# Patient Record
Sex: MALE | Race: ASIAN | NOT HISPANIC OR LATINO | URBAN - METROPOLITAN AREA
[De-identification: names, ages, dates, MRNs, and addresses within clinical notes are randomized per-mention and may not be internally consistent; named-entity substitution may affect disease eponyms.]

---

## 2015-04-15 RX ORDER — ASPIRIN/CALCIUM CARB/MAGNESIUM 324 MG
1 TABLET ORAL
Qty: 0 | Refills: 0 | COMMUNITY
Start: 2015-04-15

## 2017-03-31 ENCOUNTER — INPATIENT (INPATIENT)
Facility: HOSPITAL | Age: 77
LOS: 4 days | Discharge: HOME | End: 2017-04-05
Attending: INTERNAL MEDICINE | Admitting: INTERNAL MEDICINE
Payer: MEDICARE

## 2017-03-31 VITALS
OXYGEN SATURATION: 100 % | RESPIRATION RATE: 14 BRPM | SYSTOLIC BLOOD PRESSURE: 131 MMHG | HEART RATE: 58 BPM | TEMPERATURE: 98 F | DIASTOLIC BLOOD PRESSURE: 65 MMHG

## 2017-03-31 DIAGNOSIS — R55 SYNCOPE AND COLLAPSE: ICD-10-CM

## 2017-03-31 DIAGNOSIS — E11.9 TYPE 2 DIABETES MELLITUS WITHOUT COMPLICATIONS: ICD-10-CM

## 2017-03-31 DIAGNOSIS — I25.10 ATHEROSCLEROTIC HEART DISEASE OF NATIVE CORONARY ARTERY WITHOUT ANGINA PECTORIS: ICD-10-CM

## 2017-03-31 DIAGNOSIS — I10 ESSENTIAL (PRIMARY) HYPERTENSION: ICD-10-CM

## 2017-03-31 DIAGNOSIS — K21.9 GASTRO-ESOPHAGEAL REFLUX DISEASE WITHOUT ESOPHAGITIS: ICD-10-CM
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
## 2017-03-31 DIAGNOSIS — E78.5 HYPERLIPIDEMIA, UNSPECIFIED: ICD-10-CM

## 2017-03-31 DIAGNOSIS — N40.0 BENIGN PROSTATIC HYPERPLASIA WITHOUT LOWER URINARY TRACT SYMPTOMS: ICD-10-CM

## 2017-03-31 DIAGNOSIS — R42 DIZZINESS AND GIDDINESS: ICD-10-CM

## 2017-03-31 LAB
ALBUMIN SERPL ELPH-MCNC: 3.4 G/DL — SIGNIFICANT CHANGE UP (ref 3.3–5)
ALP SERPL-CCNC: 58 U/L — SIGNIFICANT CHANGE UP (ref 40–120)
ALT FLD-CCNC: 20 U/L — SIGNIFICANT CHANGE UP (ref 4–41)
APTT BLD: 28.7 SEC — SIGNIFICANT CHANGE UP (ref 27.5–37.4)
AST SERPL-CCNC: 21 U/L — SIGNIFICANT CHANGE UP (ref 4–40)
BASOPHILS # BLD AUTO: 0.01 K/UL — SIGNIFICANT CHANGE UP (ref 0–0.2)
BASOPHILS NFR BLD AUTO: 0.1 % — SIGNIFICANT CHANGE UP (ref 0–2)
BILIRUB SERPL-MCNC: 0.6 MG/DL — SIGNIFICANT CHANGE UP (ref 0.2–1.2)
BUN SERPL-MCNC: 12 MG/DL — SIGNIFICANT CHANGE UP (ref 7–23)
CALCIUM SERPL-MCNC: 8.6 MG/DL — SIGNIFICANT CHANGE UP (ref 8.4–10.5)
CHLORIDE SERPL-SCNC: 99 MMOL/L — SIGNIFICANT CHANGE UP (ref 98–107)
CK MB BLD-MCNC: 2.89 NG/ML — SIGNIFICANT CHANGE UP (ref 1–6.6)
CK MB BLD-MCNC: SIGNIFICANT CHANGE UP (ref 0–2.5)
CK SERPL-CCNC: 52 U/L — SIGNIFICANT CHANGE UP (ref 30–200)
CK SERPL-CCNC: 56 U/L — SIGNIFICANT CHANGE UP (ref 30–200)
CO2 SERPL-SCNC: 25 MMOL/L — SIGNIFICANT CHANGE UP (ref 22–31)
CREAT SERPL-MCNC: 0.84 MG/DL — SIGNIFICANT CHANGE UP (ref 0.5–1.3)
EOSINOPHIL # BLD AUTO: 0.22 K/UL — SIGNIFICANT CHANGE UP (ref 0–0.5)
EOSINOPHIL NFR BLD AUTO: 2.6 % — SIGNIFICANT CHANGE UP (ref 0–6)
GLUCOSE SERPL-MCNC: 87 MG/DL — SIGNIFICANT CHANGE UP (ref 70–99)
HCT VFR BLD CALC: 41.7 % — SIGNIFICANT CHANGE UP (ref 39–50)
HGB BLD-MCNC: 14.2 G/DL — SIGNIFICANT CHANGE UP (ref 13–17)
IMM GRANULOCYTES NFR BLD AUTO: 0.2 % — SIGNIFICANT CHANGE UP (ref 0–1.5)
INR BLD: 1.06 — SIGNIFICANT CHANGE UP (ref 0.88–1.17)
LYMPHOCYTES # BLD AUTO: 1.1 K/UL — SIGNIFICANT CHANGE UP (ref 1–3.3)
LYMPHOCYTES # BLD AUTO: 13.2 % — SIGNIFICANT CHANGE UP (ref 13–44)
MCHC RBC-ENTMCNC: 30.7 PG — SIGNIFICANT CHANGE UP (ref 27–34)
MCHC RBC-ENTMCNC: 34.1 % — SIGNIFICANT CHANGE UP (ref 32–36)
MCV RBC AUTO: 90.3 FL — SIGNIFICANT CHANGE UP (ref 80–100)
MONOCYTES # BLD AUTO: 0.52 K/UL — SIGNIFICANT CHANGE UP (ref 0–0.9)
MONOCYTES NFR BLD AUTO: 6.2 % — SIGNIFICANT CHANGE UP (ref 2–14)
NEUTROPHILS # BLD AUTO: 6.49 K/UL — SIGNIFICANT CHANGE UP (ref 1.8–7.4)
NEUTROPHILS NFR BLD AUTO: 77.7 % — HIGH (ref 43–77)
PLATELET # BLD AUTO: 174 K/UL — SIGNIFICANT CHANGE UP (ref 150–400)
PMV BLD: 10.1 FL — SIGNIFICANT CHANGE UP (ref 7–13)
POTASSIUM SERPL-MCNC: 4.3 MMOL/L — SIGNIFICANT CHANGE UP (ref 3.5–5.3)
POTASSIUM SERPL-SCNC: 4.3 MMOL/L — SIGNIFICANT CHANGE UP (ref 3.5–5.3)
PROT SERPL-MCNC: 5.9 G/DL — LOW (ref 6–8.3)
PROTHROM AB SERPL-ACNC: 11.9 SEC — SIGNIFICANT CHANGE UP (ref 9.8–13.1)
RBC # BLD: 4.62 M/UL — SIGNIFICANT CHANGE UP (ref 4.2–5.8)
RBC # FLD: 14.1 % — SIGNIFICANT CHANGE UP (ref 10.3–14.5)
SODIUM SERPL-SCNC: 137 MMOL/L — SIGNIFICANT CHANGE UP (ref 135–145)
TROPONIN T SERPL-MCNC: < 0.06 NG/ML — SIGNIFICANT CHANGE UP (ref 0–0.06)
TROPONIN T SERPL-MCNC: < 0.06 NG/ML — SIGNIFICANT CHANGE UP (ref 0–0.06)
WBC # BLD: 8.36 K/UL — SIGNIFICANT CHANGE UP (ref 3.8–10.5)
WBC # FLD AUTO: 8.36 K/UL — SIGNIFICANT CHANGE UP (ref 3.8–10.5)

## 2017-03-31 PROCEDURE — 70450 CT HEAD/BRAIN W/O DYE: CPT | Mod: 26

## 2017-03-31 PROCEDURE — 71020: CPT | Mod: 26

## 2017-03-31 RX ORDER — PANTOPRAZOLE SODIUM 20 MG/1
40 TABLET, DELAYED RELEASE ORAL
Qty: 0 | Refills: 0 | Status: DISCONTINUED | OUTPATIENT
Start: 2017-03-31 | End: 2017-04-05

## 2017-03-31 RX ORDER — DOCUSATE SODIUM 100 MG
100 CAPSULE ORAL DAILY
Qty: 0 | Refills: 0 | Status: DISCONTINUED | OUTPATIENT
Start: 2017-03-31 | End: 2017-04-01

## 2017-03-31 RX ORDER — ISOSORBIDE DINITRATE 5 MG/1
20 TABLET ORAL
Qty: 0 | Refills: 0 | Status: DISCONTINUED | OUTPATIENT
Start: 2017-03-31 | End: 2017-04-05

## 2017-03-31 RX ORDER — HYDROXYZINE HCL 10 MG
25 TABLET ORAL AT BEDTIME
Qty: 0 | Refills: 0 | Status: DISCONTINUED | OUTPATIENT
Start: 2017-03-31 | End: 2017-04-05

## 2017-03-31 RX ORDER — FERROUS SULFATE 325(65) MG
325 TABLET ORAL DAILY
Qty: 0 | Refills: 0 | Status: DISCONTINUED | OUTPATIENT
Start: 2017-03-31 | End: 2017-04-05

## 2017-03-31 RX ORDER — ASPIRIN/CALCIUM CARB/MAGNESIUM 324 MG
81 TABLET ORAL DAILY
Qty: 0 | Refills: 0 | Status: DISCONTINUED | OUTPATIENT
Start: 2017-03-31 | End: 2017-04-05

## 2017-03-31 RX ORDER — HEPARIN SODIUM 5000 [USP'U]/ML
5000 INJECTION INTRAVENOUS; SUBCUTANEOUS EVERY 8 HOURS
Qty: 0 | Refills: 0 | Status: DISCONTINUED | OUTPATIENT
Start: 2017-03-31 | End: 2017-04-05

## 2017-03-31 RX ORDER — SODIUM CHLORIDE 9 MG/ML
500 INJECTION INTRAMUSCULAR; INTRAVENOUS; SUBCUTANEOUS ONCE
Qty: 0 | Refills: 0 | Status: COMPLETED | OUTPATIENT
Start: 2017-03-31 | End: 2017-03-31

## 2017-03-31 RX ORDER — FAMOTIDINE 10 MG/ML
20 INJECTION INTRAVENOUS
Qty: 0 | Refills: 0 | Status: DISCONTINUED | OUTPATIENT
Start: 2017-03-31 | End: 2017-04-05

## 2017-03-31 RX ORDER — RANOLAZINE 500 MG/1
500 TABLET, FILM COATED, EXTENDED RELEASE ORAL
Qty: 0 | Refills: 0 | Status: DISCONTINUED | OUTPATIENT
Start: 2017-03-31 | End: 2017-04-05

## 2017-03-31 RX ORDER — SIMVASTATIN 20 MG/1
40 TABLET, FILM COATED ORAL AT BEDTIME
Qty: 0 | Refills: 0 | Status: DISCONTINUED | OUTPATIENT
Start: 2017-03-31 | End: 2017-04-05

## 2017-03-31 RX ADMIN — SODIUM CHLORIDE 500 MILLILITER(S): 9 INJECTION INTRAMUSCULAR; INTRAVENOUS; SUBCUTANEOUS at 13:36

## 2017-03-31 RX ADMIN — Medication 100 MILLIGRAM(S): at 22:01

## 2017-03-31 RX ADMIN — RANOLAZINE 500 MILLIGRAM(S): 500 TABLET, FILM COATED, EXTENDED RELEASE ORAL at 22:01

## 2017-03-31 RX ADMIN — Medication 20 MILLIGRAM(S): at 19:25

## 2017-03-31 RX ADMIN — Medication 81 MILLIGRAM(S): at 19:25

## 2017-03-31 RX ADMIN — SIMVASTATIN 40 MILLIGRAM(S): 20 TABLET, FILM COATED ORAL at 22:01

## 2017-03-31 RX ADMIN — PANTOPRAZOLE SODIUM 40 MILLIGRAM(S): 20 TABLET, DELAYED RELEASE ORAL at 19:25

## 2017-03-31 RX ADMIN — ISOSORBIDE DINITRATE 20 MILLIGRAM(S): 5 TABLET ORAL at 19:25

## 2017-03-31 RX ADMIN — FAMOTIDINE 20 MILLIGRAM(S): 10 INJECTION INTRAVENOUS at 22:01

## 2017-03-31 NOTE — H&P ADULT. - HISTORY OF PRESENT ILLNESS
75 y/o male c/o dizziness lasting a half hour that was experienced at 9:30am causing the pt to sit down to prevent himself from falling. He has associated upper and lower extremity weakness, along with nausea and one episode of vomiting. Pt states going to the bathroom soonafter where he collapsed due to increased weakness and was unable to lift himself from the bathroom floor. He denies hitting his head and LOC. He was found by his daughter-in-law immediately after who then called EMS. Pt also had associated palpitations during today's episode of dizziness along with blurriness and diaphoresis, but admits to having occasional episodes of dizziness, which are often accompanied with palpitations and blurriness, but the weakness was a new symptoms for him. Pt also mentions having 9/10, intermittent, right and left-sided chest pain radiating to the epigastrium for one week prior to him visiting the cardiologist last Tuesday on 03/20/17. He was prescribed a "medication" by the cardiologist, which he can't recall. Pt also admits to 8/10 neck back experienced daily when he lays down for bed. Pt rubs Beng[place] in the neck region, which provides relief. Pt denies SOB, RYAN, HAs, fevers, chills, hematuria, dysuria, polyuria, hematochezia, and melena. Pt is s/p CABG x4 in 2002 and gastrectomy for gastric cancer in 2006. Pt denies recent travel and sick contacts. 75 y/o male c/o dizziness lasting a half hour that was experienced at 9:30am causing the pt to sit down to prevent himself from falling. He also has associated b/l upper and lower extremity weakness, along with nausea and one episode of vomiting. Pt states going to the bathroom soon after where he collapsed due to increased weakness and was unable to lift himself from the bathroom floor. He denies hitting his head or LOC. He was found by his daughter-in-law immediately after who then called EMS. Pt also had associated palpitations during today's episode of dizziness along with blurriness and diaphoresis, but admits to having occasional episodes of dizziness, which are often accompanied with palpitations and blurriness, but the weakness was a new symptoms for him. Pt also mentions having 9/10, intermittent, right and left-sided chest pain radiating to the epigastrium for one week prior to him visiting the cardiologist last Tuesday on 03/28/17. He was prescribed a "medication" by the cardiologist, which he can't recall. Pt also had a carotid doppler on 3/28 which revealed carotid stenosis at Dr Rebolledo's office. Pt also admits to 8/10 neck back experienced daily when he lays down for bed. Pt rubs Beng[place] in the neck region, which provides relief. Pt denies SOB, RYAN, HAs, fevers, chills, hematuria, dysuria, polyuria, hematochezia, and melena.  Pt denies recent travel and sick contacts.

## 2017-03-31 NOTE — H&P ADULT. - PROBLEM SELECTOR PLAN 3
Monitor BPs of both arms   Place pt 2g Na restriction diet  Continue isosorbide dinitrate  F/u with outpatient PMD Continue aspirin, isosorbide, statin

## 2017-03-31 NOTE — ED PROVIDER NOTE - MEDICAL DECISION MAKING DETAILS
75 yo male w/ ? syncopal episode w/o head trauma.  Known hx of carotid stenosis, awaiting CT scan. cocnern for acs  cbc, cmp, trop, ekg

## 2017-03-31 NOTE — H&P ADULT. - PROBLEM SELECTOR PLAN 2
Consider CT of head  Place patient on fall risk 2g Na restriction diet, Continue isosorbide dinitrate

## 2017-03-31 NOTE — H&P ADULT. - PROBLEM SELECTOR PLAN 6
Order PSA level   F/u with outpatient PMD Start pt on insulin sliding scale, Monitor fingersticks, check HgbA1C

## 2017-03-31 NOTE — H&P ADULT. - ATTENDING COMMENTS
pt seen and examined  agree with above h and p    patient is a 76 year old man with mult med issues admitted with dizziness, syncope, extremity weakness, and nausea/vomiting. Also c/o palpitations.      denies fever, chills, abd pain, chest pain    veronica neg  ekg  sr, lbbb, 1st degree AVB    cxr clear    vitals stable  nad aoo x 3  nc/at neck supple no jvd  cv s1s2 rrr  lungs clear   abd soft, nt  ext no edema    A/P  \  syncope  palps  chest pain  conduction disease  r/o carotid disease      syncope/dizziness  ? sec to conduction disease vs vasovagal   tele  echo for ef  stress basim to r/o new ischemic heart disease    eps eval     carotid dopplers  r/o critical disease  head ct   med eval for mult med issues  repeat tsh, tfts  dvt ppx

## 2017-03-31 NOTE — H&P ADULT. - RS GEN PE MLT RESP DETAILS PC
clear to auscultation bilaterally/respirations non-labored/airway patent/good air movement/breath sounds equal

## 2017-03-31 NOTE — H&P ADULT. - NEGATIVE CARDIOVASCULAR SYMPTOMS
no claudication/no paroxysmal nocturnal dyspnea/no orthopnea/no chest pain/no dyspnea on exertion/no peripheral edema

## 2017-03-31 NOTE — H&P ADULT. - PROBLEM SELECTOR PLAN 1
Order orthostatics, CBC, CMP, EKG  Consider Echo  Possible cariology consult  Place patient on fall risk Admit to Telemetry, serial CE's, EKG's, check orthostatics, echocardiogram, CT Head. Obtain outpt CD results in am. F/U Cardiology note Admit to Telemetry, serial CE's, EKG's, check orthostatics, echocardiogram, CT Head, Carotid Dopplers, pharm NST. F/U Cardiology note

## 2017-03-31 NOTE — ED ADULT TRIAGE NOTE - CHIEF COMPLAINT QUOTE
generalized weakness L/E  f/s @ home 125 generalized weakness L/E  f/s @ home 125   as per family states  pt b/p was low

## 2017-03-31 NOTE — H&P ADULT. - NEGATIVE MUSCULOSKELETAL SYMPTOMS
no arthritis/no stiffness/no muscle weakness/no joint swelling/no arthralgia/no muscle cramps/no myalgia

## 2017-03-31 NOTE — ED PROVIDER NOTE - OBJECTIVE STATEMENT
74 year old male with history of CAD s/p CABG, HTN, Hypercholesterolemia, Chronic urticaria(on po prednisone for 2 years), BPH, gastric CA s/p partial gastrectomy s/p syncope. Recently diagnosed rt carotid stenosis, due for imaging.    This morning patient went to pray, felt lightheadedness, saw spots, felt weak, diaphoretic. Went to bathroom, unwitnessed fall, patient denies loc, head trauma. Afterwards 74 year old male with history of CAD s/p CABG, HTN, Hypercholesterolemia, Chronic urticaria(on po prednisone for 2 years), BPH, gastric CA s/p partial gastrectomy s/p syncope. Recently diagnosed rt carotid stenosis, due for imaging.   This morning patient went to pray, felt lightheadedness, saw spots, felt weak, diaphoretic. Went to bathroom, unwitnessed fall, states that he some LOC, lasting approximately seconds, denies head trauma. Patient states that he had chest pain yesterday, non before, during, or after the incident.  Denies sob, fevers or chills. The patient had similar symptoms in the past, was admitted and told everything was "fine".  Denies bowel or bladder incontinence.  Patient had 1 episode of n/v.  States that he feels a little "dizzy"  Cardiologist: Dr. Rebolledo [phone] Jose G att: 74 year old male with history of htn, hld, CAD s/p CABG, BPH, gastric CA s/p partial gastrectomy s/p syncope. Recently diagnosed rt carotid stenosis, due for imaging. This morning patient went to pray, felt lightheadedness, saw spots, felt weak, diaphoretic. Went to bathroom, unwitnessed fall, states that he some LOC, lasting approximately seconds, denies head trauma. Patient states that he had chest pain yesterday, non before, during, or after the incident.  Denies sob, fevers or chills. The patient had similar symptoms in the past, was admitted and told everything was "fine".  Denies bowel or bladder incontinence.  Patient had 1 episode of n/v.  States that he feels a little "dizzy." Denies focal motor or sensory deficit.   Cardiologist: Dr. Rebolledo [phone] Jose G att: 74 year old male with history of htn, hld, CAD s/p CABG, BPH, gastric CA s/p partial gastrectomy s/p syncope. Recently diagnosed rt carotid stenosis, due for imaging. This morning patient went to pray, felt lightheadedness, saw spots, felt weak, diaphoretic. Went to bathroom, unwitnessed fall, unclear if LOC, lasting approximately seconds, denies head trauma. Patient states that he had chest pain yesterday, non before, during, or after the incident.  Denies sob, fevers or chills. The patient had similar symptoms in the past, was admitted and told everything was "fine".  Denies bowel or bladder incontinence.  Patient had 1 episode of n/v.  States that he feels a little "dizzy." Denies focal motor or sensory deficit.   Cardiologist: Dr. Rebolledo [phone]

## 2017-03-31 NOTE — H&P ADULT. - NEGATIVE GENERAL SYMPTOMS
no weight gain/no fever/no chills/no polyphagia/no polyuria/no anorexia/no weight loss/no polydipsia/no malaise

## 2017-03-31 NOTE — H&P ADULT. - NEGATIVE GASTROINTESTINAL SYMPTOMS
no melena/no flatulence/no change in bowel habits/no constipation/no abdominal pain/no nausea/no hematochezia/no diarrhea/no steatorrhea/no vomiting/no jaundice

## 2017-03-31 NOTE — ED PROVIDER NOTE - PMH
Anemia    BPH (Benign Prostatic Hypertrophy)    CAD (Coronary Artery Disease)    DM (diabetes mellitus)    Dyslipidemia    GERD (Gastroesophageal Reflux Disease)    History of Stomach Cancer    Hives  on prednisone  HTN - Hypertension    Hypercholesteremia    S/P CABG X 3    S/P Chemotherapy, Time Since Greater than 12 Weeks    S/P Radiation > 12 Weeks

## 2017-03-31 NOTE — H&P ADULT. - PROBLEM SELECTOR PLAN 4
Continue aspirin  Consider possible echo  F/u with outpatient cardiologist Continue simvastatin, Order lipid panel

## 2017-03-31 NOTE — H&P ADULT. - PSH
S/P CABG X 4    S/P Gastrectomy  2006 due to gastric cancer  Status Post Gastrectomy S/P CABG X 4  2002  S/P Gastrectomy  2006 due to gastric cancer

## 2017-03-31 NOTE — H&P ADULT. - ASSESSMENT
75 y/o male c/o of an episode of dizziness with associated upper and lower extremity weakness, palpitations, and diaphoresis 77 y/o male c/o of an episode of dizziness with associated upper and lower extremity weakness, palpitations, and diaphoresis and fall. Pt had recent carotid dopplers as outpt revealing carotid stenosis.  BP RUE: 187/78,  LUE: 190/76

## 2017-03-31 NOTE — ED PROVIDER NOTE - ATTENDING CONTRIBUTION TO CARE
Dr. Araiza: I have personally seen and examined this patient at the bedside. I have fully participated in the care of this patient. I have reviewed all pertinent clinical information, including history, physical exam, plan and the Resident's note and agree except as noted. HPI above as by me. PE above as by me. DDX syncope, suspect cardiac etiology PLAN cbc cmp trop ck ckmb cxr, admit to cards for r/o mi and cta head and neck eval carotid stenosis.

## 2017-04-01 LAB
ALBUMIN SERPL ELPH-MCNC: 3.3 G/DL — SIGNIFICANT CHANGE UP (ref 3.3–5)
ALP SERPL-CCNC: 59 U/L — SIGNIFICANT CHANGE UP (ref 40–120)
ALT FLD-CCNC: 17 U/L — SIGNIFICANT CHANGE UP (ref 4–41)
APPEARANCE UR: CLEAR — SIGNIFICANT CHANGE UP
AST SERPL-CCNC: 19 U/L — SIGNIFICANT CHANGE UP (ref 4–40)
BASOPHILS # BLD AUTO: 0.01 K/UL — SIGNIFICANT CHANGE UP (ref 0–0.2)
BASOPHILS NFR BLD AUTO: 0.2 % — SIGNIFICANT CHANGE UP (ref 0–2)
BILIRUB SERPL-MCNC: 0.9 MG/DL — SIGNIFICANT CHANGE UP (ref 0.2–1.2)
BILIRUB UR-MCNC: NEGATIVE — SIGNIFICANT CHANGE UP
BLOOD UR QL VISUAL: NEGATIVE — SIGNIFICANT CHANGE UP
BUN SERPL-MCNC: 15 MG/DL — SIGNIFICANT CHANGE UP (ref 7–23)
CALCIUM SERPL-MCNC: 8.8 MG/DL — SIGNIFICANT CHANGE UP (ref 8.4–10.5)
CHLORIDE SERPL-SCNC: 100 MMOL/L — SIGNIFICANT CHANGE UP (ref 98–107)
CHOLEST SERPL-MCNC: 161 MG/DL — SIGNIFICANT CHANGE UP (ref 120–199)
CK SERPL-CCNC: 45 U/L — SIGNIFICANT CHANGE UP (ref 30–200)
CO2 SERPL-SCNC: 23 MMOL/L — SIGNIFICANT CHANGE UP (ref 22–31)
COLOR SPEC: SIGNIFICANT CHANGE UP
CREAT SERPL-MCNC: 0.82 MG/DL — SIGNIFICANT CHANGE UP (ref 0.5–1.3)
EOSINOPHIL # BLD AUTO: 0.01 K/UL — SIGNIFICANT CHANGE UP (ref 0–0.5)
EOSINOPHIL NFR BLD AUTO: 0.2 % — SIGNIFICANT CHANGE UP (ref 0–6)
GLUCOSE SERPL-MCNC: 147 MG/DL — HIGH (ref 70–99)
GLUCOSE UR-MCNC: SIGNIFICANT CHANGE UP
HBA1C BLD-MCNC: 6.7 % — HIGH (ref 4–5.6)
HCT VFR BLD CALC: 45.4 % — SIGNIFICANT CHANGE UP (ref 39–50)
HDLC SERPL-MCNC: 78 MG/DL — HIGH (ref 35–55)
HGB BLD-MCNC: 15.5 G/DL — SIGNIFICANT CHANGE UP (ref 13–17)
IMM GRANULOCYTES NFR BLD AUTO: 0.5 % — SIGNIFICANT CHANGE UP (ref 0–1.5)
KETONES UR-MCNC: NEGATIVE — SIGNIFICANT CHANGE UP
LEUKOCYTE ESTERASE UR-ACNC: NEGATIVE — SIGNIFICANT CHANGE UP
LIPID PNL WITH DIRECT LDL SERPL: 86 MG/DL — SIGNIFICANT CHANGE UP
LYMPHOCYTES # BLD AUTO: 0.7 K/UL — LOW (ref 1–3.3)
LYMPHOCYTES # BLD AUTO: 12.1 % — LOW (ref 13–44)
MAGNESIUM SERPL-MCNC: 2.3 MG/DL — SIGNIFICANT CHANGE UP (ref 1.6–2.6)
MCHC RBC-ENTMCNC: 30.9 PG — SIGNIFICANT CHANGE UP (ref 27–34)
MCHC RBC-ENTMCNC: 34.1 % — SIGNIFICANT CHANGE UP (ref 32–36)
MCV RBC AUTO: 90.6 FL — SIGNIFICANT CHANGE UP (ref 80–100)
MONOCYTES # BLD AUTO: 0.07 K/UL — SIGNIFICANT CHANGE UP (ref 0–0.9)
MONOCYTES NFR BLD AUTO: 1.2 % — LOW (ref 2–14)
MUCOUS THREADS # UR AUTO: SIGNIFICANT CHANGE UP
NEUTROPHILS # BLD AUTO: 4.98 K/UL — SIGNIFICANT CHANGE UP (ref 1.8–7.4)
NEUTROPHILS NFR BLD AUTO: 85.8 % — HIGH (ref 43–77)
NITRITE UR-MCNC: NEGATIVE — SIGNIFICANT CHANGE UP
PH UR: 8 — SIGNIFICANT CHANGE UP (ref 4.6–8)
PHOSPHATE SERPL-MCNC: 3.8 MG/DL — SIGNIFICANT CHANGE UP (ref 2.5–4.5)
PLATELET # BLD AUTO: 199 K/UL — SIGNIFICANT CHANGE UP (ref 150–400)
PMV BLD: 10.4 FL — SIGNIFICANT CHANGE UP (ref 7–13)
POTASSIUM SERPL-MCNC: 5.1 MMOL/L — SIGNIFICANT CHANGE UP (ref 3.5–5.3)
POTASSIUM SERPL-SCNC: 5.1 MMOL/L — SIGNIFICANT CHANGE UP (ref 3.5–5.3)
PROT SERPL-MCNC: 5.9 G/DL — LOW (ref 6–8.3)
PROT UR-MCNC: NEGATIVE — SIGNIFICANT CHANGE UP
RBC # BLD: 5.01 M/UL — SIGNIFICANT CHANGE UP (ref 4.2–5.8)
RBC # FLD: 14.2 % — SIGNIFICANT CHANGE UP (ref 10.3–14.5)
RBC CASTS # UR COMP ASSIST: SIGNIFICANT CHANGE UP (ref 0–?)
SODIUM SERPL-SCNC: 135 MMOL/L — SIGNIFICANT CHANGE UP (ref 135–145)
SP GR SPEC: 1.01 — SIGNIFICANT CHANGE UP (ref 1–1.03)
TRIGL SERPL-MCNC: 46 MG/DL — SIGNIFICANT CHANGE UP (ref 10–149)
TROPONIN T SERPL-MCNC: < 0.06 NG/ML — SIGNIFICANT CHANGE UP (ref 0–0.06)
TSH SERPL-MCNC: 0.21 UIU/ML — LOW (ref 0.27–4.2)
UROBILINOGEN FLD QL: NORMAL E.U. — SIGNIFICANT CHANGE UP (ref 0.1–0.2)
WBC # BLD: 5.8 K/UL — SIGNIFICANT CHANGE UP (ref 3.8–10.5)
WBC # FLD AUTO: 5.8 K/UL — SIGNIFICANT CHANGE UP (ref 3.8–10.5)
WBC UR QL: SIGNIFICANT CHANGE UP (ref 0–?)

## 2017-04-01 RX ORDER — SODIUM CHLORIDE 9 MG/ML
1000 INJECTION INTRAMUSCULAR; INTRAVENOUS; SUBCUTANEOUS
Qty: 0 | Refills: 0 | Status: DISCONTINUED | OUTPATIENT
Start: 2017-04-01 | End: 2017-04-05

## 2017-04-01 RX ORDER — DOCUSATE SODIUM 100 MG
100 CAPSULE ORAL
Qty: 0 | Refills: 0 | Status: DISCONTINUED | OUTPATIENT
Start: 2017-04-01 | End: 2017-04-05

## 2017-04-01 RX ORDER — POLYETHYLENE GLYCOL 3350 17 G/17G
17 POWDER, FOR SOLUTION ORAL DAILY
Qty: 0 | Refills: 0 | Status: DISCONTINUED | OUTPATIENT
Start: 2017-04-01 | End: 2017-04-05

## 2017-04-01 RX ADMIN — RANOLAZINE 500 MILLIGRAM(S): 500 TABLET, FILM COATED, EXTENDED RELEASE ORAL at 17:25

## 2017-04-01 RX ADMIN — ISOSORBIDE DINITRATE 20 MILLIGRAM(S): 5 TABLET ORAL at 17:25

## 2017-04-01 RX ADMIN — SIMVASTATIN 40 MILLIGRAM(S): 20 TABLET, FILM COATED ORAL at 21:13

## 2017-04-01 RX ADMIN — POLYETHYLENE GLYCOL 3350 17 GRAM(S): 17 POWDER, FOR SOLUTION ORAL at 18:40

## 2017-04-01 RX ADMIN — FAMOTIDINE 20 MILLIGRAM(S): 10 INJECTION INTRAVENOUS at 06:09

## 2017-04-01 RX ADMIN — Medication 100 MILLIGRAM(S): at 18:40

## 2017-04-01 RX ADMIN — Medication 325 MILLIGRAM(S): at 11:25

## 2017-04-01 RX ADMIN — Medication 20 MILLIGRAM(S): at 06:09

## 2017-04-01 RX ADMIN — ISOSORBIDE DINITRATE 20 MILLIGRAM(S): 5 TABLET ORAL at 06:09

## 2017-04-01 RX ADMIN — SODIUM CHLORIDE 100 MILLILITER(S): 9 INJECTION INTRAMUSCULAR; INTRAVENOUS; SUBCUTANEOUS at 20:35

## 2017-04-01 RX ADMIN — FAMOTIDINE 20 MILLIGRAM(S): 10 INJECTION INTRAVENOUS at 17:25

## 2017-04-01 RX ADMIN — Medication 81 MILLIGRAM(S): at 11:25

## 2017-04-01 RX ADMIN — Medication 100 MILLIGRAM(S): at 11:25

## 2017-04-01 RX ADMIN — PANTOPRAZOLE SODIUM 40 MILLIGRAM(S): 20 TABLET, DELAYED RELEASE ORAL at 06:09

## 2017-04-01 RX ADMIN — RANOLAZINE 500 MILLIGRAM(S): 500 TABLET, FILM COATED, EXTENDED RELEASE ORAL at 06:09

## 2017-04-02 LAB
BUN SERPL-MCNC: 20 MG/DL — SIGNIFICANT CHANGE UP (ref 7–23)
CALCIUM SERPL-MCNC: 8.2 MG/DL — LOW (ref 8.4–10.5)
CHLORIDE SERPL-SCNC: 102 MMOL/L — SIGNIFICANT CHANGE UP (ref 98–107)
CO2 SERPL-SCNC: 24 MMOL/L — SIGNIFICANT CHANGE UP (ref 22–31)
CREAT SERPL-MCNC: 0.85 MG/DL — SIGNIFICANT CHANGE UP (ref 0.5–1.3)
GLUCOSE SERPL-MCNC: 100 MG/DL — HIGH (ref 70–99)
HCT VFR BLD CALC: 39.6 % — SIGNIFICANT CHANGE UP (ref 39–50)
HGB BLD-MCNC: 13.4 G/DL — SIGNIFICANT CHANGE UP (ref 13–17)
MCHC RBC-ENTMCNC: 30.6 PG — SIGNIFICANT CHANGE UP (ref 27–34)
MCHC RBC-ENTMCNC: 33.8 % — SIGNIFICANT CHANGE UP (ref 32–36)
MCV RBC AUTO: 90.4 FL — SIGNIFICANT CHANGE UP (ref 80–100)
PLATELET # BLD AUTO: 189 K/UL — SIGNIFICANT CHANGE UP (ref 150–400)
PMV BLD: 10.2 FL — SIGNIFICANT CHANGE UP (ref 7–13)
POTASSIUM SERPL-MCNC: 4.7 MMOL/L — SIGNIFICANT CHANGE UP (ref 3.5–5.3)
POTASSIUM SERPL-SCNC: 4.7 MMOL/L — SIGNIFICANT CHANGE UP (ref 3.5–5.3)
RBC # BLD: 4.38 M/UL — SIGNIFICANT CHANGE UP (ref 4.2–5.8)
RBC # FLD: 14.1 % — SIGNIFICANT CHANGE UP (ref 10.3–14.5)
SODIUM SERPL-SCNC: 138 MMOL/L — SIGNIFICANT CHANGE UP (ref 135–145)
WBC # BLD: 10.02 K/UL — SIGNIFICANT CHANGE UP (ref 3.8–10.5)
WBC # FLD AUTO: 10.02 K/UL — SIGNIFICANT CHANGE UP (ref 3.8–10.5)

## 2017-04-02 PROCEDURE — 93880 EXTRACRANIAL BILAT STUDY: CPT | Mod: 26

## 2017-04-02 RX ORDER — DEXTROSE 50 % IN WATER 50 %
1 SYRINGE (ML) INTRAVENOUS ONCE
Qty: 0 | Refills: 0 | Status: DISCONTINUED | OUTPATIENT
Start: 2017-04-02 | End: 2017-04-05

## 2017-04-02 RX ORDER — DEXTROSE 50 % IN WATER 50 %
25 SYRINGE (ML) INTRAVENOUS ONCE
Qty: 0 | Refills: 0 | Status: DISCONTINUED | OUTPATIENT
Start: 2017-04-02 | End: 2017-04-05

## 2017-04-02 RX ORDER — SODIUM CHLORIDE 9 MG/ML
1000 INJECTION INTRAMUSCULAR; INTRAVENOUS; SUBCUTANEOUS
Qty: 0 | Refills: 0 | Status: DISCONTINUED | OUTPATIENT
Start: 2017-04-02 | End: 2017-04-05

## 2017-04-02 RX ORDER — GLUCAGON INJECTION, SOLUTION 0.5 MG/.1ML
1 INJECTION, SOLUTION SUBCUTANEOUS ONCE
Qty: 0 | Refills: 0 | Status: DISCONTINUED | OUTPATIENT
Start: 2017-04-02 | End: 2017-04-05

## 2017-04-02 RX ORDER — DEXTROSE 50 % IN WATER 50 %
12.5 SYRINGE (ML) INTRAVENOUS ONCE
Qty: 0 | Refills: 0 | Status: DISCONTINUED | OUTPATIENT
Start: 2017-04-02 | End: 2017-04-05

## 2017-04-02 RX ORDER — INSULIN LISPRO 100/ML
2 VIAL (ML) SUBCUTANEOUS ONCE
Qty: 0 | Refills: 0 | Status: COMPLETED | OUTPATIENT
Start: 2017-04-02 | End: 2017-04-02

## 2017-04-02 RX ORDER — SODIUM CHLORIDE 9 MG/ML
1000 INJECTION, SOLUTION INTRAVENOUS
Qty: 0 | Refills: 0 | Status: DISCONTINUED | OUTPATIENT
Start: 2017-04-02 | End: 2017-04-05

## 2017-04-02 RX ORDER — INSULIN LISPRO 100/ML
VIAL (ML) SUBCUTANEOUS
Qty: 0 | Refills: 0 | Status: DISCONTINUED | OUTPATIENT
Start: 2017-04-02 | End: 2017-04-05

## 2017-04-02 RX ADMIN — Medication 100 MILLIGRAM(S): at 17:18

## 2017-04-02 RX ADMIN — Medication 1: at 12:36

## 2017-04-02 RX ADMIN — SIMVASTATIN 40 MILLIGRAM(S): 20 TABLET, FILM COATED ORAL at 22:05

## 2017-04-02 RX ADMIN — POLYETHYLENE GLYCOL 3350 17 GRAM(S): 17 POWDER, FOR SOLUTION ORAL at 17:18

## 2017-04-02 RX ADMIN — RANOLAZINE 500 MILLIGRAM(S): 500 TABLET, FILM COATED, EXTENDED RELEASE ORAL at 17:19

## 2017-04-02 RX ADMIN — SODIUM CHLORIDE 75 MILLILITER(S): 9 INJECTION INTRAMUSCULAR; INTRAVENOUS; SUBCUTANEOUS at 15:13

## 2017-04-02 RX ADMIN — Medication 20 MILLIGRAM(S): at 05:36

## 2017-04-02 RX ADMIN — FAMOTIDINE 20 MILLIGRAM(S): 10 INJECTION INTRAVENOUS at 05:37

## 2017-04-02 RX ADMIN — RANOLAZINE 500 MILLIGRAM(S): 500 TABLET, FILM COATED, EXTENDED RELEASE ORAL at 05:37

## 2017-04-02 RX ADMIN — ISOSORBIDE DINITRATE 20 MILLIGRAM(S): 5 TABLET ORAL at 17:19

## 2017-04-02 RX ADMIN — FAMOTIDINE 20 MILLIGRAM(S): 10 INJECTION INTRAVENOUS at 17:19

## 2017-04-02 RX ADMIN — Medication 81 MILLIGRAM(S): at 12:37

## 2017-04-02 RX ADMIN — Medication 325 MILLIGRAM(S): at 12:37

## 2017-04-02 RX ADMIN — PANTOPRAZOLE SODIUM 40 MILLIGRAM(S): 20 TABLET, DELAYED RELEASE ORAL at 06:04

## 2017-04-02 RX ADMIN — Medication 2 UNIT(S): at 17:21

## 2017-04-02 RX ADMIN — SODIUM CHLORIDE 75 MILLILITER(S): 9 INJECTION INTRAMUSCULAR; INTRAVENOUS; SUBCUTANEOUS at 22:05

## 2017-04-02 RX ADMIN — ISOSORBIDE DINITRATE 20 MILLIGRAM(S): 5 TABLET ORAL at 05:37

## 2017-04-02 RX ADMIN — Medication 100 MILLIGRAM(S): at 05:40

## 2017-04-03 LAB — T4 FREE SERPL-MCNC: 1.51 NG/DL — SIGNIFICANT CHANGE UP (ref 0.9–1.8)

## 2017-04-03 PROCEDURE — 70498 CT ANGIOGRAPHY NECK: CPT | Mod: 26,52

## 2017-04-03 PROCEDURE — 93016 CV STRESS TEST SUPVJ ONLY: CPT | Mod: GC

## 2017-04-03 PROCEDURE — 78452 HT MUSCLE IMAGE SPECT MULT: CPT | Mod: 26

## 2017-04-03 PROCEDURE — 93306 TTE W/DOPPLER COMPLETE: CPT | Mod: 26

## 2017-04-03 PROCEDURE — 93018 CV STRESS TEST I&R ONLY: CPT | Mod: GC

## 2017-04-03 RX ADMIN — FAMOTIDINE 20 MILLIGRAM(S): 10 INJECTION INTRAVENOUS at 06:19

## 2017-04-03 RX ADMIN — Medication 100 MILLIGRAM(S): at 06:19

## 2017-04-03 RX ADMIN — Medication 20 MILLIGRAM(S): at 06:19

## 2017-04-03 RX ADMIN — Medication 3: at 13:51

## 2017-04-03 RX ADMIN — ISOSORBIDE DINITRATE 20 MILLIGRAM(S): 5 TABLET ORAL at 06:19

## 2017-04-03 RX ADMIN — Medication 10 MILLIGRAM(S): at 13:52

## 2017-04-03 RX ADMIN — ISOSORBIDE DINITRATE 20 MILLIGRAM(S): 5 TABLET ORAL at 18:24

## 2017-04-03 RX ADMIN — Medication 325 MILLIGRAM(S): at 11:29

## 2017-04-03 RX ADMIN — Medication 81 MILLIGRAM(S): at 11:29

## 2017-04-03 RX ADMIN — HEPARIN SODIUM 5000 UNIT(S): 5000 INJECTION INTRAVENOUS; SUBCUTANEOUS at 06:19

## 2017-04-03 RX ADMIN — PANTOPRAZOLE SODIUM 40 MILLIGRAM(S): 20 TABLET, DELAYED RELEASE ORAL at 06:19

## 2017-04-03 RX ADMIN — POLYETHYLENE GLYCOL 3350 17 GRAM(S): 17 POWDER, FOR SOLUTION ORAL at 11:29

## 2017-04-03 RX ADMIN — Medication 100 MILLIGRAM(S): at 18:24

## 2017-04-03 RX ADMIN — SIMVASTATIN 40 MILLIGRAM(S): 20 TABLET, FILM COATED ORAL at 22:52

## 2017-04-03 RX ADMIN — FAMOTIDINE 20 MILLIGRAM(S): 10 INJECTION INTRAVENOUS at 18:24

## 2017-04-03 RX ADMIN — RANOLAZINE 500 MILLIGRAM(S): 500 TABLET, FILM COATED, EXTENDED RELEASE ORAL at 06:19

## 2017-04-03 RX ADMIN — RANOLAZINE 500 MILLIGRAM(S): 500 TABLET, FILM COATED, EXTENDED RELEASE ORAL at 18:24

## 2017-04-03 RX ADMIN — HEPARIN SODIUM 5000 UNIT(S): 5000 INJECTION INTRAVENOUS; SUBCUTANEOUS at 22:52

## 2017-04-04 LAB
BASOPHILS # BLD AUTO: 0.02 K/UL — SIGNIFICANT CHANGE UP (ref 0–0.2)
BASOPHILS NFR BLD AUTO: 0.2 % — SIGNIFICANT CHANGE UP (ref 0–2)
BUN SERPL-MCNC: 19 MG/DL — SIGNIFICANT CHANGE UP (ref 7–23)
CALCIUM SERPL-MCNC: 9.3 MG/DL — SIGNIFICANT CHANGE UP (ref 8.4–10.5)
CHLORIDE SERPL-SCNC: 95 MMOL/L — LOW (ref 98–107)
CO2 SERPL-SCNC: 20 MMOL/L — LOW (ref 22–31)
CREAT SERPL-MCNC: 1 MG/DL — SIGNIFICANT CHANGE UP (ref 0.5–1.3)
EOSINOPHIL # BLD AUTO: 0.08 K/UL — SIGNIFICANT CHANGE UP (ref 0–0.5)
EOSINOPHIL NFR BLD AUTO: 1 % — SIGNIFICANT CHANGE UP (ref 0–6)
GLUCOSE SERPL-MCNC: 96 MG/DL — SIGNIFICANT CHANGE UP (ref 70–99)
HCT VFR BLD CALC: 44.7 % — SIGNIFICANT CHANGE UP (ref 39–50)
HGB BLD-MCNC: 15.7 G/DL — SIGNIFICANT CHANGE UP (ref 13–17)
IMM GRANULOCYTES NFR BLD AUTO: 0.5 % — SIGNIFICANT CHANGE UP (ref 0–1.5)
LYMPHOCYTES # BLD AUTO: 2.77 K/UL — SIGNIFICANT CHANGE UP (ref 1–3.3)
LYMPHOCYTES # BLD AUTO: 33.5 % — SIGNIFICANT CHANGE UP (ref 13–44)
MAGNESIUM SERPL-MCNC: 2.1 MG/DL — SIGNIFICANT CHANGE UP (ref 1.6–2.6)
MCHC RBC-ENTMCNC: 31.5 PG — SIGNIFICANT CHANGE UP (ref 27–34)
MCHC RBC-ENTMCNC: 35.1 % — SIGNIFICANT CHANGE UP (ref 32–36)
MCV RBC AUTO: 89.8 FL — SIGNIFICANT CHANGE UP (ref 80–100)
MONOCYTES # BLD AUTO: 1 K/UL — HIGH (ref 0–0.9)
MONOCYTES NFR BLD AUTO: 12.1 % — SIGNIFICANT CHANGE UP (ref 2–14)
NEUTROPHILS # BLD AUTO: 4.35 K/UL — SIGNIFICANT CHANGE UP (ref 1.8–7.4)
NEUTROPHILS NFR BLD AUTO: 52.7 % — SIGNIFICANT CHANGE UP (ref 43–77)
PLATELET # BLD AUTO: 193 K/UL — SIGNIFICANT CHANGE UP (ref 150–400)
PMV BLD: 10.5 FL — SIGNIFICANT CHANGE UP (ref 7–13)
POTASSIUM SERPL-MCNC: 4.4 MMOL/L — SIGNIFICANT CHANGE UP (ref 3.5–5.3)
POTASSIUM SERPL-SCNC: 4.4 MMOL/L — SIGNIFICANT CHANGE UP (ref 3.5–5.3)
RBC # BLD: 4.98 M/UL — SIGNIFICANT CHANGE UP (ref 4.2–5.8)
RBC # FLD: 14.1 % — SIGNIFICANT CHANGE UP (ref 10.3–14.5)
SODIUM SERPL-SCNC: 133 MMOL/L — LOW (ref 135–145)
WBC # BLD: 8.26 K/UL — SIGNIFICANT CHANGE UP (ref 3.8–10.5)
WBC # FLD AUTO: 8.26 K/UL — SIGNIFICANT CHANGE UP (ref 3.8–10.5)

## 2017-04-04 RX ADMIN — Medication: at 18:45

## 2017-04-04 RX ADMIN — RANOLAZINE 500 MILLIGRAM(S): 500 TABLET, FILM COATED, EXTENDED RELEASE ORAL at 06:52

## 2017-04-04 RX ADMIN — Medication 81 MILLIGRAM(S): at 12:38

## 2017-04-04 RX ADMIN — Medication 325 MILLIGRAM(S): at 12:38

## 2017-04-04 RX ADMIN — HEPARIN SODIUM 5000 UNIT(S): 5000 INJECTION INTRAVENOUS; SUBCUTANEOUS at 06:52

## 2017-04-04 RX ADMIN — Medication 30 MILLILITER(S): at 20:53

## 2017-04-04 RX ADMIN — ISOSORBIDE DINITRATE 20 MILLIGRAM(S): 5 TABLET ORAL at 18:06

## 2017-04-04 RX ADMIN — Medication 100 MILLIGRAM(S): at 06:52

## 2017-04-04 RX ADMIN — POLYETHYLENE GLYCOL 3350 17 GRAM(S): 17 POWDER, FOR SOLUTION ORAL at 06:58

## 2017-04-04 RX ADMIN — RANOLAZINE 500 MILLIGRAM(S): 500 TABLET, FILM COATED, EXTENDED RELEASE ORAL at 18:06

## 2017-04-04 RX ADMIN — Medication 2: at 12:44

## 2017-04-04 RX ADMIN — FAMOTIDINE 20 MILLIGRAM(S): 10 INJECTION INTRAVENOUS at 18:06

## 2017-04-04 RX ADMIN — FAMOTIDINE 20 MILLIGRAM(S): 10 INJECTION INTRAVENOUS at 06:52

## 2017-04-04 RX ADMIN — Medication 20 MILLIGRAM(S): at 06:52

## 2017-04-04 RX ADMIN — ISOSORBIDE DINITRATE 20 MILLIGRAM(S): 5 TABLET ORAL at 06:59

## 2017-04-04 RX ADMIN — SIMVASTATIN 40 MILLIGRAM(S): 20 TABLET, FILM COATED ORAL at 21:21

## 2017-04-04 RX ADMIN — PANTOPRAZOLE SODIUM 40 MILLIGRAM(S): 20 TABLET, DELAYED RELEASE ORAL at 06:59

## 2017-04-04 RX ADMIN — Medication 100 MILLIGRAM(S): at 18:07

## 2017-04-04 RX ADMIN — HEPARIN SODIUM 5000 UNIT(S): 5000 INJECTION INTRAVENOUS; SUBCUTANEOUS at 21:21

## 2017-04-04 RX ADMIN — HEPARIN SODIUM 5000 UNIT(S): 5000 INJECTION INTRAVENOUS; SUBCUTANEOUS at 14:17

## 2017-04-05 ENCOUNTER — TRANSCRIPTION ENCOUNTER (OUTPATIENT)
Age: 77
End: 2017-04-05

## 2017-04-05 VITALS
OXYGEN SATURATION: 98 % | DIASTOLIC BLOOD PRESSURE: 88 MMHG | HEART RATE: 94 BPM | TEMPERATURE: 98 F | SYSTOLIC BLOOD PRESSURE: 135 MMHG | RESPIRATION RATE: 18 BRPM

## 2017-04-05 LAB
BUN SERPL-MCNC: 20 MG/DL — SIGNIFICANT CHANGE UP (ref 7–23)
CALCIUM SERPL-MCNC: 9.2 MG/DL — SIGNIFICANT CHANGE UP (ref 8.4–10.5)
CHLORIDE SERPL-SCNC: 94 MMOL/L — LOW (ref 98–107)
CO2 SERPL-SCNC: 27 MMOL/L — SIGNIFICANT CHANGE UP (ref 22–31)
CREAT SERPL-MCNC: 1.11 MG/DL — SIGNIFICANT CHANGE UP (ref 0.5–1.3)
GLUCOSE SERPL-MCNC: 264 MG/DL — HIGH (ref 70–99)
POTASSIUM SERPL-MCNC: 4.5 MMOL/L — SIGNIFICANT CHANGE UP (ref 3.5–5.3)
POTASSIUM SERPL-SCNC: 4.5 MMOL/L — SIGNIFICANT CHANGE UP (ref 3.5–5.3)
SODIUM SERPL-SCNC: 134 MMOL/L — LOW (ref 135–145)

## 2017-04-05 RX ADMIN — Medication 2: at 09:37

## 2017-04-05 RX ADMIN — Medication 325 MILLIGRAM(S): at 12:31

## 2017-04-05 RX ADMIN — Medication 20 MILLIGRAM(S): at 06:21

## 2017-04-05 RX ADMIN — ISOSORBIDE DINITRATE 20 MILLIGRAM(S): 5 TABLET ORAL at 06:21

## 2017-04-05 RX ADMIN — Medication 100 MILLIGRAM(S): at 06:21

## 2017-04-05 RX ADMIN — RANOLAZINE 500 MILLIGRAM(S): 500 TABLET, FILM COATED, EXTENDED RELEASE ORAL at 06:21

## 2017-04-05 RX ADMIN — Medication 2: at 12:31

## 2017-04-05 RX ADMIN — Medication 81 MILLIGRAM(S): at 12:31

## 2017-04-05 RX ADMIN — HEPARIN SODIUM 5000 UNIT(S): 5000 INJECTION INTRAVENOUS; SUBCUTANEOUS at 14:41

## 2017-04-05 RX ADMIN — PANTOPRAZOLE SODIUM 40 MILLIGRAM(S): 20 TABLET, DELAYED RELEASE ORAL at 06:21

## 2017-04-05 RX ADMIN — HEPARIN SODIUM 5000 UNIT(S): 5000 INJECTION INTRAVENOUS; SUBCUTANEOUS at 06:21

## 2017-04-05 RX ADMIN — FAMOTIDINE 20 MILLIGRAM(S): 10 INJECTION INTRAVENOUS at 06:21

## 2017-04-05 NOTE — DISCHARGE NOTE ADULT - MEDICATION SUMMARY - MEDICATIONS TO TAKE
I will START or STAY ON the medications listed below when I get home from the hospital:    predniSONE 20 mg oral tablet  -- 1 tab(s) by mouth once a day  -- Indication: For CAD (Coronary Artery Disease)    aspirin 81 mg oral delayed release tablet  -- 1 tab(s) by mouth once a day, As Needed  -- Indication: For CAD (Coronary Artery Disease)    Ranexa 500 mg oral tablet, extended release  -- 1 tab(s) by mouth 2 times a day  -- Indication: For CAD (Coronary Artery Disease)    isosorbide dinitrate 20 mg oral tablet  -- 1 tab(s) by mouth 2 times a day  -- Indication: For CAD (Coronary Artery Disease)    glipiZIDE 5 mg oral tablet  -- 1 tab(s) by mouth once a day, As Needed  -- Indication: For Diabetes mellitus    simvastatin 40 mg oral tablet  -- 1 tab(s) by mouth once a day (at bedtime)  -- Indication: For Cholesterol    hydrOXYzine hydrochloride 25 mg oral tablet  -- 1 tab(s) by mouth once a day (at bedtime), As Needed  -- Indication: For Anxiety    Zantac 150 oral tablet  -- 1 tab(s) by mouth 2 times a day  -- Indication: For GERD (Gastroesophageal Reflux Disease)    ferrous sulfate 325 mg (65 mg elemental iron) oral tablet  -- 1 tab(s) by mouth once a day  -- Indication: For Supplement    NexIUM 40 mg oral delayed release capsule  -- 1 cap(s) by mouth once a day  -- Indication: For GERD (Gastroesophageal Reflux Disease) I will START or STAY ON the medications listed below when I get home from the hospital:    predniSONE 20 mg oral tablet  -- 1 tab(s) by mouth once a day, as needed   -- Indication: For rash    aspirin 81 mg oral delayed release tablet  -- 1 tab(s) by mouth once a day, As Needed  -- Indication: For CAD (Coronary Artery Disease)    Ranexa 500 mg oral tablet, extended release  -- 1 tab(s) by mouth 2 times a day  -- Indication: For CAD (Coronary Artery Disease)    isosorbide dinitrate 20 mg oral tablet  -- 1 tab(s) by mouth 2 times a day  -- Indication: For CAD (Coronary Artery Disease)    glipiZIDE 5 mg oral tablet  -- 1 tab(s) by mouth once a day, As Needed  -- Indication: For Diabetes mellitus    simvastatin 40 mg oral tablet  -- 1 tab(s) by mouth once a day (at bedtime)  -- Indication: For Cholesterol    hydrOXYzine hydrochloride 25 mg oral tablet  -- 1 tab(s) by mouth once a day (at bedtime), As Needed  -- Indication: For Anxiety    Zantac 150 oral tablet  -- 1 tab(s) by mouth 2 times a day  -- Indication: For GERD (Gastroesophageal Reflux Disease)    ferrous sulfate 325 mg (65 mg elemental iron) oral tablet  -- 1 tab(s) by mouth once a day  -- Indication: For Supplement    NexIUM 40 mg oral delayed release capsule  -- 1 cap(s) by mouth once a day  -- Indication: For GERD (Gastroesophageal Reflux Disease)

## 2017-04-05 NOTE — DISCHARGE NOTE ADULT - PATIENT PORTAL LINK FT
“You can access the [employer] Patient Portal, offered by [facility], by registering with the following website: [email or web address]”

## 2017-04-05 NOTE — DISCHARGE NOTE ADULT - HOSPITAL COURSE
75 y/o Male, with a PmHx of DM, CABG x 3, Anemia, HLD, HTN, BPH, Stomach Ca s/p chemo and radiation w/gastrectomy, Gerd, p/w gen weaknwess, lightheadedness, fall, denies losing consciousness, and had CP 10 days ago. Pt went to Cardiologist office and was found to have Rt carotid stenosis.  EKG showed NSR @ 60 1st deg AVB, LBBB, 1-2mm PALOMA v1-3, Q waves III, avF, TWI I, L .  CXR results show Vague subcentimeter nodular shadow projecting in left lung base above the left hemidiaphragm thought to represent a nipple shadow, repeat study with markers applied could be obtained to confirm. Clear remaining visualized lungs. No pleural effusions or pneumothorax. CT Head showed Unremarkable noncontrast head CT. Carotid dopplers results were Right Internal Carotid Artery:  There is mild heterogenous plaque within the proximal vessel, consistent with a 16-49% stenosis. No hemodynamically significant stenosis. Left Internal Carotid Artery:  There is moderate  heterogenous plaque within the proximal vessel, consistent with a 50-79% stenosis. TTE results were  Mitral annular calcification, otherwise normal mitral valve. Minimal mitral regurgitation. Normal left ventricular internal dimensions and wall thicknesses. Normal left ventricular systolic function. No segmental wall motion abnormalities. Mild diastolic dysfunction (Stage I). Normal right ventricular size and function.CTA neck results were bilateral carotid bifurcation atherosclerotic disease without flow-limiting stenosis by NASCET criteria. Intracranially, no evidence for aneurysm or significant stenosis. Stress test results show Myocardial Perfusion SPECT results are abnormal. There is a medium sized moderate to severe defect in proximal to mid septal wall  and mild defect in  proximal to mid inferior wall which are predominantly reversible consistent with moderate to severe basal septal  ischemia and mild proximal to mid inferior ischemia.Post-stress gated wall motion analysis was performed (LVEF = 55 %;LVEDV = 67 ml.), revealing normal LV function. There was paradoxical motion of the septum (LBBB and post CABG). RV function appeared normal.    4/3 Cards recommend f/u Stress, CTA neck to eval L ICA dz CTH negative, ASA/Statin.    Patient is stable for discharge and in no acute distress. 75 y/o Male, with a PmHx of DM, CABG x 3, Anemia, HLD, HTN, BPH, Stomach Ca s/p chemo and radiation w/gastrectomy, Gerd, p/w gen weaknwess, lightheadedness, fall, denies losing consciousness, and had CP 10 days ago. Pt went to Cardiologist office and was found to have Rt carotid stenosis.  EKG showed NSR @ 60 1st deg AVB, LBBB, 1-2mm PALOMA v1-3, Q waves III, avF, TWI I, L .  CXR results show Vague subcentimeter nodular shadow projecting in left lung base above the left hemidiaphragm thought to represent a nipple shadow, repeat study with markers applied could be obtained to confirm. Clear remaining visualized lungs. No pleural effusions or pneumothorax. CT Head showed Unremarkable noncontrast head CT. Carotid dopplers showed  moderate L carotid stenosis. A subsequent CTA neck revealed Bilateral carotid bifurcation atherosclerotic disease without   flow-limiting stenosis.      TTE results were  Mitral annular calcification, otherwise normal mitral valve. Minimal mitral regurgitation. Normal left ventricular internal dimensions and wall thicknesses. Normal left ventricular systolic function. No segmental wall motion abnormalities. Mild diastolic dysfunction (Stage I). Normal right ventricular size and function.  Stress test results showed  a medium sized moderate to severe defect in proximal to mid septal wall  and mild defect in  proximal to mid inferior wall which are predominantly reversible consistent with moderate to severe basal septal  ischemia and mild proximal to mid inferior ischemia. It was decided by cardiology to medically manage the patient,. Pt will be discharged home with outpatient cardiology follow up.

## 2017-04-05 NOTE — DISCHARGE NOTE ADULT - PLAN OF CARE
Prevent CAD Return to the hospital if you have any of the following signs of a heart attack: Squeezing, pressure, or pain in your chest that lasts longer than 5 minutes or returns, Discomfort or pain in your back, neck, jaw, stomach, or arm , Trouble breathing, Nausea or vomiting, Lightheadedness or a sudden cold sweat, especially with chest pain or trouble breathing.   Please follow up with your primary care doctor and cardiologist upon discharge. Also take the medications as directed when discharged. prevent hyperlidemia Maintain a low salt, cardio healthy diet. Exercise regularly. Continue to take medications as prescribed. Follow up with primary care doctor. Check cholesterol levels regularly. Prevent GERD Maintain a regular blood pressure Prevent blood loss. Continue to check blood pressure daily. Maintain a low salt diet. Continue to take medications as prescribed. Follow up with primary care doctor. Maintain a healthy weight.    Avoid lying down after meals.    Avoid eating late at night. Elevate the head of your bed by 6 inches. You can do this by placing wooden blocks or bed risers under the head of your bed. Avoid wearing tight-fitting clothes. Avoid foods that might irritate your stomach, such as the following: Alcohol, Chocolate, Caffeine, Spearmint or peppermint, etc. Routine blood level check. Follow up with primary care doctor. medication compliance to prevent progression of disease moderate carotid stenosis in left carotid artery. Follow up with PCP for routine imaging as needed for monitoring.

## 2017-04-05 NOTE — DISCHARGE NOTE ADULT - CARE PLAN
Principal Discharge DX:	CAD (Coronary Artery Disease)  Goal:	Prevent CAD  Instructions for follow-up, activity and diet:	Return to the hospital if you have any of the following signs of a heart attack: Squeezing, pressure, or pain in your chest that lasts longer than 5 minutes or returns, Discomfort or pain in your back, neck, jaw, stomach, or arm , Trouble breathing, Nausea or vomiting, Lightheadedness or a sudden cold sweat, especially with chest pain or trouble breathing.   Please follow up with your primary care doctor and cardiologist upon discharge. Also take the medications as directed when discharged.  Secondary Diagnosis:	Dyslipidemia  Goal:	prevent hyperlidemia  Instructions for follow-up, activity and diet:	Maintain a low salt, cardio healthy diet. Exercise regularly. Continue to take medications as prescribed. Follow up with primary care doctor. Check cholesterol levels regularly.  Secondary Diagnosis:	GERD (Gastroesophageal Reflux Disease)  Goal:	Prevent GERD  Instructions for follow-up, activity and diet:	Maintain a healthy weight.    Avoid lying down after meals.    Avoid eating late at night. Elevate the head of your bed by 6 inches. You can do this by placing wooden blocks or bed risers under the head of your bed. Avoid wearing tight-fitting clothes. Avoid foods that might irritate your stomach, such as the following: Alcohol, Chocolate, Caffeine, Spearmint or peppermint, etc.  Secondary Diagnosis:	HTN (hypertension)  Goal:	Maintain a regular blood pressure  Instructions for follow-up, activity and diet:	Continue to check blood pressure daily. Maintain a low salt diet. Continue to take medications as prescribed. Follow up with primary care doctor.  Secondary Diagnosis:	Anemia  Goal:	Prevent blood loss.  Instructions for follow-up, activity and diet:	Routine blood level check. Follow up with primary care doctor. Principal Discharge DX:	CAD (Coronary Artery Disease)  Goal:	medication compliance to prevent progression of disease  Instructions for follow-up, activity and diet:	Return to the hospital if you have any of the following signs of a heart attack: Squeezing, pressure, or pain in your chest that lasts longer than 5 minutes or returns, Discomfort or pain in your back, neck, jaw, stomach, or arm , Trouble breathing, Nausea or vomiting, Lightheadedness or a sudden cold sweat, especially with chest pain or trouble breathing.   Please follow up with your primary care doctor and cardiologist upon discharge. Also take the medications as directed when discharged.  Secondary Diagnosis:	Dyslipidemia  Goal:	prevent hyperlidemia  Instructions for follow-up, activity and diet:	Maintain a low salt, cardio healthy diet. Exercise regularly. Continue to take medications as prescribed. Follow up with primary care doctor. Check cholesterol levels regularly.  Secondary Diagnosis:	GERD (Gastroesophageal Reflux Disease)  Goal:	Prevent GERD  Instructions for follow-up, activity and diet:	Maintain a healthy weight.    Avoid lying down after meals.    Avoid eating late at night. Elevate the head of your bed by 6 inches. You can do this by placing wooden blocks or bed risers under the head of your bed. Avoid wearing tight-fitting clothes. Avoid foods that might irritate your stomach, such as the following: Alcohol, Chocolate, Caffeine, Spearmint or peppermint, etc.  Secondary Diagnosis:	HTN (hypertension)  Goal:	Maintain a regular blood pressure  Instructions for follow-up, activity and diet:	Continue to check blood pressure daily. Maintain a low salt diet. Continue to take medications as prescribed. Follow up with primary care doctor.  Secondary Diagnosis:	Anemia  Goal:	Prevent blood loss.  Instructions for follow-up, activity and diet:	Routine blood level check. Follow up with primary care doctor. Principal Discharge DX:	CAD (Coronary Artery Disease)  Goal:	medication compliance to prevent progression of disease  Instructions for follow-up, activity and diet:	Return to the hospital if you have any of the following signs of a heart attack: Squeezing, pressure, or pain in your chest that lasts longer than 5 minutes or returns, Discomfort or pain in your back, neck, jaw, stomach, or arm , Trouble breathing, Nausea or vomiting, Lightheadedness or a sudden cold sweat, especially with chest pain or trouble breathing.   Please follow up with your primary care doctor and cardiologist upon discharge. Also take the medications as directed when discharged.  Secondary Diagnosis:	Dyslipidemia  Goal:	prevent hyperlidemia  Instructions for follow-up, activity and diet:	Maintain a low salt, cardio healthy diet. Exercise regularly. Continue to take medications as prescribed. Follow up with primary care doctor. Check cholesterol levels regularly.  Secondary Diagnosis:	GERD (Gastroesophageal Reflux Disease)  Goal:	Prevent GERD  Instructions for follow-up, activity and diet:	Maintain a healthy weight.    Avoid lying down after meals.    Avoid eating late at night. Elevate the head of your bed by 6 inches. You can do this by placing wooden blocks or bed risers under the head of your bed. Avoid wearing tight-fitting clothes. Avoid foods that might irritate your stomach, such as the following: Alcohol, Chocolate, Caffeine, Spearmint or peppermint, etc.  Secondary Diagnosis:	HTN (hypertension)  Goal:	Maintain a regular blood pressure  Instructions for follow-up, activity and diet:	Continue to check blood pressure daily. Maintain a low salt diet. Continue to take medications as prescribed. Follow up with primary care doctor.  Secondary Diagnosis:	Anemia  Goal:	Prevent blood loss.  Instructions for follow-up, activity and diet:	Routine blood level check. Follow up with primary care doctor.  Secondary Diagnosis:	Left carotid stenosis  Instructions for follow-up, activity and diet:	moderate carotid stenosis in left carotid artery. Follow up with PCP for routine imaging as needed for monitoring.

## 2017-12-21 ENCOUNTER — NON-APPOINTMENT (OUTPATIENT)
Age: 77
End: 2017-12-21

## 2017-12-21 ENCOUNTER — APPOINTMENT (OUTPATIENT)
Dept: CARDIOLOGY | Facility: CLINIC | Age: 77
End: 2017-12-21
Payer: MEDICARE

## 2017-12-21 VITALS
WEIGHT: 107 LBS | DIASTOLIC BLOOD PRESSURE: 64 MMHG | HEART RATE: 77 BPM | BODY MASS INDEX: 17.83 KG/M2 | HEIGHT: 65 IN | OXYGEN SATURATION: 98 % | SYSTOLIC BLOOD PRESSURE: 120 MMHG

## 2017-12-21 DIAGNOSIS — Z82.49 FAMILY HISTORY OF ISCHEMIC HEART DISEASE AND OTHER DISEASES OF THE CIRCULATORY SYSTEM: ICD-10-CM

## 2017-12-21 DIAGNOSIS — E78.00 PURE HYPERCHOLESTEROLEMIA, UNSPECIFIED: ICD-10-CM

## 2017-12-21 DIAGNOSIS — Z87.898 PERSONAL HISTORY OF OTHER SPECIFIED CONDITIONS: ICD-10-CM

## 2017-12-21 DIAGNOSIS — Z86.39 PERSONAL HISTORY OF OTHER ENDOCRINE, NUTRITIONAL AND METABOLIC DISEASE: ICD-10-CM

## 2017-12-21 PROCEDURE — 99214 OFFICE O/P EST MOD 30 MIN: CPT

## 2017-12-21 PROCEDURE — 93000 ELECTROCARDIOGRAM COMPLETE: CPT

## 2017-12-21 RX ORDER — NITROGLYCERIN 0.4 MG/1
0.4 TABLET SUBLINGUAL
Qty: 25 | Refills: 1 | Status: ACTIVE | COMMUNITY

## 2017-12-21 RX ORDER — SIMVASTATIN 40 MG/1
40 TABLET, FILM COATED ORAL
Refills: 0 | Status: ACTIVE | COMMUNITY

## 2018-03-02 ENCOUNTER — APPOINTMENT (OUTPATIENT)
Dept: CARDIOLOGY | Facility: CLINIC | Age: 78
End: 2018-03-02

## 2018-04-13 ENCOUNTER — APPOINTMENT (OUTPATIENT)
Dept: CARDIOLOGY | Facility: CLINIC | Age: 78
End: 2018-04-13
Payer: MEDICARE

## 2018-04-13 VITALS
OXYGEN SATURATION: 98 % | SYSTOLIC BLOOD PRESSURE: 120 MMHG | WEIGHT: 111 LBS | HEART RATE: 88 BPM | BODY MASS INDEX: 18.49 KG/M2 | DIASTOLIC BLOOD PRESSURE: 66 MMHG | HEIGHT: 65 IN

## 2018-04-13 PROCEDURE — 93000 ELECTROCARDIOGRAM COMPLETE: CPT

## 2018-04-13 PROCEDURE — 99214 OFFICE O/P EST MOD 30 MIN: CPT

## 2018-05-16 NOTE — PATIENT PROFILE ADULT. - AS [facility] NUTRITION
Problem: Respiratory Impairment - Respiratory Therapy 253  Intervention: Inhaled medication delivery  Intervention Status  Done         (3) adequate

## 2018-07-03 ENCOUNTER — INPATIENT (INPATIENT)
Facility: HOSPITAL | Age: 78
LOS: 2 days | Discharge: HOME | End: 2018-07-06
Attending: INTERNAL MEDICINE | Admitting: INTERNAL MEDICINE
Payer: MEDICARE

## 2018-07-03 VITALS
SYSTOLIC BLOOD PRESSURE: 131 MMHG | RESPIRATION RATE: 16 BRPM | DIASTOLIC BLOOD PRESSURE: 54 MMHG | TEMPERATURE: 97 F | OXYGEN SATURATION: 100 % | HEART RATE: 73 BPM

## 2018-07-03 DIAGNOSIS — E11.9 TYPE 2 DIABETES MELLITUS WITHOUT COMPLICATIONS: ICD-10-CM

## 2018-07-03 DIAGNOSIS — R55 SYNCOPE AND COLLAPSE: ICD-10-CM

## 2018-07-03 DIAGNOSIS — Z29.9 ENCOUNTER FOR PROPHYLACTIC MEASURES, UNSPECIFIED: ICD-10-CM

## 2018-07-03 DIAGNOSIS — I65.29 OCCLUSION AND STENOSIS OF UNSPECIFIED CAROTID ARTERY: ICD-10-CM

## 2018-07-03 DIAGNOSIS — I10 ESSENTIAL (PRIMARY) HYPERTENSION: ICD-10-CM

## 2018-07-03 DIAGNOSIS — E78.5 HYPERLIPIDEMIA, UNSPECIFIED: ICD-10-CM

## 2018-07-03 DIAGNOSIS — I25.10 ATHEROSCLEROTIC HEART DISEASE OF NATIVE CORONARY ARTERY WITHOUT ANGINA PECTORIS: ICD-10-CM

## 2018-07-03 DIAGNOSIS — D50.9 IRON DEFICIENCY ANEMIA, UNSPECIFIED: ICD-10-CM

## 2018-07-03 LAB
ALBUMIN SERPL ELPH-MCNC: 3.5 G/DL — SIGNIFICANT CHANGE UP (ref 3.3–5)
ALP SERPL-CCNC: 45 U/L — SIGNIFICANT CHANGE UP (ref 40–120)
ALT FLD-CCNC: 25 U/L — SIGNIFICANT CHANGE UP (ref 4–41)
APPEARANCE UR: CLEAR — SIGNIFICANT CHANGE UP
APTT BLD: 23.5 SEC — LOW (ref 27.5–37.4)
AST SERPL-CCNC: 36 U/L — SIGNIFICANT CHANGE UP (ref 4–40)
BASOPHILS # BLD AUTO: 0.02 K/UL — SIGNIFICANT CHANGE UP (ref 0–0.2)
BASOPHILS NFR BLD AUTO: 0.3 % — SIGNIFICANT CHANGE UP (ref 0–2)
BILIRUB SERPL-MCNC: 0.6 MG/DL — SIGNIFICANT CHANGE UP (ref 0.2–1.2)
BILIRUB UR-MCNC: NEGATIVE — SIGNIFICANT CHANGE UP
BLOOD UR QL VISUAL: NEGATIVE — SIGNIFICANT CHANGE UP
BUN SERPL-MCNC: 22 MG/DL — SIGNIFICANT CHANGE UP (ref 7–23)
CALCIUM SERPL-MCNC: 9 MG/DL — SIGNIFICANT CHANGE UP (ref 8.4–10.5)
CHLORIDE SERPL-SCNC: 100 MMOL/L — SIGNIFICANT CHANGE UP (ref 98–107)
CO2 SERPL-SCNC: 26 MMOL/L — SIGNIFICANT CHANGE UP (ref 22–31)
COLOR SPEC: SIGNIFICANT CHANGE UP
CREAT SERPL-MCNC: 1 MG/DL — SIGNIFICANT CHANGE UP (ref 0.5–1.3)
EOSINOPHIL # BLD AUTO: 0.05 K/UL — SIGNIFICANT CHANGE UP (ref 0–0.5)
EOSINOPHIL NFR BLD AUTO: 0.7 % — SIGNIFICANT CHANGE UP (ref 0–6)
GLUCOSE BLDC GLUCOMTR-MCNC: 159 MG/DL — HIGH (ref 70–99)
GLUCOSE SERPL-MCNC: 150 MG/DL — HIGH (ref 70–99)
GLUCOSE UR-MCNC: NEGATIVE — SIGNIFICANT CHANGE UP
HCT VFR BLD CALC: 37.3 % — LOW (ref 39–50)
HGB BLD-MCNC: 11.5 G/DL — LOW (ref 13–17)
IMM GRANULOCYTES # BLD AUTO: 0.05 # — SIGNIFICANT CHANGE UP
IMM GRANULOCYTES NFR BLD AUTO: 0.7 % — SIGNIFICANT CHANGE UP (ref 0–1.5)
INR BLD: 0.98 — SIGNIFICANT CHANGE UP (ref 0.88–1.17)
KETONES UR-MCNC: NEGATIVE — SIGNIFICANT CHANGE UP
LEUKOCYTE ESTERASE UR-ACNC: NEGATIVE — SIGNIFICANT CHANGE UP
LYMPHOCYTES # BLD AUTO: 1.12 K/UL — SIGNIFICANT CHANGE UP (ref 1–3.3)
LYMPHOCYTES # BLD AUTO: 15.2 % — SIGNIFICANT CHANGE UP (ref 13–44)
MCHC RBC-ENTMCNC: 24.4 PG — LOW (ref 27–34)
MCHC RBC-ENTMCNC: 30.8 % — LOW (ref 32–36)
MCV RBC AUTO: 79 FL — LOW (ref 80–100)
MONOCYTES # BLD AUTO: 0.28 K/UL — SIGNIFICANT CHANGE UP (ref 0–0.9)
MONOCYTES NFR BLD AUTO: 3.8 % — SIGNIFICANT CHANGE UP (ref 2–14)
NEUTROPHILS # BLD AUTO: 5.87 K/UL — SIGNIFICANT CHANGE UP (ref 1.8–7.4)
NEUTROPHILS NFR BLD AUTO: 79.3 % — HIGH (ref 43–77)
NITRITE UR-MCNC: NEGATIVE — SIGNIFICANT CHANGE UP
NRBC # FLD: 0 — SIGNIFICANT CHANGE UP
PH UR: 8 — SIGNIFICANT CHANGE UP (ref 4.6–8)
PLATELET # BLD AUTO: 181 K/UL — SIGNIFICANT CHANGE UP (ref 150–400)
PMV BLD: 10.7 FL — SIGNIFICANT CHANGE UP (ref 7–13)
POTASSIUM SERPL-MCNC: 5.1 MMOL/L — SIGNIFICANT CHANGE UP (ref 3.5–5.3)
POTASSIUM SERPL-SCNC: 5.1 MMOL/L — SIGNIFICANT CHANGE UP (ref 3.5–5.3)
PROT SERPL-MCNC: 6.6 G/DL — SIGNIFICANT CHANGE UP (ref 6–8.3)
PROT UR-MCNC: NEGATIVE MG/DL — SIGNIFICANT CHANGE UP
PROTHROM AB SERPL-ACNC: 10.9 SEC — SIGNIFICANT CHANGE UP (ref 9.8–13.1)
RBC # BLD: 4.72 M/UL — SIGNIFICANT CHANGE UP (ref 4.2–5.8)
RBC # FLD: 17.5 % — HIGH (ref 10.3–14.5)
RBC CASTS # UR COMP ASSIST: SIGNIFICANT CHANGE UP (ref 0–?)
SODIUM SERPL-SCNC: 137 MMOL/L — SIGNIFICANT CHANGE UP (ref 135–145)
SP GR SPEC: 1.01 — SIGNIFICANT CHANGE UP (ref 1–1.04)
TROPONIN T, HIGH SENSITIVITY: 19 NG/L — SIGNIFICANT CHANGE UP (ref ?–14)
TROPONIN T, HIGH SENSITIVITY: 22 NG/L — SIGNIFICANT CHANGE UP (ref ?–14)
UROBILINOGEN FLD QL: NORMAL MG/DL — SIGNIFICANT CHANGE UP
WBC # BLD: 7.39 K/UL — SIGNIFICANT CHANGE UP (ref 3.8–10.5)
WBC # FLD AUTO: 7.39 K/UL — SIGNIFICANT CHANGE UP (ref 3.8–10.5)
WBC UR QL: SIGNIFICANT CHANGE UP (ref 0–?)

## 2018-07-03 PROCEDURE — 71046 X-RAY EXAM CHEST 2 VIEWS: CPT | Mod: 26

## 2018-07-03 RX ORDER — DEXTROSE 50 % IN WATER 50 %
25 SYRINGE (ML) INTRAVENOUS ONCE
Qty: 0 | Refills: 0 | Status: DISCONTINUED | OUTPATIENT
Start: 2018-07-03 | End: 2018-07-06

## 2018-07-03 RX ORDER — PANTOPRAZOLE SODIUM 20 MG/1
40 TABLET, DELAYED RELEASE ORAL
Qty: 0 | Refills: 0 | Status: DISCONTINUED | OUTPATIENT
Start: 2018-07-03 | End: 2018-07-06

## 2018-07-03 RX ORDER — SODIUM CHLORIDE 9 MG/ML
1000 INJECTION, SOLUTION INTRAVENOUS
Qty: 0 | Refills: 0 | Status: DISCONTINUED | OUTPATIENT
Start: 2018-07-03 | End: 2018-07-06

## 2018-07-03 RX ORDER — FERROUS SULFATE 325(65) MG
325 TABLET ORAL DAILY
Qty: 0 | Refills: 0 | Status: DISCONTINUED | OUTPATIENT
Start: 2018-07-03 | End: 2018-07-06

## 2018-07-03 RX ORDER — ASPIRIN/CALCIUM CARB/MAGNESIUM 324 MG
81 TABLET ORAL DAILY
Qty: 0 | Refills: 0 | Status: DISCONTINUED | OUTPATIENT
Start: 2018-07-03 | End: 2018-07-06

## 2018-07-03 RX ORDER — INSULIN LISPRO 100/ML
VIAL (ML) SUBCUTANEOUS
Qty: 0 | Refills: 0 | Status: DISCONTINUED | OUTPATIENT
Start: 2018-07-03 | End: 2018-07-06

## 2018-07-03 RX ORDER — SIMVASTATIN 20 MG/1
40 TABLET, FILM COATED ORAL AT BEDTIME
Qty: 0 | Refills: 0 | Status: DISCONTINUED | OUTPATIENT
Start: 2018-07-03 | End: 2018-07-06

## 2018-07-03 RX ORDER — RANOLAZINE 500 MG/1
1 TABLET, FILM COATED, EXTENDED RELEASE ORAL
Qty: 0 | Refills: 0 | COMMUNITY

## 2018-07-03 RX ORDER — ENOXAPARIN SODIUM 100 MG/ML
40 INJECTION SUBCUTANEOUS EVERY 24 HOURS
Qty: 0 | Refills: 0 | Status: DISCONTINUED | OUTPATIENT
Start: 2018-07-03 | End: 2018-07-06

## 2018-07-03 RX ORDER — HYDROXYZINE HCL 10 MG
1 TABLET ORAL
Qty: 0 | Refills: 0 | COMMUNITY

## 2018-07-03 RX ORDER — ESOMEPRAZOLE MAGNESIUM 40 MG/1
1 CAPSULE, DELAYED RELEASE ORAL
Qty: 0 | Refills: 0 | COMMUNITY

## 2018-07-03 RX ORDER — RANITIDINE HYDROCHLORIDE 150 MG/1
1 TABLET, FILM COATED ORAL
Qty: 0 | Refills: 0 | COMMUNITY

## 2018-07-03 RX ORDER — INSULIN LISPRO 100/ML
VIAL (ML) SUBCUTANEOUS AT BEDTIME
Qty: 0 | Refills: 0 | Status: DISCONTINUED | OUTPATIENT
Start: 2018-07-03 | End: 2018-07-06

## 2018-07-03 RX ORDER — GLUCAGON INJECTION, SOLUTION 0.5 MG/.1ML
1 INJECTION, SOLUTION SUBCUTANEOUS ONCE
Qty: 0 | Refills: 0 | Status: DISCONTINUED | OUTPATIENT
Start: 2018-07-03 | End: 2018-07-06

## 2018-07-03 RX ORDER — FERROUS SULFATE 325(65) MG
1 TABLET ORAL
Qty: 0 | Refills: 0 | COMMUNITY

## 2018-07-03 RX ORDER — DEXTROSE 50 % IN WATER 50 %
12.5 SYRINGE (ML) INTRAVENOUS ONCE
Qty: 0 | Refills: 0 | Status: DISCONTINUED | OUTPATIENT
Start: 2018-07-03 | End: 2018-07-06

## 2018-07-03 RX ORDER — DEXTROSE 50 % IN WATER 50 %
15 SYRINGE (ML) INTRAVENOUS ONCE
Qty: 0 | Refills: 0 | Status: DISCONTINUED | OUTPATIENT
Start: 2018-07-03 | End: 2018-07-06

## 2018-07-03 RX ADMIN — SIMVASTATIN 40 MILLIGRAM(S): 20 TABLET, FILM COATED ORAL at 21:21

## 2018-07-03 RX ADMIN — ENOXAPARIN SODIUM 40 MILLIGRAM(S): 100 INJECTION SUBCUTANEOUS at 21:20

## 2018-07-03 NOTE — H&P ADULT - NEGATIVE GASTROINTESTINAL SYMPTOMS
no flatulence/no hematochezia/no diarrhea/no change in bowel habits/no nausea/no vomiting/no constipation/no abdominal pain/no melena

## 2018-07-03 NOTE — H&P ADULT - NSHPLABSRESULTS_GEN_ALL_CORE
EKG: NSR with 1st degree AV block, LAE, LBBB  CE x2: Trop 22-->19  H/H: 11.5/37.3  Glucose: 150  UA: Negative    7/3 CXR: Clear lungs.

## 2018-07-03 NOTE — H&P ADULT - ASSESSMENT
76 y/o male with a PMHx of CAD S/P CABG, HTN, HLD, DM, GERD, anemia and gastric cancer S/P RT, chemo and gastrectomy presents to ED S/P syncopal episodes.

## 2018-07-03 NOTE — H&P ADULT - NSHPSOCIALHISTORY_GEN_ALL_CORE
Pt is  and lives with wife. He is retired. He is independent at baseline. He denies smoking and drinking.

## 2018-07-03 NOTE — ED ADULT NURSE NOTE - OBJECTIVE STATEMENT
pt brought to rm 20 A&Ox3, amb, skin w/d/i, as per family hx low BP associated w/ dizziness x 1 week, episodes occur when he stands up or changes position associated w/ palpitations, chest pressure, dizziness and near syncopal episode, relieved by sitting, eating some salty crackers, and water, similar symptoms in past w/ multiple hospitalizations for similar symptoms, VS as noted, SL palced, labs sent, pt appears in NAD @ this time, awaiting results and further orders, will continue to monitor.

## 2018-07-03 NOTE — ED PROVIDER NOTE - ATTENDING CONTRIBUTION TO CARE
I performed a history and physical exam of the patient and discussed their management with the resident and /or advanced care provider. I reviewed the resident and /or ACP's note and agree with the documented findings and plan of care. My medical decison making and observations are found above.  Abd soft, chest clear.

## 2018-07-03 NOTE — H&P ADULT - PMH
CAD (coronary artery disease)  S/P CABG  Carotid stenosis    DM (diabetes mellitus)    Gastric cancer  S/P RT and chemo  GERD (gastroesophageal reflux disease)    Hives  on Prednisone  HLD (hyperlipidemia)    HTN (hypertension)    JACK (iron deficiency anemia)

## 2018-07-03 NOTE — H&P ADULT - ATTENDING COMMENTS
Pt seen and examined yesterday and today  agree with most of above    CAD history of cabg  cont asa and statin     Syncope  ? BP related   hold imdur and ranexa  check echo and carotid doppler  monitor on tele   head CT  orhtostatics neg  check am cortisol    DM  Monitor finger stick. Insulin coverage. Diabetic education and Diabetic diet. Consider nutrition consultation.

## 2018-07-03 NOTE — ED PROVIDER NOTE - MEDICAL DECISION MAKING DETAILS
77M presenting with presyncope and low BP with hx of multiple similar episodes. Will obtain cardiac enzymes with likely admission for further evaluation 77M presenting with presyncope and low BP with hx of multiple similar episodes. Will obtain cardiac enzymes with likely admission for further evaluation  Lakesha: Highest risk for syncope being cardiac, as well as high risk from trauma perspective.  Chest pain on and off also needs further eval. Would admit.

## 2018-07-03 NOTE — ED PROVIDER NOTE - OBJECTIVE STATEMENT
77M with hx of CAD s/p CABG, HLD, DM, BPH presenting with complaints of intermittent low BP, dizziness x 1 week. Patient endorses episodes occur when he stands up, experiences palpitations, chest pressure, dizziness and feeling like he might pass out. Symptoms are relieved after sitting down in about 15 minutes, eating some salty crackers, and water. States long hx of similar symptoms with multiple hospitalizations for dizziness and syncope. Denies fever, chills, sob, n/v/d, dysuria, cough, rhinorrhea 77M with hx of CAD s/p CABG, HLD, DM, BPH presenting with complaints of intermittent low BP, dizziness x 1 week and episode of syncope about 1 week ago near start of symptoms. Patient endorses episodes occur when he stands up, experiences palpitations, chest pressure, dizziness and feeling like he might pass out. Symptoms are relieved after sitting down in about 15 minutes, eating some salty crackers, and water. States long hx of similar symptoms with multiple hospitalizations for dizziness and syncope. Denies fever, chills, sob, n/v/d, dysuria, cough, rhinorrhea

## 2018-07-03 NOTE — H&P ADULT - NEGATIVE CARDIOVASCULAR SYMPTOMS
no paroxysmal nocturnal dyspnea/no dyspnea on exertion/no chest pain/no claudication/no orthopnea/no peripheral edema/no palpitations

## 2018-07-03 NOTE — H&P ADULT - PROBLEM SELECTOR PLAN 1
Admit to telemetry, serial EKGs, serial cardiac enzymes  Check CBC, CMP, TSH, FLP, HgA1C, UA  Orthostatics ordered  Carotid dopplers ordered   Echocardiogram ordered  PT eval ordered  F/U MD note

## 2018-07-03 NOTE — H&P ADULT - NEUROLOGICAL DETAILS
sensation intact/cranial nerves intact/normal strength/alert and oriented x 3/responds to pain/responds to verbal commands

## 2018-07-03 NOTE — H&P ADULT - NEGATIVE OPHTHALMOLOGIC SYMPTOMS
no blurred vision R/no diplopia/no photophobia/no pain L/no blurred vision L/no pain R/no loss of vision L/no loss of vision R

## 2018-07-03 NOTE — H&P ADULT - RS GEN PE MLT RESP DETAILS PC
good air movement/no chest wall tenderness/no intercostal retractions/no rales/no rhonchi/no wheezes/respirations non-labored/clear to auscultation bilaterally/breath sounds equal/airway patent

## 2018-07-03 NOTE — H&P ADULT - NEGATIVE NEUROLOGICAL SYMPTOMS
no transient paralysis/no paresthesias/no headache/no confusion/no generalized seizures/no tremors/no loss of sensation/no facial palsy/no weakness/no difficulty walking/no focal seizures/no hemiparesis

## 2018-07-03 NOTE — ED ADULT TRIAGE NOTE - CHIEF COMPLAINT QUOTE
Pt c/o intermittent low bp at home x 1 week with intermittent dizziness. Pt normotensive at present time. Denies dizziness at present time. Denies cp.

## 2018-07-03 NOTE — H&P ADULT - PROBLEM SELECTOR PLAN 4
Pt previously on Ranexa and Isosorbide but no longer on any antihypertensives  Monitor BP serially and consider initiation of antihypertensive (ideally BB or ACE given history of CAD/DM)  Sodium restriction

## 2018-07-03 NOTE — ED ADULT NURSE REASSESSMENT NOTE - NS ED NURSE REASSESS COMMENT FT1
FS as noted, Tele PA Charlie aware, pt not given tray @ this time, awaiting orders, pt transported to 4S.

## 2018-07-04 LAB
BUN SERPL-MCNC: 17 MG/DL — SIGNIFICANT CHANGE UP (ref 7–23)
CALCIUM SERPL-MCNC: 8.8 MG/DL — SIGNIFICANT CHANGE UP (ref 8.4–10.5)
CHLORIDE SERPL-SCNC: 101 MMOL/L — SIGNIFICANT CHANGE UP (ref 98–107)
CHOLEST SERPL-MCNC: 195 MG/DL — SIGNIFICANT CHANGE UP (ref 120–199)
CO2 SERPL-SCNC: 23 MMOL/L — SIGNIFICANT CHANGE UP (ref 22–31)
CREAT SERPL-MCNC: 0.79 MG/DL — SIGNIFICANT CHANGE UP (ref 0.5–1.3)
GLUCOSE BLDC GLUCOMTR-MCNC: 155 MG/DL — HIGH (ref 70–99)
GLUCOSE BLDC GLUCOMTR-MCNC: 176 MG/DL — HIGH (ref 70–99)
GLUCOSE BLDC GLUCOMTR-MCNC: 82 MG/DL — SIGNIFICANT CHANGE UP (ref 70–99)
GLUCOSE BLDC GLUCOMTR-MCNC: 86 MG/DL — SIGNIFICANT CHANGE UP (ref 70–99)
GLUCOSE SERPL-MCNC: 70 MG/DL — SIGNIFICANT CHANGE UP (ref 70–99)
HAPTOGLOB SERPL-MCNC: 130 MG/DL — SIGNIFICANT CHANGE UP (ref 34–200)
HBA1C BLD-MCNC: 7.8 % — HIGH (ref 4–5.6)
HCT VFR BLD CALC: 35.8 % — LOW (ref 39–50)
HDLC SERPL-MCNC: 92 MG/DL — HIGH (ref 35–55)
HGB BLD-MCNC: 10.9 G/DL — LOW (ref 13–17)
IRON SATN MFR SERPL: 295 UG/DL — SIGNIFICANT CHANGE UP (ref 155–535)
IRON SATN MFR SERPL: 35 UG/DL — LOW (ref 45–165)
LDH SERPL L TO P-CCNC: 232 U/L — HIGH (ref 135–225)
LIPID PNL WITH DIRECT LDL SERPL: 99 MG/DL — SIGNIFICANT CHANGE UP
MAGNESIUM SERPL-MCNC: 2.2 MG/DL — SIGNIFICANT CHANGE UP (ref 1.6–2.6)
MCHC RBC-ENTMCNC: 24.4 PG — LOW (ref 27–34)
MCHC RBC-ENTMCNC: 30.4 % — LOW (ref 32–36)
MCV RBC AUTO: 80.1 FL — SIGNIFICANT CHANGE UP (ref 80–100)
NRBC # FLD: 0 — SIGNIFICANT CHANGE UP
PLATELET # BLD AUTO: 215 K/UL — SIGNIFICANT CHANGE UP (ref 150–400)
PMV BLD: 10.4 FL — SIGNIFICANT CHANGE UP (ref 7–13)
POTASSIUM SERPL-MCNC: 4.4 MMOL/L — SIGNIFICANT CHANGE UP (ref 3.5–5.3)
POTASSIUM SERPL-SCNC: 4.4 MMOL/L — SIGNIFICANT CHANGE UP (ref 3.5–5.3)
RBC # BLD: 4.47 M/UL — SIGNIFICANT CHANGE UP (ref 4.2–5.8)
RBC # FLD: 17.1 % — HIGH (ref 10.3–14.5)
RETICS #: 81 K/UL — SIGNIFICANT CHANGE UP (ref 25–125)
RETICS/RBC NFR: 1.8 % — SIGNIFICANT CHANGE UP (ref 0.5–2.5)
SODIUM SERPL-SCNC: 138 MMOL/L — SIGNIFICANT CHANGE UP (ref 135–145)
TRIGL SERPL-MCNC: 86 MG/DL — SIGNIFICANT CHANGE UP (ref 10–149)
TSH SERPL-MCNC: 1.34 UIU/ML — SIGNIFICANT CHANGE UP (ref 0.27–4.2)
UIBC SERPL-MCNC: 259.9 UG/DL — SIGNIFICANT CHANGE UP (ref 110–370)
VIT B12 SERPL-MCNC: 620 PG/ML — SIGNIFICANT CHANGE UP (ref 200–900)
WBC # BLD: 6.3 K/UL — SIGNIFICANT CHANGE UP (ref 3.8–10.5)
WBC # FLD AUTO: 6.3 K/UL — SIGNIFICANT CHANGE UP (ref 3.8–10.5)

## 2018-07-04 PROCEDURE — 70450 CT HEAD/BRAIN W/O DYE: CPT | Mod: 26

## 2018-07-04 RX ORDER — SODIUM CHLORIDE 9 MG/ML
1000 INJECTION INTRAMUSCULAR; INTRAVENOUS; SUBCUTANEOUS
Qty: 0 | Refills: 0 | Status: DISCONTINUED | OUTPATIENT
Start: 2018-07-04 | End: 2018-07-06

## 2018-07-04 RX ADMIN — ENOXAPARIN SODIUM 40 MILLIGRAM(S): 100 INJECTION SUBCUTANEOUS at 21:21

## 2018-07-04 RX ADMIN — Medication 81 MILLIGRAM(S): at 12:36

## 2018-07-04 RX ADMIN — SIMVASTATIN 40 MILLIGRAM(S): 20 TABLET, FILM COATED ORAL at 21:21

## 2018-07-04 RX ADMIN — Medication 325 MILLIGRAM(S): at 12:36

## 2018-07-04 RX ADMIN — PANTOPRAZOLE SODIUM 40 MILLIGRAM(S): 20 TABLET, DELAYED RELEASE ORAL at 05:15

## 2018-07-04 RX ADMIN — SODIUM CHLORIDE 100 MILLILITER(S): 9 INJECTION INTRAMUSCULAR; INTRAVENOUS; SUBCUTANEOUS at 19:31

## 2018-07-04 RX ADMIN — Medication 1: at 13:29

## 2018-07-04 NOTE — CONSULT NOTE ADULT - ASSESSMENT
78 y/o male with a PMHx of CAD S/P CABG, HTN, HLD, DM, GERD, anemia and gastric cancer S/P RT, chemo and gastrectomy presents to ED S/P syncopal episodes:  1. Syncope - multiple episodes at home, also reports dizziness and low BP associated w/episodes, plan per Cardio, Imdur and Renexa held, TTE pending, CTH pending, tele  2. CAD - ACS ruled out, ASA, statin  3. Carotid stenosis - ASA, statin, US carotid  4. HTN - monitor BP off meds  5. HLD - c/w statin  6. DM2 - c/w ISS, PO meds held, check A1C  7. JACK - c/w FeSO4  8. DVT prophylaxis

## 2018-07-04 NOTE — CONSULT NOTE ADULT - SUBJECTIVE AND OBJECTIVE BOX
HPI: 76 y/o male with a PMHx of CAD S/P CABG, carotid stenosis, HTN, HLD, DM, GERD, anemia and gastric cancer S/P RT, chemo and gastrectomy presents to ED with multiple episodes of syncope associated with hypotension. Pt reports that he monitors his blood pressure at home. For the past week or so, pt has noticed that his blood pressure has been fluctuating from very low to very high. Pt attributes his episodes of hypotension to taking baby Aspirin so he no longer takes it. During the times when his blood pressure has been low, he reports dizziness and palpitations with several episodes of syncope. Pt admits to transient and short periods of loss of consciousness but he does not admit to any head or bodily trauma. Pt does not know how long he passes out for but he doesn't think it is for a long period of time. Pt admits that this has happened to him multiple times in the past and he has been known to have carotid stenosis. Pt denies fever, chills, recent travel, headache, visual deficits, chest pain, shortness of breath, orthopnea, abdominal pain, N/V/D/C, hematochezia, melena, dysuria, hematuria, peripheral edema.      Allergies:  No Known Allergies      PAST MEDICAL & SURGICAL HISTORY:  GERD (gastroesophageal reflux disease)  Gastric cancer: S/P RT and chemo  Carotid stenosis  JACK (iron deficiency anemia)  HLD (hyperlipidemia)  HTN (hypertension)  CAD (coronary artery disease): S/P CABG  Hives: on Prednisone  DM (diabetes mellitus)  S/P Gastrectomy: 2006 due to gastric cancer  S/P CABG X 4: 2002      FAMILY HISTORY:  No pertinent family history in first degree relatives      REVIEW OF SYSTEMS:  CONSTITUTIONAL: No fever, weight loss, or fatigue  EYES: No eye pain, visual disturbances, or discharge  NECK: No pain or stiffness  RESPIRATORY: No cough or wheezing, no shortness of breath  CARDIOVASCULAR: No chest pain, palpitations, + occasional dizziness, no leg swelling  GASTROINTESTINAL: No abdominal or epigastric pain. No nausea, vomiting, diarrhea or constipation  GENITOURINARY: No dysuria, urinary frequency or urgency, no hematuria  NEUROLOGICAL: No headaches, memory loss, loss of strength, numbness, or tremors, + LOC  SKIN: No itching, burning, rashes, or lesions   MUSCULOSKELETAL: No joint pain or swelling; No muscle, back, or extremity pain    Medications:  MEDICATIONS  (STANDING):  aspirin enteric coated 81 milliGRAM(s) Oral daily  dextrose 5%. 1000 milliLiter(s) (50 mL/Hr) IV Continuous <Continuous>  dextrose 50% Injectable 12.5 Gram(s) IV Push once  dextrose 50% Injectable 25 Gram(s) IV Push once  dextrose 50% Injectable 25 Gram(s) IV Push once  enoxaparin Injectable 40 milliGRAM(s) SubCutaneous every 24 hours  ferrous    sulfate 325 milliGRAM(s) Oral daily  insulin lispro (HumaLOG) corrective regimen sliding scale   SubCutaneous three times a day before meals  insulin lispro (HumaLOG) corrective regimen sliding scale   SubCutaneous at bedtime  pantoprazole    Tablet 40 milliGRAM(s) Oral before breakfast  simvastatin 40 milliGRAM(s) Oral at bedtime    MEDICATIONS  (PRN):  dextrose 40% Gel 15 Gram(s) Oral once PRN Blood Glucose LESS THAN 70 milliGRAM(s)/deciliter  glucagon  Injectable 1 milliGRAM(s) IntraMuscular once PRN Glucose LESS THAN 70 milligrams/deciliter    	    PHYSICAL EXAM:  T(C): 36.6 (07-04-18 @ 05:14), Max: 36.7 (07-03-18 @ 19:00)  HR: 57 (07-03-18 @ 21:17) (57 - 73)  BP: 114/58 (07-03-18 @ 21:17) (114/58 - 143/43)  RR: 18 (07-04-18 @ 05:14) (16 - 18)  SpO2: 100% (07-04-18 @ 05:14) (100% - 100%)  Wt(kg): --  I&O's Summary      Appearance: Normal	  HEENT:   NCAT, PERRL, EOMI	  Lymphatic: No lymphadenopathy  Cardiovascular: Normal S1 S2, RRR  Respiratory: Lungs clear to auscultation BL  Psychiatry: A & O x 3, Mood & affect appropriate  Gastrointestinal:  Soft, Non-tender, + BS  Skin: No rashes, No ecchymoses, No cyanosis	  Neurologic: Non-focal  Extremities: Normal range of motion, No clubbing, cyanosis or edema    	  LABS:	 	    CARDIAC MARKERS:                                11.5   7.39  )-----------( 181      ( 03 Jul 2018 11:45 )             37.3     07-03    137  |  100  |  22  ----------------------------<  150<H>  5.1   |  26  |  1.00    Ca    9.0      03 Jul 2018 11:45    TPro  6.6  /  Alb  3.5  /  TBili  0.6  /  DBili  x   /  AST  36  /  ALT  25  /  AlkPhos  45  07-03    proBNP:   Lipid Profile:   HgA1c:   TSH:

## 2018-07-04 NOTE — PHYSICAL THERAPY INITIAL EVALUATION ADULT - PLANNED THERAPY INTERVENTIONS, PT EVAL
balance training/bed mobility training/transfer training/stair negotiation/strengthening/gait training

## 2018-07-04 NOTE — PHYSICAL THERAPY INITIAL EVALUATION ADULT - PATIENT PROFILE REVIEW, REHAB EVAL
Pt. profile reviewed, consulted with RN Vaishnavi MENDOSA prior to initial PT evaluation and tx, as per RN, Pt. is OK to participate in skilled therapy session, current activity orders; ambulate with assistance/yes

## 2018-07-04 NOTE — PHYSICAL THERAPY INITIAL EVALUATION ADULT - PERTINENT HX OF CURRENT PROBLEM, REHAB EVAL
Pt. is a 77 year old male admitted to [facility] secondary to syncope and collapse. PMH: GERD, HTN, CAD

## 2018-07-05 DIAGNOSIS — E27.40 UNSPECIFIED ADRENOCORTICAL INSUFFICIENCY: ICD-10-CM

## 2018-07-05 LAB
BUN SERPL-MCNC: 18 MG/DL — SIGNIFICANT CHANGE UP (ref 7–23)
CALCIUM SERPL-MCNC: 8.9 MG/DL — SIGNIFICANT CHANGE UP (ref 8.4–10.5)
CHLORIDE SERPL-SCNC: 99 MMOL/L — SIGNIFICANT CHANGE UP (ref 98–107)
CO2 SERPL-SCNC: 18 MMOL/L — LOW (ref 22–31)
CORTIS SERPL-MCNC: 1.4 UG/DL — LOW (ref 2.7–18.4)
CREAT SERPL-MCNC: 0.81 MG/DL — SIGNIFICANT CHANGE UP (ref 0.5–1.3)
GLUCOSE BLDC GLUCOMTR-MCNC: 141 MG/DL — HIGH (ref 70–99)
GLUCOSE BLDC GLUCOMTR-MCNC: 145 MG/DL — HIGH (ref 70–99)
GLUCOSE BLDC GLUCOMTR-MCNC: 173 MG/DL — HIGH (ref 70–99)
GLUCOSE BLDC GLUCOMTR-MCNC: 209 MG/DL — HIGH (ref 70–99)
GLUCOSE SERPL-MCNC: 133 MG/DL — HIGH (ref 70–99)
HCT VFR BLD CALC: 43.8 % — SIGNIFICANT CHANGE UP (ref 39–50)
HGB BLD-MCNC: 13.5 G/DL — SIGNIFICANT CHANGE UP (ref 13–17)
MAGNESIUM SERPL-MCNC: 2.1 MG/DL — SIGNIFICANT CHANGE UP (ref 1.6–2.6)
MCHC RBC-ENTMCNC: 24.2 PG — LOW (ref 27–34)
MCHC RBC-ENTMCNC: 30.8 % — LOW (ref 32–36)
MCV RBC AUTO: 78.4 FL — LOW (ref 80–100)
NRBC # FLD: 0 — SIGNIFICANT CHANGE UP
PLATELET # BLD AUTO: 181 K/UL — SIGNIFICANT CHANGE UP (ref 150–400)
PMV BLD: 12.1 FL — SIGNIFICANT CHANGE UP (ref 7–13)
POTASSIUM SERPL-MCNC: 4.8 MMOL/L — SIGNIFICANT CHANGE UP (ref 3.5–5.3)
POTASSIUM SERPL-SCNC: 4.8 MMOL/L — SIGNIFICANT CHANGE UP (ref 3.5–5.3)
RBC # BLD: 5.59 M/UL — SIGNIFICANT CHANGE UP (ref 4.2–5.8)
RBC # FLD: 18.3 % — HIGH (ref 10.3–14.5)
SODIUM SERPL-SCNC: 136 MMOL/L — SIGNIFICANT CHANGE UP (ref 135–145)
WBC # BLD: 6.99 K/UL — SIGNIFICANT CHANGE UP (ref 3.8–10.5)
WBC # FLD AUTO: 6.99 K/UL — SIGNIFICANT CHANGE UP (ref 3.8–10.5)

## 2018-07-05 PROCEDURE — 93880 EXTRACRANIAL BILAT STUDY: CPT | Mod: 26

## 2018-07-05 PROCEDURE — 99223 1ST HOSP IP/OBS HIGH 75: CPT | Mod: GC

## 2018-07-05 RX ORDER — SENNA PLUS 8.6 MG/1
2 TABLET ORAL AT BEDTIME
Qty: 0 | Refills: 0 | Status: DISCONTINUED | OUTPATIENT
Start: 2018-07-05 | End: 2018-07-06

## 2018-07-05 RX ORDER — DOCUSATE SODIUM 100 MG
100 CAPSULE ORAL
Qty: 0 | Refills: 0 | Status: DISCONTINUED | OUTPATIENT
Start: 2018-07-05 | End: 2018-07-06

## 2018-07-05 RX ADMIN — SENNA PLUS 2 TABLET(S): 8.6 TABLET ORAL at 21:38

## 2018-07-05 RX ADMIN — ENOXAPARIN SODIUM 40 MILLIGRAM(S): 100 INJECTION SUBCUTANEOUS at 21:38

## 2018-07-05 RX ADMIN — Medication 325 MILLIGRAM(S): at 13:00

## 2018-07-05 RX ADMIN — PANTOPRAZOLE SODIUM 40 MILLIGRAM(S): 20 TABLET, DELAYED RELEASE ORAL at 05:04

## 2018-07-05 RX ADMIN — Medication 1: at 09:20

## 2018-07-05 RX ADMIN — SIMVASTATIN 40 MILLIGRAM(S): 20 TABLET, FILM COATED ORAL at 21:38

## 2018-07-05 RX ADMIN — Medication 2: at 13:01

## 2018-07-05 RX ADMIN — Medication 15 MILLIGRAM(S): at 21:38

## 2018-07-05 RX ADMIN — Medication 81 MILLIGRAM(S): at 13:00

## 2018-07-05 NOTE — CONSULT NOTE ADULT - PROBLEM SELECTOR RECOMMENDATION 9
Primary vs Secondary   2/2 topical and PO steroid use   Cortisol level of 1.4 on PO steroids is very low and can be virtually diagnostic of adrenal insufficiency   will repeat cortisol levels in am with ACTH as ordered   pt is a candidate of cosyntropin stim test   After confirmation of disease pt will need further work up like MRI and Pitutary hormones is its secondary AI Secondary AI 2/2 long term topical and PO steroid use   Hx intermittent steroid use so will do short taper of steroids - 15 mg prednisone PO today , 10 mg PO prednisone tomorrow and 5 mg Prednisone day after tomorrow, will watch for any AI sx while tapering steroids  Cortisol level of 1.4 , will not pursue further work up for AI  derm/rhuem consult for his pruritus dx and need of high dose long term steroids

## 2018-07-05 NOTE — CONSULT NOTE ADULT - ATTENDING COMMENTS
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 Recommend prednisone taper as outlined above. Would try to obtain diagnosis for his skin condition to determine if other treatments are available instead of prednisone - would consult dermatology. He will need outpatient endocrine follow up [phone].

## 2018-07-05 NOTE — CONSULT NOTE ADULT - ASSESSMENT
76 y/o male with a PMHx of CAD S/P CABG, carotid stenosis, HTN, HLD, DM, GERD, anemia and gastric cancer S/P RT, chemo and gastrectomy presents to ED with multiple episodes of syncope associated with hypotension. with AM cortisol level of 1.4 in setting if PO and Topical steroid use x 2-3 years leads to high suspicion of Adrenal insufficiency 2/2 steroid use.

## 2018-07-05 NOTE — CONSULT NOTE ADULT - PROBLEM SELECTOR RECOMMENDATION 3
BP low in 100s with syncope   agree with not giving BP meds now until BP goes persistently above goal  goal  <130/85 in diabetic pts

## 2018-07-05 NOTE — CONSULT NOTE ADULT - PROBLEM SELECTOR RECOMMENDATION 2
HbA1c - 7.8   takes glipizide as needed at home  elderly pt with comorbidities , HbA1c goal ~ 7.5-8.0  pre-meal FS goal - 140-180   currently fasting and pre-meal FS are labile from 82 to 173  upon dc - kidney functions normal, will start low dose metformin will discontinue glipizide 2.5 mg daily/as needed pt will continue f/u with our o/p office [address] [facility] ( [phone]) 2/2 steroid use  HbA1c - 7.8   only used glipizide one time for high sugars  elderly pt with comorbidities , HbA1c goal ~ 7.5-8.0  pre-meal FS goal - 140-180   currently fasting and pre-meal FS are labile from 82 to 173  after tapering steroids pt might not need any diabetic meds  will f/u  upon dc - will discontinue glipizide 2.5 mg daily/as needed pt will continue f/u with our o/p office [address] ([phone])

## 2018-07-05 NOTE — CONSULT NOTE ADULT - SUBJECTIVE AND OBJECTIVE BOX
HPI:  78 y/o male with a PMHx of CAD S/P CABG, carotid stenosis, HTN, HLD, DM, GERD, anemia and gastric cancer S/P RT, chemo and gastrectomy presents to ED with multiple episodes of syncope associated with hypotension. Pt reports that he monitors his blood pressure at home. For the past week or so, pt has noticed that his blood pressure has been fluctuating from very low to very high. Pt attributes his episodes of hypotension to taking baby Aspirin so he no longer takes it. During the times when his blood pressure has been low, he reports dizziness and palpitations with several episodes of syncope. Pt admits to transient and short periods of loss of consciousness but he does not admit to any head or bodily trauma. Pt does not know how long he passes out for but he doesn't think it is for a long period of time. Pt admits that this has happened to him multiple times in the past and he has been known to have carotid stenosis. Pt denies fever, chills, recent travel, headache, visual deficits, chest pain, shortness of breath, orthopnea, abdominal pain, N/V/D/C, hematochezia, melena, dysuria, hematuria, peripheral edema. Upon arrival to ED, EKG: NSR with 1st degree AV block, LAE, LBBB. CE x2: Trop 22-->19. H/H: 11.5/37.3. Glucose: 150. UA: Negative. CXR: Clear lungs. (03 Jul 2018 20:36)      Endocrine History  78 y/o male with a PMHx of CAD S/P CABG, carotid stenosis, HTN, HLD, DM, GERD, anemia and gastric cancer S/P RT, chemo and gastrectomy presents to ED with multiple episodes of syncope associated with hypotension. Pt was seen by Endocrine service for low cortisol level - 1.4 and Adrenal insufficiency work up. Pt mentioned dizziness prior to his unwitnessed syncope with unknown duration of LOC. He mentioned syncope episodes with negative cardiac work up in the past and lately feeling weak and tired. He lost 1-2 lbs in last few weeks. He has been using triamcinolone topical and prednisone oral 20 mg for last 2-3 years for his pruritus on extremities. He has no rash.  He denies and known pituitary mass, hormonal therapy in the past.  He denies ant fever, cough, night sweats, recent surgery, recent infection , recent stress, C/P, dyspnea, abd pain.     Diabetes - dx 3 years ago , currently HbA1c 7.8, on as needed glipizide. follows PMD for diabetes.       PAST MEDICAL & SURGICAL HISTORY:  GERD (gastroesophageal reflux disease)  Gastric cancer: S/P RT and chemo  Carotid stenosis  JACK (iron deficiency anemia)  HLD (hyperlipidemia)  HTN (hypertension)  CAD (coronary artery disease): S/P CABG  Hives: on Prednisone  DM (diabetes mellitus)  S/P Gastrectomy: 2006 due to gastric cancer  S/P CABG X 4: 2002      FAMILY HISTORY:  No pertinent family history in first degree relatives      Social History:  Tobacco  ETOH  Illicits  Occupation - retired      MEDICATIONS  (STANDING):  aspirin enteric coated 81 milliGRAM(s) Oral daily  dextrose 5%. 1000 milliLiter(s) (50 mL/Hr) IV Continuous <Continuous>  dextrose 50% Injectable 12.5 Gram(s) IV Push once  dextrose 50% Injectable 25 Gram(s) IV Push once  dextrose 50% Injectable 25 Gram(s) IV Push once  docusate sodium 100 milliGRAM(s) Oral two times a day  enoxaparin Injectable 40 milliGRAM(s) SubCutaneous every 24 hours  ferrous    sulfate 325 milliGRAM(s) Oral daily  insulin lispro (HumaLOG) corrective regimen sliding scale   SubCutaneous three times a day before meals  insulin lispro (HumaLOG) corrective regimen sliding scale   SubCutaneous at bedtime  pantoprazole    Tablet 40 milliGRAM(s) Oral before breakfast  senna 2 Tablet(s) Oral at bedtime  simvastatin 40 milliGRAM(s) Oral at bedtime  sodium chloride 0.9%. 1000 milliLiter(s) (100 mL/Hr) IV Continuous <Continuous>    MEDICATIONS  (PRN):  dextrose 40% Gel 15 Gram(s) Oral once PRN Blood Glucose LESS THAN 70 milliGRAM(s)/deciliter  glucagon  Injectable 1 milliGRAM(s) IntraMuscular once PRN Glucose LESS THAN 70 milligrams/deciliter  predniSONE   Tablet 20 milliGRAM(s) Oral daily PRN Pruritus      Allergies    No Known Allergies    Intolerances      Review of Systems:  Constitutional: No fever, good appetite/po intake, dizziness  Eyes: No blurry vision, diplopia  Neuro: No tremors  HEENT: No pain  Cardiovascular: No chest pain, palpitations  Respiratory: No SOB, no cough  GI: trouble swallowing and heartburn associated with nausea/vomiting,   : No dysuria, hematuria  Skin: no rash,   Psych: no depression  Endocrine: no polyuria, polydipsia  Hem/lymph: no swelling  Osteoporosis: no fractures    ALL OTHER SYSTEMS REVIEWED AND NEGATIVE    UNABLE TO OBTAIN    PHYSICAL EXAM:  VITALS: T(C): 36.6 (07-05-18 @ 14:21)  T(F): 97.9 (07-05-18 @ 14:21), Max: 98.4 (07-04-18 @ 20:19)  HR: 65 (07-05-18 @ 14:21) (61 - 65)  BP: 116/61 (07-05-18 @ 14:21) (101/59 - 116/61)  RR:  (18 - 19)  SpO2:  (99% - 100%)  Wt(kg): --  GENERAL: NAD, well-groomed, well-developed  EYES: No proptosis, no lid lag, anicteric  HEENT:  Atraumatic, Normocephalic, moist mucous membranes  THYROID: Normal size, no palpable nodules  RESPIRATORY: Clear to auscultation bilaterally; No rales, rhonchi, wheezing, or rubs  CARDIOVASCULAR: Regular rate and rhythm; No murmurs; no peripheral edema  GI: Soft, nontender, non distended, normal bowel sounds  SKIN: Dry, intact, No rashes or lesions;  no purple or white striae  NEURO: sensation intact, extraocular movements intact, no tremor, normal reflexes  PSYCH: reactive affect, euthymic mood      POCT Blood Glucose.: 209 mg/dL (07-05-18 @ 12:21)  POCT Blood Glucose.: 173 mg/dL (07-05-18 @ 08:58)  POCT Blood Glucose.: 176 mg/dL (07-04-18 @ 21:23)  POCT Blood Glucose.: 86 mg/dL (07-04-18 @ 17:29)  POCT Blood Glucose.: 155 mg/dL (07-04-18 @ 12:46)  POCT Blood Glucose.: 82 mg/dL (07-04-18 @ 08:28)  POCT Blood Glucose.: 159 mg/dL (07-03-18 @ 21:44)  POCT Blood Glucose.: 194 mg/dL (07-03-18 @ 18:38)  POCT Blood Glucose.: 228 mg/dL (07-03-18 @ 10:39)                            13.5   6.99  )-----------( 181      ( 05 Jul 2018 06:12 )             43.8       07-05    136  |  99  |  18  ----------------------------<  133<H>  4.8   |  18<L>  |  0.81    EGFR if : 99  EGFR if non : 85    Ca    8.9      07-05  Mg     2.1     07-05    TPro  6.6  /  Alb  3.5  /  TBili  0.6  /  DBili  x   /  AST  36  /  ALT  25  /  AlkPhos  45  07-03      Thyroid Function Tests:  07-04 @ 07:18 TSH 1.34 FreeT4 -- T3 -- Anti TPO -- Anti Thyroglobulin Ab -- TSI --      Hemoglobin A1C, Whole Blood: 7.8 % <H> [4.0 - 5.6] (07-04-18 @ 07:18)      07-04 Chol 195 LDL 99 HDL 92<H> Trig 86 HPI:  78 y/o male with a PMHx of CAD S/P CABG, carotid stenosis, HTN, HLD, DM, GERD, anemia and gastric cancer S/P RT, chemo and gastrectomy presents to ED with multiple episodes of syncope associated with hypotension. Pt reports that he monitors his blood pressure at home. For the past week or so, pt has noticed that his blood pressure has been fluctuating from very low to very high. Pt attributes his episodes of hypotension to taking baby Aspirin so he no longer takes it. During the times when his blood pressure has been low, he reports dizziness and palpitations with several episodes of syncope. Pt admits to transient and short periods of loss of consciousness but he does not admit to any head or bodily trauma. Pt does not know how long he passes out for but he doesn't think it is for a long period of time. Pt admits that this has happened to him multiple times in the past and he has been known to have carotid stenosis. Pt denies fever, chills, recent travel, headache, visual deficits, chest pain, shortness of breath, orthopnea, abdominal pain, N/V/D/C, hematochezia, melena, dysuria, hematuria, peripheral edema. Upon arrival to ED, EKG: NSR with 1st degree AV block, LAE, LBBB. CE x2: Trop 22-->19. H/H: 11.5/37.3. Glucose: 150. UA: Negative. CXR: Clear lungs. (03 Jul 2018 20:36)      Endocrine History  78 y/o male with a PMHx of CAD S/P CABG, carotid stenosis, HTN, HLD, DM, GERD, anemia and gastric cancer S/P RT, chemo and gastrectomy presents to ED with multiple episodes of syncope associated with hypotension. Pt was seen by Endocrine service for low cortisol level - 1.4 and Adrenal insufficiency work up. Pt mentioned dizziness prior to his unwitnessed syncope with unknown duration of LOC. He mentioned syncope episodes with negative cardiac work up in the past and lately feeling weak and tired. He lost 1-2 lbs in last few weeks. He has been using triamcinolone topical and prednisone oral 20 mg for last 2-3 years for his pruritus on extremities. He has no rash.  He denies and known pituitary mass, hormonal therapy in the past.  He denies ant fever, cough, night sweats, recent surgery, recent infection , recent stress, C/P, dyspnea, abd pain.     Diabetes - dx 3 years ago , currently HbA1c 7.8, on as needed glipizide. follows PMD for diabetes.       PAST MEDICAL & SURGICAL HISTORY:  GERD (gastroesophageal reflux disease)  Gastric cancer: S/P RT and chemo  Carotid stenosis  JACK (iron deficiency anemia)  HLD (hyperlipidemia)  HTN (hypertension)  CAD (coronary artery disease): S/P CABG  Hives: on Prednisone  DM (diabetes mellitus)  S/P Gastrectomy: 2006 due to gastric cancer  S/P CABG X 4: 2002      FAMILY HISTORY:  No pertinent family history in first degree relatives      Social History:  Tobacco  ETOH  Illicits  Occupation - retired      MEDICATIONS  (STANDING):  aspirin enteric coated 81 milliGRAM(s) Oral daily  dextrose 5%. 1000 milliLiter(s) (50 mL/Hr) IV Continuous <Continuous>  dextrose 50% Injectable 12.5 Gram(s) IV Push once  dextrose 50% Injectable 25 Gram(s) IV Push once  dextrose 50% Injectable 25 Gram(s) IV Push once  docusate sodium 100 milliGRAM(s) Oral two times a day  enoxaparin Injectable 40 milliGRAM(s) SubCutaneous every 24 hours  ferrous    sulfate 325 milliGRAM(s) Oral daily  insulin lispro (HumaLOG) corrective regimen sliding scale   SubCutaneous three times a day before meals  insulin lispro (HumaLOG) corrective regimen sliding scale   SubCutaneous at bedtime  pantoprazole    Tablet 40 milliGRAM(s) Oral before breakfast  senna 2 Tablet(s) Oral at bedtime  simvastatin 40 milliGRAM(s) Oral at bedtime  sodium chloride 0.9%. 1000 milliLiter(s) (100 mL/Hr) IV Continuous <Continuous>    MEDICATIONS  (PRN):  dextrose 40% Gel 15 Gram(s) Oral once PRN Blood Glucose LESS THAN 70 milliGRAM(s)/deciliter  glucagon  Injectable 1 milliGRAM(s) IntraMuscular once PRN Glucose LESS THAN 70 milligrams/deciliter  predniSONE   Tablet 20 milliGRAM(s) Oral daily PRN Pruritus      Allergies    No Known Allergies    Intolerances      Review of Systems:  Constitutional: No fever, good appetite/po intake, dizziness  Eyes: No blurry vision, diplopia  Neuro: No tremors  HEENT: No pain  Cardiovascular: No chest pain, palpitations  Respiratory: No SOB, no cough  GI: trouble swallowing and heartburn associated with nausea/vomiting,   : No dysuria, hematuria  Skin: no rash,   Psych: no depression  Endocrine: no polyuria, polydipsia  Hem/lymph: no swelling  Osteoporosis: no fractures    ALL OTHER SYSTEMS REVIEWED AND NEGATIVE      PHYSICAL EXAM:  VITALS: T(C): 36.6 (07-05-18 @ 14:21)  T(F): 97.9 (07-05-18 @ 14:21), Max: 98.4 (07-04-18 @ 20:19)  HR: 65 (07-05-18 @ 14:21) (61 - 65)  BP: 116/61 (07-05-18 @ 14:21) (101/59 - 116/61)  RR:  (18 - 19)  SpO2:  (99% - 100%)  Wt(kg): --  GENERAL: NAD, well-groomed, well-developed  EYES: No proptosis, no lid lag, anicteric  HEENT:  Atraumatic, Normocephalic, moist mucous membranes  THYROID: Normal size, no palpable nodules  RESPIRATORY: Clear to auscultation bilaterally; No rales, rhonchi, wheezing, or rubs  CARDIOVASCULAR: Regular rate and rhythm; No murmurs; no peripheral edema  GI: Soft, nontender, non distended, normal bowel sounds  SKIN: Dry, intact, No rashes or lesions;  no purple or white striae  NEURO: sensation intact, extraocular movements intact, no tremor, normal reflexes  PSYCH: reactive affect, euthymic mood      POCT Blood Glucose.: 209 mg/dL (07-05-18 @ 12:21)  POCT Blood Glucose.: 173 mg/dL (07-05-18 @ 08:58)  POCT Blood Glucose.: 176 mg/dL (07-04-18 @ 21:23)  POCT Blood Glucose.: 86 mg/dL (07-04-18 @ 17:29)  POCT Blood Glucose.: 155 mg/dL (07-04-18 @ 12:46)  POCT Blood Glucose.: 82 mg/dL (07-04-18 @ 08:28)  POCT Blood Glucose.: 159 mg/dL (07-03-18 @ 21:44)  POCT Blood Glucose.: 194 mg/dL (07-03-18 @ 18:38)  POCT Blood Glucose.: 228 mg/dL (07-03-18 @ 10:39)                            13.5   6.99  )-----------( 181      ( 05 Jul 2018 06:12 )             43.8       07-05    136  |  99  |  18  ----------------------------<  133<H>  4.8   |  18<L>  |  0.81    EGFR if : 99  EGFR if non : 85    Ca    8.9      07-05  Mg     2.1     07-05    TPro  6.6  /  Alb  3.5  /  TBili  0.6  /  DBili  x   /  AST  36  /  ALT  25  /  AlkPhos  45  07-03      Thyroid Function Tests:  07-04 @ 07:18 TSH 1.34 FreeT4 -- T3 -- Anti TPO -- Anti Thyroglobulin Ab -- TSI --      Hemoglobin A1C, Whole Blood: 7.8 % <H> [4.0 - 5.6] (07-04-18 @ 07:18)      07-04 Chol 195 LDL 99 HDL 92<H> Trig 86

## 2018-07-06 ENCOUNTER — TRANSCRIPTION ENCOUNTER (OUTPATIENT)
Age: 78
End: 2018-07-06

## 2018-07-06 ENCOUNTER — APPOINTMENT (OUTPATIENT)
Dept: CARDIOLOGY | Facility: CLINIC | Age: 78
End: 2018-07-06

## 2018-07-06 VITALS
RESPIRATION RATE: 17 BRPM | SYSTOLIC BLOOD PRESSURE: 113 MMHG | TEMPERATURE: 98 F | OXYGEN SATURATION: 100 % | HEART RATE: 65 BPM | DIASTOLIC BLOOD PRESSURE: 60 MMHG

## 2018-07-06 LAB
BUN SERPL-MCNC: 22 MG/DL — SIGNIFICANT CHANGE UP (ref 7–23)
CALCIUM SERPL-MCNC: 8.9 MG/DL — SIGNIFICANT CHANGE UP (ref 8.4–10.5)
CHLORIDE SERPL-SCNC: 101 MMOL/L — SIGNIFICANT CHANGE UP (ref 98–107)
CO2 SERPL-SCNC: 23 MMOL/L — SIGNIFICANT CHANGE UP (ref 22–31)
CORTIS SERPL-MCNC: 1.3 UG/DL — LOW (ref 2.7–18.4)
CREAT SERPL-MCNC: 0.84 MG/DL — SIGNIFICANT CHANGE UP (ref 0.5–1.3)
GLUCOSE BLDC GLUCOMTR-MCNC: 132 MG/DL — HIGH (ref 70–99)
GLUCOSE SERPL-MCNC: 147 MG/DL — HIGH (ref 70–99)
POTASSIUM SERPL-MCNC: 4.7 MMOL/L — SIGNIFICANT CHANGE UP (ref 3.5–5.3)
POTASSIUM SERPL-SCNC: 4.7 MMOL/L — SIGNIFICANT CHANGE UP (ref 3.5–5.3)
SODIUM SERPL-SCNC: 135 MMOL/L — SIGNIFICANT CHANGE UP (ref 135–145)

## 2018-07-06 RX ORDER — DOCUSATE SODIUM 100 MG
1 CAPSULE ORAL
Qty: 60 | Refills: 0 | OUTPATIENT
Start: 2018-07-06 | End: 2018-08-04

## 2018-07-06 RX ORDER — HYDROXYZINE HCL 10 MG
1 TABLET ORAL
Qty: 0 | Refills: 0 | COMMUNITY

## 2018-07-06 RX ORDER — SIMVASTATIN 20 MG/1
1 TABLET, FILM COATED ORAL
Qty: 0 | Refills: 0 | COMMUNITY
Start: 2018-07-06

## 2018-07-06 RX ORDER — SENNA PLUS 8.6 MG/1
2 TABLET ORAL
Qty: 60 | Refills: 0 | OUTPATIENT
Start: 2018-07-06 | End: 2018-08-04

## 2018-07-06 RX ORDER — FERROUS SULFATE 325(65) MG
1 TABLET ORAL
Qty: 0 | Refills: 0 | COMMUNITY
Start: 2018-07-06

## 2018-07-06 RX ORDER — SENNA PLUS 8.6 MG/1
2 TABLET ORAL
Qty: 0 | Refills: 0 | COMMUNITY
Start: 2018-07-06

## 2018-07-06 RX ORDER — DOCUSATE SODIUM 100 MG
1 CAPSULE ORAL
Qty: 0 | Refills: 0 | COMMUNITY
Start: 2018-07-06

## 2018-07-06 RX ORDER — DOCUSATE SODIUM 100 MG
1 CAPSULE ORAL
Qty: 0 | Refills: 0 | COMMUNITY

## 2018-07-06 RX ORDER — ASPIRIN/CALCIUM CARB/MAGNESIUM 324 MG
1 TABLET ORAL
Qty: 0 | Refills: 0 | COMMUNITY
Start: 2018-07-06

## 2018-07-06 RX ADMIN — PANTOPRAZOLE SODIUM 40 MILLIGRAM(S): 20 TABLET, DELAYED RELEASE ORAL at 05:48

## 2018-07-06 RX ADMIN — Medication 81 MILLIGRAM(S): at 11:48

## 2018-07-06 RX ADMIN — Medication 325 MILLIGRAM(S): at 11:48

## 2018-07-06 RX ADMIN — Medication 3: at 13:09

## 2018-07-06 RX ADMIN — Medication 100 MILLIGRAM(S): at 05:48

## 2018-07-06 NOTE — DISCHARGE NOTE ADULT - HOSPITAL COURSE
76 y/o male, with a PmHx of CAD s/p CABG, carotid stenosis, HTN, HLD, DM, GERD, anemia and gastric cancer s/p RT, chemo and gastrectomy, presents to ED with multiple episodes of syncope associated with hypotension. Pt reports that he monitors his blood pressure at home. For the past week or so, pt has noticed that his blood pressure has been fluctuating from very low to very high. Pt attributes his episodes of hypotension to taking baby Aspirin so he no longer takes it. During the times when his blood pressure has been low, he reports dizziness and palpitations with several episodes of syncope. Pt admits to transient and short periods of loss of consciousness but he does not admit to any head or bodily trauma. Pt does not know how long he passes out for but he doesn't think it is for a long period of time. Pt admits that this has happened to him multiple times in the past and he has been known to have carotid stenosis. Pt denies fever, chills, recent travel, headache, visual deficits, chest pain, shortness of breath, orthopnea, abdominal pain, N/V/D/C, hematochezia, melena, dysuria, hematuria, peripheral edema. Upon arrival to ED, EKG: NSR with 1st degree AV block, LAE, LBBB. CE x2: Trop 22-->19. H/H: 11.5/37.3. Glucose: 150. UA: Negative. CXR: Clear lungs.    On admission, CT Head done showed stable exam, microvascular disease. CD done showed right internal carotid artery: there is mild heterogenous plaque within the proximal vessel, consistent with a 16-49% stenosis. No hemodynamically significant stenosis. Left internal carotid artery, there is moderate heterogenous plaque within the proximal vessel, consistent with a 50-79% stenosis. Pt was found to have Adrenal Insufficiency due to the prolonged use of oral steroids. Pt was seen by Endocrine and will have a short taper off of steroids and will need outpatient follow up with them. 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 Outpatient Echocardiogram. Pt comfortable at this time. Pt is medically cleared for discharge home as per Dr. Tobar.

## 2018-07-06 NOTE — PROGRESS NOTE ADULT - SUBJECTIVE AND OBJECTIVE BOX
Chief complaint: syncope  Interval hx: no acute events    Allergies:  No Known Allergies      PAST MEDICAL & SURGICAL HISTORY:  GERD (gastroesophageal reflux disease)  Gastric cancer: S/P RT and chemo  Carotid stenosis  JACK (iron deficiency anemia)  HLD (hyperlipidemia)  HTN (hypertension)  CAD (coronary artery disease): S/P CABG  Hives: on Prednisone  DM (diabetes mellitus)  S/P Gastrectomy: 2006 due to gastric cancer  S/P CABG X 4: 2002      FAMILY HISTORY:  No pertinent family history in first degree relatives      REVIEW OF SYSTEMS:  CONSTITUTIONAL: No fever, weight loss, or fatigue  EYES: No eye pain, visual disturbances, or discharge  NECK: No pain or stiffness  RESPIRATORY: No cough or wheezing, no shortness of breath  CARDIOVASCULAR: No chest pain, palpitations, no dizziness, no leg swelling  GASTROINTESTINAL: No abdominal or epigastric pain. No nausea, vomiting, diarrhea or constipation  GENITOURINARY: No dysuria, urinary frequency or urgency, no hematuria  NEUROLOGICAL: No headaches, memory loss, loss of strength, numbness, or tremors, + LOC  SKIN: No itching, burning, rashes, or lesions   MUSCULOSKELETAL: No joint pain or swelling; No muscle, back, or extremity pain      Medications:  MEDICATIONS  (STANDING):  aspirin enteric coated 81 milliGRAM(s) Oral daily  dextrose 5%. 1000 milliLiter(s) (50 mL/Hr) IV Continuous <Continuous>  dextrose 50% Injectable 12.5 Gram(s) IV Push once  dextrose 50% Injectable 25 Gram(s) IV Push once  dextrose 50% Injectable 25 Gram(s) IV Push once  docusate sodium 100 milliGRAM(s) Oral two times a day  enoxaparin Injectable 40 milliGRAM(s) SubCutaneous every 24 hours  ferrous    sulfate 325 milliGRAM(s) Oral daily  insulin lispro (HumaLOG) corrective regimen sliding scale   SubCutaneous three times a day before meals  insulin lispro (HumaLOG) corrective regimen sliding scale   SubCutaneous at bedtime  pantoprazole    Tablet 40 milliGRAM(s) Oral before breakfast  predniSONE   Tablet 10 milliGRAM(s) Oral once  senna 2 Tablet(s) Oral at bedtime  simvastatin 40 milliGRAM(s) Oral at bedtime  sodium chloride 0.9%. 1000 milliLiter(s) (100 mL/Hr) IV Continuous <Continuous>    MEDICATIONS  (PRN):  dextrose 40% Gel 15 Gram(s) Oral once PRN Blood Glucose LESS THAN 70 milliGRAM(s)/deciliter  glucagon  Injectable 1 milliGRAM(s) IntraMuscular once PRN Glucose LESS THAN 70 milligrams/deciliter    	    PHYSICAL EXAM:  T(C): 36.5 (07-06-18 @ 05:47), Max: 36.7 (07-05-18 @ 21:02)  HR: 65 (07-06-18 @ 05:47) (65 - 66)  BP: 113/60 (07-06-18 @ 05:47) (113/60 - 119/60)  RR: 17 (07-06-18 @ 05:47) (17 - 19)  SpO2: 100% (07-06-18 @ 05:47) (100% - 100%)  Wt(kg): --  I&O's Summary    Appearance: Normal	  HEENT:   NCAT, PERRL, EOMI	  Lymphatic: No lymphadenopathy  Cardiovascular: Normal S1 S2, RRR  Respiratory: Lungs clear to auscultation BL  Psychiatry: A & O x 3, Mood & affect appropriate  Gastrointestinal:  Soft, Non-tender, + BS  Skin: No rashes, No ecchymoses, No cyanosis	  Neurologic: Non-focal  Extremities: Normal range of motion, No clubbing, cyanosis or edema    LABS:	 	    CARDIAC MARKERS:                                13.5   6.99  )-----------( 181      ( 05 Jul 2018 06:12 )             43.8     07-06    135  |  101  |  22  ----------------------------<  147<H>  4.7   |  23  |  0.84    Ca    8.9      06 Jul 2018 07:47  Mg     2.1     07-05      proBNP:   Lipid Profile:   HgA1c:   TSH:
Chief complaint: syncope  Interval hx: no acute events    Allergies:  No Known Allergies      PAST MEDICAL & SURGICAL HISTORY:  GERD (gastroesophageal reflux disease)  Gastric cancer: S/P RT and chemo  Carotid stenosis  JACK (iron deficiency anemia)  HLD (hyperlipidemia)  HTN (hypertension)  CAD (coronary artery disease): S/P CABG  Hives: on Prednisone  DM (diabetes mellitus)  S/P Gastrectomy: 2006 due to gastric cancer  S/P CABG X 4: 2002      FAMILY HISTORY:  No pertinent family history in first degree relatives      REVIEW OF SYSTEMS:  CONSTITUTIONAL: No fever, weight loss, or fatigue  EYES: No eye pain, visual disturbances, or discharge  NECK: No pain or stiffness  RESPIRATORY: No cough or wheezing, no shortness of breath  CARDIOVASCULAR: No chest pain, palpitations, + occasional dizziness, no leg swelling  GASTROINTESTINAL: No abdominal or epigastric pain. No nausea, vomiting, diarrhea or constipation  GENITOURINARY: No dysuria, urinary frequency or urgency, no hematuria  NEUROLOGICAL: No headaches, memory loss, loss of strength, numbness, or tremors, + LOC  SKIN: No itching, burning, rashes, or lesions   MUSCULOSKELETAL: No joint pain or swelling; No muscle, back, or extremity pain      Medications:  MEDICATIONS  (STANDING):  aspirin enteric coated 81 milliGRAM(s) Oral daily  dextrose 5%. 1000 milliLiter(s) (50 mL/Hr) IV Continuous <Continuous>  dextrose 50% Injectable 12.5 Gram(s) IV Push once  dextrose 50% Injectable 25 Gram(s) IV Push once  dextrose 50% Injectable 25 Gram(s) IV Push once  enoxaparin Injectable 40 milliGRAM(s) SubCutaneous every 24 hours  ferrous    sulfate 325 milliGRAM(s) Oral daily  insulin lispro (HumaLOG) corrective regimen sliding scale   SubCutaneous three times a day before meals  insulin lispro (HumaLOG) corrective regimen sliding scale   SubCutaneous at bedtime  pantoprazole    Tablet 40 milliGRAM(s) Oral before breakfast  simvastatin 40 milliGRAM(s) Oral at bedtime  sodium chloride 0.9%. 1000 milliLiter(s) (100 mL/Hr) IV Continuous <Continuous>    MEDICATIONS  (PRN):  dextrose 40% Gel 15 Gram(s) Oral once PRN Blood Glucose LESS THAN 70 milliGRAM(s)/deciliter  glucagon  Injectable 1 milliGRAM(s) IntraMuscular once PRN Glucose LESS THAN 70 milligrams/deciliter  predniSONE   Tablet 20 milliGRAM(s) Oral daily PRN Pruritus    	    PHYSICAL EXAM:  T(C): 36.6 (07-05-18 @ 05:02), Max: 36.9 (07-04-18 @ 20:19)  HR: 61 (07-05-18 @ 05:02) (61 - 62)  BP: 101/59 (07-05-18 @ 05:02) (101/59 - 153/66)  RR: 18 (07-05-18 @ 05:02) (18 - 19)  SpO2: 99% (07-05-18 @ 05:02) (99% - 100%)  Wt(kg): --  I&O's Summary    Appearance: Normal	  HEENT:   NCAT, PERRL, EOMI	  Lymphatic: No lymphadenopathy  Cardiovascular: Normal S1 S2, RRR  Respiratory: Lungs clear to auscultation BL  Psychiatry: A & O x 3, Mood & affect appropriate  Gastrointestinal:  Soft, Non-tender, + BS  Skin: No rashes, No ecchymoses, No cyanosis	  Neurologic: Non-focal  Extremities: Normal range of motion, No clubbing, cyanosis or edema    LABS:	 	    CARDIAC MARKERS:                                13.5   6.99  )-----------( 181      ( 05 Jul 2018 06:12 )             43.8     07-05    136  |  99  |  18  ----------------------------<  133<H>  4.8   |  18<L>  |  0.81    Ca    8.9      05 Jul 2018 06:10  Mg     2.1     07-05    TPro  6.6  /  Alb  3.5  /  TBili  0.6  /  DBili  x   /  AST  36  /  ALT  25  /  AlkPhos  45  07-03    proBNP:   Lipid Profile:   HgA1c:   TSH:
Subjective: Patient seen and examined. No new events except as noted.     SUBJECTIVE/ROS:  No chest pain, dyspnea, palpitation, or dizziness.       MEDICATIONS:  MEDICATIONS  (STANDING):  aspirin enteric coated 81 milliGRAM(s) Oral daily  dextrose 5%. 1000 milliLiter(s) (50 mL/Hr) IV Continuous <Continuous>  dextrose 50% Injectable 12.5 Gram(s) IV Push once  dextrose 50% Injectable 25 Gram(s) IV Push once  dextrose 50% Injectable 25 Gram(s) IV Push once  docusate sodium 100 milliGRAM(s) Oral two times a day  enoxaparin Injectable 40 milliGRAM(s) SubCutaneous every 24 hours  ferrous    sulfate 325 milliGRAM(s) Oral daily  insulin lispro (HumaLOG) corrective regimen sliding scale   SubCutaneous three times a day before meals  insulin lispro (HumaLOG) corrective regimen sliding scale   SubCutaneous at bedtime  pantoprazole    Tablet 40 milliGRAM(s) Oral before breakfast  predniSONE   Tablet 10 milliGRAM(s) Oral once  senna 2 Tablet(s) Oral at bedtime  simvastatin 40 milliGRAM(s) Oral at bedtime  sodium chloride 0.9%. 1000 milliLiter(s) (100 mL/Hr) IV Continuous <Continuous>      PHYSICAL EXAM:  T(C): 36.5 (07-06-18 @ 05:47), Max: 36.7 (07-05-18 @ 21:02)  HR: 65 (07-06-18 @ 05:47) (65 - 66)  BP: 113/60 (07-06-18 @ 05:47) (113/60 - 119/60)  RR: 17 (07-06-18 @ 05:47) (17 - 19)  SpO2: 100% (07-06-18 @ 05:47) (100% - 100%)  Wt(kg): --  I&O's Summary      JVP: Normal  Neck: supple  Lung: clear   CV: S1 S2 , Murmur:  Abd: soft  Ext: No edema  neuro: Awake / alert  Psych: flat affect  Skin: normal       LABS/DATA:    CARDIAC MARKERS:                                13.5   6.99  )-----------( 181      ( 05 Jul 2018 06:12 )             43.8     07-05    136  |  99  |  18  ----------------------------<  133<H>  4.8   |  18<L>  |  0.81    Ca    8.9      05 Jul 2018 06:10  Mg     2.1     07-05      proBNP:   Lipid Profile:   HgA1c:   TSH:     TELE:  EKG:    < from: VA Duplex Carotid Arteries, Bilateral. (07.05.18 @ 09:36) >  Summary/Impressions:  1. Right Internal Carotid Artery:  There is mild  heterogenous plaque within the proximal vessel, consistent  with a 16-49% stenosis. No hemodynamically significant  stenosis.  2. Left Internal Carotid Artery:  There is moderate  heterogenous plaque within the proximal vessel, consistent  with a 50-79% stenosis.  ------------------------------------------------------------------------  Confirmed on  7/5/2018 - 11:50 AM by Dane Paris MD, FAC,  FASE, RPVI  By signing this report, the attending physician certifies  that he or she has personally supervised and interpreted  the vascular study and has reviewed and or edited and  agrees with the written comments contained within the  report.    < end of copied text >
Subjective: Patient seen and examined. No new events except as noted.     SUBJECTIVE/ROS:  feels good   wants to go home       MEDICATIONS:  MEDICATIONS  (STANDING):  aspirin enteric coated 81 milliGRAM(s) Oral daily  dextrose 5%. 1000 milliLiter(s) (50 mL/Hr) IV Continuous <Continuous>  dextrose 50% Injectable 12.5 Gram(s) IV Push once  dextrose 50% Injectable 25 Gram(s) IV Push once  dextrose 50% Injectable 25 Gram(s) IV Push once  enoxaparin Injectable 40 milliGRAM(s) SubCutaneous every 24 hours  ferrous    sulfate 325 milliGRAM(s) Oral daily  insulin lispro (HumaLOG) corrective regimen sliding scale   SubCutaneous three times a day before meals  insulin lispro (HumaLOG) corrective regimen sliding scale   SubCutaneous at bedtime  pantoprazole    Tablet 40 milliGRAM(s) Oral before breakfast  simvastatin 40 milliGRAM(s) Oral at bedtime  sodium chloride 0.9%. 1000 milliLiter(s) (100 mL/Hr) IV Continuous <Continuous>      PHYSICAL EXAM:  T(C): 36.6 (07-05-18 @ 05:02), Max: 36.9 (07-04-18 @ 20:19)  HR: 61 (07-05-18 @ 05:02) (61 - 62)  BP: 101/59 (07-05-18 @ 05:02) (101/59 - 153/66)  RR: 18 (07-05-18 @ 05:02) (18 - 19)  SpO2: 99% (07-05-18 @ 05:02) (99% - 100%)  Wt(kg): --  I&O's Summary        Appearance: Normal	  HEENT:   Normal oral mucosa, PERRL, EOMI	  Cardiovascular: Normal S1 S2,    Murmur:   Neck: JVP normal  Respiratory: Lungs clear to auscultation  Gastrointestinal:  Soft, Non-tender, + BS	  Skin: normal   Neuro: No gross deficits.   Psychiatry:  Mood & affect appropriate  Ext: No edema      LABS/DATA:    CARDIAC MARKERS:                                13.5   6.99  )-----------( 181      ( 05 Jul 2018 06:12 )             43.8     07-05    136  |  99  |  18  ----------------------------<  133<H>  4.8   |  18<L>  |  0.81    Ca    8.9      05 Jul 2018 06:10  Mg     2.1     07-05    TPro  6.6  /  Alb  3.5  /  TBili  0.6  /  DBili  x   /  AST  36  /  ALT  25  /  AlkPhos  45  07-03    proBNP:   Lipid Profile:   HgA1c:   TSH:     TELE:  EKG:

## 2018-07-06 NOTE — DISCHARGE NOTE ADULT - CARE PLAN
Principal Discharge DX:	Adrenal insufficiency  Goal:	Continue the two days of Prednisone and then stop.  Assessment and plan of treatment:	Follow with Endocrine in their office on [address] [facility] [phone].  Secondary Diagnosis:	DM (diabetes mellitus)  Goal:	To maintain a normal blood glucose level and HgA1C level < 5.7 and to prevent diabetic complications such as uncontrolled diabetes, diabetic coma, blindness, renal failure, and amputations.  Assessment and plan of treatment:	Monitor finger sticks pre-meal and bedtime, low salt, fat and carbohydrate diet, minimize glucose intake.  Exercise daily for at least 30 minutes and weight loss.  Follow up with primary care physician and endocrinologist for routine Hemoglobin A1C checks and management.  Follow up with your ophthalmologist for routine yearly vision exams.  Secondary Diagnosis:	HLD (hyperlipidemia)  Goal:	To maintain normal cholesterol levels to prevent stroke, coronary artery disease, peripheral vascular disease and heart attacks.  Assessment and plan of treatment:	Low fat diet, exercise daily and continue current medications. Follow up with primary care physician and cardiologist for management.  Secondary Diagnosis:	GERD (gastroesophageal reflux disease)  Goal:	To prevent acid reflux, heartburn and chest pain.  Assessment and plan of treatment:	Avoid fatty, fried foods, acidic foods such as tomatoes, lime and chocolate. Continue to take your medications as prescribed, exercise daily and weight loss.

## 2018-07-06 NOTE — PROGRESS NOTE ADULT - ASSESSMENT
78 y/o male with a PMHx of CAD S/P CABG, HTN, HLD, DM, GERD, anemia and gastric cancer S/P RT, chemo and gastrectomy presents to ED S/P syncopal episodes:  1. Syncope - multiple episodes at home, also reports dizziness and low BP associated w/episodes, plan per Cardio, Imdur and Renexa held, TTE pending, CTH reviewed, no acute changes, tele, AM cortisole 1.4, adrenal insuff likely cause of hypotension and symcope, Endo eval noted, Prednosine per Endo  2. CAD - ACS ruled out, ASA, statin  3. Carotid stenosis - ASA, statin, US carotid  4. HTN - monitor BP off meds  5. HLD - c/w statin  6. DM2 - c/w ISS, PO meds held, check A1C  7. JACK - c/w FeSO4  8. DVT prophylaxis  9. Chronic pruritis - outpt Derm f/u
76 y/o male with a PMHx of CAD S/P CABG, HTN, HLD, DM, GERD, anemia and gastric cancer S/P RT, chemo and gastrectomy presents to ED S/P syncopal episodes.
76 y/o male with a PMHx of CAD S/P CABG, HTN, HLD, DM, GERD, anemia and gastric cancer S/P RT, chemo and gastrectomy presents to ED S/P syncopal episodes:  1. Syncope - multiple episodes at home, also reports dizziness and low BP associated w/episodes, plan per Cardio, Imdur and Renexa held, TTE pending, CTH reviewed, no acute changes, tele, AM cortisole 1.4, adrenal insuff likely cause of hypotension and symcope, Endo eval pending  2. CAD - ACS ruled out, ASA, statin  3. Carotid stenosis - ASA, statin, US carotid  4. HTN - monitor BP off meds  5. HLD - c/w statin  6. DM2 - c/w ISS, PO meds held, check A1C  7. JACK - c/w FeSO4  8. DVT prophylaxis
76 y/o male with a PMHx of CAD S/P CABG, HTN, HLD, DM, GERD, anemia and gastric cancer S/P RT, chemo and gastrectomy presents to ED S/P syncopal episodes.

## 2018-07-06 NOTE — DISCHARGE NOTE ADULT - ADDITIONAL INSTRUCTIONS
Outpatient Endocrine follow up. Call [phone] to make an appointment for Diabetes and Adrenal insufficiency management.  Outpatient Dermatologist. Call [phone] to make an appointment for the itching and need for steroids.  Follow up with your primary care doctor.

## 2018-07-06 NOTE — PROGRESS NOTE ADULT - PROBLEM SELECTOR PLAN 3
Resume ASA and continue statin  Carotid dopplers ordered  DASH diet
asa, statin  asymptomatic  no change in plaque burden from last year

## 2018-07-06 NOTE — DISCHARGE NOTE ADULT - CARE PROVIDERS DIRECT ADDRESSES
,DirectAddress_Unknown,[email or web address]

## 2018-07-06 NOTE — DISCHARGE NOTE ADULT - PLAN OF CARE
Continue the two days of Prednisone and then stop. Follow with Endocrine in their office on [address] [facility] [phone]. To maintain a normal blood glucose level and HgA1C level < 5.7 and to prevent diabetic complications such as uncontrolled diabetes, diabetic coma, blindness, renal failure, and amputations. Monitor finger sticks pre-meal and bedtime, low salt, fat and carbohydrate diet, minimize glucose intake.  Exercise daily for at least 30 minutes and weight loss.  Follow up with primary care physician and endocrinologist for routine Hemoglobin A1C checks and management.  Follow up with your ophthalmologist for routine yearly vision exams. To maintain normal cholesterol levels to prevent stroke, coronary artery disease, peripheral vascular disease and heart attacks. Low fat diet, exercise daily and continue current medications. Follow up with primary care physician and cardiologist for management. To prevent acid reflux, heartburn and chest pain. Avoid fatty, fried foods, acidic foods such as tomatoes, lime and chocolate. Continue to take your medications as prescribed, exercise daily and weight loss.

## 2018-07-06 NOTE — DISCHARGE NOTE ADULT - CARE PROVIDER_API CALL
Yash Tobar), Cardiovascular Disease; Internal Medicine  [address] [facility] [address]  [place] [address]  Phone: [phone]  Fax: [phone]    Fabrice Love), EndocrinologyMetabDiabetes; Internal Medicine  [address] [place] [address]  Phone: [phone]  Fax: [phone]

## 2018-07-06 NOTE — DISCHARGE NOTE ADULT - MEDICATION SUMMARY - MEDICATIONS TO STOP TAKING
I will STOP taking the medications listed below when I get home from the hospital:    predniSONE 20 mg oral tablet  -- 1 tab(s) by mouth once a day, as needed for itching    hydrOXYzine hydrochloride 10 mg oral tablet  -- 1 tab(s) by mouth once a day, As Needed    glipiZIDE 2.5 mg oral tablet, extended release  -- 1 tab(s) by mouth once a day

## 2018-07-06 NOTE — DISCHARGE NOTE ADULT - MEDICATION SUMMARY - MEDICATIONS TO TAKE
I will START or STAY ON the medications listed below when I get home from the hospital:    predniSONE 10 mg oral tablet  -- 1 tab(s) by mouth once in Am 7/7  then 0.5 tabl by mouth once in Am 7/8 then stop.  -- Indication: For Adrenal insufficiency    aspirin 81 mg oral delayed release tablet  -- 1 tab(s) by mouth once a day  -- Indication: For CAD (coronary artery disease)    simvastatin 40 mg oral tablet  -- 1 tab(s) by mouth once a day (at bedtime)  -- Indication: For HLD (hyperlipidemia)    ferrous sulfate 325 mg (65 mg elemental iron) oral tablet  -- 1 tab(s) by mouth once a day  -- Indication: For Anemia    senna oral tablet  -- 2 tab(s) by mouth once a day (at bedtime)  -- Indication: For Constipation    docusate sodium 100 mg oral capsule  -- 1 cap(s) by mouth 2 times a day  -- Indication: For Constipation    esomeprazole 40 mg oral delayed release capsule  -- 1 cap(s) by mouth once a day  -- Indication: For GERD (gastroesophageal reflux disease) I will START or STAY ON the medications listed below when I get home from the hospital:    predniSONE 10 mg oral tablet  -- 1 tab(s) by mouth once in Am 7/7  then 0.5 tabl by mouth once in Am 7/8 then stop.  -- Indication: For Adrenal insufficiency    aspirin 81 mg oral delayed release tablet  -- 1 tab(s) by mouth once a day  -- Indication: For CAD (coronary artery disease)    simvastatin 40 mg oral tablet  -- 1 tab(s) by mouth once a day (at bedtime)  -- Indication: For HLD (hyperlipidemia)    ferrous sulfate 325 mg (65 mg elemental iron) oral tablet  -- 1 tab(s) by mouth once a day  -- Indication: For Anemia    senna oral tablet  -- 2 tab(s) by mouth once a day (at bedtime)  -- Indication: For Constipation    Colace 100 mg oral capsule  -- 1 cap(s) by mouth 2 times a day  -- Indication: For Constipation    esomeprazole 40 mg oral delayed release capsule  -- 1 cap(s) by mouth once a day  -- Indication: For GERD (gastroesophageal reflux disease) I will START or STAY ON the medications listed below when I get home from the hospital:    predniSONE 10 mg oral tablet  -- 1 tab(s) by mouth once in Am 7/7  then 0.5 tabl by mouth once in Am 7/8 then stop.  -- Indication: For Adrenal insufficiency    aspirin 81 mg oral delayed release tablet  -- 1 tab(s) by mouth once a day  -- Indication: For CAD (coronary artery disease)    simvastatin 40 mg oral tablet  -- 1 tab(s) by mouth once a day (at bedtime)  -- Indication: For HLD (hyperlipidemia)    ferrous sulfate 325 mg (65 mg elemental iron) oral tablet  -- 1 tab(s) by mouth once a day  -- Indication: For Anemia    Colace 100 mg oral capsule  -- 1 cap(s) by mouth 2 times a day  -- Indication: For Constipation    senna oral tablet  -- 2 tab(s) by mouth once a day (at bedtime)  -- Indication: For Constipation    esomeprazole 40 mg oral delayed release capsule  -- 1 cap(s) by mouth once a day  -- Indication: For GERD (gastroesophageal reflux disease)

## 2018-07-06 NOTE — PROGRESS NOTE ADULT - PROBLEM SELECTOR PLAN 5
Monitor finger stick. Insulin coverage. Diabetic education and Diabetic diet. Consider nutrition consultation.   endo eval
due to steroids  appreciate endo eval

## 2018-07-06 NOTE — PROGRESS NOTE ADULT - PROBLEM SELECTOR PLAN 2
Monitor on telemetry, serial EKGs  Serial troponins negative  Continue ASA, statin  DASH diet
Monitor on telemetry, serial EKGs  Serial troponins negative  Continue ASA, statin  DASH diet

## 2018-07-06 NOTE — DISCHARGE NOTE ADULT - NSFUCAREDSC_ALL_CORE_SIUH
No, the patient is not being discharged from [facility] Yes, the patient is being discharged from [facility]...

## 2018-07-06 NOTE — DISCHARGE NOTE ADULT - PATIENT PORTAL LINK FT
You can access the [facility] Patient Portal, offered by [facility], by registering with the following website: [email or web address]

## 2018-07-06 NOTE — PROGRESS NOTE ADULT - PROBLEM SELECTOR PLAN 1
due to dec vascular tone due to adrenal suppression   appreciate Endo eval  on prednisone tape
very low cortisol   suspect underlying adrenal insuf.   consult endo

## 2018-10-02 PROBLEM — I10 ESSENTIAL (PRIMARY) HYPERTENSION: Chronic | Status: ACTIVE | Noted: 2018-07-03

## 2018-10-02 PROBLEM — E78.5 HYPERLIPIDEMIA, UNSPECIFIED: Chronic | Status: ACTIVE | Noted: 2018-07-03

## 2018-10-02 PROBLEM — I65.29 OCCLUSION AND STENOSIS OF UNSPECIFIED CAROTID ARTERY: Chronic | Status: ACTIVE | Noted: 2018-07-03

## 2018-10-02 PROBLEM — D50.9 IRON DEFICIENCY ANEMIA, UNSPECIFIED: Chronic | Status: ACTIVE | Noted: 2018-07-03

## 2018-10-02 PROBLEM — I25.10 ATHEROSCLEROTIC HEART DISEASE OF NATIVE CORONARY ARTERY WITHOUT ANGINA PECTORIS: Chronic | Status: ACTIVE | Noted: 2018-07-03

## 2018-10-02 PROBLEM — C16.9 MALIGNANT NEOPLASM OF STOMACH, UNSPECIFIED: Chronic | Status: ACTIVE | Noted: 2018-07-03

## 2018-10-02 PROBLEM — K21.9 GASTRO-ESOPHAGEAL REFLUX DISEASE WITHOUT ESOPHAGITIS: Chronic | Status: ACTIVE | Noted: 2018-07-03

## 2018-10-05 ENCOUNTER — APPOINTMENT (OUTPATIENT)
Dept: CARDIOLOGY | Facility: CLINIC | Age: 78
End: 2018-10-05
Payer: MEDICARE

## 2018-10-05 VITALS
SYSTOLIC BLOOD PRESSURE: 106 MMHG | HEIGHT: 65 IN | BODY MASS INDEX: 18.87 KG/M2 | HEART RATE: 66 BPM | OXYGEN SATURATION: 99 % | DIASTOLIC BLOOD PRESSURE: 58 MMHG | WEIGHT: 113.25 LBS

## 2018-10-05 DIAGNOSIS — R55 SYNCOPE AND COLLAPSE: ICD-10-CM

## 2018-10-05 PROCEDURE — 93306 TTE W/DOPPLER COMPLETE: CPT

## 2018-10-05 PROCEDURE — 99214 OFFICE O/P EST MOD 30 MIN: CPT

## 2018-10-05 PROCEDURE — 93000 ELECTROCARDIOGRAM COMPLETE: CPT

## 2019-02-01 ENCOUNTER — OUTPATIENT (OUTPATIENT)
Dept: OUTPATIENT SERVICES | Facility: HOSPITAL | Age: 79
LOS: 1 days | End: 2019-02-01
Payer: MEDICARE

## 2019-02-01 PROCEDURE — G9001: CPT

## 2019-02-06 ENCOUNTER — EMERGENCY (EMERGENCY)
Facility: HOSPITAL | Age: 79
LOS: 1 days | Discharge: HOME | End: 2019-02-06
Attending: EMERGENCY MEDICINE | Admitting: EMERGENCY MEDICINE
Payer: MEDICARE

## 2019-02-06 VITALS
TEMPERATURE: 98 F | RESPIRATION RATE: 16 BRPM | OXYGEN SATURATION: 99 % | DIASTOLIC BLOOD PRESSURE: 45 MMHG | HEART RATE: 85 BPM | SYSTOLIC BLOOD PRESSURE: 126 MMHG

## 2019-02-06 PROCEDURE — 99283 EMERGENCY DEPT VISIT LOW MDM: CPT

## 2019-02-06 RX ADMIN — Medication 50 MILLIGRAM(S): at 10:16

## 2019-02-06 NOTE — ED ADULT TRIAGE NOTE - CHIEF COMPLAINT QUOTE
pt amb to triage c/o rash over whole body x 2 weeks, zyrtec, hydroxyz, triamcinolone, and hydrocortisone w/o relief, no resp difficulty noted, able to complete sentences, denies new creams or foods

## 2019-02-06 NOTE — ED PROVIDER NOTE - OBJECTIVE STATEMENT
78M h/o HTN, DM, presents with pruritic rash x 2 weeks.  Saw PCP who prescribed steroid cream and hydroxyzine.  However, pt states no improvement.  Denies new substances, including new foods/meds/detergents/creams.  No sick contacts or other family with rash.  States he had something similar years ago but no known allergies.  Denies fever/chills, cp, sob, n/v/d.

## 2019-02-06 NOTE — ED PROVIDER NOTE - NSFOLLOWUPINSTRUCTIONS_ED_ALL_ED_FT
Please take prednisone 40mg (steroid) once a day.    Follow up with your primary care physician within a week.    Recommend allergist follow up, see provided referral list.

## 2019-02-06 NOTE — ED PROVIDER NOTE - MEDICAL DECISION MAKING DETAILS
- rash consistent with urticaria  - steroids, f/u PCP, recommend allergist   - discharge with return precautions

## 2019-02-07 DIAGNOSIS — Z71.89 OTHER SPECIFIED COUNSELING: ICD-10-CM

## 2019-02-11 RX ORDER — CEFPODOXIME PROXETIL 100 MG
1 TABLET ORAL
Qty: 0 | Refills: 0 | COMMUNITY
Start: 2019-02-11 | End: 2019-02-20

## 2019-02-19 ENCOUNTER — INPATIENT (INPATIENT)
Facility: HOSPITAL | Age: 79
LOS: 6 days | Discharge: HOME | End: 2019-02-26
Attending: HOSPITALIST | Admitting: HOSPITALIST
Payer: MEDICARE

## 2019-02-19 VITALS
DIASTOLIC BLOOD PRESSURE: 60 MMHG | TEMPERATURE: 98 F | RESPIRATION RATE: 18 BRPM | HEART RATE: 67 BPM | SYSTOLIC BLOOD PRESSURE: 151 MMHG

## 2019-02-19 DIAGNOSIS — S06.2X9A DIFFUSE TRAUMATIC BRAIN INJURY WITH LOSS OF CONSCIOUSNESS OF UNSPECIFIED DURATION, INITIAL ENCOUNTER: ICD-10-CM

## 2019-02-19 DIAGNOSIS — R11.10 VOMITING, UNSPECIFIED: ICD-10-CM

## 2019-02-19 DIAGNOSIS — R21 RASH AND OTHER NONSPECIFIC SKIN ERUPTION: ICD-10-CM

## 2019-02-19 DIAGNOSIS — I61.9 NONTRAUMATIC INTRACEREBRAL HEMORRHAGE, UNSPECIFIED: ICD-10-CM

## 2019-02-19 DIAGNOSIS — I25.10 ATHEROSCLEROTIC HEART DISEASE OF NATIVE CORONARY ARTERY WITHOUT ANGINA PECTORIS: ICD-10-CM

## 2019-02-19 DIAGNOSIS — Z29.9 ENCOUNTER FOR PROPHYLACTIC MEASURES, UNSPECIFIED: ICD-10-CM

## 2019-02-19 DIAGNOSIS — Z79.899 OTHER LONG TERM (CURRENT) DRUG THERAPY: ICD-10-CM

## 2019-02-19 DIAGNOSIS — E11.8 TYPE 2 DIABETES MELLITUS WITH UNSPECIFIED COMPLICATIONS: ICD-10-CM

## 2019-02-19 LAB
ALBUMIN SERPL ELPH-MCNC: 3.8 G/DL — SIGNIFICANT CHANGE UP (ref 3.3–5)
ALP SERPL-CCNC: 77 U/L — SIGNIFICANT CHANGE UP (ref 40–120)
ALT FLD-CCNC: 24 U/L — SIGNIFICANT CHANGE UP (ref 4–41)
ANION GAP SERPL CALC-SCNC: 14 MMO/L — SIGNIFICANT CHANGE UP (ref 7–14)
APTT BLD: 23.8 SEC — LOW (ref 27.5–36.3)
AST SERPL-CCNC: 27 U/L — SIGNIFICANT CHANGE UP (ref 4–40)
BILIRUB SERPL-MCNC: 0.6 MG/DL — SIGNIFICANT CHANGE UP (ref 0.2–1.2)
BUN SERPL-MCNC: 17 MG/DL — SIGNIFICANT CHANGE UP (ref 7–23)
CALCIUM SERPL-MCNC: 9.2 MG/DL — SIGNIFICANT CHANGE UP (ref 8.4–10.5)
CHLORIDE SERPL-SCNC: 94 MMOL/L — LOW (ref 98–107)
CO2 SERPL-SCNC: 23 MMOL/L — SIGNIFICANT CHANGE UP (ref 22–31)
CREAT SERPL-MCNC: 0.81 MG/DL — SIGNIFICANT CHANGE UP (ref 0.5–1.3)
GLUCOSE BLDC GLUCOMTR-MCNC: 196 MG/DL — HIGH (ref 70–99)
GLUCOSE SERPL-MCNC: 181 MG/DL — HIGH (ref 70–99)
HCT VFR BLD CALC: 40.8 % — SIGNIFICANT CHANGE UP (ref 39–50)
HGB BLD-MCNC: 13.1 G/DL — SIGNIFICANT CHANGE UP (ref 13–17)
INR BLD: 1.04 — SIGNIFICANT CHANGE UP (ref 0.88–1.17)
MCHC RBC-ENTMCNC: 27.1 PG — SIGNIFICANT CHANGE UP (ref 27–34)
MCHC RBC-ENTMCNC: 32.1 % — SIGNIFICANT CHANGE UP (ref 32–36)
MCV RBC AUTO: 84.3 FL — SIGNIFICANT CHANGE UP (ref 80–100)
NRBC # FLD: 0 K/UL — LOW (ref 25–125)
PLATELET # BLD AUTO: 245 K/UL — SIGNIFICANT CHANGE UP (ref 150–400)
PMV BLD: 10.1 FL — SIGNIFICANT CHANGE UP (ref 7–13)
POTASSIUM SERPL-MCNC: 4.7 MMOL/L — SIGNIFICANT CHANGE UP (ref 3.5–5.3)
POTASSIUM SERPL-SCNC: 4.7 MMOL/L — SIGNIFICANT CHANGE UP (ref 3.5–5.3)
PROT SERPL-MCNC: 7.3 G/DL — SIGNIFICANT CHANGE UP (ref 6–8.3)
PROTHROM AB SERPL-ACNC: 11.6 SEC — SIGNIFICANT CHANGE UP (ref 9.8–13.1)
RBC # BLD: 4.84 M/UL — SIGNIFICANT CHANGE UP (ref 4.2–5.8)
RBC # FLD: 16.5 % — HIGH (ref 10.3–14.5)
SODIUM SERPL-SCNC: 131 MMOL/L — LOW (ref 135–145)
TROPONIN T, HIGH SENSITIVITY: 15 NG/L — SIGNIFICANT CHANGE UP (ref ?–14)
TROPONIN T, HIGH SENSITIVITY: 16 NG/L — SIGNIFICANT CHANGE UP (ref ?–14)
WBC # BLD: 13.14 K/UL — HIGH (ref 3.8–10.5)
WBC # FLD AUTO: 13.14 K/UL — HIGH (ref 3.8–10.5)

## 2019-02-19 PROCEDURE — 70450 CT HEAD/BRAIN W/O DYE: CPT | Mod: 26

## 2019-02-19 PROCEDURE — 71046 X-RAY EXAM CHEST 2 VIEWS: CPT | Mod: 26

## 2019-02-19 PROCEDURE — 99233 SBSQ HOSP IP/OBS HIGH 50: CPT

## 2019-02-19 PROCEDURE — 99223 1ST HOSP IP/OBS HIGH 75: CPT

## 2019-02-19 RX ORDER — SODIUM CHLORIDE 9 MG/ML
1000 INJECTION, SOLUTION INTRAVENOUS
Qty: 0 | Refills: 0 | Status: DISCONTINUED | OUTPATIENT
Start: 2019-02-19 | End: 2019-02-26

## 2019-02-19 RX ORDER — OXYCODONE HYDROCHLORIDE 5 MG/1
5 TABLET ORAL EVERY 6 HOURS
Qty: 0 | Refills: 0 | Status: DISCONTINUED | OUTPATIENT
Start: 2019-02-19 | End: 2019-02-21

## 2019-02-19 RX ORDER — DEXTROSE 50 % IN WATER 50 %
15 SYRINGE (ML) INTRAVENOUS ONCE
Qty: 0 | Refills: 0 | Status: DISCONTINUED | OUTPATIENT
Start: 2019-02-19 | End: 2019-02-26

## 2019-02-19 RX ORDER — GLUCAGON INJECTION, SOLUTION 0.5 MG/.1ML
1 INJECTION, SOLUTION SUBCUTANEOUS ONCE
Qty: 0 | Refills: 0 | Status: DISCONTINUED | OUTPATIENT
Start: 2019-02-19 | End: 2019-02-26

## 2019-02-19 RX ORDER — ACETAMINOPHEN 500 MG
650 TABLET ORAL EVERY 6 HOURS
Qty: 0 | Refills: 0 | Status: DISCONTINUED | OUTPATIENT
Start: 2019-02-19 | End: 2019-02-26

## 2019-02-19 RX ORDER — DIPHENHYDRAMINE HCL 50 MG
25 CAPSULE ORAL ONCE
Qty: 0 | Refills: 0 | Status: COMPLETED | OUTPATIENT
Start: 2019-02-19 | End: 2019-02-19

## 2019-02-19 RX ORDER — MORPHINE SULFATE 50 MG/1
1 CAPSULE, EXTENDED RELEASE ORAL EVERY 6 HOURS
Qty: 0 | Refills: 0 | Status: DISCONTINUED | OUTPATIENT
Start: 2019-02-19 | End: 2019-02-19

## 2019-02-19 RX ORDER — DEXTROSE 50 % IN WATER 50 %
12.5 SYRINGE (ML) INTRAVENOUS ONCE
Qty: 0 | Refills: 0 | Status: DISCONTINUED | OUTPATIENT
Start: 2019-02-19 | End: 2019-02-26

## 2019-02-19 RX ORDER — DEXTROSE 50 % IN WATER 50 %
25 SYRINGE (ML) INTRAVENOUS ONCE
Qty: 0 | Refills: 0 | Status: DISCONTINUED | OUTPATIENT
Start: 2019-02-19 | End: 2019-02-26

## 2019-02-19 RX ORDER — SIMVASTATIN 20 MG/1
40 TABLET, FILM COATED ORAL AT BEDTIME
Qty: 0 | Refills: 0 | Status: DISCONTINUED | OUTPATIENT
Start: 2019-02-19 | End: 2019-02-26

## 2019-02-19 RX ORDER — INSULIN LISPRO 100/ML
VIAL (ML) SUBCUTANEOUS AT BEDTIME
Qty: 0 | Refills: 0 | Status: DISCONTINUED | OUTPATIENT
Start: 2019-02-19 | End: 2019-02-26

## 2019-02-19 RX ORDER — MECLIZINE HCL 12.5 MG
25 TABLET ORAL ONCE
Qty: 0 | Refills: 0 | Status: DISCONTINUED | OUTPATIENT
Start: 2019-02-19 | End: 2019-02-19

## 2019-02-19 RX ORDER — INSULIN LISPRO 100/ML
VIAL (ML) SUBCUTANEOUS
Qty: 0 | Refills: 0 | Status: DISCONTINUED | OUTPATIENT
Start: 2019-02-19 | End: 2019-02-26

## 2019-02-19 RX ORDER — ACETAMINOPHEN 500 MG
650 TABLET ORAL ONCE
Qty: 0 | Refills: 0 | Status: COMPLETED | OUTPATIENT
Start: 2019-02-19 | End: 2019-02-19

## 2019-02-19 RX ORDER — METOCLOPRAMIDE HCL 10 MG
10 TABLET ORAL ONCE
Qty: 0 | Refills: 0 | Status: COMPLETED | OUTPATIENT
Start: 2019-02-19 | End: 2019-02-19

## 2019-02-19 RX ORDER — DIPHENHYDRAMINE HCL 50 MG
25 CAPSULE ORAL EVERY 4 HOURS
Qty: 0 | Refills: 0 | Status: DISCONTINUED | OUTPATIENT
Start: 2019-02-19 | End: 2019-02-26

## 2019-02-19 RX ADMIN — Medication 25 MILLIGRAM(S): at 10:58

## 2019-02-19 RX ADMIN — Medication 650 MILLIGRAM(S): at 23:30

## 2019-02-19 RX ADMIN — Medication 10 MILLIGRAM(S): at 10:58

## 2019-02-19 NOTE — ED ADULT NURSE NOTE - NSIMPLEMENTINTERV_GEN_ALL_ED
Implemented All Fall with Harm Risk Interventions:  [place] to call system. Call bell, personal items and telephone within reach. Instruct patient to call for assistance. Room bathroom lighting operational. Non-slip footwear when patient is off stretcher. Physically safe environment: no spills, clutter or unnecessary equipment. Stretcher in lowest position, wheels locked, appropriate side rails in place. Provide visual cue, wrist band, yellow gown, etc. Monitor gait and stability. Monitor for mental status changes and reorient to person, place, and time. Review medications for side effects contributing to fall risk. Reinforce activity limits and safety measures with patient and family. Provide visual clues: red socks.

## 2019-02-19 NOTE — ED ADULT NURSE REASSESSMENT NOTE - NS ED NURSE REASSESS COMMENT FT1
Patient is alert and oriented x 4 c/o feeling dizzy with movement , out of bed ambulating with assistance will continue to monitor.

## 2019-02-19 NOTE — ED ADULT TRIAGE NOTE - CHIEF COMPLAINT QUOTE
Pt was seen in ER on 2/06 for hives(rash). Pt was given prednisone which he states is making him worse and now pt c/o weakness and no appetite and some vomiting.

## 2019-02-19 NOTE — H&P ADULT - PROBLEM SELECTOR PLAN 3
-h/o gastric ca, along with 3 months of postprandial vomiting, warrants CT abd which has been ordered  -would call for GI eval after CT abd results  -Of note, pt -h/o gastric ca, along with 3 months of postprandial vomiting, warrants CT abd which has been ordered  -would call for GI eval after CT abd results  -report syncopal episode prior to attempted EGD 6 months ago??

## 2019-02-19 NOTE — H&P ADULT - PROBLEM SELECTOR PLAN 1
-unclear etiology at this time; not unreasonable to send blood clutures and , order TTE for IE workup per neurology recommendations  -seen by neurology and nuerosurgery  -MRI /A ordered. If change in neurological status order immediate CT head. If MRI delayed tomorrow, repeat CT head in morning even if no change in neurological status  -q 4h neuro checks -unclear etiology at this time; not unreasonable to send blood cultures and , order TTE for IE workup per neurology recommendations  -seen by neurology and neurosurgery  -MRI /A ordered. If change in neurological status order immediate CT head. If MRI delayed tomorrow, repeat CT head in morning even if no change in neurological status  -q 4h neuro checks  -Tylenol prn pain

## 2019-02-19 NOTE — H&P ADULT - NSHPLABSRESULTS_GEN_ALL_CORE
Vital Signs Last 24 Hrs  T(C): 36.7 (19 Feb 2019 22:17), Max: 36.9 (19 Feb 2019 17:26)  T(F): 98 (19 Feb 2019 22:17), Max: 98.4 (19 Feb 2019 17:26)  HR: 58 (19 Feb 2019 22:17) (54 - 74)  BP: 143/70 (19 Feb 2019 22:17) (125/60 - 164/58)  BP(mean): --  RR: 16 (19 Feb 2019 22:17) (15 - 18)  SpO2: 100% (19 Feb 2019 22:17) (98% - 100%)                            13.1   13.14 )-----------( 245      ( 19 Feb 2019 11:11 )             40.8     02-19    131<L>  |  94<L>  |  17  ----------------------------<  181<H>  4.7   |  23  |  0.81    Ca    9.2      19 Feb 2019 11:11    TPro  7.3  /  Alb  3.8  /  TBili  0.6  /  DBili  x   /  AST  27  /  ALT  24  /  AlkPhos  77  02-19    CAPILLARY BLOOD GLUCOSE      POCT Blood Glucose.: 196 mg/dL (19 Feb 2019 18:52)  POCT Blood Glucose.: 171 mg/dL (19 Feb 2019 09:43)    PT/INR - ( 19 Feb 2019 21:20 )   PT: 11.6 SEC;   INR: 1.04          PTT - ( 19 Feb 2019 21:20 )  PTT:23.8 SEC

## 2019-02-19 NOTE — ED PROVIDER NOTE - PROGRESS NOTE DETAILS
Ambulating well, strength full all ext., states improved nausea and unsteadiness but still generalized weakness. Radiology reports mult. areas of ? contusion with central areas of acute blood. No reported hx of head trauma despite repeat history taking, NT areas of scalp reported as ST swelling. Neurosurgery and Neurology consult, will admit for probable serial imaging. Takes ASA 81mg, denies plavix.

## 2019-02-19 NOTE — CONSULT NOTE ADULT - SUBJECTIVE AND OBJECTIVE BOX
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 The headache came on suddenly, but resolved after he took OTC pain medications, was not positional. The dizziness is described as "room spinning" and it is worse when he sits up, he concurrently feels fatigued and weak, but he has felt weak for at least several years. The past 2-3 weeks the dizziness has resulted in falls (w/o striking his head or LOC), and he has blurry vision when both eyes are open and when either is closed.   Regarding the prednisone, he also feels worse when he's taking it, makes his blood sugar rise and makes him feel weaker. He has not been eating well for several years, per family he throws up food after he eats it, etiology unknown. At baseline he walks without a walker or cane.  Meds: ASA 81, glipizide 2.5, levocitirizine 5, nitroglycerin prn, cefpodoxime 100, prednisone 20 daily  NIHSS 0, MRS 2    PAST MEDICAL & SURGICAL HISTORY:  GERD (gastroesophageal reflux disease)  Gastric cancer: S/P RT and chemo  Carotid stenosis  JACK (iron deficiency anemia)  HLD (hyperlipidemia)  HTN (hypertension)  CAD (coronary artery disease): S/P CABG  Hives: on Prednisone  DM (diabetes mellitus)  S/P Gastrectomy: 2006 due to gastric cancer  S/P CABG X 4: 2002    FAMILY HISTORY:  No pertinent family history in first degree relatives    Allergies  No Known Allergies    Review of Systems:  CONSTITUTIONAL:  No weight loss, fever, chills, weakness or fatigue.  HEENT:  Eyes:  No visual loss, blurred vision, double vision or yellow sclerae. Ears, Nose, Throat:  No hearing loss, sneezing, congestion, runny nose or sore throat.  CARDIOVASCULAR:  No chest pain, chest pressure or chest discomfort. No palpitations or edema.  GASTROINTESTINAL:  No anorexia, nausea, vomiting or diarrhea. No abdominal pain or blood.  NEUROLOGICAL: See HPI  MUSCULOSKELETAL:  No muscle, back pain, joint pain or stiffness.  PSYCHIATRIC:  No history of depression or anxiety.    Vital Signs Last 24 Hrs  T(C): 36.7 (19 Feb 2019 14:15), Max: 36.7 (19 Feb 2019 14:15)  T(F): 98 (19 Feb 2019 14:15), Max: 98 (19 Feb 2019 14:15)  HR: 54 (19 Feb 2019 14:15) (54 - 74)  BP: 128/51 (19 Feb 2019 14:15) (128/51 - 164/58)  BP(mean): --  RR: 16 (19 Feb 2019 14:15) (16 - 18)  SpO2: 98% (19 Feb 2019 14:15) (98% - 100%)    General Exam:   General appearance: No acute distress                 Neurological Exam:  Mental Status: Orientated to self, date and place.    No dysarthria, aphasia or neglect. speech is fluent    Cranial Nerves: CN I - not tested.  PERRL, EOMI, VFF, no nystagmus or diplopia.  No facial asymmetry.  Hearing intact to finger rub bilaterally.   + symptomatic during esequiel hallpike, symptoms exacerbated.     Motor:   Tone: normal.                  Strength:     [] Upper extremity                      Delt       Bicep    Tricep                                                  R         5/5        5/5        5/5       5/5                                               L          5/5        5/5        5/5       5/5  [] Lower extremity                       HF          KE          KF        DF         PF                                               R        5/5        5/5        5/5       5/5       5/5                                               L         5/5        5/5       5/5       5/5        5/5  Pronator drift: none                 Dysmetria: BL NL FTN  No truncal ataxia.    Tremor: No resting, postural or action tremor.  No myoclonus.    Sensation: intact to light touch, no extinction  Gait: stable gait, but patient feels weak and fatigued.    Other:  Radiology  CT: Age-indeterminate left anterior frontal lobe and left temporal lobe contusions with acute hemorrhage in these areas as well as acute hemorrhage in the anterior right frontal region, may be IPH or SAH . 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 The headache came on suddenly, but resolved after he took OTC pain medications, was not positional. The dizziness is described as "room spinning" and it is worse when he sits up, he concurrently feels fatigued and weak, but he has felt weak for at least several years. The past 2-3 weeks the dizziness has resulted in falls (w/o striking his head or LOC), and he has blurry vision when both eyes are open and when either is closed.   Regarding the prednisone, he also feels worse when he's taking it, makes his blood sugar rise and makes him feel weaker. He has not been eating well for several years, per family he throws up food after he eats it, etiology unknown. At baseline he walks without a walker or cane.  Meds: ASA 81, glipizide 2.5, levocitirizine 5, nitroglycerin prn, cefpodoxime 100, prednisone 20 daily  NIHSS 0, MRS 2    PAST MEDICAL & SURGICAL HISTORY:  GERD (gastroesophageal reflux disease)  Gastric cancer: S/P RT and chemo  Carotid stenosis  JACK (iron deficiency anemia)  HLD (hyperlipidemia)  HTN (hypertension)  CAD (coronary artery disease): S/P CABG  Hives: on Prednisone  DM (diabetes mellitus)  S/P Gastrectomy: 2006 due to gastric cancer  S/P CABG X 4: 2002    FAMILY HISTORY:  No pertinent family history in first degree relatives    Allergies  No Known Allergies    Review of Systems:  CONSTITUTIONAL:  No weight loss, fever, chills, weakness or fatigue.  HEENT:  Eyes:  No visual loss, blurred vision, double vision or yellow sclerae. Ears, Nose, Throat:  No hearing loss, sneezing, congestion, runny nose or sore throat.  CARDIOVASCULAR:  No chest pain, chest pressure or chest discomfort. No palpitations or edema.  GASTROINTESTINAL:  No anorexia, nausea, vomiting or diarrhea. No abdominal pain or blood.  NEUROLOGICAL: See HPI  MUSCULOSKELETAL:  No muscle, back pain, joint pain or stiffness.  PSYCHIATRIC:  No history of depression or anxiety.    Vital Signs Last 24 Hrs  T(C): 36.7 (19 Feb 2019 14:15), Max: 36.7 (19 Feb 2019 14:15)  T(F): 98 (19 Feb 2019 14:15), Max: 98 (19 Feb 2019 14:15)  HR: 54 (19 Feb 2019 14:15) (54 - 74)  BP: 128/51 (19 Feb 2019 14:15) (128/51 - 164/58)  BP(mean): --  RR: 16 (19 Feb 2019 14:15) (16 - 18)  SpO2: 98% (19 Feb 2019 14:15) (98% - 100%)    General Exam:   General appearance: No acute distress                 Neurological Exam:  Mental Status: Orientated to self, date and place.    No dysarthria, aphasia or neglect. speech is fluent    Cranial Nerves: CN I - not tested.  PERRL, EOMI, VFF, no nystagmus or diplopia.  No facial asymmetry.  Hearing intact to finger rub bilaterally.     Motor:   Tone: normal.                  Strength:     [] Upper extremity                      Delt       Bicep    Tricep                                                  R         5/5        5/5        5/5       5/5                                               L          5/5        5/5        5/5       5/5  [] Lower extremity                       HF          KE          KF        DF         PF                                               R        5/5        5/5        5/5       5/5       5/5                                               L         5/5        5/5       5/5       5/5        5/5  Pronator drift: none                 Dysmetria: BL NL FTN  No truncal ataxia.    Tremor: No resting, postural or action tremor.  No myoclonus.    Sensation: intact to light touch, no extinction  Gait: stable gait, but patient feels weak and fatigued.    Other:  Radiology  CT: Age-indeterminate left anterior frontal lobe and left temporal lobe contusions with acute hemorrhage in these areas as well as acute hemorrhage in the anterior right frontal region, may be IPH or SAH .

## 2019-02-19 NOTE — CONSULT NOTE ADULT - PROBLEM SELECTOR RECOMMENDATION 9
1. No acute neurosurgical intervention. Hold Aspirin  2. Neuro checks q 4 hours  3. Agree with neurology regarding MRI  +/- brain for further evaluation. If MRI is delayed tomorrow, Repeat CT Head without contrast in AM to ensure bleed remain stable.   4. D/w Dr. Mayberry

## 2019-02-19 NOTE — H&P ADULT - HISTORY OF PRESENT ILLNESS
77 yo m h/o cad/cabg 2002, diastolic chf,  gastric ca s/p surgery 2006, chemoradiation. HTN, HLD, chronic diffuse  pruritic rash for last 4 years on multiple courses of prednisone, currently on taper since discharge from [facility] last week. Also with h/o DM, JACK, GERD, carotid stenosis.     Pt reports  frontal type HA that extends to forehead/periorbitally with no vision changes , but associated lightheadedness and dizziness. CT head: Age-indeterminate left anterior frontal lobe and left temporal lobe contusions with acute hemorrhage in these areas as well as acute hemorrhage in the anterior right frontal region, maybe intraparenchymal or SAH. Pt denies recent trauma. Denies focal weakness, numbness. Currently on aspirin. Platelets , coags WNL. Denies cp, sob. EKG 1st degree block, LBBB similar to July 3rd tracing. HS trop 15, 16. Pt also notes worsening rash; current prednisone regimen ineffective. Recent dye applied to skin that he describes as [place] remedy has also been ineffective Denies myalgias, arthralgias. Notes recent subjective fevers; normal temp thus far in ed.  Pt additionally endorsing 3 moths pf postprandial vomiting to both solids and lisuids. At times can keep po intake down; has recently passed stool  and gas. Denies melena, hematochezia 79 yo m h/o cad/cabg 2002, diastolic chf,  gastric ca s/p surgery 2006, chemoradiation. HTN, HLD, chronic diffuse  pruritic rash for last 4 years on multiple courses of prednisone, currently on taper since discharge from [facility] last week. Also with h/o DM, JACK, GERD, carotid stenosis.     Pt reports  frontal type HA that extends to forehead/periorbitally with no vision changes , but associated lightheadedness and dizziness. CT head: Age-indeterminate left anterior frontal lobe and left temporal lobe contusions with acute hemorrhage in these areas as well as acute hemorrhage in the anterior right frontal region, maybe intraparenchymal or SAH. Pt denies recent trauma. Denies focal weakness, numbness. Currently on aspirin. Platelets , coags WNL. Denies cp, sob. EKG 1st degree block, LBBB similar to July 3rd tracing. HS trop 15, 16. Pt also notes worsening rash; current prednisone regimen ineffective. Recent dye applied to skin that he describes as [place] remedy has also been ineffective Denies myalgias, arthralgias. Notes recent subjective fevers; normal temp thus far in ed.  Pt additionally endorsing 3 moths pf postprandial vomiting to both solids and lisuids. At times can keep po intake down; has recently passed stool  and gas. Denies melena, hematochezia. Denies dysphagia,  odynophagia

## 2019-02-19 NOTE — ED PROVIDER NOTE - CRANIAL NERVE AND PUPILLARY EXAM
+[place]-Hallpike reproducing sx, Epley performed, +nystagmus during position change, +extinguishing/central and peripheral vision intact/central vision intact/extra-ocular movements intact/cranial nerves 2-12 intact/peripheral vision intact/tongue is midline

## 2019-02-19 NOTE — CONSULT NOTE ADULT - ASSESSMENT
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 The headache came on suddenly, but resolved after he took OTC pain medications, was not positional. The dizziness is described as "room spinning" and it is worse when he sits up, he concurrently feels fatigued and weak, but he has felt weak for at least several years. The past 2-3 weeks the dizziness has resulted in falls (w/o striking his head or LOC), and he has blurry vision when both eyes are open and when either is closed.   Regarding the prednisone, he also feels worse when he's taking it, makes his blood sugar rise and makes him feel weaker. He has not been eating well for several years, per family he throws up food after he eats, etiology unknown. At baseline he walks without a walker or cane.  Neurology was consulted for CTh findings: Age-indeterminate left anterior frontal lobe and left temporal lobe contusions with acute hemorrhage in these areas as well as acute hemorrhage in the anterior right frontal region, may be IPH or SAH. Per ED, neurosurgery was called, no neursurgical intervention recommended at this time  Meds: ASA 81, glipizide 2.5, levocitirizine 5, nitroglycerin prn, cefpodoxime 100, prednisone 20 daily  NIHSS 0, MRS 2  on Exam had + esequiel hallpike in ED, gait was stable but cautious and patient felt weak.    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 The headache came on suddenly, but resolved after he took OTC pain medications, was not positional. The dizziness is described as "room spinning" and it is worse when he sits up, he concurrently feels fatigued and weak, but he has felt weak for at least several years. The past 2-3 weeks the dizziness has resulted in falls (w/o striking his head or LOC), and he has blurry vision when both eyes are open and when either is closed.   Regarding the prednisone, he also feels worse when he's taking it, makes his blood sugar rise and makes him feel weaker. He has not been eating well for several years, per family he throws up food after he eats, etiology unknown. At baseline he walks without a walker or cane.  Neurology was consulted for CTh findings: Age-indeterminate left anterior frontal lobe and left temporal lobe contusions with acute hemorrhage in these areas as well as acute hemorrhage in the anterior right frontal region, may be IPH or SAH. Per ED, neurosurgery was called, no neursurgical intervention recommended at this time  Meds: ASA 81, glipizide 2.5, levocitirizine 5, nitroglycerin prn, cefpodoxime 100, prednisone 20 daily  NIHSS 0, MRS 2  on Exam  gait was stable but cautious and patient felt weak.    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 The headache came on suddenly, but resolved after he took OTC pain medications, was not positional. The dizziness is described as "room spinning" and it is worse when he sits up, he concurrently feels fatigued and weak, but he has felt weak for at least several years. The past 2-3 weeks the dizziness has resulted in falls (w/o striking his head or LOC), and he has blurry vision when both eyes are open and when either is closed.   Regarding the prednisone, he also feels worse when he's taking it, makes his blood sugar rise and makes him feel weaker. He has not been eating well for several years, per family he throws up food after he eats, etiology unknown. At baseline he walks without a walker or cane.  Neurology was consulted for CTh findings: Age-indeterminate left anterior frontal lobe and left temporal lobe contusions with acute hemorrhage in these areas as well as acute hemorrhage in the anterior right frontal region, may be IPH or SAH. Per ED, neurosurgery was called, no neursurgical intervention recommended at this time  Meds: ASA 81, glipizide 2.5, levocitirizine 5, nitroglycerin prn, cefpodoxime 100, prednisone 20 daily  NIHSS 0, MRS 2  on Exam  gait was stable but cautious and patient felt weak.    Etiology: hemorrhagic strokes of unknown etiology, ?amyloid angiopathy vs hemorrhagic transformation of ischemic stroke vs AVM tumor vs vasculitic    Plan  [] hold anticoagulation/antiplt agents for 5-7 days  [] MRI brain, MRA head and neck w, w/o contrast  [] blood cultures x2  [] TTE to r/o endocarditis  [] repeat CTh if change in neurological status  [] goal SBP < 160/90  [] continue rest of home medications  [] correct electrolyte abnormalities  [] please send b12, folate  [] DVT ppx in 48 hrs if repeat CThead is unchanged

## 2019-02-19 NOTE — CONSULT NOTE ADULT - SUBJECTIVE AND OBJECTIVE BOX
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 The headache came on suddenly, but resolved after he took OTC pain medications, was not positional. The dizziness is described as "room spinning" and it is worse when he sits up, he concurrently feels fatigued and weak, but he has felt weak for at least several years. The past 2-3 weeks the dizziness has resulted in falls (w/o striking his head or LOC), and he has blurry vision when both eyes are open and when either is closed.   Regarding the prednisone, he also feels worse when he's taking it, makes his blood sugar rise and makes him feel weaker. He has not been eating well for several years, per family he throws up food after he eats it, etiology unknown. At baseline he walks without a walker or cane.  Meds: ASA 81, glipizide 2.5, levocitirizine 5, nitroglycerin prn, cefpodoxime 100, prednisone 20 daily  NIHSS 0, MRS 2    PAST MEDICAL & SURGICAL HISTORY:  GERD (gastroesophageal reflux disease)  Gastric cancer: S/P RT and chemo  Carotid stenosis  JACK (iron deficiency anemia)  HLD (hyperlipidemia)  HTN (hypertension)  CAD (coronary artery disease): S/P CABG  Hives: on Prednisone  DM (diabetes mellitus)  S/P Gastrectomy: 2006 due to gastric cancer  S/P CABG X 4: 2002    FAMILY HISTORY:  No pertinent family history in first degree relatives    Allergies  No Known Allergies    Review of Systems:  CONSTITUTIONAL:  No weight loss, fever, chills, weakness or fatigue.  HEENT:  Eyes:  No visual loss, blurred vision, double vision or yellow sclerae. Ears, Nose, Throat:  No hearing loss, sneezing, congestion, runny nose or sore throat.  CARDIOVASCULAR:  No chest pain, chest pressure or chest discomfort. No palpitations or edema.  GASTROINTESTINAL:  No anorexia, nausea, vomiting or diarrhea. No abdominal pain or blood.  NEUROLOGICAL: See HPI  MUSCULOSKELETAL:  No muscle, back pain, joint pain or stiffness.  PSYCHIATRIC:  No history of depression or anxiety.    Vital Signs Last 24 Hrs  T(C): 36.7 (19 Feb 2019 14:15), Max: 36.7 (19 Feb 2019 14:15)  T(F): 98 (19 Feb 2019 14:15), Max: 98 (19 Feb 2019 14:15)  HR: 54 (19 Feb 2019 14:15) (54 - 74)  BP: 128/51 (19 Feb 2019 14:15) (128/51 - 164/58)  BP(mean): --  RR: 16 (19 Feb 2019 14:15) (16 - 18)  SpO2: 98% (19 Feb 2019 14:15) (98% - 100%)    General Exam:   General appearance: No acute distress                 Neurological Exam:  Mental Status: Orientated to self, date and place.    No dysarthria, aphasia or neglect. speech is fluent    Cranial Nerves: CN I - not tested.  PERRL, EOMI, VFF, no nystagmus or diplopia.  No facial asymmetry.  Hearing intact to finger rub bilaterally.     Motor:   Tone: normal.                  Strength:     [] Upper extremity                      Delt       Bicep    Tricep                                                  R         5/5        5/5        5/5       5/5                                               L          5/5        5/5        5/5       5/5  [] Lower extremity                       HF          KE          KF        DF         PF                                               R        5/5        5/5        5/5       5/5       5/5                                               L         5/5        5/5       5/5       5/5        5/5  Pronator drift: none                 Dysmetria: BL NL FTN  No truncal ataxia.    Tremor: No resting, postural or action tremor.  No myoclonus.    Sensation: intact to light touch, no extinction  Gait: stable gait, but patient feels weak and fatigued.    Other:  Radiology  CT: Age-indeterminate left anterior frontal lobe and left temporal lobe contusions with acute hemorrhage in these areas as well as acute hemorrhage in the anterior right frontal region, may be IPH or SAH .

## 2019-02-19 NOTE — H&P ADULT - PROBLEM SELECTOR PLAN 2
-dc prednisone  -Pt reports not seeing dermatologist in 4 years; doesn't think he underwent biopsy or received diagnosis -dc prednisone  -Pt reports not seeing dermatologist in 4 years; doesn't think he underwent biopsy or received diagnosis  -would call derm  -loratadine prn

## 2019-02-19 NOTE — H&P ADULT - NSHPREVIEWOFSYSTEMS_GEN_ALL_CORE
Review of Systems:   CONSTITUTIONAL: + subjective fever, weight loss, or fatigue  EYES: No visual disturbances, or discharge  ENMT:  No difficulty hearing, tinnitus, vertigo; No sinus or throat pain  NECK: No pain or stiffness  RESPIRATORY: No cough, wheezing, chills or hemoptysis; No shortness of breath  CARDIOVASCULAR: No chest pain, palpitations, dizziness, or leg swelling  GASTROINTESTINAL: No abdominal or epigastric pain. chronic postprandial vomiting, no hematemesis; No diarrhea or constipation. No melena or hematochezia.  GENITOURINARY: No dysuria, frequency, hematuria, or incontinence  NEUROLOGICAL:  headache per HPI  SKIN: worsening of chronic  itching rashes  LYMPH NODES: No enlarged glands  ENDOCRINE: No heat or cold intolerance; No hair loss  MUSCULOSKELETAL: No joint pain or swelling; No muscle, back, or extremity pain

## 2019-02-19 NOTE — H&P ADULT - PROBLEM SELECTOR PLAN 5
hold aspirin  reports taking statin, but pharmacy has not included on list. pt not on BB alyssa dur to previous syncopal episodes admission July 2018. Will c/w statin for now

## 2019-02-19 NOTE — CONSULT NOTE ADULT - ASSESSMENT
78 year old M on baby ASA p/w headache, dizziness x several weeks found to have b/l frontal contusion vs sah- patient denies any trauma to the head with recent falls

## 2019-02-19 NOTE — H&P ADULT - NSHPPHYSICALEXAM_GEN_ALL_CORE
PHYSICAL EXAM:      Constitutional: NAD, cachectic  HEENT: EOMI, Normal Hearing  Neck: No LAD, No JVD  Back: Normal spine flexure, No CVA tenderness  Respiratory: CTAB  Cardiovascular: S1 and S2, RRR, no M/G/R  Gastrointestinal: BS+, soft, NT/ND  Extremities: No peripheral edema  Vascular: 2+ peripheral pulses  Neurological: A/O x 3, no focal deficits  Psychiatric: Normal mood, normal affect  Musculoskeletal: 5/5 strength b/l upper and lower extremities  Skin:  macular rash in clusters on arms; reports on scalp but unable to visualize due to yellow dye placed for attempted relief

## 2019-02-19 NOTE — ED ADULT NURSE NOTE - OBJECTIVE STATEMENT
pt sitting in stretcher with home health aid at bedside, reports not feeling well this morning, difficult to get out of bed, dizzy and nauseas.  pt also has diffuse rash all over body for which he has been taking prednisone with no relief.

## 2019-02-19 NOTE — ED PROVIDER NOTE - CLINICAL SUMMARY MEDICAL DECISION MAKING FREE TEXT BOX
78M with 4 yr. hx of episodic rashes, ? allergic, usually spont. resolve in 1 wk., occ. sees MD and gets prednisone Rx and has assoc. sx of gen. weakness, nausea and occ. falls and "passing out."

## 2019-02-19 NOTE — ED ADULT NURSE NOTE - ASSOCIATED SYMPTOMS
pt states he has been having dizziness, and rash all over body which started 4 years ago. Pt denies falling on ground but currently has a headache which is new. States he is also having problems with bp and sugars due to prednisone taken for rash

## 2019-02-20 ENCOUNTER — TRANSCRIPTION ENCOUNTER (OUTPATIENT)
Age: 79
End: 2019-02-20

## 2019-02-20 DIAGNOSIS — L50.9 URTICARIA, UNSPECIFIED: ICD-10-CM

## 2019-02-20 LAB
ALBUMIN SERPL ELPH-MCNC: 3.4 G/DL — SIGNIFICANT CHANGE UP (ref 3.3–5)
ALP SERPL-CCNC: 67 U/L — SIGNIFICANT CHANGE UP (ref 40–120)
ALT FLD-CCNC: 23 U/L — SIGNIFICANT CHANGE UP (ref 4–41)
ANION GAP SERPL CALC-SCNC: 12 MMO/L — SIGNIFICANT CHANGE UP (ref 7–14)
AST SERPL-CCNC: 21 U/L — SIGNIFICANT CHANGE UP (ref 4–40)
BASOPHILS # BLD AUTO: 0.04 K/UL — SIGNIFICANT CHANGE UP (ref 0–0.2)
BASOPHILS NFR BLD AUTO: 0.4 % — SIGNIFICANT CHANGE UP (ref 0–2)
BILIRUB SERPL-MCNC: 0.5 MG/DL — SIGNIFICANT CHANGE UP (ref 0.2–1.2)
BUN SERPL-MCNC: 18 MG/DL — SIGNIFICANT CHANGE UP (ref 7–23)
CALCIUM SERPL-MCNC: 8.7 MG/DL — SIGNIFICANT CHANGE UP (ref 8.4–10.5)
CHLORIDE SERPL-SCNC: 95 MMOL/L — LOW (ref 98–107)
CO2 SERPL-SCNC: 25 MMOL/L — SIGNIFICANT CHANGE UP (ref 22–31)
CREAT SERPL-MCNC: 0.98 MG/DL — SIGNIFICANT CHANGE UP (ref 0.5–1.3)
EOSINOPHIL # BLD AUTO: 0.15 K/UL — SIGNIFICANT CHANGE UP (ref 0–0.5)
EOSINOPHIL NFR BLD AUTO: 1.7 % — SIGNIFICANT CHANGE UP (ref 0–6)
GLUCOSE BLDC GLUCOMTR-MCNC: 112 MG/DL — HIGH (ref 70–99)
GLUCOSE SERPL-MCNC: 212 MG/DL — HIGH (ref 70–99)
HCT VFR BLD CALC: 38.4 % — LOW (ref 39–50)
HGB BLD-MCNC: 12.3 G/DL — LOW (ref 13–17)
IMM GRANULOCYTES NFR BLD AUTO: 1.1 % — SIGNIFICANT CHANGE UP (ref 0–1.5)
LYMPHOCYTES # BLD AUTO: 2.65 K/UL — SIGNIFICANT CHANGE UP (ref 1–3.3)
LYMPHOCYTES # BLD AUTO: 29.7 % — SIGNIFICANT CHANGE UP (ref 13–44)
MAGNESIUM SERPL-MCNC: 2.2 MG/DL — SIGNIFICANT CHANGE UP (ref 1.6–2.6)
MCHC RBC-ENTMCNC: 26.7 PG — LOW (ref 27–34)
MCHC RBC-ENTMCNC: 32 % — SIGNIFICANT CHANGE UP (ref 32–36)
MCV RBC AUTO: 83.3 FL — SIGNIFICANT CHANGE UP (ref 80–100)
MONOCYTES # BLD AUTO: 0.59 K/UL — SIGNIFICANT CHANGE UP (ref 0–0.9)
MONOCYTES NFR BLD AUTO: 6.6 % — SIGNIFICANT CHANGE UP (ref 2–14)
NEUTROPHILS # BLD AUTO: 5.4 K/UL — SIGNIFICANT CHANGE UP (ref 1.8–7.4)
NEUTROPHILS NFR BLD AUTO: 60.5 % — SIGNIFICANT CHANGE UP (ref 43–77)
NRBC # FLD: 0 K/UL — LOW (ref 25–125)
PHOSPHATE SERPL-MCNC: 4.3 MG/DL — SIGNIFICANT CHANGE UP (ref 2.5–4.5)
PLATELET # BLD AUTO: 257 K/UL — SIGNIFICANT CHANGE UP (ref 150–400)
PMV BLD: 10.5 FL — SIGNIFICANT CHANGE UP (ref 7–13)
POTASSIUM SERPL-MCNC: 4.1 MMOL/L — SIGNIFICANT CHANGE UP (ref 3.5–5.3)
POTASSIUM SERPL-SCNC: 4.1 MMOL/L — SIGNIFICANT CHANGE UP (ref 3.5–5.3)
PROT SERPL-MCNC: 6.5 G/DL — SIGNIFICANT CHANGE UP (ref 6–8.3)
RBC # BLD: 4.61 M/UL — SIGNIFICANT CHANGE UP (ref 4.2–5.8)
RBC # FLD: 16.3 % — HIGH (ref 10.3–14.5)
SODIUM SERPL-SCNC: 132 MMOL/L — LOW (ref 135–145)
SPECIMEN SOURCE: SIGNIFICANT CHANGE UP
SPECIMEN SOURCE: SIGNIFICANT CHANGE UP
WBC # BLD: 8.93 K/UL — SIGNIFICANT CHANGE UP (ref 3.8–10.5)
WBC # FLD AUTO: 8.93 K/UL — SIGNIFICANT CHANGE UP (ref 3.8–10.5)

## 2019-02-20 PROCEDURE — 99223 1ST HOSP IP/OBS HIGH 75: CPT

## 2019-02-20 PROCEDURE — 99233 SBSQ HOSP IP/OBS HIGH 50: CPT

## 2019-02-20 PROCEDURE — 70553 MRI BRAIN STEM W/O & W/DYE: CPT | Mod: 26

## 2019-02-20 PROCEDURE — 70544 MR ANGIOGRAPHY HEAD W/O DYE: CPT | Mod: 26,59

## 2019-02-20 PROCEDURE — 99223 1ST HOSP IP/OBS HIGH 75: CPT | Mod: GC

## 2019-02-20 PROCEDURE — 70549 MR ANGIOGRAPH NECK W/O&W/DYE: CPT | Mod: 26

## 2019-02-20 PROCEDURE — 74177 CT ABD & PELVIS W/CONTRAST: CPT | Mod: 26

## 2019-02-20 RX ORDER — LORATADINE 10 MG/1
10 TABLET ORAL
Qty: 0 | Refills: 0 | Status: DISCONTINUED | OUTPATIENT
Start: 2019-02-20 | End: 2019-02-20

## 2019-02-20 RX ORDER — LORATADINE 10 MG/1
10 TABLET ORAL ONCE
Qty: 0 | Refills: 0 | Status: COMPLETED | OUTPATIENT
Start: 2019-02-20 | End: 2019-02-20

## 2019-02-20 RX ADMIN — OXYCODONE HYDROCHLORIDE 5 MILLIGRAM(S): 5 TABLET ORAL at 18:03

## 2019-02-20 RX ADMIN — LORATADINE 10 MILLIGRAM(S): 10 TABLET ORAL at 23:07

## 2019-02-20 RX ADMIN — Medication 1: at 19:03

## 2019-02-20 RX ADMIN — SIMVASTATIN 40 MILLIGRAM(S): 20 TABLET, FILM COATED ORAL at 23:07

## 2019-02-20 RX ADMIN — OXYCODONE HYDROCHLORIDE 5 MILLIGRAM(S): 5 TABLET ORAL at 19:00

## 2019-02-20 RX ADMIN — Medication 1 APPLICATION(S): at 23:07

## 2019-02-20 NOTE — DISCHARGE NOTE ADULT - PATIENT PORTAL LINK FT
You can access the [facility] Patient Portal, offered by [facility], by registering with the following website: [email or web address]

## 2019-02-20 NOTE — CONSULT NOTE ADULT - SUBJECTIVE AND OBJECTIVE BOX
HPI:  79 yo m h/o cad/cabg 2002, diastolic chf,  gastric ca s/p surgery 2006, chemoradiation. HTN, HLD, chronic diffuse  pruritic rash for last 4 years on multiple courses of prednisone, currently on taper since discharge from [facility] last week. Also with h/o DM, JACK, GERD, carotid stenosis.     Pt reports  frontal type HA that extends to forehead/periorbitally with no vision changes , but associated lightheadedness and dizziness. CT head: Age-indeterminate left anterior frontal lobe and left temporal lobe contusions with acute hemorrhage in these areas as well as acute hemorrhage in the anterior right frontal region, maybe intraparenchymal or SAH. Pt denies recent trauma. Denies focal weakness, numbness. Currently on aspirin. Platelets , coags WNL. Denies cp, sob. EKG 1st degree block, LBBB similar to July 3rd tracing. HS trop 15, 16. Pt also notes worsening rash; current prednisone regimen ineffective. Recent dye applied to skin that he describes as [place] remedy has also been ineffective Denies myalgias, arthralgias. Notes recent subjective fevers; normal temp thus far in ed.  Pt additionally endorsing 3 moths pf postprandial vomiting to both solids and lisuids. At times can keep po intake down; has recently passed stool  and gas. Denies melena, hematochezia. Denies dysphagia,  odynophagia (19 Feb 2019 20:36)      PAST MEDICAL & SURGICAL HISTORY:  GERD (gastroesophageal reflux disease)  Gastric cancer: S/P RT and chemo  Carotid stenosis  JACK (iron deficiency anemia)  HLD (hyperlipidemia)  HTN (hypertension)  CAD (coronary artery disease): S/P CABG  Hives: on Prednisone  DM (diabetes mellitus)  S/P Gastrectomy: 2006 due to gastric cancer  S/P CABG X 4: 2002      REVIEW OF SYSTEMS      General: no fevers/chills, no lethargy	    Skin/Breast: see HPI  	  Ophthalmologic: no eye pain or change in vision  	  ENMT: no dysphagia or change in hearing    Respiratory and Thorax: no SOB or cough  	  Cardiovascular: no palpitations or chest pain    Gastrointestinal: no abdominal pain or blood in stool , +nausea    Genitourinary: no dysuria or frequency    Musculoskeletal: no joint pains or weakness	    Neurological: +dizziness    MEDICATIONS  (STANDING):  dextrose 5%. 1000 milliLiter(s) (50 mL/Hr) IV Continuous <Continuous>  dextrose 50% Injectable 12.5 Gram(s) IV Push once  dextrose 50% Injectable 25 Gram(s) IV Push once  dextrose 50% Injectable 25 Gram(s) IV Push once  insulin lispro (HumaLOG) corrective regimen sliding scale   SubCutaneous three times a day before meals  insulin lispro (HumaLOG) corrective regimen sliding scale   SubCutaneous at bedtime  simvastatin 40 milliGRAM(s) Oral at bedtime    MEDICATIONS  (PRN):  acetaminophen   Tablet .. 650 milliGRAM(s) Oral every 6 hours PRN Mild Pain (1 - 3), Moderate Pain (4 - 6)  dextrose 40% Gel 15 Gram(s) Oral once PRN Blood Glucose LESS THAN 70 milliGRAM(s)/deciliter  diphenhydrAMINE 25 milliGRAM(s) Oral every 4 hours PRN Rash and/or Itching  glucagon  Injectable 1 milliGRAM(s) IntraMuscular once PRN Glucose LESS THAN 70 milligrams/deciliter  oxyCODONE    IR 5 milliGRAM(s) Oral every 6 hours PRN Severe Pain (7 - 10)      Allergies    No Known Allergies    Intolerances        SOCIAL HISTORY:    FAMILY HISTORY:  No pertinent family history in first degree relatives      Vital Signs Last 24 Hrs  T(C): 36.7 (20 Feb 2019 14:23), Max: 36.9 (19 Feb 2019 17:26)  T(F): 98.1 (20 Feb 2019 14:23), Max: 98.4 (19 Feb 2019 17:26)  HR: 66 (20 Feb 2019 14:23) (54 - 66)  BP: 125/55 (20 Feb 2019 14:23) (106/51 - 155/74)  BP(mean): --  RR: 17 (20 Feb 2019 14:23) (15 - 20)  SpO2: 100% (20 Feb 2019 14:23) (99% - 100%)    PHYSICAL EXAM:     The patient was alert and oriented X 3, well nourished, and in no  apparent distress.  OP showed no ulcerations  There was no visible lymphadenopathy.  Conjunctiva were non injected  There was no clubbing or edema of extremities.  The scalp, hair, face, eyebrows, lips, OP, neck, chest, back,   extremities X 4, nails were examined.  There was no hyperhidrosis or bromhidrosis.    Of note on skin exam:       LABS:                        12.3   8.93  )-----------( 257      ( 20 Feb 2019 05:45 )             38.4     02-20    132<L>  |  95<L>  |  18  ----------------------------<  212<H>  4.1   |  25  |  0.98    Ca    8.7      20 Feb 2019 12:17  Phos  4.3     02-20  Mg     2.2     02-20    TPro  6.5  /  Alb  3.4  /  TBili  0.5  /  DBili  x   /  AST  21  /  ALT  23  /  AlkPhos  67  02-20    PT/INR - ( 19 Feb 2019 21:20 )   PT: 11.6 SEC;   INR: 1.04          PTT - ( 19 Feb 2019 21:20 )  PTT:23.8 SEC      RADIOLOGY & ADDITIONAL STUDIES: HPI:  79 yo m h/o cad/cabg 2002, diastolic chf, gastric ca s/p surgery 2006, chemoradiation, HTN, HLD, DM, JACK, GERD, carotid stenosis and chronic diffuse  pruritic rash for last 4 years on multiple courses of prednisone, currently on taper since discharge from [facility] last week. Patient admitted for ICH vs SAH. Dermatology consulted regarding chronic rash. Patient notes intensely pruritic rash that recurs and remits frequently over past 4 years. Patient unsure if individual lesions last for > 24 hours. Patient denies any triggers. Patient notes improvement with prednisone and triamcinolone however notes undesirable side effects with prednisone. Patient endorses lip swelling over past 2 months however denies throat symptoms or difficulty breathings. Patient has not seen dermatology in several years, unsure of name. Patient has been having rash managed by his internist, Dr. Brennan [phone]. Patient also endorsing nausea and dizziness.         PAST MEDICAL & SURGICAL HISTORY:  GERD (gastroesophageal reflux disease)  Gastric cancer: S/P RT and chemo  Carotid stenosis  JACK (iron deficiency anemia)  HLD (hyperlipidemia)  HTN (hypertension)  CAD (coronary artery disease): S/P CABG  Hives: on Prednisone  DM (diabetes mellitus)  S/P Gastrectomy: 2006 due to gastric cancer  S/P CABG X 4: 2002      REVIEW OF SYSTEMS      General: no fevers/chills, no lethargy	    Skin/Breast: see HPI  	  Ophthalmologic: no eye pain or change in vision  	  ENMT: no dysphagia or change in hearing    Respiratory and Thorax: no SOB or cough  	  Cardiovascular: no palpitations or chest pain    Gastrointestinal: no abdominal pain or blood in stool , +nausea    Genitourinary: no dysuria or frequency    Musculoskeletal: no joint pains or weakness	    Neurological: +dizziness    MEDICATIONS  (STANDING):  dextrose 5%. 1000 milliLiter(s) (50 mL/Hr) IV Continuous <Continuous>  dextrose 50% Injectable 12.5 Gram(s) IV Push once  dextrose 50% Injectable 25 Gram(s) IV Push once  dextrose 50% Injectable 25 Gram(s) IV Push once  insulin lispro (HumaLOG) corrective regimen sliding scale   SubCutaneous three times a day before meals  insulin lispro (HumaLOG) corrective regimen sliding scale   SubCutaneous at bedtime  simvastatin 40 milliGRAM(s) Oral at bedtime    MEDICATIONS  (PRN):  acetaminophen   Tablet .. 650 milliGRAM(s) Oral every 6 hours PRN Mild Pain (1 - 3), Moderate Pain (4 - 6)  dextrose 40% Gel 15 Gram(s) Oral once PRN Blood Glucose LESS THAN 70 milliGRAM(s)/deciliter  diphenhydrAMINE 25 milliGRAM(s) Oral every 4 hours PRN Rash and/or Itching  glucagon  Injectable 1 milliGRAM(s) IntraMuscular once PRN Glucose LESS THAN 70 milligrams/deciliter  oxyCODONE    IR 5 milliGRAM(s) Oral every 6 hours PRN Severe Pain (7 - 10)      Allergies    No Known Allergies    Intolerances        SOCIAL HISTORY:    FAMILY HISTORY:  No pertinent family history in first degree relatives      Vital Signs Last 24 Hrs  T(C): 36.7 (20 Feb 2019 14:23), Max: 36.9 (19 Feb 2019 17:26)  T(F): 98.1 (20 Feb 2019 14:23), Max: 98.4 (19 Feb 2019 17:26)  HR: 66 (20 Feb 2019 14:23) (54 - 66)  BP: 125/55 (20 Feb 2019 14:23) (106/51 - 155/74)  BP(mean): --  RR: 17 (20 Feb 2019 14:23) (15 - 20)  SpO2: 100% (20 Feb 2019 14:23) (99% - 100%)    PHYSICAL EXAM:     The patient was alert and oriented X 3, well nourished, and in no  apparent distress.  OP showed no ulcerations  There was no visible lymphadenopathy.  Conjunctiva were non injected  There was no clubbing or edema of extremities.  The scalp, hair, face, eyebrows, lips, OP, neck, chest, back,   extremities X 4, nails were examined.  There was no hyperhidrosis or bromhidrosis.    Of note on skin exam:       LABS:                        12.3   8.93  )-----------( 257      ( 20 Feb 2019 05:45 )             38.4     02-20    132<L>  |  95<L>  |  18  ----------------------------<  212<H>  4.1   |  25  |  0.98    Ca    8.7      20 Feb 2019 12:17  Phos  4.3     02-20  Mg     2.2     02-20    TPro  6.5  /  Alb  3.4  /  TBili  0.5  /  DBili  x   /  AST  21  /  ALT  23  /  AlkPhos  67  02-20    PT/INR - ( 19 Feb 2019 21:20 )   PT: 11.6 SEC;   INR: 1.04          PTT - ( 19 Feb 2019 21:20 )  PTT:23.8 SEC      RADIOLOGY & ADDITIONAL STUDIES: HPI:  77 yo m h/o cad/cabg 2002, diastolic chf, gastric ca s/p surgery 2006, chemoradiation, HTN, HLD, DM, JACK, GERD, carotid stenosis and chronic diffuse  pruritic rash for last 4 years on multiple courses of prednisone, currently on taper since discharge from [facility] last week. Patient admitted for ICH vs SAH. Dermatology consulted regarding chronic rash. Patient notes intensely pruritic rash that recurs and remits frequently over past 4 years. Patient unsure if individual lesions last for > 24 hours. Patient denies any triggers. Patient notes improvement with prednisone and triamcinolone however notes undesirable side effects with prednisone. Patient endorses lip swelling over past 2 months however denies throat symptoms or difficulty breathings. Patient has not seen dermatology in several years, unsure of name. Patient has been having rash managed by his internist, Dr. Brennan [phone]. Patient also endorsing nausea and dizziness.         PAST MEDICAL & SURGICAL HISTORY:  GERD (gastroesophageal reflux disease)  Gastric cancer: S/P RT and chemo  Carotid stenosis  JACK (iron deficiency anemia)  HLD (hyperlipidemia)  HTN (hypertension)  CAD (coronary artery disease): S/P CABG  Hives: on Prednisone  DM (diabetes mellitus)  S/P Gastrectomy: 2006 due to gastric cancer  S/P CABG X 4: 2002      REVIEW OF SYSTEMS      General: no fevers/chills, no lethargy	    Skin/Breast: see HPI  	  Ophthalmologic: no eye pain or change in vision  	  ENMT: no dysphagia or change in hearing    Respiratory and Thorax: no SOB or cough  	  Cardiovascular: no palpitations or chest pain    Gastrointestinal: no abdominal pain or blood in stool , +nausea    Genitourinary: no dysuria or frequency    Musculoskeletal: no joint pains or weakness	    Neurological: +dizziness    MEDICATIONS  (STANDING):  dextrose 5%. 1000 milliLiter(s) (50 mL/Hr) IV Continuous <Continuous>  dextrose 50% Injectable 12.5 Gram(s) IV Push once  dextrose 50% Injectable 25 Gram(s) IV Push once  dextrose 50% Injectable 25 Gram(s) IV Push once  insulin lispro (HumaLOG) corrective regimen sliding scale   SubCutaneous three times a day before meals  insulin lispro (HumaLOG) corrective regimen sliding scale   SubCutaneous at bedtime  simvastatin 40 milliGRAM(s) Oral at bedtime    MEDICATIONS  (PRN):  acetaminophen   Tablet .. 650 milliGRAM(s) Oral every 6 hours PRN Mild Pain (1 - 3), Moderate Pain (4 - 6)  dextrose 40% Gel 15 Gram(s) Oral once PRN Blood Glucose LESS THAN 70 milliGRAM(s)/deciliter  diphenhydrAMINE 25 milliGRAM(s) Oral every 4 hours PRN Rash and/or Itching  glucagon  Injectable 1 milliGRAM(s) IntraMuscular once PRN Glucose LESS THAN 70 milligrams/deciliter  oxyCODONE    IR 5 milliGRAM(s) Oral every 6 hours PRN Severe Pain (7 - 10)      Allergies    No Known Allergies    Intolerances        SOCIAL HISTORY:    FAMILY HISTORY:  No pertinent family history in first degree relatives      Vital Signs Last 24 Hrs  T(C): 36.7 (20 Feb 2019 14:23), Max: 36.9 (19 Feb 2019 17:26)  T(F): 98.1 (20 Feb 2019 14:23), Max: 98.4 (19 Feb 2019 17:26)  HR: 66 (20 Feb 2019 14:23) (54 - 66)  BP: 125/55 (20 Feb 2019 14:23) (106/51 - 155/74)  BP(mean): --  RR: 17 (20 Feb 2019 14:23) (15 - 20)  SpO2: 100% (20 Feb 2019 14:23) (99% - 100%)    PHYSICAL EXAM:     The patient was alert and oriented X 3, well nourished, and in no  apparent distress.  OP showed no ulcerations  There was no visible lymphadenopathy.  Conjunctiva were non injected  There was no clubbing or edema of extremities.  The scalp, hair, face, eyebrows, lips, OP, neck, chest, back,   extremities X 4, nails were examined.  There was no hyperhidrosis or bromhidrosis.    Of note on skin exam:   many scattered edematous and eythematous circular and polcyclic blanching plaques on neck, trunk, extremities    mild upper lip swelling     LABS:                        12.3   8.93  )-----------( 257      ( 20 Feb 2019 05:45 )             38.4     02-20    132<L>  |  95<L>  |  18  ----------------------------<  212<H>  4.1   |  25  |  0.98    Ca    8.7      20 Feb 2019 12:17  Phos  4.3     02-20  Mg     2.2     02-20    TPro  6.5  /  Alb  3.4  /  TBili  0.5  /  DBili  x   /  AST  21  /  ALT  23  /  AlkPhos  67  02-20    PT/INR - ( 19 Feb 2019 21:20 )   PT: 11.6 SEC;   INR: 1.04          PTT - ( 19 Feb 2019 21:20 )  PTT:23.8 SEC    CBC Full  -  ( 20 Feb 2019 05:45 )  WBC Count : 8.93 K/uL  Hemoglobin : 12.3 g/dL  Hematocrit : 38.4 %  Platelet Count - Automated : 257 K/uL  Mean Cell Volume : 83.3 fL  Mean Cell Hemoglobin : 26.7 pg  Mean Cell Hemoglobin Concentration : 32.0 %  Auto Neutrophil # : 5.40 K/uL  Auto Lymphocyte # : 2.65 K/uL  Auto Monocyte # : 0.59 K/uL  Auto Eosinophil # : 0.15 K/uL  Auto Basophil # : 0.04 K/uL  Auto Neutrophil % : 60.5 %  Auto Lymphocyte % : 29.7 %  Auto Monocyte % : 6.6 %  Auto Eosinophil % : 1.7 %  Auto Basophil % : 0.4 %      RADIOLOGY & ADDITIONAL STUDIES:

## 2019-02-20 NOTE — DISCHARGE NOTE ADULT - MEDICATION SUMMARY - MEDICATIONS TO STOP TAKING
I will STOP taking the medications listed below when I get home from the hospital:    predniSONE 20 mg oral tablet  -- 2 tab(s) by mouth once a day x 4 days    glipiZIDE 2.5 mg oral tablet, extended release  -- 1 tab(s) by mouth once a day    triamcinolone 0.1% topical cream  -- Apply on skin to affected area 2 times a day    cefpodoxime 100 mg oral tablet  -- 1 tab(s) by mouth every 12 hours x 10 days

## 2019-02-20 NOTE — CONSULT NOTE ADULT - ASSESSMENT
Patient staffed with Dr. Merritt    Please page [phone] with questions.    Dina Quiroz MD PGY-2  [facility]  Pager: [phone]  Office: [phone] 77 yo m h/o cad/cabg 2002, diastolic chf, gastric ca s/p surgery 2006, chemoradiation, HTN, HLD, DM, JACK, GERD, carotid stenosis with chronic urticaria.    Chronic urticaria  -ddx includes urticarial vasculitis if individual lesions remain > 24 hours. Marked several lesions and will reassess tomorrow  -start zyrtec three times a day  -use clobetasol 0.05% ointment twice daily to itchy areas on body, avoid face and groin  -will consider bx on 2/21 of persistent lesion to r/o urticarial vasculitis  -do not recommend steroids at present time  -patient should follow up with his outpatient dermatologist or he can follow up with dermatology at [address] [place] [address], [place] [address], [place] [address] with any provider. Patient should call [phone] to schedule appointment in 2-3 weeks upon discharge .    Dina Quiroz MD PGY-2  [facility]  Pager: [phone]  Office: [phone]      Patient staffed with Dr. Merritt    Please page [phone] with questions.    Dina Quiroz MD PGY-2  [facility]  Pager: [phone]  Office: [phone] 79 yo m h/o cad/cabg 2002, diastolic chf, gastric ca s/p surgery 2006, chemoradiation, HTN, HLD, DM, JACK, GERD, carotid stenosis with chronic urticaria.    Chronic urticaria with angioedema  -ddx includes urticarial vasculitis if individual lesions remain > 24 hours. Marked several lesions and will reassess tomorrow  -start zyrtec three times a day  -use clobetasol 0.05% ointment twice daily to itchy areas on body, avoid face and groin  -will consider bx on 2/21 of persistent lesion to r/o urticarial vasculitis  -do not recommend steroids at present time  -patient should follow up with his outpatient dermatologist or he can follow up with dermatology at [address] [place] [address], [facility], [place] [address] with any provider. Patient should call [phone] to schedule appointment in 2-3 weeks upon discharge .    Dina Quiroz MD PGY-2  [facility]at[facility]  Pager: [phone]  Office: [phone]      Patient staffed with Dr. Merritt    Please page [phone] with questions.    Dina Quiroz MD PGY-2  [facility]at[facility]  Pager: [phone]  Office: [phone] 79 yo m h/o cad/cabg 2002, diastolic chf, gastric ca s/p surgery 2006, chemoradiation, HTN, HLD, DM, JACK, GERD, carotid stenosis with chronic urticaria.    Urticaria  -ddx includes urticarial vasculitis if individual lesions remain > 24 hours. Marked several lesions and will reassess tomorrow  -start zyrtec three times a day  -use clobetasol 0.05% ointment twice daily to itchy areas on body, avoid face and groin for up to 2 weeks at a time  -will consider bx on 2/21 if he has single lesions that persist beyond 24 hours to r/o urticarial vasculitis  -do not recommend systemic steroids at present time  -patient should follow up with his outpatient dermatologist or he can follow up with dermatology at [address] [place] [address], [place] [address] with any provider. Patient should call [phone] to schedule appointment in 2-3 weeks upon discharge .    Dina Quiroz MD PGY-2  [facility]  Pager: [phone]  Office: [phone]      Patient staffed with Dr. Merritt    Please page [phone] with questions.    Dina Quiroz MD PGY-2  [facility]  Pager: [phone]  Office: [phone]

## 2019-02-20 NOTE — DISCHARGE NOTE ADULT - HOME CARE AGENCY
[facility] [phone] Nurse to visit on the day following discharge. Other appropriate services to be arranged thereafter. Please contact the home care agency at the above phone number if you have not heard from them by approximately 12 noon on the day after your hospital discharge.

## 2019-02-20 NOTE — DISCHARGE NOTE ADULT - HOSPITAL COURSE
78 year old male with PMH CAD, DM, c/o intermittent pruritic rashes x4 years, worse x1 month, nausea and vomiting on and off, often assoc. with rash, weakness, headaches and dizziness x2-3 weeks resulting in falls (without striking head or LOC) following the start of prednisone prescribed for rash.  Pt. states prednisone usually makes his sugar rise and makes him feel worse. Patient states dizziness worse with change in position and worse after staying in same position for extended period of time and then moving. Rash noted to be generalized and urticaria in nature, worse on upper arms, back and buttocks.     Brain bleed  - patient has been dizzy and falling   - neuro cs  - neurosx: No acute neurosurgical intervention. Hold AC for 2 weeks and f/u as outpatient   - neuro q4  - MRI head/MRA head/neck: Bifrontal small contusion hematomas, and bitemporal contusions as described with small amounts of subarachnoid hemorrhage within the hemispheric sulci and refluxed into the ventricular system. Small bifrontal subdural hygromas versus chronic subdural hematomas. Intracranial MR angiography demonstrates no significant stenosis, evidence for aneurysm, or vascular malformation.  - neurosx: recommendation is to hold ASA or any other blood thinners for 2 weeks, and to follow up outpatient with your neurosurgeon before restarting Eliquis, Plavix, Brilinta, ASA or Coumadin.      Skin rash  - dc prednisone  - Pt reports not seeing dermatologist in 4 years; doesn't think he underwent biopsy or received diagnosis  - dermatology cs: Chronic urticaria with angioedema. zyrtec TID, clobetasol   - 2/19 b.cx negative    N/V, dysphagia, dilated pancreatic duct   - CT A/P: A 6 mm calculus in the pancreatic neck with moderate ductal dilatation.  - [facility] cs   - MRCP- Dilated main pancreatic duct down to the neck, likely secondary to an intraductal calculus better seen on recent CT    back pain   - MRI lumbar: Hemorrhage layering within the distal thecal sac this patient with known intracranial hemorrhage. Bilateral L5 spondylolysis with grade 2 anterolisthesis of L5 on S1.   - neuro sx: no intervention     DM2  - A1C- 6.8  - sliding scale    CAD  - ASA on hold 2/2 brain bleed   - zocor     Right Hip Pain   - Right Hip Xray: no fractures 78 year old male with PMH CAD, DM, c/o intermittent pruritic rashes x4 years, worse x1 month, nausea and vomiting on and off, often assoc. with rash, weakness, headaches and dizziness x2-3 weeks resulting in falls (without striking head or LOC) following the start of prednisone prescribed for rash.  Pt. states prednisone usually makes his sugar rise and makes him feel worse. Patient states dizziness worse with change in position and worse after staying in same position for extended period of time and then moving. Rash noted to be generalized and urticaria in nature, worse on upper arms, back and buttocks.     Brain bleed  - patient has been dizzy and falling   - neuro cs  - neurosx: No acute neurosurgical intervention. Hold AC for 2 weeks and f/u as outpatient   - neuro q4  - MRI head/MRA head/neck: Bifrontal small contusion hematomas, and bitemporal contusions as described with small amounts of subarachnoid hemorrhage within the hemispheric sulci and refluxed into the ventricular system. Small bifrontal subdural hygromas versus chronic subdural hematomas. Intracranial MR angiography demonstrates no significant stenosis, evidence for aneurysm, or vascular malformation.  - neurosx: recommendation is to hold ASA or any other blood thinners for 2 weeks, and to follow up outpatient with your neurosurgeon before restarting Eliquis, Plavix, Brilinta, ASA or Coumadin.      Skin rash  - dc prednisone  - Pt reports not seeing dermatologist in 4 years; doesn't think he underwent biopsy or received diagnosis  - dermatology cs: Chronic urticaria with angioedema. zyrtec TID, clobetasol   - 2/19 b.cx negative    N/V, dysphagia, dilated pancreatic duct   - CT A/P: A 6 mm calculus in the pancreatic neck with moderate ductal dilatation.  - [facility] cs   - MRCP- Dilated main pancreatic duct down to the neck, likely secondary to an intraductal calculus better seen on recent CT  -follow up with GI outpatient [phone]    back pain   - MRI lumbar: Hemorrhage layering within the distal thecal sac this patient with known intracranial hemorrhage. Bilateral L5 spondylolysis with grade 2 anterolisthesis of L5 on S1.   - neuro sx: no intervention     DM2  - A1C- 6.8  - sliding scale  -consistent carb diet    CAD  - ASA on hold 2/2 brain bleed   - zocor     Right Hip Pain   - Right Hip Xray: no fractures    Dispo: home with homecare 78 year old male with PMH CAD, DM, c/o intermittent pruritic rashes x4 years, worse x1 month, nausea and vomiting on and off, often assoc. with rash, weakness, headaches and dizziness x2-3 weeks resulting in falls (without striking head or LOC) following the start of prednisone prescribed for rash.  Pt. states prednisone usually makes his sugar rise and makes him feel worse. Patient states dizziness worse with change in position and worse after staying in same position for extended period of time and then moving. Rash noted to be generalized and urticaria in nature, worse on upper arms, back and buttocks.     Brain bleed  - patient has been dizzy and falling   - neuro cs  - neurosx: No acute neurosurgical intervention. Hold AC for 2 weeks and f/u as outpatient   - neuro q4  - MRI head/MRA head/neck: Bifrontal small contusion hematomas, and bitemporal contusions as described with small amounts of subarachnoid hemorrhage within the hemispheric sulci and refluxed into the ventricular system. Small bifrontal subdural hygromas versus chronic subdural hematomas. Intracranial MR angiography demonstrates no significant stenosis, evidence for aneurysm, or vascular malformation.  - neurosx: recommendation is to hold ASA or any other blood thinners for 2 weeks, and to follow up outpatient with your neurosurgeon before restarting Eliquis, Plavix, Brilinta, ASA or Coumadin.      Skin rash  - dc prednisone  - Pt reports not seeing dermatologist in 4 years; doesn't think he underwent biopsy or received diagnosis  - dermatology cs: Chronic urticaria with angioedema. zyrtec TID, clobetasol   - 2/19 b.cx negative    N/V, dysphagia, dilated pancreatic duct   - CT A/P: A 6 mm calculus in the pancreatic neck with moderate ductal dilatation.  - [facility] cs   - MRCP- Dilated main pancreatic duct down to the neck, likely secondary to an intraductal calculus better seen on recent CT  -follow up with GI outpatient [phone]    back pain   - MRI lumbar: Hemorrhage layering within the distal thecal sac this patient with known intracranial hemorrhage. Bilateral L5 spondylolysis with grade 2 anterolisthesis of L5 on S1.   - neuro sx: no intervention     DM2  - A1C- 6.8  - sliding scale  -consistent carb diet    CAD  - ASA on hold 2/2 brain bleed   - zocor     Right Hip Pain   - Right Hip Xray: no fractures    Dispo: home with homecare. d/w Dr. Trotter 78 year old male with PMH CAD, DM, c/o intermittent pruritic rashes x4 years, worse x1 month, nausea and vomiting on and off, often assoc. with rash, weakness, headaches and dizziness x2-3 weeks resulting in falls (without striking head or LOC) following the start of prednisone prescribed for rash.  Pt. states prednisone usually makes his sugar rise and makes him feel worse. Patient states dizziness worse with change in position and worse after staying in same position for extended period of time and then moving. Rash noted to be generalized and urticaria in nature, worse on upper arms, back and buttocks.     Brain bleed  - patient has been dizzy and falling   - neuro cs  - neurosx: No acute neurosurgical intervention. Hold AC for 2 weeks and f/u as outpatient   - neuro q4  - MRI head/MRA head/neck: Bifrontal small contusion hematomas, and bitemporal contusions as described with small amounts of subarachnoid hemorrhage within the hemispheric sulci and refluxed into the ventricular system. Small bifrontal subdural hygromas versus chronic subdural hematomas. Intracranial MR angiography demonstrates no significant stenosis, evidence for aneurysm, or vascular malformation.  - neurosx: recommendation is to hold ASA or any other blood thinners for 2 weeks, and to follow up outpatient with your neurosurgeon before restarting Eliquis, Plavix, Brilinta, ASA or Coumadin.      Skin rash  - dc prednisone  - Pt reports not seeing dermatologist in 4 years; doesn't think he underwent biopsy or received diagnosis  - dermatology cs: Chronic urticaria with angioedema. zyrtec TID, clobetasol   - 2/19 b.cx negative    N/V, dysphagia, dilated pancreatic duct   - CT A/P: A 6 mm calculus in the pancreatic neck with moderate ductal dilatation.  - [facility] cs   - MRCP- Dilated main pancreatic duct down to the neck, likely secondary to an intraductal calculus better seen on recent CT  -follow up with GI outpatient [phone]    back pain   - MRI lumbar: Hemorrhage layering within the distal thecal sac this patient with known intracranial hemorrhage. Bilateral L5 spondylolysis with grade 2 anterolisthesis of L5 on S1.   - neuro sx: no intervention     DM2  - A1C- 6.8  - sliding scale  -consistent carb diet  -stop glipizide and start metformin 500 mg daily- f/u with PCP    CAD  - ASA on hold 2/2 brain bleed   - zocor     Right Hip Pain   - Right Hip Xray: no fractures    Dispo: home with homecare. d/w Dr. Trotter 78 year old male with PMH CAD, DM, c/o intermittent pruritic rashes x4 years, worse x1 month, nausea and vomiting on and off, often assoc. with rash, weakness, headaches and dizziness x2-3 weeks resulting in falls (without striking head or LOC) following the start of prednisone prescribed for rash.  Pt. states prednisone usually makes his sugar rise and makes him feel worse. Patient states dizziness worse with change in position and worse after staying in same position for extended period of time and then moving. Rash noted to be generalized and urticaria in nature, worse on upper arms, back and buttocks.     SAH  - patient has been dizzy and falling - likely d/t fall  - neurosx: No acute neurosurgical intervention. Hold AC for 2 weeks and f/u as outpatient   - neuro q4  - MRI head/MRA head/neck: Bifrontal small contusion hematomas, and bitemporal contusions as described with small amounts of subarachnoid hemorrhage within the hemispheric sulci and refluxed into the ventricular system. Small bifrontal subdural hygromas versus chronic subdural hematomas. Intracranial MR angiography demonstrates no significant stenosis, evidence for aneurysm, or vascular malformation.  - neurosx: recommendation is to hold ASA or any other blood thinners for 2 weeks, and to follow up outpatient with your neurosurgeon before restarting Eliquis, Plavix, Brilinta, ASA or Coumadin.      Skin rash  - dc prednisone  - Pt reports not seeing dermatologist in 4 years; doesn't think he underwent biopsy or received diagnosis  - dermatology cs: Chronic urticaria with angioedema. zyrtec TID, clobetasol   - 2/19 b.cx negative  OP f/u w derm advised    N/V, dysphagia, dilated pancreatic duct   - CT A/P: A 6 mm calculus in the pancreatic neck with moderate ductal dilatation.  - [facility]   - MRCP- Dilated main pancreatic duct down to the neck, likely secondary to an intraductal calculus better seen on recent CT  -follow up with GI outpatient [phone]- due to high risk for MACE no gi intervention planned    back pain   - MRI lumbar: Hemorrhage layering within the distal thecal sac this patient with known intracranial hemorrhage. Bilateral L5 spondylolysis with grade 2 anterolisthesis of L5 on S1.   - neuro sx: no intervention   pt w c/o leg pain but able to ambulate     DM2  - A1C- 6.8  - sliding scale  -consistent carb diet  -stop glipizide and start metformin 500 mg daily- f/u with PCP    CAD  - ASA on hold 2/2 brain bleed   - zocor     Right Hip Pain   - Right Hip Xray: no fractures    Dispo: home with homecare. d/w Dr. Trotter

## 2019-02-20 NOTE — DISCHARGE NOTE ADULT - MEDICATION SUMMARY - MEDICATIONS TO TAKE
I will START or STAY ON the medications listed below when I get home from the hospital:    acetaminophen 325 mg oral tablet  -- 2 tab(s) by mouth every 6 hours, As needed, Mild Pain (1 - 3), Moderate Pain (4 - 6)  -- Indication: For Pain    aluminum hydroxide-magnesium hydroxide 200 mg-200 mg/5 mL oral suspension  -- 30 milliliter(s) by mouth every 6 hours, As Needed   -- Indication: For antacid as needed     nitroglycerin 0.4 mg sublingual tablet  -- 1 tab(s) under tongue every 5 minutes, As Needed  -- Indication: For Chest pains    metFORMIN 500 mg oral tablet  -- 1 tab(s) by mouth once a day prior to breakfast  -- Check with your doctor before becoming pregnant.  Do not drink alcoholic beverages when taking this medication.  It is very important that you take or use this exactly as directed.  Do not skip doses or discontinue unless directed by your doctor.  Obtain medical advice before taking any non-prescription drugs as some may affect the action of this medication.  Take with food or milk.    -- Indication: For Diabetes    levocetirizine 5 mg oral tablet  -- 1 tab(s) by mouth 2 times a day, As Needed  -- Indication: For antihistamine    simvastatin 40 mg oral tablet  -- 1 tab(s) by mouth once a day (at bedtime)  -- Indication: For High cholesterol    clobetasol 0.05% topical ointment  -- Apply on skin to affected area 2 times a day to affected area   -- For external use only.    -- Indication: For Skin ointment    senna oral tablet  -- 2 tab(s) by mouth once a day (at bedtime)  -- Indication: For Constipation as needed    docusate sodium 100 mg oral capsule  -- 1 cap(s) by mouth 2 times a day   -- Indication: For Stool softner as needed    simethicone 80 mg oral tablet, chewable  -- 1 tab(s) by mouth every 6 hours, As Needed   -- Indication: For gas as needed    esomeprazole 40 mg oral delayed release capsule  -- 1 cap(s) by mouth once a day  -- Indication: For Stomach protection I will START or STAY ON the medications listed below when I get home from the hospital:    acetaminophen 325 mg oral tablet  -- 2 tab(s) by mouth every 6 hours, As needed, Mild Pain (1 - 3), Moderate Pain (4 - 6)  -- Indication: For Pain    oxyCODONE 5 mg oral tablet  -- 1 tab(s) by mouth every 6 hours, As Needed MDD:4 tabs  -- Caution federal law prohibits the transfer of this drug to any person other  than the person for whom it was prescribed.  It is very important that you take or use this exactly as directed.  Do not skip doses or discontinue unless directed by your doctor.  May cause drowsiness.  Alcohol may intensify this effect.  Use care when operating dangerous machinery.  This prescription cannot be refilled.  Using more of this medication than prescribed may cause serious breathing problems.    -- Indication: For Severe pain    aluminum hydroxide-magnesium hydroxide 200 mg-200 mg/5 mL oral suspension  -- 30 milliliter(s) by mouth every 6 hours, As Needed   -- Indication: For antacid as needed     nitroglycerin 0.4 mg sublingual tablet  -- 1 tab(s) under tongue every 5 minutes, As Needed  -- Indication: For Chest pains    metFORMIN 500 mg oral tablet  -- 1 tab(s) by mouth once a day prior to breakfast  -- Check with your doctor before becoming pregnant.  Do not drink alcoholic beverages when taking this medication.  It is very important that you take or use this exactly as directed.  Do not skip doses or discontinue unless directed by your doctor.  Obtain medical advice before taking any non-prescription drugs as some may affect the action of this medication.  Take with food or milk.    -- Indication: For Diabetes    levocetirizine 5 mg oral tablet  -- 1 tab(s) by mouth 2 times a day, As Needed  -- Indication: For antihistamine    simvastatin 40 mg oral tablet  -- 1 tab(s) by mouth once a day (at bedtime)  -- Indication: For High cholesterol    clobetasol 0.05% topical ointment  -- Apply on skin to affected area 2 times a day to affected area   -- For external use only.    -- Indication: For Skin ointment    senna oral tablet  -- 2 tab(s) by mouth once a day (at bedtime)  -- Indication: For Constipation as needed    docusate sodium 100 mg oral capsule  -- 1 cap(s) by mouth 2 times a day   -- Indication: For Stool softner as needed    simethicone 80 mg oral tablet, chewable  -- 1 tab(s) by mouth every 6 hours, As Needed   -- Indication: For gas as needed    esomeprazole 40 mg oral delayed release capsule  -- 1 cap(s) by mouth once a day  -- Indication: For Stomach protection

## 2019-02-20 NOTE — DISCHARGE NOTE ADULT - CARE PLAN
Principal Discharge DX:	Brain bleed  Goal:	stable  Assessment and plan of treatment:	no surgical intervention per neurosurgery.   hold blood thinners for 2 weeks.   follow up with neurosurgery to discuss when you can restart blood thinner - call to make an appointment  Secondary Diagnosis:	Type 2 diabetes mellitus with complication, unspecified whether long term insulin use  Assessment and plan of treatment:	hgba1c is 6.8  Secondary Diagnosis:	Pancreatic duct dilated Principal Discharge DX:	Brain bleed  Goal:	stable  Assessment and plan of treatment:	no surgical intervention per neurosurgery.   hold blood thinners for 2 weeks.   follow up with neurosurgery to discuss when you can restart blood thinner - call to make an appointment  Secondary Diagnosis:	Type 2 diabetes mellitus with complication, unspecified whether long term insulin use  Assessment and plan of treatment:	hgba1c is 6.8  consistent carb diet  Secondary Diagnosis:	Pancreatic duct dilated  Assessment and plan of treatment:	MRCP- Dilated main pancreatic duct down to the neck, likely secondary to an intraductal calculus better seen on recent CT  follow up with GI outpatient [phone], call for appointment  Secondary Diagnosis:	Vomiting, intractability of vomiting not specified, presence of nausea not specified, unspecified vomiting type  Assessment and plan of treatment:	follow up with GI outpatient, inpatient workup was negative Principal Discharge DX:	Brain bleed  Goal:	stable  Assessment and plan of treatment:	no surgical intervention per neurosurgery.   hold blood thinners for 2 weeks. Follow up with neurosurgery before restarting any blood thinners, call for appointment to be made within the 2 weeks.   follow up with neurosurgery to discuss when you can restart blood thinner - call to make an appointment  Secondary Diagnosis:	Type 2 diabetes mellitus with complication, unspecified whether long term insulin use  Assessment and plan of treatment:	hgba1c is 6.8  consistent carb diet  Secondary Diagnosis:	Pancreatic duct dilated  Assessment and plan of treatment:	MRCP- Dilated main pancreatic duct down to the neck, likely secondary to an intraductal calculus better seen on recent CT  follow up with GI outpatient [phone], call for appointment  Secondary Diagnosis:	Vomiting, intractability of vomiting not specified, presence of nausea not specified, unspecified vomiting type  Assessment and plan of treatment:	follow up with GI outpatient, inpatient workup was negative Principal Discharge DX:	Brain bleed  Goal:	stable  Assessment and plan of treatment:	no surgical intervention per neurosurgery.   hold blood thinners for 2 weeks. Follow up with neurosurgery before restarting any blood thinners, call for appointment to be made within the 2 weeks.   follow up with neurosurgery to discuss when you can restart blood thinner - call to make an appointment  Secondary Diagnosis:	Type 2 diabetes mellitus with complication, unspecified whether long term insulin use  Assessment and plan of treatment:	hgba1c is 6.8. consistent carb diet. Stop taking glipizide and start metformin 500mg daily prior to breakfast. Monitor fingersticks before meals and at bedtime. follow up with PCP within 2 weeks.  Secondary Diagnosis:	Pancreatic duct dilated  Assessment and plan of treatment:	MRCP- Dilated main pancreatic duct down to the neck, likely secondary to an intraductal calculus better seen on recent CT  follow up with GI outpatient [phone], call for appointment  Secondary Diagnosis:	Vomiting, intractability of vomiting not specified, presence of nausea not specified, unspecified vomiting type  Assessment and plan of treatment:	follow up with GI outpatient, inpatient workup was negative Principal Discharge DX:	Brain bleed  Goal:	stable  Assessment and plan of treatment:	no surgical intervention per neurosurgery.   hold blood thinners for 2 weeks. Follow up with neurosurgery before restarting any blood thinners, call for appointment to be made within the 2 weeks.   follow up with neurosurgery Dr. Mayberry to discuss when you can restart blood thinner - call to make an appointment  Secondary Diagnosis:	Type 2 diabetes mellitus with complication, unspecified whether long term insulin use  Assessment and plan of treatment:	hgba1c is 6.8. consistent carb diet. Stop taking glipizide and start metformin 500mg daily prior to breakfast. Monitor fingersticks before meals and at bedtime. follow up with PCP within 2 weeks.  Secondary Diagnosis:	Pancreatic duct dilated  Assessment and plan of treatment:	MRCP- Dilated main pancreatic duct down to the neck, likely secondary to an intraductal calculus better seen on recent CT  follow up with GI Dr. Ko or Stephen outpatient [phone], call for appointment  Secondary Diagnosis:	Vomiting, intractability of vomiting not specified, presence of nausea not specified, unspecified vomiting type  Assessment and plan of treatment:	follow up with GI outpatient, inpatient workup was negative

## 2019-02-20 NOTE — PROGRESS NOTE ADULT - SUBJECTIVE AND OBJECTIVE BOX
Tatiana Reyes MD  Pager [phone]    CHIEF COMPLAINT: Patient is a 78y old  male who presents with a chief complaint of headache, rash (20 Feb 2019 14:08)      SUBJECTIVE / OVERNIGHT EVENTS:  Pt reports headache, admits to fall but denies head trauma, also c/o itchy skin rash on trunk/extremities for 3 years, on prednisone and topical treatment without relief, also c/o postprandial vomiting     MEDICATIONS  (STANDING):  dextrose 5%. 1000 milliLiter(s) (50 mL/Hr) IV Continuous <Continuous>  dextrose 50% Injectable 12.5 Gram(s) IV Push once  dextrose 50% Injectable 25 Gram(s) IV Push once  dextrose 50% Injectable 25 Gram(s) IV Push once  insulin lispro (HumaLOG) corrective regimen sliding scale   SubCutaneous three times a day before meals  insulin lispro (HumaLOG) corrective regimen sliding scale   SubCutaneous at bedtime  simvastatin 40 milliGRAM(s) Oral at bedtime    MEDICATIONS  (PRN):  acetaminophen   Tablet .. 650 milliGRAM(s) Oral every 6 hours PRN Mild Pain (1 - 3), Moderate Pain (4 - 6)  dextrose 40% Gel 15 Gram(s) Oral once PRN Blood Glucose LESS THAN 70 milliGRAM(s)/deciliter  diphenhydrAMINE 25 milliGRAM(s) Oral every 4 hours PRN Rash and/or Itching  glucagon  Injectable 1 milliGRAM(s) IntraMuscular once PRN Glucose LESS THAN 70 milligrams/deciliter  oxyCODONE    IR 5 milliGRAM(s) Oral every 6 hours PRN Severe Pain (7 - 10)      VITALS:  T(F): 98.2 (02-20-19 @ 10:02), Max: 98.4 (02-19-19 @ 17:26)  HR: 61 (02-20-19 @ 10:02) (54 - 61)  BP: 106/51 (02-20-19 @ 10:02) (106/51 - 155/74)  RR: 18 (02-20-19 @ 10:02) (15 - 20)  SpO2: 100% (02-20-19 @ 10:02)      CAPILLARY BLOOD GLUCOSE    Output     I&O's Summary  T(F): 98.2 (02-20-19 @ 10:02), Max: 98.4 (02-19-19 @ 17:26)  HR: 61 (02-20-19 @ 10:02) (54 - 61)  BP: 106/51 (02-20-19 @ 10:02) (106/51 - 155/74)  RR: 18 (02-20-19 @ 10:02) (15 - 20)  SpO2: 100% (02-20-19 @ 10:02)    PHYSICAL EXAM:  GENERAL: NAD, well-developed  HEAD:  Atraumatic, Normocephalic  EYES: EOMI, PERRLA, conjunctiva and sclera clear  NECK: Supple, No JVD  CHEST/LUNG: Clear to auscultation bilaterally; No wheeze  HEART: Regular rate and rhythm; No murmurs, rubs, or gallops  ABDOMEN: Soft, Nontender, Nondistended; Bowel sounds present  EXTREMITIES:  2+ Peripheral Pulses, No clubbing, cyanosis, or edema  PSYCH: AAOx3  NEUROLOGY: non-focal, moves all extremities  SKIN: No rashes or lesions    LABS:              x                    132  | 25   | 18           x     >-----------< x       ------------------------< 212                   x                    4.1  | 95   | 0.98                                         Ca 8.7   Mg 2.2   Ph 4.3         TPro  6.5  /  Alb  3.4      TBili  0.5  /  DBili  x         AST  21  /  ALT  23            AlkPhos  67      INR: 1.04 ;    PT: 11.6 SEC;    PTT: 23.8 SEC<L>    MICROBIOLOGY:    RADIOLOGY & ADDITIONAL TESTS:    Imaging Personally Reviewed:  < from: CT Abdomen and Pelvis w/ Oral Cont and w/ IV Cont (02.20.19 @ 13:00) >  IMPRESSION:   A 6 mm calculus in the pancreatic neck with moderate ductal dilatation.    Status post gastrojejunostomy.  < from: MR Angio Head No Cont (02.20.19 @ 01:50) >  IMPRESSION:  Bifrontal small contusion hematomas, and bitemporal contusions as   described with small amounts of subarachnoid hemorrhage within the   hemispheric sulci and refluxed into the ventricular system. Small   bifrontal subdural hygromas versus chronic subdural hematomas.  Intracranial MR angiography demonstrates no significant stenosis,   evidence for aneurysm, or vascular malformation.  Extracranial MR angiography demonstrates no flow-limiting stenosis by   NASCET criteria.      [ ] Consultant(s) Notes Reviewed:  [x ] Care Discussed with Consultants/Other Providers: ADS NP Shannon, F/u MRI and neurosx recs, GI consult for postprandial vomiting and dermatology consult for skin rash Tatiana Reyes MD  Pager [phone]    CHIEF COMPLAINT: Patient is a 78y old  male who presents with a chief complaint of headache, rash (20 Feb 2019 14:08)      SUBJECTIVE / OVERNIGHT EVENTS:  Pt reports headache, admits to fall but denies head trauma, also c/o itchy skin rash on trunk/extremities for 3 years, on prednisone and topical treatment without relief, also c/o postprandial vomiting     MEDICATIONS  (STANDING):  dextrose 5%. 1000 milliLiter(s) (50 mL/Hr) IV Continuous <Continuous>  dextrose 50% Injectable 12.5 Gram(s) IV Push once  dextrose 50% Injectable 25 Gram(s) IV Push once  dextrose 50% Injectable 25 Gram(s) IV Push once  insulin lispro (HumaLOG) corrective regimen sliding scale   SubCutaneous three times a day before meals  insulin lispro (HumaLOG) corrective regimen sliding scale   SubCutaneous at bedtime  simvastatin 40 milliGRAM(s) Oral at bedtime    MEDICATIONS  (PRN):  acetaminophen   Tablet .. 650 milliGRAM(s) Oral every 6 hours PRN Mild Pain (1 - 3), Moderate Pain (4 - 6)  dextrose 40% Gel 15 Gram(s) Oral once PRN Blood Glucose LESS THAN 70 milliGRAM(s)/deciliter  diphenhydrAMINE 25 milliGRAM(s) Oral every 4 hours PRN Rash and/or Itching  glucagon  Injectable 1 milliGRAM(s) IntraMuscular once PRN Glucose LESS THAN 70 milligrams/deciliter  oxyCODONE    IR 5 milliGRAM(s) Oral every 6 hours PRN Severe Pain (7 - 10)      VITALS:  T(F): 98.2 (02-20-19 @ 10:02), Max: 98.4 (02-19-19 @ 17:26)  HR: 61 (02-20-19 @ 10:02) (54 - 61)  BP: 106/51 (02-20-19 @ 10:02) (106/51 - 155/74)  RR: 18 (02-20-19 @ 10:02) (15 - 20)  SpO2: 100% (02-20-19 @ 10:02)      CAPILLARY BLOOD GLUCOSE    Output     I&O's Summary  T(F): 98.2 (02-20-19 @ 10:02), Max: 98.4 (02-19-19 @ 17:26)  HR: 61 (02-20-19 @ 10:02) (54 - 61)  BP: 106/51 (02-20-19 @ 10:02) (106/51 - 155/74)  RR: 18 (02-20-19 @ 10:02) (15 - 20)  SpO2: 100% (02-20-19 @ 10:02)    PHYSICAL EXAM:  GENERAL: NAD, well-developed  HEAD:  Atraumatic, Normocephalic  EYES: EOMI, PERRLA, conjunctiva and sclera clear  NECK: Supple, No JVD  CHEST/LUNG: Clear to auscultation bilaterally; No wheeze  HEART: Regular rate and rhythm; No murmurs, rubs, or gallops  ABDOMEN: Soft, Nontender, Nondistended; Bowel sounds present  EXTREMITIES:  2+ Peripheral Pulses, No clubbing, cyanosis, or edema  PSYCH: AAOx3  NEUROLOGY: non-focal, moves all extremities  SKIN: erythematous urticaric maculopapular rash on extremities (arms and legs) and trunk    LABS:              x                    132  | 25   | 18           x     >-----------< x       ------------------------< 212                   x                    4.1  | 95   | 0.98                                         Ca 8.7   Mg 2.2   Ph 4.3         TPro  6.5  /  Alb  3.4      TBili  0.5  /  DBili  x         AST  21  /  ALT  23            AlkPhos  67      INR: 1.04 ;    PT: 11.6 SEC;    PTT: 23.8 SEC<L>    MICROBIOLOGY:    RADIOLOGY & ADDITIONAL TESTS:    Imaging Personally Reviewed:  < from: CT Abdomen and Pelvis w/ Oral Cont and w/ IV Cont (02.20.19 @ 13:00) >  IMPRESSION:   A 6 mm calculus in the pancreatic neck with moderate ductal dilatation.    Status post gastrojejunostomy.  < from: MR Angio Head No Cont (02.20.19 @ 01:50) >  IMPRESSION:  Bifrontal small contusion hematomas, and bitemporal contusions as   described with small amounts of subarachnoid hemorrhage within the   hemispheric sulci and refluxed into the ventricular system. Small   bifrontal subdural hygromas versus chronic subdural hematomas.  Intracranial MR angiography demonstrates no significant stenosis,   evidence for aneurysm, or vascular malformation.  Extracranial MR angiography demonstrates no flow-limiting stenosis by   NASCET criteria.      [ ] Consultant(s) Notes Reviewed:  [x ] Care Discussed with Consultants/Other Providers: ADS NP Ethelyn, F/u MRI and neurosx recs, GI consult for postprandial vomiting and dermatology consult for skin rash

## 2019-02-20 NOTE — DISCHARGE NOTE ADULT - CARE PROVIDER_API CALL
Asaf Brown)  Neurological Surgery  [address] [place] [address], [address] [place] [address]  Phone: [phone]  Fax: [phone]  Follow Up Time: Asaf Brown)  Neurological Surgery  [address] [place] [address], [address] [place] [address]  Phone: [phone]  Fax: [phone]  Follow Up Time:     Dermatology,   [address] [facility], [place] [address], [place], [facility]   Call [phone] to schedule appointment in 2-3 weeks upon discharge .  Phone: (   )    -  Fax: (   )    -  Follow Up Time: Dermatology,   [address] [facility] [address], [facility]   Call [phone] to schedule appointment in 2-3 weeks upon discharge .  Phone: (   )    -  Fax: (   )    -  Follow Up Time:     Shaun Mayberry)  Neurological Surgery  [address] [place] [address]  Phone: [phone]  Fax: [phone]  Follow Up Time:     Oliver Mitchell)  Gastroenterology; Internal Medicine  [address] [place] [address]  Phone: [phone]  Fax: [phone]  Follow Up Time:

## 2019-02-20 NOTE — DISCHARGE NOTE ADULT - PROVIDER TOKENS
PROVIDER:[TOKEN:[[id number]]] PROVIDER:[TOKEN:[[id number]]],FREE:[LAST:[Dermatology],PHONE:[(   )    -],FAX:[(   )    -],ADDRESS:[[address] [place] [address], [facility]   Call [phone] to schedule appointment in 2-3 weeks upon discharge .]] FREE:[LAST:[Dermatology],PHONE:[(   )    -],FAX:[(   )    -],ADDRESS:[[address] [place] [address], [place] [address], [place], [facility]   Call [phone] to schedule appointment in 2-3 weeks upon discharge .]],PROVIDER:[TOKEN:[[id number]]],PROVIDER:[TOKEN:[[id number]]]

## 2019-02-20 NOTE — DISCHARGE NOTE ADULT - CARE PROVIDERS DIRECT ADDRESSES
,[email or web address],DirectAddress_Unknown ,DirectAddress_Unknown,[email or web address]

## 2019-02-20 NOTE — DISCHARGE NOTE ADULT - PLAN OF CARE
stable no surgical intervention per neurosurgery.   hold blood thinners for 2 weeks.   follow up with neurosurgery to discuss when you can restart blood thinner - call to make an appointment hgba1c is 6.8 hgba1c is 6.8  consistent carb diet MRCP- Dilated main pancreatic duct down to the neck, likely secondary to an intraductal calculus better seen on recent CT  follow up with GI outpatient [phone], call for appointment follow up with GI outpatient, inpatient workup was negative no surgical intervention per neurosurgery.   hold blood thinners for 2 weeks. Follow up with neurosurgery before restarting any blood thinners, call for appointment to be made within the 2 weeks.   follow up with neurosurgery to discuss when you can restart blood thinner - call to make an appointment hgba1c is 6.8. consistent carb diet. Stop taking glipizide and start metformin 500mg daily prior to breakfast. Monitor fingersticks before meals and at bedtime. follow up with PCP within 2 weeks. no surgical intervention per neurosurgery.   hold blood thinners for 2 weeks. Follow up with neurosurgery before restarting any blood thinners, call for appointment to be made within the 2 weeks.   follow up with neurosurgery Dr. Mayberry to discuss when you can restart blood thinner - call to make an appointment MRCP- Dilated main pancreatic duct down to the neck, likely secondary to an intraductal calculus better seen on recent CT  follow up with GI Dr. Ko or Stephen outpatient [phone], call for appointment

## 2019-02-20 NOTE — CHART NOTE - NSCHARTNOTEFT_GEN_A_CORE
MRI reviewed with attending Neurosurgeon. No surgical intervention at this time, f/u outpatient with Dr. Asaf Brown.   You are at risk of increasing the size of your intracerebral hemorrhage (ICH), SDH, EDH, IVH if you are restarted on anticoagulation, which could increase morbidity/mortality.  Our recommendation is to hold ASA or any other blood thinners for 2 weeks, and to follow up outpatient with your neurosurgeon before restarting Eliquis, Plavix, Brilinta, ASA or Coumadin.    Case discussed with attending neurosurgeon.

## 2019-02-20 NOTE — DISCHARGE NOTE ADULT - SECONDARY DIAGNOSIS.
Type 2 diabetes mellitus with complication, unspecified whether long term insulin use Pancreatic duct dilated Vomiting, intractability of vomiting not specified, presence of nausea not specified, unspecified vomiting type

## 2019-02-20 NOTE — DISCHARGE NOTE ADULT - CONDITIONS AT DISCHARGE
VSS. Afebrile. denies SOB/Chest pain. Pain in b/l legs managed with oxycodone 5mg PO. Pt reports relief after taking pain medication. Pt medically cleared for d/c home.

## 2019-02-21 LAB
ANION GAP SERPL CALC-SCNC: 13 MMO/L — SIGNIFICANT CHANGE UP (ref 7–14)
BUN SERPL-MCNC: 17 MG/DL — SIGNIFICANT CHANGE UP (ref 7–23)
CALCIUM SERPL-MCNC: 8.6 MG/DL — SIGNIFICANT CHANGE UP (ref 8.4–10.5)
CHLORIDE SERPL-SCNC: 93 MMOL/L — LOW (ref 98–107)
CO2 SERPL-SCNC: 24 MMOL/L — SIGNIFICANT CHANGE UP (ref 22–31)
CREAT SERPL-MCNC: 0.94 MG/DL — SIGNIFICANT CHANGE UP (ref 0.5–1.3)
FOLATE SERPL-MCNC: > 20 NG/ML — HIGH (ref 4.7–20)
GLUCOSE SERPL-MCNC: 128 MG/DL — HIGH (ref 70–99)
HBA1C BLD-MCNC: 6.8 % — HIGH (ref 4–5.6)
HCT VFR BLD CALC: 47.5 % — SIGNIFICANT CHANGE UP (ref 39–50)
HGB BLD-MCNC: 15 G/DL — SIGNIFICANT CHANGE UP (ref 13–17)
HIV 1+2 AB+HIV1 P24 AG SERPL QL IA: SIGNIFICANT CHANGE UP
MAGNESIUM SERPL-MCNC: 2.4 MG/DL — SIGNIFICANT CHANGE UP (ref 1.6–2.6)
MCHC RBC-ENTMCNC: 26.8 PG — LOW (ref 27–34)
MCHC RBC-ENTMCNC: 31.6 % — LOW (ref 32–36)
MCV RBC AUTO: 84.8 FL — SIGNIFICANT CHANGE UP (ref 80–100)
NRBC # FLD: 0 K/UL — LOW (ref 25–125)
PHOSPHATE SERPL-MCNC: 4.4 MG/DL — SIGNIFICANT CHANGE UP (ref 2.5–4.5)
PLATELET # BLD AUTO: 282 K/UL — SIGNIFICANT CHANGE UP (ref 150–400)
PMV BLD: 10 FL — SIGNIFICANT CHANGE UP (ref 7–13)
POTASSIUM SERPL-MCNC: 4.5 MMOL/L — SIGNIFICANT CHANGE UP (ref 3.5–5.3)
POTASSIUM SERPL-SCNC: 4.5 MMOL/L — SIGNIFICANT CHANGE UP (ref 3.5–5.3)
RBC # BLD: 5.6 M/UL — SIGNIFICANT CHANGE UP (ref 4.2–5.8)
RBC # FLD: 16.6 % — HIGH (ref 10.3–14.5)
SODIUM SERPL-SCNC: 130 MMOL/L — LOW (ref 135–145)
VIT B12 SERPL-MCNC: 697 PG/ML — SIGNIFICANT CHANGE UP (ref 200–900)
WBC # BLD: 9.37 K/UL — SIGNIFICANT CHANGE UP (ref 3.8–10.5)
WBC # FLD AUTO: 9.37 K/UL — SIGNIFICANT CHANGE UP (ref 3.8–10.5)

## 2019-02-21 PROCEDURE — 99233 SBSQ HOSP IP/OBS HIGH 50: CPT

## 2019-02-21 PROCEDURE — 74183 MRI ABD W/O CNTR FLWD CNTR: CPT | Mod: 26

## 2019-02-21 PROCEDURE — 93306 TTE W/DOPPLER COMPLETE: CPT | Mod: 26

## 2019-02-21 PROCEDURE — 99222 1ST HOSP IP/OBS MODERATE 55: CPT | Mod: GC

## 2019-02-21 RX ORDER — LORATADINE 10 MG/1
10 TABLET ORAL
Qty: 0 | Refills: 0 | Status: DISCONTINUED | OUTPATIENT
Start: 2019-02-21 | End: 2019-02-21

## 2019-02-21 RX ORDER — OXYCODONE HYDROCHLORIDE 5 MG/1
5 TABLET ORAL EVERY 6 HOURS
Qty: 0 | Refills: 0 | Status: DISCONTINUED | OUTPATIENT
Start: 2019-02-21 | End: 2019-02-26

## 2019-02-21 RX ORDER — PANTOPRAZOLE SODIUM 20 MG/1
40 TABLET, DELAYED RELEASE ORAL
Qty: 0 | Refills: 0 | Status: DISCONTINUED | OUTPATIENT
Start: 2019-02-22 | End: 2019-02-26

## 2019-02-21 RX ORDER — CETIRIZINE HYDROCHLORIDE 10 MG/1
10 TABLET ORAL DAILY
Qty: 0 | Refills: 0 | Status: DISCONTINUED | OUTPATIENT
Start: 2019-02-21 | End: 2019-02-22

## 2019-02-21 RX ADMIN — CETIRIZINE HYDROCHLORIDE 10 MILLIGRAM(S): 10 TABLET ORAL at 19:03

## 2019-02-21 RX ADMIN — OXYCODONE HYDROCHLORIDE 5 MILLIGRAM(S): 5 TABLET ORAL at 02:05

## 2019-02-21 RX ADMIN — Medication 650 MILLIGRAM(S): at 16:08

## 2019-02-21 RX ADMIN — Medication 1 APPLICATION(S): at 17:19

## 2019-02-21 RX ADMIN — Medication 650 MILLIGRAM(S): at 15:38

## 2019-02-21 RX ADMIN — Medication 25 MILLIGRAM(S): at 17:32

## 2019-02-21 RX ADMIN — Medication 1: at 17:19

## 2019-02-21 RX ADMIN — SIMVASTATIN 40 MILLIGRAM(S): 20 TABLET, FILM COATED ORAL at 22:29

## 2019-02-21 RX ADMIN — Medication 25 MILLIGRAM(S): at 09:27

## 2019-02-21 RX ADMIN — Medication 1 APPLICATION(S): at 05:16

## 2019-02-21 RX ADMIN — OXYCODONE HYDROCHLORIDE 5 MILLIGRAM(S): 5 TABLET ORAL at 01:13

## 2019-02-21 NOTE — CONSULT NOTE ADULT - SUBJECTIVE AND OBJECTIVE BOX
Mor Woodard MD  Interventional Cardiology   [place] Office : [phone] [place] [address] [place] [address]  Tel:   [place] Office : [address] [facility] [place] [address]  Tel: [phone]  Cell : [phone]    HISTORY OF PRESENT ILLNESS:    77 yo m h/o cad/cabg 2002, diastolic chf,  gastric ca s/p surgery 2006, chemoradiation. HTN, HLD, chronic diffuse  pruritic rash for last 4 years on multiple courses of prednisone, currently on taper since discharge from [facility] last week. Also with h/o DM, JACK, GERD, carotid stenosis.     Pt reports  frontal type HA that extends to forehead/periorbitally with no vision changes , but associated lightheadedness and dizziness. CT head: Age-indeterminate left anterior frontal lobe and left temporal lobe contusions with acute hemorrhage in these areas as well as acute hemorrhage in the anterior right frontal region, maybe intraparenchymal or SAH. Pt denies recent trauma. Denies focal weakness, numbness. Currently on aspirin. Platelets , coags WNL. Denies cp, sob. EKG 1st degree block, LBBB similar to July 3rd tracing. HS trop 15, 16. Pt also notes worsening rash; current prednisone regimen ineffective. Recent dye applied to skin that he describes as  remedy has also been ineffective Denies myalgias, arthralgias. Notes recent subjective fevers; normal temp thus far in ed.  Pt additionally endorsing 3 moths pf postprandial vomiting to both solids and lisuids. At times can keep po intake down; has recently passed stool  and gas. Denies melena, hematochezia. Denies dysphagia,  odynophagia       PAST MEDICAL & SURGICAL HISTORY:  GERD (gastroesophageal reflux disease)  Gastric cancer: S/P RT and chemo  Carotid stenosis  JACK (iron deficiency anemia)  HLD (hyperlipidemia)  HTN (hypertension)  CAD (coronary artery disease): S/P CABG  Hives: on Prednisone  DM (diabetes mellitus)  S/P Gastrectomy: 2006 due to gastric cancer  S/P CABG X 4: 2002    	    MEDICATIONS:      cetirizine 10 milliGRAM(s) Oral daily    acetaminophen   Tablet .. 650 milliGRAM(s) Oral every 6 hours PRN  diphenhydrAMINE 25 milliGRAM(s) Oral every 4 hours PRN  oxyCODONE    IR 5 milliGRAM(s) Oral every 6 hours PRN      dextrose 40% Gel 15 Gram(s) Oral once PRN  dextrose 50% Injectable 12.5 Gram(s) IV Push once  dextrose 50% Injectable 25 Gram(s) IV Push once  dextrose 50% Injectable 25 Gram(s) IV Push once  glucagon  Injectable 1 milliGRAM(s) IntraMuscular once PRN  insulin lispro (HumaLOG) corrective regimen sliding scale   SubCutaneous three times a day before meals  insulin lispro (HumaLOG) corrective regimen sliding scale   SubCutaneous at bedtime  simvastatin 40 milliGRAM(s) Oral at bedtime    clobetasol 0.05% Ointment 1 Application(s) Topical two times a day  dextrose 5%. 1000 milliLiter(s) IV Continuous <Continuous>      FAMILY HISTORY:  No pertinent family history in first degree relatives        Allergies    No Known Allergies    Intolerances    	      PHYSICAL EXAM:  T(C): 36.5 (02-21-19 @ 10:16), Max: 36.9 (02-20-19 @ 21:49)  HR: 92 (02-21-19 @ 10:16) (66 - 101)  BP: 111/67 (02-21-19 @ 10:16) (106/57 - 131/69)  RR: 18 (02-21-19 @ 10:16) (17 - 18)  SpO2: 100% (02-21-19 @ 10:16) (98% - 100%)  Wt(kg): --  I&O's Summary      Appearance: cachectic 	  HEENT:   Normal oral mucosa	  Cardiovascular: Normal S1 S2, No JVD, No murmurs  Respiratory: Lungs clear to auscultation	  Gastrointestinal:  Soft, Non-tender, + BS	  Extremities:  No clubbing, cyanosis or edema    LABS:	 	    CARDIAC MARKERS:                                  15.0   9.37  )-----------( 282      ( 21 Feb 2019 05:45 )             47.5     02-21    130<L>  |  93<L>  |  17  ----------------------------<  128<H>  4.5   |  24  |  0.94    Ca    8.6      21 Feb 2019 05:45  Phos  4.4     02-21  Mg     2.4     02-21    TPro  6.5  /  Alb  3.4  /  TBili  0.5  /  DBili  x   /  AST  21  /  ALT  23  /  AlkPhos  67  02-20    proBNP:   Lipid Profile:   HgA1c: Hemoglobin A1C, Whole Blood: 6.8 % (02-21 @ 05:45)    TSH:     ASSESSMENT/PLAN:

## 2019-02-21 NOTE — CONSULT NOTE ADULT - ASSESSMENT
Impression:  1) n/v - unclear etiology, could be 2/2 brain bleeds/neurologic issues, vs pancreatitis from stone, vs unrelated etiologies like abnormal motility, GJ stenosis  2) 6mm PD stone with upstream PD dilatation  3) H/o gastric ca s/p surgery (B2?) in 2006 with chemoradiation  4) H/p CABG on ASA, CHF, last TTE 2017    Recs:  - would check lipase, amylase  - obtain neurosurgical clearance for anesthesia given contusions on imaging  - also would at minimum repeat TTE and obtain consider cardiology clearance for anesthesia  - liquid diet as tolerated for now  - will discuss intervention for PD stone removal or stenting with advanced attendings Impression:  1) n/v - unclear etiology, could be 2/2 brain bleeds/neurologic issues, vs pancreatitis from stone, vs unrelated etiologies like abnormal motility, GJ stenosis  2) 6mm PD stone with upstream PD dilatation, likely 2/2 chronic panc  3) H/o gastric ca s/p surgery (B2?) in 2006 with chemoradiation.  Attempted EGD previously, however, patient snycopized prior to procedure  4) H/p CABG on ASA, CHF, last TTE 2017    Recs:  - would check lipase, amylase  - obtain neurosurgical clearance for anesthesia given contusions on imaging  - also needs cardiology evaluation as patient had pre-procedural syncope, has not seen cardiology for over a year, no recent TTE  - liquid diet as tolerated for now; try to add Ensure clear if patient tolerates for calorie supplementation  - obtain MRI/MRCP to better evaluate biliary tree, PD, and pancreas  - will discuss role for intervention for PD stone removal or stenting with advanced attendings

## 2019-02-21 NOTE — PROVIDER CONTACT NOTE (OTHER) - ASSESSMENT
general neuro check completed with no defecits. pt denies SOB, chest pain. orthostatics 118/55 HR 88 lying, 104/65 HR 88 sitting, 97/61 HR 84 standing

## 2019-02-21 NOTE — PROGRESS NOTE ADULT - SUBJECTIVE AND OBJECTIVE BOX
Patient is a 78y old  Male who presents with a chief complaint of headache, rash (21 Feb 2019 06:41)      SUBJECTIVE / OVERNIGHT EVENTS: Pt seen and examined at 12:50pm, no overnight events, states that the head ache is better today, has itching/rash, nausea but no vomiting has poor appetite, no other new issues reported.    MEDICATIONS  (STANDING):  clobetasol 0.05% Ointment 1 Application(s) Topical two times a day  dextrose 5%. 1000 milliLiter(s) (50 mL/Hr) IV Continuous <Continuous>  dextrose 50% Injectable 12.5 Gram(s) IV Push once  dextrose 50% Injectable 25 Gram(s) IV Push once  dextrose 50% Injectable 25 Gram(s) IV Push once  insulin lispro (HumaLOG) corrective regimen sliding scale   SubCutaneous three times a day before meals  insulin lispro (HumaLOG) corrective regimen sliding scale   SubCutaneous at bedtime  loratadine 10 milliGRAM(s) Oral two times a day  simvastatin 40 milliGRAM(s) Oral at bedtime    MEDICATIONS  (PRN):  acetaminophen   Tablet .. 650 milliGRAM(s) Oral every 6 hours PRN Mild Pain (1 - 3), Moderate Pain (4 - 6)  dextrose 40% Gel 15 Gram(s) Oral once PRN Blood Glucose LESS THAN 70 milliGRAM(s)/deciliter  diphenhydrAMINE 25 milliGRAM(s) Oral every 4 hours PRN Rash and/or Itching  glucagon  Injectable 1 milliGRAM(s) IntraMuscular once PRN Glucose LESS THAN 70 milligrams/deciliter  oxyCODONE    IR 5 milliGRAM(s) Oral every 6 hours PRN Severe Pain (7 - 10)      Vital Signs Last 24 Hrs  T(C): 36.5 (21 Feb 2019 10:16), Max: 36.9 (20 Feb 2019 21:49)  T(F): 97.7 (21 Feb 2019 10:16), Max: 98.4 (20 Feb 2019 21:49)  HR: 92 (21 Feb 2019 10:16) (66 - 101)  BP: 111/67 (21 Feb 2019 10:16) (106/57 - 131/69)  BP(mean): --  RR: 18 (21 Feb 2019 10:16) (17 - 18)  SpO2: 100% (21 Feb 2019 10:16) (98% - 100%)  CAPILLARY BLOOD GLUCOSE      POCT Blood Glucose.: 97 mg/dL (21 Feb 2019 08:41)  POCT Blood Glucose.: 118 mg/dL (20 Feb 2019 21:18)  POCT Blood Glucose.: 170 mg/dL (20 Feb 2019 18:22)    I&O's Summary      PHYSICAL EXAM:  GENERAL: NAD, thinly built male  CHEST/LUNG: Clear to auscultation bilaterally; No wheeze  HEART: Regular rate and rhythm  ABDOMEN: Soft, Nontender, Nondistended  EXTREMITIES: no LE edema  PSYCH: Calm  NEUROLOGY: AAOx3  SKIN: erythematous plaques on the trunk and extremities    LABS:                        15.0   9.37  )-----------( 282      ( 21 Feb 2019 05:45 )             47.5     02-21    130<L>  |  93<L>  |  17  ----------------------------<  128<H>  4.5   |  24  |  0.94    Ca    8.6      21 Feb 2019 05:45  Phos  4.4     02-21  Mg     2.4     02-21    TPro  6.5  /  Alb  3.4  /  TBili  0.5  /  DBili  x   /  AST  21  /  ALT  23  /  AlkPhos  67  02-20    PT/INR - ( 19 Feb 2019 21:20 )   PT: 11.6 SEC;   INR: 1.04          PTT - ( 19 Feb 2019 21:20 )  PTT:23.8 SEC          RADIOLOGY & ADDITIONAL TESTS:    Imaging Personally Reviewed:    Consultant(s) Notes Reviewed:      Care Discussed with Consultants/Other Providers:

## 2019-02-21 NOTE — CONSULT NOTE ADULT - ASSESSMENT
EKG SR 1st degree AV block , LBBB (old)    Echo: < from: Transthoracic Echocardiogram (02.21.19 @ 21:03) >  opening.  2. Normal left ventricular internal dimensions and wall  thicknesses.  3. Normal left ventricular systolic function. No segmental  wall motion abnormalities.  4. Normal right ventricular size and function.  5. Estimatedpulmonary artery systolic pressure equals 40  mm Hg, assuming right atrial pressure equals 10  mm Hg,  consistent with mild pulmonary hypertension.    < end of copied text >    NST:   < from: Nuclear Stress Test-Pharmacologic (04.03.17 @ 10:00) >  * Myocardial Perfusion SPECT results are abnormal.  * There is a medium sized moderate to severe defect in  proximal to mid septal wall  and mild defect in  proximal  to mid inferior wall which are predominantly reversible  consistent with moderate to severe basal septal  ischemia  and mild proximal to mid inferior ischemia  * Post-stress gated wall motion analysis was performed  (LVEF = 55 %;LVEDV = 67 ml.), revealing normal LV  function. There was paradoxical motion of the septum (LBBB  and post CABG). RV function appeared normal.    < end of copied text >      Assessment and Plan     1) Perioperative risk assessment:  Pt with positive stress in 4/17 as above, medically managed , denies CP on exertion , Echo today with normal LV function, Given positive stress pt at moderate risk of MACE with EGD, no active symptoms therefore no ischemic eval warranted     2) N/V: Planned for EGD    DVT PPX : SCD

## 2019-02-21 NOTE — CONSULT NOTE ADULT - SUBJECTIVE AND OBJECTIVE BOX
Chief Complaint:  Patient is a 78y old  Male who presents with a chief complaint of headache, rash (20 Feb 2019 14:49)      HPI:  77 yo m h/o cad/cabg 2002, diastolic chf,  gastric ca s/p surgery 2006 (gastrojejunostomy), chemoradiation. HTN, HLD, chronic diffuse  pruritic rash for last 4 years on multiple courses of prednisone, currently on taper since discharge from [facility] last week. Also with h/o DM, JACK, GERD, carotid stenosis.     Pt reports  frontal type HA that extends to forehead/periorbitally with no vision changes , but associated lightheadedness and dizziness. CT head: Age-indeterminate left anterior frontal lobe and left temporal lobe contusions with acute hemorrhage in these areas as well as acute hemorrhage in the anterior right frontal region, maybe intraparenchymal or SAH. Pt denies recent trauma. Denies focal weakness, numbness. Currently on aspirin. Platelets , coags WNL. Denies cp, sob. EKG 1st degree block, LBBB similar to July 3rd tracing. HS trop 15, 16. Pt also notes worsening rash; current prednisone regimen ineffective. Recent dye applied to skin that he describes as  remedy has also been ineffective Denies myalgias, arthralgias. Notes recent subjective fevers; normal temp thus far in ed.  Pt additionally endorsing 3 moths pf postprandial vomiting to both solids and lisuids. At times can keep po intake down; has recently passed stool  and gas. Denies melena, hematochezia. Denies dysphagia,  odynophagia (19 Feb 2019 20:36)    CT head showed several bleeds, NS said no intervention. Advanced GI consulted because CT A/P performed for n/v showed PD stone with associated dilatation, without zuleima pancreatitis.    Allergies:  No Known Allergies      Home Medications:  aspirin 81 mg oral delayed release tablet: 1 tab(s) orally once a day (19 Feb 2019 15:23)  cefpodoxime 100 mg oral tablet: 1 tab(s) orally every 12 hours x 10 days (19 Feb 2019 15:23)  esomeprazole 40 mg oral delayed release capsule: 1 cap(s) orally once a day (19 Feb 2019 15:23)  glipiZIDE 2.5 mg oral tablet, extended release: 1 tab(s) orally once a day (19 Feb 2019 15:23)  levocetirizine 5 mg oral tablet: 1 tab(s) orally 2 times a day, As Needed (19 Feb 2019 15:23)  nitroglycerin 0.4 mg sublingual tablet: 1 tab(s) sublingual every 5 minutes, As Needed (19 Feb 2019 15:23)  predniSONE 20 mg oral tablet: 2 tab(s) orally once a day x 4 days (19 Feb 2019 15:23)  triamcinolone 0.1% topical cream: Apply topically to affected area 2 times a day (19 Feb 2019 15:23)      Hospital Medications:  acetaminophen   Tablet .. 650 milliGRAM(s) Oral every 6 hours PRN  clobetasol 0.05% Ointment 1 Application(s) Topical two times a day  dextrose 40% Gel 15 Gram(s) Oral once PRN  dextrose 5%. 1000 milliLiter(s) IV Continuous <Continuous>  dextrose 50% Injectable 12.5 Gram(s) IV Push once  dextrose 50% Injectable 25 Gram(s) IV Push once  dextrose 50% Injectable 25 Gram(s) IV Push once  diphenhydrAMINE 25 milliGRAM(s) Oral every 4 hours PRN  glucagon  Injectable 1 milliGRAM(s) IntraMuscular once PRN  insulin lispro (HumaLOG) corrective regimen sliding scale   SubCutaneous three times a day before meals  insulin lispro (HumaLOG) corrective regimen sliding scale   SubCutaneous at bedtime  oxyCODONE    IR 5 milliGRAM(s) Oral every 6 hours PRN  simvastatin 40 milliGRAM(s) Oral at bedtime      PMHX/PSHX:  GERD (gastroesophageal reflux disease)  Gastric cancer  Carotid stenosis  JACK (iron deficiency anemia)  HLD (hyperlipidemia)  HTN (hypertension)  CAD (coronary artery disease)  Hives  DM (diabetes mellitus)  BPH (Benign Prostatic Hypertrophy)  GERD (Gastroesophageal Reflux Disease)  S/P CABG X 3  Hypercholesteremia  CAD (Coronary Artery Disease)  S/P Radiation > 12 Weeks  S/P Chemotherapy, Time Since Greater than 12 Weeks  Anemia  BPH (Benign Prostatic Hypertrophy)  GERD (Gastroesophageal Reflux Disease)  Dyslipidemia  HTN - Hypertension  CAD (Coronary Artery Disease)  History of Stomach Cancer  S/P Gastrectomy  Atypical Chest Pain  Status Post Gastrectomy  S/P CABG X 4  Status Post Gastrectomy  S/P CABG X 4      Family history:  No pertinent family history in first degree relatives      Social History: no etoh/drugs/tob    ROS:     General:  No wt loss, fevers, chills, night sweats, fatigue,   Eyes:  Good vision, no reported pain  ENT:  No sore throat, pain, runny nose, dysphagia  CV:  No pain, palpitations, hypo/hypertension  Resp:  No dyspnea, cough, tachypnea, wheezing  GI:  See HPI  :  No pain, bleeding, incontinence, nocturia  Muscle:  No pain, weakness  Neuro:  No weakness, tingling, memory problems  Psych:  No fatigue, insomnia, mood problems, depression  Endocrine:  No polyuria, polydipsia, cold/heat intolerance  Heme:  No petechiae, ecchymosis, easy bruisability  Skin:  No rash, edema      PHYSICAL EXAM:     GENERAL:  NAD  HEENT:  NC/AT,  conjunctivae clear and pink,  no JVD  CHEST:  Full & symmetric excursion, no increased effort  HEART:  Regular rhythm, S1, S2  ABDOMEN:  Soft, non-tender, non-distended, normoactive bowel sounds,  no masses ,  EXTREMITIES:  no cyanosis,clubbing or edema  SKIN:  Urticarial rash  NEURO:  Alert, oriented    Vital Signs:  Vital Signs Last 24 Hrs  T(C): 36.8 (21 Feb 2019 05:04), Max: 36.9 (20 Feb 2019 21:49)  T(F): 98.3 (21 Feb 2019 05:04), Max: 98.4 (20 Feb 2019 21:49)  HR: 101 (21 Feb 2019 05:04) (61 - 101)  BP: 106/57 (21 Feb 2019 05:04) (106/51 - 131/69)  BP(mean): --  RR: 17 (21 Feb 2019 05:04) (17 - 18)  SpO2: 98% (21 Feb 2019 05:04) (98% - 100%)  Daily     Daily     LABS:                        15.0   9.37  )-----------( 282      ( 21 Feb 2019 05:45 )             47.5     02-20    132<L>  |  95<L>  |  18  ----------------------------<  212<H>  4.1   |  25  |  0.98    Ca    8.7      20 Feb 2019 12:17  Phos  4.3     02-20  Mg     2.2     02-20    TPro  6.5  /  Alb  3.4  /  TBili  0.5  /  DBili  x   /  AST  21  /  ALT  23  /  AlkPhos  67  02-20    LIVER FUNCTIONS - ( 20 Feb 2019 12:17 )  Alb: 3.4 g/dL / Pro: 6.5 g/dL / ALK PHOS: 67 u/L / ALT: 23 u/L / AST: 21 u/L / GGT: x           PT/INR - ( 19 Feb 2019 21:20 )   PT: 11.6 SEC;   INR: 1.04          PTT - ( 19 Feb 2019 21:20 )  PTT:23.8 SEC        Imaging:  < from: CT Abdomen and Pelvis w/ Oral Cont and w/ IV Cont (02.20.19 @ 13:00) >    EXAM:  CT ABDOMEN AND PELVIS OC IC        PROCEDURE DATE:  Feb 20 2019         INTERPRETATION:  CLINICAL INFORMATION: Vomiting.    COMPARISON: 4/13/2015    PROCEDURE:   CT of the Abdomen and Pelvis was performed with intravenous contrast.   Intravenous contrast: 90 ml Omnipaque 350. 10 ml discarded.  Oral contrast: positive contrast was administered.  Sagittal and coronal reformats were performed.    FINDINGS:    LOWER CHEST: Status post sternotomy and CABG.    LIVER: Left lobe atrophy. Subcentimeter lesions too small to characterize.  BILE DUCTS: Normal caliber.  GALLBLADDER: Within normal limits.  SPLEEN: Within normal limits.   PANCREAS: There is a 6 mm calculus in the main pancreatic duct in the   neck on series 2 image 29 with moderateupstream ductal dilatation. No   mass lesion is identified. The portal vein and SMV are widely patent.  ADRENALS: Within normal limits.   KIDNEYS/URETERS: Subcentimeter lesions too small to characterize.         BLADDER: Within normal limits.   REPRODUCTIVE ORGANS: The prostate is enlarged.    BOWEL: No bowel obstruction. The appendix is normal.  Status post   gastrojejunostomy. Tiny periampullary duodenal diverticulum.      PERITONEUM: No ascites.       VESSELS:  Within normal limits.  RETROPERITONEUM: No lymphadenopathy.     ABDOMINAL WALL: Within normal limits.  BONES: Within normal limits.     IMPRESSION:   A 6 mm calculus in the pancreatic neck with moderate ductal dilatation.    Status post gastrojejunostomy.      CHAZ DEUTSCH M.D., ATTENDING RADIOLOGIST  This document has been electronically signed. Feb 20 2019  1:41PM      < end of copied text >      < from: CT Head No Cont (02.19.19 @ 11:27) >    EXAM:  CT BRAIN        PROCEDURE DATE:  Feb 19 2019         INTERPRETATION:  INDICATIONS:  Vertigo, episodic, peripheral  and   dizziness.    TECHNIQUE:  Serial axial images were obtained from the skull base to the   vertex without intravenous contrast. Coronal and sagittal reformatted   images were obtained.    COMPARISON EXAMINATION: 7/4/2018    FINDINGS:    VENTRICLES AND SULCI:  Prominent in size compatible with age-appropriate   volume loss    INTRA-AXIAL /EXTRA-AXIAL:  Focal areas of hypodensity are seen within the   left anterior temporal lobe and left frontal lobe inferiorly which were   not seen previously. Small areas of associated hyperdensity are seen in   these regions compatible with associated acute intraparenchymal or   possibly subarachnoid hemorrhage. A small focus of hemorrhage is seen   within the right anterior cranial fossa anteriorly.    VISUALIZED SINUSES:  Clear.    VISUALIZED MASTOIDS:  Clear.    CALVARIUM:  Normal.    MISCELLANEOUS:  Right parietal superior scalp soft tissue swelling is   seen.      IMPRESSION:    Age-indeterminate left anterior frontal lobe and left temporal lobe   contusions with acute hemorrhage in these areas as well as acute   hemorrhage in the anterior right frontal region which may be   intraparenchymal or subarachnoid in location.    Findings were discussed with Dr. Chan on 2/19/2019 1:30 PM with read   back.                  JUDITH LOPEZ M.D., ATTENDING RADIOLOGIST  This document has been electronically signed. Feb 19 2019  1:31PM                  < end of copied text >    < from: Transthoracic Echocardiogram (04.03.17 @ 07:56) >    Patient name: SAMMIE SALGADO  YOB: 1940   Age: 76 (M)   MR#: [id number]  Study Date: 4/3/2017  [facility] StressSonographer: Ziyad Buckley  Study quality: Technically good  Referring Physician: Harry Bocanegra MD  Blood Pressure: 117/69 mmHg  Height: 165 cm  Weight: 56 kg  BSA: 1.6 m2  ------------------------------------------------------------------------  PROCEDURE: Transthoracic echocardiogram with 2-D, M-Mode  and complete spectral and color flow Doppler.  INDICATION:Syncope and collapse (R55)  ------------------------------------------------------------------------  DIMENSIONS:  Dimensions:     Normal Values:  LA:     3.4 cm    2.0 - 4.0 cm  Ao:     3.2 cm    2.0 - 3.8 cm  SEPTUM: 0.7 cm    0.6 - 1.2 cm  PWT:0.6 cm    0.6 - 1.1 cm  LVIDd:  4.1 cm    3.0 - 5.6 cm  LVIDs:  2.7 cm    1.8 - 4.0 cm  Derived Variables:  LVMI: 46 g/m2  RWT: 0.29  Fractional short: 34 %  Ejection Fraction (Teicholtz): 64 %  ------------------------------------------------------------------------  OBSERVATIONS:  Mitral Valve: Mitral annular calcification, otherwise  normal mitral valve. Minimal mitral regurgitation.  Aortic Root: Normal aortic root.  Aortic Valve: Calcified trileaflet aortic valve with normal  opening.  Left Atrium: Normal left atrium.  Left Ventricle: Normal left ventricular systolic function.  No segmental wall motion abnormalities. Normal left  ventricular internal dimensions and wall thicknesses. Mild  diastolic dysfunction (Stage I).  Right Heart: Normal right atrium. Normal right ventricular  size and function. Normal tricuspid valve.  Minimal  tricuspid regurgitation. Normal pulmonic valve.  Pericardium/PleuraNormal pericardium with no pericardial  effusion.  ------------------------------------------------------------------------  CONCLUSIONS:  1. Mitral annular calcification, otherwise normal mitral  valve. Minimal mitral regurgitation.  2. Normal left ventricular internal dimensions and wall  thicknesses.  3. Normal left ventricular systolic function. No segmental  wall motion abnormalities.  4. Mild diastolic dysfunction (Stage I).  5. Normal right ventricular size and function.  ------------------------------------------------------------------------  Confirmed on  4/3/2017 - 09:01:20 by Harry Zavaleta M.D.  ------------------------------------------------------------------------    < end of copied text >

## 2019-02-22 DIAGNOSIS — M25.551 PAIN IN RIGHT HIP: ICD-10-CM

## 2019-02-22 LAB
AMYLASE P1 CFR SERPL: 63 U/L — SIGNIFICANT CHANGE UP (ref 25–125)
ANION GAP SERPL CALC-SCNC: 12 MMO/L — SIGNIFICANT CHANGE UP (ref 7–14)
BUN SERPL-MCNC: 16 MG/DL — SIGNIFICANT CHANGE UP (ref 7–23)
CALCIUM SERPL-MCNC: 8.1 MG/DL — LOW (ref 8.4–10.5)
CHLORIDE SERPL-SCNC: 99 MMOL/L — SIGNIFICANT CHANGE UP (ref 98–107)
CO2 SERPL-SCNC: 24 MMOL/L — SIGNIFICANT CHANGE UP (ref 22–31)
CREAT SERPL-MCNC: 0.91 MG/DL — SIGNIFICANT CHANGE UP (ref 0.5–1.3)
GLUCOSE SERPL-MCNC: 119 MG/DL — HIGH (ref 70–99)
HCT VFR BLD CALC: 34.6 % — LOW (ref 39–50)
HGB BLD-MCNC: 11 G/DL — LOW (ref 13–17)
LIDOCAIN IGE QN: 28.7 U/L — SIGNIFICANT CHANGE UP (ref 7–60)
MAGNESIUM SERPL-MCNC: 2.4 MG/DL — SIGNIFICANT CHANGE UP (ref 1.6–2.6)
MCHC RBC-ENTMCNC: 26.5 PG — LOW (ref 27–34)
MCHC RBC-ENTMCNC: 31.8 % — LOW (ref 32–36)
MCV RBC AUTO: 83.4 FL — SIGNIFICANT CHANGE UP (ref 80–100)
NRBC # FLD: 0 K/UL — LOW (ref 25–125)
PHOSPHATE SERPL-MCNC: 3.8 MG/DL — SIGNIFICANT CHANGE UP (ref 2.5–4.5)
PLATELET # BLD AUTO: 227 K/UL — SIGNIFICANT CHANGE UP (ref 150–400)
PMV BLD: 10.5 FL — SIGNIFICANT CHANGE UP (ref 7–13)
POTASSIUM SERPL-MCNC: 4.6 MMOL/L — SIGNIFICANT CHANGE UP (ref 3.5–5.3)
POTASSIUM SERPL-SCNC: 4.6 MMOL/L — SIGNIFICANT CHANGE UP (ref 3.5–5.3)
RBC # BLD: 4.15 M/UL — LOW (ref 4.2–5.8)
RBC # FLD: 16.4 % — HIGH (ref 10.3–14.5)
SODIUM SERPL-SCNC: 135 MMOL/L — SIGNIFICANT CHANGE UP (ref 135–145)
WBC # BLD: 7.16 K/UL — SIGNIFICANT CHANGE UP (ref 3.8–10.5)
WBC # FLD AUTO: 7.16 K/UL — SIGNIFICANT CHANGE UP (ref 3.8–10.5)

## 2019-02-22 PROCEDURE — 99232 SBSQ HOSP IP/OBS MODERATE 35: CPT

## 2019-02-22 PROCEDURE — 99233 SBSQ HOSP IP/OBS HIGH 50: CPT

## 2019-02-22 RX ORDER — SIMETHICONE 80 MG/1
80 TABLET, CHEWABLE ORAL EVERY 6 HOURS
Qty: 0 | Refills: 0 | Status: DISCONTINUED | OUTPATIENT
Start: 2019-02-22 | End: 2019-02-24

## 2019-02-22 RX ORDER — CETIRIZINE HYDROCHLORIDE 10 MG/1
10 TABLET ORAL
Qty: 0 | Refills: 0 | Status: DISCONTINUED | OUTPATIENT
Start: 2019-02-22 | End: 2019-02-26

## 2019-02-22 RX ORDER — ONDANSETRON 8 MG/1
4 TABLET, FILM COATED ORAL EVERY 8 HOURS
Qty: 0 | Refills: 0 | Status: DISCONTINUED | OUTPATIENT
Start: 2019-02-22 | End: 2019-02-26

## 2019-02-22 RX ADMIN — Medication 2: at 12:28

## 2019-02-22 RX ADMIN — PANTOPRAZOLE SODIUM 40 MILLIGRAM(S): 20 TABLET, DELAYED RELEASE ORAL at 06:31

## 2019-02-22 RX ADMIN — Medication 1 APPLICATION(S): at 06:29

## 2019-02-22 RX ADMIN — Medication 1 APPLICATION(S): at 17:42

## 2019-02-22 RX ADMIN — Medication 650 MILLIGRAM(S): at 06:29

## 2019-02-22 RX ADMIN — SIMVASTATIN 40 MILLIGRAM(S): 20 TABLET, FILM COATED ORAL at 22:09

## 2019-02-22 RX ADMIN — CETIRIZINE HYDROCHLORIDE 10 MILLIGRAM(S): 10 TABLET ORAL at 17:43

## 2019-02-22 RX ADMIN — Medication 650 MILLIGRAM(S): at 16:37

## 2019-02-22 RX ADMIN — Medication 650 MILLIGRAM(S): at 07:29

## 2019-02-22 NOTE — PROGRESS NOTE ADULT - SUBJECTIVE AND OBJECTIVE BOX
Patient is a 78y old  Male who presents with a chief complaint of headache, rash (22 Feb 2019 12:40)      SUBJECTIVE / OVERNIGHT EVENTS: Pt seen and examined at 1:20pm, overnight events reviewed ( had head ache last night) pt reports that the head ache is better now, no weakness, blurry vision or numbness, rash and itching is improving, still with some nausea but no vomiting, has rt hip pain today, reports that his family gave him bread to eat this afternoon, daughter in law at bedside, no other new issues reported.      MEDICATIONS  (STANDING):  cetirizine 10 milliGRAM(s) Oral <User Schedule>  clobetasol 0.05% Ointment 1 Application(s) Topical two times a day  dextrose 5%. 1000 milliLiter(s) (50 mL/Hr) IV Continuous <Continuous>  dextrose 50% Injectable 12.5 Gram(s) IV Push once  dextrose 50% Injectable 25 Gram(s) IV Push once  dextrose 50% Injectable 25 Gram(s) IV Push once  insulin lispro (HumaLOG) corrective regimen sliding scale   SubCutaneous three times a day before meals  insulin lispro (HumaLOG) corrective regimen sliding scale   SubCutaneous at bedtime  pantoprazole    Tablet 40 milliGRAM(s) Oral before breakfast  simvastatin 40 milliGRAM(s) Oral at bedtime    MEDICATIONS  (PRN):  acetaminophen   Tablet .. 650 milliGRAM(s) Oral every 6 hours PRN Mild Pain (1 - 3), Moderate Pain (4 - 6)  dextrose 40% Gel 15 Gram(s) Oral once PRN Blood Glucose LESS THAN 70 milliGRAM(s)/deciliter  diphenhydrAMINE 25 milliGRAM(s) Oral every 4 hours PRN Rash and/or Itching  glucagon  Injectable 1 milliGRAM(s) IntraMuscular once PRN Glucose LESS THAN 70 milligrams/deciliter  oxyCODONE    IR 5 milliGRAM(s) Oral every 6 hours PRN Severe Pain (7 - 10)      Vital Signs Last 24 Hrs  T(C): 36.4 (22 Feb 2019 10:42), Max: 36.7 (22 Feb 2019 05:25)  T(F): 97.6 (22 Feb 2019 10:42), Max: 98 (22 Feb 2019 05:25)  HR: 82 (22 Feb 2019 10:42) (67 - 82)  BP: 108/45 (22 Feb 2019 10:42) (108/45 - 131/57)  BP(mean): --  RR: 16 (22 Feb 2019 10:42) (16 - 17)  SpO2: 98% (22 Feb 2019 10:42) (96% - 99%)  CAPILLARY BLOOD GLUCOSE      POCT Blood Glucose.: 212 mg/dL (22 Feb 2019 12:22)  POCT Blood Glucose.: 150 mg/dL (22 Feb 2019 08:15)  POCT Blood Glucose.: 181 mg/dL (21 Feb 2019 21:59)  POCT Blood Glucose.: 195 mg/dL (21 Feb 2019 17:08)    I&O's Summary      PHYSICAL EXAM:  GENERAL: NAD, thinly built male  CHEST/LUNG: Clear to auscultation bilaterally; No wheeze  HEART: Regular rate and rhythm  ABDOMEN: Soft, Nontender, Nondistended  EXTREMITIES: no LE edema, rt hip/low back non tender  PSYCH: Calm  NEUROLOGY: AAOx3  SKIN: erythematous plaques on the trunk and extremities appears resolving        LABS:                        11.0   7.16  )-----------( 227      ( 22 Feb 2019 05:50 )             34.6     02-22    135  |  99  |  16  ----------------------------<  119<H>  4.6   |  24  |  0.91    Ca    8.1<L>      22 Feb 2019 05:50  Phos  3.8     02-22  Mg     2.4     02-22                RADIOLOGY & ADDITIONAL TESTS:  < from: MR MRCP w/wo IV Cont (02.21.19 @ 11:01) >  MR MRCP WAW IC      < end of copied text >  < from: MR MRCP w/wo IV Cont (02.21.19 @ 11:01) >  Dilated main pancreatic duct down to the neck, likely   secondary to an intraductal calculus better seen on recent CT; consider   EUS for further evaluation.      < end of copied text >    Imaging Personally Reviewed:    Consultant(s) Notes Reviewed:      Care Discussed with Consultants/Other Providers:

## 2019-02-22 NOTE — CHART NOTE - NSCHARTNOTEFT_GEN_A_CORE
Notified by RN patient c/o headache.   Examined patient at bedside who states his frontal headache is similar to previous episodes and tylenol usually helps with his symptoms.   He denies any dizziness, worsening of his headache, focal weakness, slurred speech, N/V or any other symptoms.   Neuro exam benign. Will give Tylenol and continue to monitor.   -ADS GARY Ochoa

## 2019-02-22 NOTE — PROGRESS NOTE ADULT - SUBJECTIVE AND OBJECTIVE BOX
oMr Woodard MD  Interventional Cardiology  [place] Office : [phone] [place] [id number]  Tel:   [place] Office : [address] [place] [address]  Tel: [phone]  Cell : [phone]    Subjective : Pt lying in bed comfortable, not in distress, denies any chest pain or SOB  	  MEDICATIONS:      cetirizine 10 milliGRAM(s) Oral <User Schedule>    acetaminophen   Tablet .. 650 milliGRAM(s) Oral every 6 hours PRN  diphenhydrAMINE 25 milliGRAM(s) Oral every 4 hours PRN  oxyCODONE    IR 5 milliGRAM(s) Oral every 6 hours PRN    pantoprazole    Tablet 40 milliGRAM(s) Oral before breakfast    dextrose 40% Gel 15 Gram(s) Oral once PRN  dextrose 50% Injectable 12.5 Gram(s) IV Push once  dextrose 50% Injectable 25 Gram(s) IV Push once  dextrose 50% Injectable 25 Gram(s) IV Push once  glucagon  Injectable 1 milliGRAM(s) IntraMuscular once PRN  insulin lispro (HumaLOG) corrective regimen sliding scale   SubCutaneous three times a day before meals  insulin lispro (HumaLOG) corrective regimen sliding scale   SubCutaneous at bedtime  simvastatin 40 milliGRAM(s) Oral at bedtime    clobetasol 0.05% Ointment 1 Application(s) Topical two times a day  dextrose 5%. 1000 milliLiter(s) IV Continuous <Continuous>      PHYSICAL EXAM:  T(C): 36.4 (02-22-19 @ 10:42), Max: 36.7 (02-22-19 @ 05:25)  HR: 82 (02-22-19 @ 10:42) (67 - 82)  BP: 108/45 (02-22-19 @ 10:42) (108/45 - 131/57)  RR: 16 (02-22-19 @ 10:42) (16 - 17)  SpO2: 98% (02-22-19 @ 10:42) (96% - 99%)  Wt(kg): --  I&O's Summary      Appearance: cachectic 	  HEENT:   Normal oral mucosa	  Cardiovascular: Normal S1 S2, No JVD, systolic murmur +  Respiratory: Lungs clear to auscultation	  Gastrointestinal:  Soft, Non-tender, + BS	  Extremities:  No clubbing, cyanosis or edema                                    11.0   7.16  )-----------( 227      ( 22 Feb 2019 05:50 )             34.6     02-22    135  |  99  |  16  ----------------------------<  119<H>  4.6   |  24  |  0.91    Ca    8.1<L>      22 Feb 2019 05:50  Phos  3.8     02-22  Mg     2.4     02-22      proBNP:   Lipid Profile:   HgA1c:   TSH:

## 2019-02-22 NOTE — PROGRESS NOTE ADULT - SUBJECTIVE AND OBJECTIVE BOX
Chief Complaint:  Patient is a 78y old  Male who presents with a chief complaint of headache, rash (21 Feb 2019 17:02)      Interval Events: No events overnight.  Pt s/p MRCP yesterday.  No change in sx.    Allergies:  No Known Allergies      Hospital Medications:  acetaminophen   Tablet .. 650 milliGRAM(s) Oral every 6 hours PRN  cetirizine 10 milliGRAM(s) Oral daily  clobetasol 0.05% Ointment 1 Application(s) Topical two times a day  dextrose 40% Gel 15 Gram(s) Oral once PRN  dextrose 5%. 1000 milliLiter(s) IV Continuous <Continuous>  dextrose 50% Injectable 12.5 Gram(s) IV Push once  dextrose 50% Injectable 25 Gram(s) IV Push once  dextrose 50% Injectable 25 Gram(s) IV Push once  diphenhydrAMINE 25 milliGRAM(s) Oral every 4 hours PRN  glucagon  Injectable 1 milliGRAM(s) IntraMuscular once PRN  insulin lispro (HumaLOG) corrective regimen sliding scale   SubCutaneous three times a day before meals  insulin lispro (HumaLOG) corrective regimen sliding scale   SubCutaneous at bedtime  oxyCODONE    IR 5 milliGRAM(s) Oral every 6 hours PRN  pantoprazole    Tablet 40 milliGRAM(s) Oral before breakfast  simvastatin 40 milliGRAM(s) Oral at bedtime      PMHX/PSHX:  GERD (gastroesophageal reflux disease)  Gastric cancer  Carotid stenosis  JACK (iron deficiency anemia)  HLD (hyperlipidemia)  HTN (hypertension)  CAD (coronary artery disease)  Hives  DM (diabetes mellitus)  BPH (Benign Prostatic Hypertrophy)  GERD (Gastroesophageal Reflux Disease)  S/P CABG X 3  Hypercholesteremia  CAD (Coronary Artery Disease)  S/P Radiation > 12 Weeks  S/P Chemotherapy, Time Since Greater than 12 Weeks  Anemia  BPH (Benign Prostatic Hypertrophy)  GERD (Gastroesophageal Reflux Disease)  Dyslipidemia  HTN - Hypertension  CAD (Coronary Artery Disease)  History of Stomach Cancer  S/P Gastrectomy  Atypical Chest Pain  Status Post Gastrectomy  S/P CABG X 4  Status Post Gastrectomy  S/P CABG X 4      Family history:  No pertinent family history in first degree relatives      ROS:     General:  No wt loss, fevers, chills, night sweats, fatigue,   Eyes:  Good vision, no reported pain  ENT:  No sore throat, pain, runny nose, dysphagia  CV:  No pain, palpitations, hypo/hypertension  Resp:  No dyspnea, cough, tachypnea, wheezing  GI:  See HPI  :  No pain, bleeding, incontinence, nocturia  Muscle:  No pain, weakness  Neuro:  No weakness, tingling, memory problems  Psych:  No fatigue, insomnia, mood problems, depression  Endocrine:  No polyuria, polydipsia, cold/heat intolerance  Heme:  No petechiae, ecchymosis, easy bruisability  Skin:  No rash, edema      PHYSICAL EXAM:     GENERAL: NAD  HEENT:  NC/AT,  conjunctivae clear, sclera -anicteric  CHEST:  Full & symmetric excursion, no increased effort  ABDOMEN:  Soft, non-tender, non-distended  EXTREMITIES:  no cyanosis,clubbing or edema  SKIN:  No rash  NEURO:  Alert, oriented    Vital Signs:  Vital Signs Last 24 Hrs  T(C): 36.7 (22 Feb 2019 05:25), Max: 36.7 (22 Feb 2019 05:25)  T(F): 98 (22 Feb 2019 05:25), Max: 98 (22 Feb 2019 05:25)  HR: 71 (22 Feb 2019 05:25) (67 - 92)  BP: 113/51 (22 Feb 2019 05:25) (111/67 - 131/57)  BP(mean): --  RR: 16 (22 Feb 2019 05:25) (16 - 18)  SpO2: 96% (22 Feb 2019 05:25) (96% - 100%)  Daily     Daily     LABS:                        11.0   7.16  )-----------( 227      ( 22 Feb 2019 05:50 )             34.6     02-22    135  |  99  |  16  ----------------------------<  119<H>  4.6   |  24  |  0.91    Ca    8.1<L>      22 Feb 2019 05:50  Phos  3.8     02-22  Mg     2.4     02-22    TPro  6.5  /  Alb  3.4  /  TBili  0.5  /  DBili  x   /  AST  21  /  ALT  23  /  AlkPhos  67  02-20    LIVER FUNCTIONS - ( 20 Feb 2019 12:17 )  Alb: 3.4 g/dL / Pro: 6.5 g/dL / ALK PHOS: 67 u/L / ALT: 23 u/L / AST: 21 u/L / GGT: x               Amylase Serum63      Lipase serum28.7       Ammonia--      Imaging:  < from: MR MRCP w/wo IV Cont (02.21.19 @ 11:01) >    EXAM:  MR MRCP WAW IC        PROCEDURE DATE:  Feb 21 2019         INTERPRETATION:  CLINICAL INFORMATION: Pruritus.    COMPARISON: 2/20/2019    PROCEDURE:   MRI of the abdomen was performed with and without intravenous contrast.  Administration of Gadavist was uneventful. 5 mL was administered and 2.5   mL discarded.      MRCP was performed.    FINDINGS:    LOWER CHEST: Within normal limits.    LIVER: Cysts. Left lobe atrophy again noted.   BILE DUCTS: No significant biliary dilatation.  GALLBLADDER: No gallstones. There is a low insertion of the cystic duct.  SPLEEN: Within normal limits.  PANCREAS: Again noted is marked dilatation of the main pancreatic duct   with cut off in the pancreatic neck, likely secondary to an intraductal   calculus which is better seen on the recent CT.  ADRENALS: Within normal limits.  KIDNEYS/URETERS: Cysts.    VISUALIZED PORTIONS:    BOWEL: Status post gastrojejunostomy.   PERITONEUM: No ascites.  VESSELS: Within normal limits.  RETROPERITONEUM: No lymphadenopathy.    ABDOMINAL WALL: Within normal limits.  BONES: Within normal limits.    IMPRESSION: Dilated main pancreatic duct down to the neck, likely   secondary to an intraductal calculus better seen on recent CT; consider   EUS for further evaluation.        CHAZ DEUTSCH M.D., ATTENDING RADIOLOGIST  This document has been electronically signed. Feb 21 2019  1:33PM                  < end of copied text >

## 2019-02-23 LAB
AMYLASE P1 CFR SERPL: 65 U/L — SIGNIFICANT CHANGE UP (ref 25–125)
ANION GAP SERPL CALC-SCNC: 11 MMO/L — SIGNIFICANT CHANGE UP (ref 7–14)
BUN SERPL-MCNC: 12 MG/DL — SIGNIFICANT CHANGE UP (ref 7–23)
CALCIUM SERPL-MCNC: 8.6 MG/DL — SIGNIFICANT CHANGE UP (ref 8.4–10.5)
CHLORIDE SERPL-SCNC: 100 MMOL/L — SIGNIFICANT CHANGE UP (ref 98–107)
CO2 SERPL-SCNC: 24 MMOL/L — SIGNIFICANT CHANGE UP (ref 22–31)
CREAT SERPL-MCNC: 0.88 MG/DL — SIGNIFICANT CHANGE UP (ref 0.5–1.3)
GLUCOSE SERPL-MCNC: 128 MG/DL — HIGH (ref 70–99)
HCT VFR BLD CALC: 37.6 % — LOW (ref 39–50)
HGB BLD-MCNC: 12 G/DL — LOW (ref 13–17)
LIDOCAIN IGE QN: 35.6 U/L — SIGNIFICANT CHANGE UP (ref 7–60)
MAGNESIUM SERPL-MCNC: 2.2 MG/DL — SIGNIFICANT CHANGE UP (ref 1.6–2.6)
MCHC RBC-ENTMCNC: 26.7 PG — LOW (ref 27–34)
MCHC RBC-ENTMCNC: 31.9 % — LOW (ref 32–36)
MCV RBC AUTO: 83.7 FL — SIGNIFICANT CHANGE UP (ref 80–100)
NRBC # FLD: 0 K/UL — LOW (ref 25–125)
PHOSPHATE SERPL-MCNC: 3.5 MG/DL — SIGNIFICANT CHANGE UP (ref 2.5–4.5)
PLATELET # BLD AUTO: 226 K/UL — SIGNIFICANT CHANGE UP (ref 150–400)
PMV BLD: 10.3 FL — SIGNIFICANT CHANGE UP (ref 7–13)
POTASSIUM SERPL-MCNC: 4.3 MMOL/L — SIGNIFICANT CHANGE UP (ref 3.5–5.3)
POTASSIUM SERPL-SCNC: 4.3 MMOL/L — SIGNIFICANT CHANGE UP (ref 3.5–5.3)
RBC # BLD: 4.49 M/UL — SIGNIFICANT CHANGE UP (ref 4.2–5.8)
RBC # FLD: 16.4 % — HIGH (ref 10.3–14.5)
SODIUM SERPL-SCNC: 135 MMOL/L — SIGNIFICANT CHANGE UP (ref 135–145)
WBC # BLD: 6 K/UL — SIGNIFICANT CHANGE UP (ref 3.8–10.5)
WBC # FLD AUTO: 6 K/UL — SIGNIFICANT CHANGE UP (ref 3.8–10.5)

## 2019-02-23 PROCEDURE — 73502 X-RAY EXAM HIP UNI 2-3 VIEWS: CPT | Mod: 26,RT

## 2019-02-23 PROCEDURE — 99232 SBSQ HOSP IP/OBS MODERATE 35: CPT

## 2019-02-23 RX ADMIN — SIMETHICONE 80 MILLIGRAM(S): 80 TABLET, CHEWABLE ORAL at 10:40

## 2019-02-23 RX ADMIN — Medication 650 MILLIGRAM(S): at 11:22

## 2019-02-23 RX ADMIN — Medication 1 APPLICATION(S): at 16:51

## 2019-02-23 RX ADMIN — Medication 2: at 16:50

## 2019-02-23 RX ADMIN — PANTOPRAZOLE SODIUM 40 MILLIGRAM(S): 20 TABLET, DELAYED RELEASE ORAL at 06:41

## 2019-02-23 RX ADMIN — OXYCODONE HYDROCHLORIDE 5 MILLIGRAM(S): 5 TABLET ORAL at 22:09

## 2019-02-23 RX ADMIN — Medication 650 MILLIGRAM(S): at 10:39

## 2019-02-23 RX ADMIN — Medication 25 MILLIGRAM(S): at 10:38

## 2019-02-23 RX ADMIN — SIMVASTATIN 40 MILLIGRAM(S): 20 TABLET, FILM COATED ORAL at 23:09

## 2019-02-23 RX ADMIN — CETIRIZINE HYDROCHLORIDE 10 MILLIGRAM(S): 10 TABLET ORAL at 06:41

## 2019-02-23 RX ADMIN — OXYCODONE HYDROCHLORIDE 5 MILLIGRAM(S): 5 TABLET ORAL at 21:31

## 2019-02-23 RX ADMIN — Medication 2: at 08:38

## 2019-02-23 RX ADMIN — CETIRIZINE HYDROCHLORIDE 10 MILLIGRAM(S): 10 TABLET ORAL at 18:38

## 2019-02-23 RX ADMIN — Medication 1 APPLICATION(S): at 06:44

## 2019-02-23 RX ADMIN — Medication 25 MILLIGRAM(S): at 18:40

## 2019-02-23 NOTE — PROGRESS NOTE ADULT - SUBJECTIVE AND OBJECTIVE BOX
Dr. Trotter pager [phone]    Patient is a 78y old  Male who presents with a chief complaint of headache, rash (22 Feb 2019 13:58)      SUBJECTIVE / OVERNIGHT EVENTS: pt c/o pain in right post thigh when he stands up- described it as radiating from back.  still w/ itching on head  c/o gas in stomach    MEDICATIONS  (STANDING):  cetirizine 10 milliGRAM(s) Oral <User Schedule>  clobetasol 0.05% Ointment 1 Application(s) Topical two times a day  dextrose 5%. 1000 milliLiter(s) (50 mL/Hr) IV Continuous <Continuous>  dextrose 50% Injectable 12.5 Gram(s) IV Push once  dextrose 50% Injectable 25 Gram(s) IV Push once  dextrose 50% Injectable 25 Gram(s) IV Push once  insulin lispro (HumaLOG) corrective regimen sliding scale   SubCutaneous three times a day before meals  insulin lispro (HumaLOG) corrective regimen sliding scale   SubCutaneous at bedtime  pantoprazole    Tablet 40 milliGRAM(s) Oral before breakfast  simvastatin 40 milliGRAM(s) Oral at bedtime    MEDICATIONS  (PRN):  acetaminophen   Tablet .. 650 milliGRAM(s) Oral every 6 hours PRN Mild Pain (1 - 3), Moderate Pain (4 - 6)  dextrose 40% Gel 15 Gram(s) Oral once PRN Blood Glucose LESS THAN 70 milliGRAM(s)/deciliter  diphenhydrAMINE 25 milliGRAM(s) Oral every 4 hours PRN Rash and/or Itching  glucagon  Injectable 1 milliGRAM(s) IntraMuscular once PRN Glucose LESS THAN 70 milligrams/deciliter  ondansetron Injectable 4 milliGRAM(s) IV Push every 8 hours PRN Nausea and/or Vomiting  oxyCODONE    IR 5 milliGRAM(s) Oral every 6 hours PRN Severe Pain (7 - 10)  simethicone 80 milliGRAM(s) Chew every 6 hours PRN Gas      Meds ordered within last 24hours  ondansetron Injectable: [Ordered as ZOFRAN Injectable]  4 milliGRAM(s), IV Push, every 8 hours, PRN for Nausea and/or Vomiting  Administration Instructions: Max IV Push dose 8 milliGRAM(s)  IF IV PUSH, ADMINISTER OVER 2-5 MIN  Provider's Contact #: [phone] (02-22 @ 14:07)  simethicone: [Known as MYLICON]  80 milliGRAM(s), Chew, every 6 hours, PRN for Gas  Provider's Contact #: [phone] (02-22 @ 16:44)      T(C): 36.3 (02-23-19 @ 09:53), Max: 37 (02-22-19 @ 14:27)  HR: 68 (02-23-19 @ 09:53) (62 - 70)  BP: 105/47 (02-23-19 @ 09:53) (105/47 - 122/49)  RR: 18 (02-23-19 @ 09:53) (16 - 18)  SpO2: 100% (02-23-19 @ 09:53) (99% - 100%)    CAPILLARY BLOOD GLUCOSE      POCT Blood Glucose.: 229 mg/dL (23 Feb 2019 08:17)  POCT Blood Glucose.: 206 mg/dL (22 Feb 2019 22:08)  POCT Blood Glucose.: 131 mg/dL (22 Feb 2019 16:37)  POCT Blood Glucose.: 212 mg/dL (22 Feb 2019 12:22)    I&O's Summary      PHYSICAL EXAM:  GENERAL: NAD  CHEST/LUNG: Clear to auscultation bilaterally; No wheeze  HEART: Regular rate and rhythm; No murmurs, rubs, or gallops  ABDOMEN: Soft, Nontender, Nondistended; Bowel sounds present  EXTREMITIES:  No clubbing, cyanosis, or edema  NEURO: A&Ox3      LABS:                        12.0   6.00  )-----------( 226      ( 23 Feb 2019 05:20 )             37.6     02-23    135  |  100  |  12  ----------------------------<  128<H>  4.3   |  24  |  0.88    Ca    8.6      23 Feb 2019 05:20  Phos  3.5     02-23  Mg     2.2     02-23                RADIOLOGY & ADDITIONAL TESTS:    Imaging Personally Reviewed:    Consultant(s) Notes Reviewed:      Care Discussed with Consultants/Other Providers:

## 2019-02-23 NOTE — PROGRESS NOTE ADULT - SUBJECTIVE AND OBJECTIVE BOX
Mor Woodard MD  Interventional Cardiology  [place] Office : [address] [place] [address]. [address]  Tel:   [place] Office : [address] [facility] [place] [address]  Tel: [phone]  Cell : [phone]    Subjective : Pt lying in bed comfortable, not in distress, denies any chest pain or SOB, c/o Hip pain   	  MEDICATIONS:      cetirizine 10 milliGRAM(s) Oral <User Schedule>    acetaminophen   Tablet .. 650 milliGRAM(s) Oral every 6 hours PRN  diphenhydrAMINE 25 milliGRAM(s) Oral every 4 hours PRN  ondansetron Injectable 4 milliGRAM(s) IV Push every 8 hours PRN  oxyCODONE    IR 5 milliGRAM(s) Oral every 6 hours PRN    pantoprazole    Tablet 40 milliGRAM(s) Oral before breakfast  simethicone 80 milliGRAM(s) Chew every 6 hours PRN    dextrose 40% Gel 15 Gram(s) Oral once PRN  dextrose 50% Injectable 12.5 Gram(s) IV Push once  dextrose 50% Injectable 25 Gram(s) IV Push once  dextrose 50% Injectable 25 Gram(s) IV Push once  glucagon  Injectable 1 milliGRAM(s) IntraMuscular once PRN  insulin lispro (HumaLOG) corrective regimen sliding scale   SubCutaneous three times a day before meals  insulin lispro (HumaLOG) corrective regimen sliding scale   SubCutaneous at bedtime  simvastatin 40 milliGRAM(s) Oral at bedtime    clobetasol 0.05% Ointment 1 Application(s) Topical two times a day  dextrose 5%. 1000 milliLiter(s) IV Continuous <Continuous>      PHYSICAL EXAM:  T(C): 36.3 (02-23-19 @ 09:53), Max: 36.9 (02-23-19 @ 05:47)  HR: 68 (02-23-19 @ 09:53) (62 - 68)  BP: 105/47 (02-23-19 @ 09:53) (105/47 - 116/55)  RR: 18 (02-23-19 @ 09:53) (16 - 18)  SpO2: 100% (02-23-19 @ 09:53) (100% - 100%)  Wt(kg): --  I&O's Summary        Appearance: cachectic 	  HEENT:   Normal oral mucosa	  Cardiovascular: Normal S1 S2, No JVD, systolic murmur +  Respiratory: Lungs clear to auscultation	  Gastrointestinal:  Soft, Non-tender, + BS	  Extremities:  No clubbing, cyanosis or edema                                      12.0   6.00  )-----------( 226      ( 23 Feb 2019 05:20 )             37.6     02-23    135  |  100  |  12  ----------------------------<  128<H>  4.3   |  24  |  0.88    Ca    8.6      23 Feb 2019 05:20  Phos  3.5     02-23  Mg     2.2     02-23      proBNP:   Lipid Profile:   HgA1c:   TSH:

## 2019-02-24 DIAGNOSIS — E87.1 HYPO-OSMOLALITY AND HYPONATREMIA: ICD-10-CM

## 2019-02-24 LAB
AMYLASE P1 CFR SERPL: 80 U/L — SIGNIFICANT CHANGE UP (ref 25–125)
ANION GAP SERPL CALC-SCNC: 13 MMO/L — SIGNIFICANT CHANGE UP (ref 7–14)
ANION GAP SERPL CALC-SCNC: 16 MMO/L — HIGH (ref 7–14)
BACTERIA BLD CULT: SIGNIFICANT CHANGE UP
BACTERIA BLD CULT: SIGNIFICANT CHANGE UP
BUN SERPL-MCNC: 13 MG/DL — SIGNIFICANT CHANGE UP (ref 7–23)
BUN SERPL-MCNC: 14 MG/DL — SIGNIFICANT CHANGE UP (ref 7–23)
CALCIUM SERPL-MCNC: 8.6 MG/DL — SIGNIFICANT CHANGE UP (ref 8.4–10.5)
CALCIUM SERPL-MCNC: 8.7 MG/DL — SIGNIFICANT CHANGE UP (ref 8.4–10.5)
CHLORIDE SERPL-SCNC: 95 MMOL/L — LOW (ref 98–107)
CHLORIDE SERPL-SCNC: 97 MMOL/L — LOW (ref 98–107)
CK SERPL-CCNC: 33 U/L — SIGNIFICANT CHANGE UP (ref 30–200)
CO2 SERPL-SCNC: 18 MMOL/L — LOW (ref 22–31)
CO2 SERPL-SCNC: 20 MMOL/L — LOW (ref 22–31)
CREAT SERPL-MCNC: 0.81 MG/DL — SIGNIFICANT CHANGE UP (ref 0.5–1.3)
CREAT SERPL-MCNC: 0.82 MG/DL — SIGNIFICANT CHANGE UP (ref 0.5–1.3)
GLUCOSE SERPL-MCNC: 123 MG/DL — HIGH (ref 70–99)
GLUCOSE SERPL-MCNC: 193 MG/DL — HIGH (ref 70–99)
HCT VFR BLD CALC: 34 % — LOW (ref 39–50)
HGB BLD-MCNC: 10.8 G/DL — LOW (ref 13–17)
LIDOCAIN IGE QN: 33 U/L — SIGNIFICANT CHANGE UP (ref 7–60)
MAGNESIUM SERPL-MCNC: 1.9 MG/DL — SIGNIFICANT CHANGE UP (ref 1.6–2.6)
MAGNESIUM SERPL-MCNC: 2 MG/DL — SIGNIFICANT CHANGE UP (ref 1.6–2.6)
MCHC RBC-ENTMCNC: 26.8 PG — LOW (ref 27–34)
MCHC RBC-ENTMCNC: 31.8 % — LOW (ref 32–36)
MCV RBC AUTO: 84.4 FL — SIGNIFICANT CHANGE UP (ref 80–100)
NRBC # FLD: 0 K/UL — LOW (ref 25–125)
OSMOLALITY SERPL: 280 MOSMO/KG — SIGNIFICANT CHANGE UP (ref 275–295)
OSMOLALITY UR: 594 MOSMO/KG — SIGNIFICANT CHANGE UP (ref 50–1200)
PHOSPHATE SERPL-MCNC: 2.8 MG/DL — SIGNIFICANT CHANGE UP (ref 2.5–4.5)
PHOSPHATE SERPL-MCNC: 3.1 MG/DL — SIGNIFICANT CHANGE UP (ref 2.5–4.5)
PLATELET # BLD AUTO: 226 K/UL — SIGNIFICANT CHANGE UP (ref 150–400)
PMV BLD: 10 FL — SIGNIFICANT CHANGE UP (ref 7–13)
POTASSIUM SERPL-MCNC: 4.6 MMOL/L — SIGNIFICANT CHANGE UP (ref 3.5–5.3)
POTASSIUM SERPL-MCNC: 4.9 MMOL/L — SIGNIFICANT CHANGE UP (ref 3.5–5.3)
POTASSIUM SERPL-SCNC: 4.6 MMOL/L — SIGNIFICANT CHANGE UP (ref 3.5–5.3)
POTASSIUM SERPL-SCNC: 4.9 MMOL/L — SIGNIFICANT CHANGE UP (ref 3.5–5.3)
RBC # BLD: 4.03 M/UL — LOW (ref 4.2–5.8)
RBC # FLD: 16.2 % — HIGH (ref 10.3–14.5)
SODIUM SERPL-SCNC: 129 MMOL/L — LOW (ref 135–145)
SODIUM SERPL-SCNC: 130 MMOL/L — LOW (ref 135–145)
SODIUM UR-SCNC: 66 MMOL/L — SIGNIFICANT CHANGE UP
TROPONIN T, HIGH SENSITIVITY: 18 NG/L — SIGNIFICANT CHANGE UP (ref ?–14)
URATE SERPL-MCNC: 2.8 MG/DL — LOW (ref 3.4–8.8)
WBC # BLD: 7.24 K/UL — SIGNIFICANT CHANGE UP (ref 3.8–10.5)
WBC # FLD AUTO: 7.24 K/UL — SIGNIFICANT CHANGE UP (ref 3.8–10.5)

## 2019-02-24 PROCEDURE — 99232 SBSQ HOSP IP/OBS MODERATE 35: CPT

## 2019-02-24 PROCEDURE — 93010 ELECTROCARDIOGRAM REPORT: CPT

## 2019-02-24 PROCEDURE — 72148 MRI LUMBAR SPINE W/O DYE: CPT | Mod: 26

## 2019-02-24 RX ORDER — SODIUM CHLORIDE 9 MG/ML
500 INJECTION INTRAMUSCULAR; INTRAVENOUS; SUBCUTANEOUS
Qty: 0 | Refills: 0 | Status: COMPLETED | OUTPATIENT
Start: 2019-02-24 | End: 2019-02-24

## 2019-02-24 RX ORDER — SIMETHICONE 80 MG/1
80 TABLET, CHEWABLE ORAL EVERY 6 HOURS
Qty: 0 | Refills: 0 | Status: DISCONTINUED | OUTPATIENT
Start: 2019-02-24 | End: 2019-02-26

## 2019-02-24 RX ADMIN — OXYCODONE HYDROCHLORIDE 5 MILLIGRAM(S): 5 TABLET ORAL at 14:32

## 2019-02-24 RX ADMIN — Medication 30 MILLILITER(S): at 17:35

## 2019-02-24 RX ADMIN — SIMETHICONE 80 MILLIGRAM(S): 80 TABLET, CHEWABLE ORAL at 17:35

## 2019-02-24 RX ADMIN — PANTOPRAZOLE SODIUM 40 MILLIGRAM(S): 20 TABLET, DELAYED RELEASE ORAL at 06:12

## 2019-02-24 RX ADMIN — SIMETHICONE 80 MILLIGRAM(S): 80 TABLET, CHEWABLE ORAL at 10:56

## 2019-02-24 RX ADMIN — SIMVASTATIN 40 MILLIGRAM(S): 20 TABLET, FILM COATED ORAL at 21:49

## 2019-02-24 RX ADMIN — OXYCODONE HYDROCHLORIDE 5 MILLIGRAM(S): 5 TABLET ORAL at 23:37

## 2019-02-24 RX ADMIN — SODIUM CHLORIDE 75 MILLILITER(S): 9 INJECTION INTRAMUSCULAR; INTRAVENOUS; SUBCUTANEOUS at 10:55

## 2019-02-24 RX ADMIN — OXYCODONE HYDROCHLORIDE 5 MILLIGRAM(S): 5 TABLET ORAL at 14:54

## 2019-02-24 RX ADMIN — Medication 30 MILLILITER(S): at 10:56

## 2019-02-24 RX ADMIN — Medication 1: at 17:35

## 2019-02-24 RX ADMIN — Medication 30 MILLILITER(S): at 23:15

## 2019-02-24 RX ADMIN — CETIRIZINE HYDROCHLORIDE 10 MILLIGRAM(S): 10 TABLET ORAL at 17:35

## 2019-02-24 RX ADMIN — OXYCODONE HYDROCHLORIDE 5 MILLIGRAM(S): 5 TABLET ORAL at 04:27

## 2019-02-24 RX ADMIN — Medication 1: at 11:53

## 2019-02-24 RX ADMIN — Medication 1 APPLICATION(S): at 21:49

## 2019-02-24 RX ADMIN — OXYCODONE HYDROCHLORIDE 5 MILLIGRAM(S): 5 TABLET ORAL at 06:17

## 2019-02-24 RX ADMIN — CETIRIZINE HYDROCHLORIDE 10 MILLIGRAM(S): 10 TABLET ORAL at 06:13

## 2019-02-24 RX ADMIN — SIMETHICONE 80 MILLIGRAM(S): 80 TABLET, CHEWABLE ORAL at 23:13

## 2019-02-24 NOTE — PROGRESS NOTE ADULT - SUBJECTIVE AND OBJECTIVE BOX
Mor Woodard MD  Interventional Cardiology  [place] Office : [address] [place] [address]  Tel:   [place] Office : [address] [facility] [place] [address]  Tel: [phone]  Cell : [phone]    Subjective : Pt lying in bed comfortable, not in distress,  c/o CP , denies SOB dizziness   	  MEDICATIONS:      cetirizine 10 milliGRAM(s) Oral <User Schedule>    acetaminophen   Tablet .. 650 milliGRAM(s) Oral every 6 hours PRN  diphenhydrAMINE 25 milliGRAM(s) Oral every 4 hours PRN  ondansetron Injectable 4 milliGRAM(s) IV Push every 8 hours PRN  oxyCODONE    IR 5 milliGRAM(s) Oral every 6 hours PRN    aluminum hydroxide/magnesium hydroxide/simethicone Suspension 30 milliLiter(s) Oral every 6 hours  pantoprazole    Tablet 40 milliGRAM(s) Oral before breakfast  simethicone 80 milliGRAM(s) Chew every 6 hours    dextrose 40% Gel 15 Gram(s) Oral once PRN  dextrose 50% Injectable 12.5 Gram(s) IV Push once  dextrose 50% Injectable 25 Gram(s) IV Push once  dextrose 50% Injectable 25 Gram(s) IV Push once  glucagon  Injectable 1 milliGRAM(s) IntraMuscular once PRN  insulin lispro (HumaLOG) corrective regimen sliding scale   SubCutaneous three times a day before meals  insulin lispro (HumaLOG) corrective regimen sliding scale   SubCutaneous at bedtime  simvastatin 40 milliGRAM(s) Oral at bedtime    clobetasol 0.05% Cream 1 Application(s) Topical two times a day  dextrose 5%. 1000 milliLiter(s) IV Continuous <Continuous>      PHYSICAL EXAM:  T(C): 37.2 (02-24-19 @ 18:14), Max: 37.2 (02-24-19 @ 18:14)  HR: 74 (02-24-19 @ 18:14) (74 - 83)  BP: 142/58 (02-24-19 @ 18:14) (142/58 - 149/57)  RR: 18 (02-24-19 @ 18:14) (16 - 18)  SpO2: 100% (02-24-19 @ 18:14) (98% - 100%)  Wt(kg): --  I&O's Summary        Appearance: cachectic 	  HEENT:   Normal oral mucosa	  Cardiovascular: Normal S1 S2, No JVD, systolic murmur +  Respiratory: Lungs clear to auscultation	  Gastrointestinal:  Soft, Non-tender, + BS	  Extremities:  No clubbing, cyanosis or edema                                    10.8   7.24  )-----------( 226      ( 24 Feb 2019 06:10 )             34.0     02-24    130<L>  |  97<L>  |  14  ----------------------------<  193<H>  4.9   |  20<L>  |  0.81    Ca    8.6      24 Feb 2019 12:47  Phos  2.8     02-24  Mg     1.9     02-24      proBNP:   Lipid Profile:   HgA1c:   TSH:

## 2019-02-24 NOTE — PROGRESS NOTE ADULT - SUBJECTIVE AND OBJECTIVE BOX
Dr. Trotter pager [phone]    Patient is a 78y old  Male who presents with a chief complaint of headache, rash (23 Feb 2019 14:57)      SUBJECTIVE / OVERNIGHT EVENTS: pt still w c/o pain in both legs- worried that it started here radiating down both legs  still w/ bloating and gassy feeling- not eating well     MEDICATIONS  (STANDING):  aluminum hydroxide/magnesium hydroxide/simethicone Suspension 30 milliLiter(s) Oral every 6 hours  cetirizine 10 milliGRAM(s) Oral <User Schedule>  clobetasol 0.05% Ointment 1 Application(s) Topical two times a day  dextrose 5%. 1000 milliLiter(s) (50 mL/Hr) IV Continuous <Continuous>  dextrose 50% Injectable 12.5 Gram(s) IV Push once  dextrose 50% Injectable 25 Gram(s) IV Push once  dextrose 50% Injectable 25 Gram(s) IV Push once  insulin lispro (HumaLOG) corrective regimen sliding scale   SubCutaneous three times a day before meals  insulin lispro (HumaLOG) corrective regimen sliding scale   SubCutaneous at bedtime  pantoprazole    Tablet 40 milliGRAM(s) Oral before breakfast  simethicone 80 milliGRAM(s) Chew every 6 hours  simvastatin 40 milliGRAM(s) Oral at bedtime  sodium chloride 0.9%. 500 milliLiter(s) (75 mL/Hr) IV Continuous <Continuous>    MEDICATIONS  (PRN):  acetaminophen   Tablet .. 650 milliGRAM(s) Oral every 6 hours PRN Mild Pain (1 - 3), Moderate Pain (4 - 6)  dextrose 40% Gel 15 Gram(s) Oral once PRN Blood Glucose LESS THAN 70 milliGRAM(s)/deciliter  diphenhydrAMINE 25 milliGRAM(s) Oral every 4 hours PRN Rash and/or Itching  glucagon  Injectable 1 milliGRAM(s) IntraMuscular once PRN Glucose LESS THAN 70 milligrams/deciliter  ondansetron Injectable 4 milliGRAM(s) IV Push every 8 hours PRN Nausea and/or Vomiting  oxyCODONE    IR 5 milliGRAM(s) Oral every 6 hours PRN Severe Pain (7 - 10)      Meds ordered within last 24hours  sodium chloride 0.9%.: Solution, 500 milliLiter(s) infuse at 75 mL/Hr, Stop After 1 Doses  Provider's Contact #: [phone] (02-24 @ 10:14)  simethicone: [Known as MYLICON]  80 milliGRAM(s), Chew, every 6 hours  Provider's Contact #: [phone] (02-24 @ 10:15)  aluminum hydroxide/magnesium hydroxide/simethicone Suspension: [Ordered as MAALOX]  30 milliLiter(s), Oral, every 6 hours  Administration Instructions: shake well  Provider's Contact #: [phone] (02-24 @ 10:15)      T(C): 37 (02-24-19 @ 09:37), Max: 37.1 (02-23-19 @ 21:22)  HR: 79 (02-24-19 @ 09:37) (66 - 83)  BP: 145/62 (02-24-19 @ 09:37) (116/60 - 145/66)  RR: 16 (02-24-19 @ 09:37) (16 - 18)  SpO2: 100% (02-24-19 @ 09:37) (99% - 100%)    CAPILLARY BLOOD GLUCOSE      POCT Blood Glucose.: 179 mg/dL (24 Feb 2019 08:03)  POCT Blood Glucose.: 122 mg/dL (23 Feb 2019 21:17)  POCT Blood Glucose.: 249 mg/dL (23 Feb 2019 16:24)  POCT Blood Glucose.: 98 mg/dL (23 Feb 2019 12:13)    I&O's Summary      PHYSICAL EXAM:  GENERAL: NAD  CHEST/LUNG: Clear to auscultation bilaterally; No wheeze  HEART: Regular rate and rhythm; No murmurs, rubs, or gallops  ABDOMEN: Soft, Nontender, slightly distended; Bowel sounds present  EXTREMITIES:  No clubbing, cyanosis, or edema  NEURO: A&Ox3      LABS:                        10.8   7.24  )-----------( 226      ( 24 Feb 2019 06:10 )             34.0     02-24    129<L>  |  95<L>  |  13  ----------------------------<  123<H>  4.6   |  18<L>  |  0.82    Ca    8.7      24 Feb 2019 06:10  Phos  3.1     02-24  Mg     2.0     02-24                RADIOLOGY & ADDITIONAL TESTS:    Imaging Personally Reviewed:    Consultant(s) Notes Reviewed:      Care Discussed with Consultants/Other Providers:

## 2019-02-25 LAB
AMYLASE P1 CFR SERPL: 76 U/L — SIGNIFICANT CHANGE UP (ref 25–125)
ANION GAP SERPL CALC-SCNC: 12 MMO/L — SIGNIFICANT CHANGE UP (ref 7–14)
BUN SERPL-MCNC: 12 MG/DL — SIGNIFICANT CHANGE UP (ref 7–23)
CALCIUM SERPL-MCNC: 8.8 MG/DL — SIGNIFICANT CHANGE UP (ref 8.4–10.5)
CHLORIDE SERPL-SCNC: 95 MMOL/L — LOW (ref 98–107)
CO2 SERPL-SCNC: 24 MMOL/L — SIGNIFICANT CHANGE UP (ref 22–31)
CREAT SERPL-MCNC: 0.81 MG/DL — SIGNIFICANT CHANGE UP (ref 0.5–1.3)
GLUCOSE BLDC GLUCOMTR-MCNC: 195 MG/DL — HIGH (ref 70–99)
GLUCOSE SERPL-MCNC: 125 MG/DL — HIGH (ref 70–99)
HCT VFR BLD CALC: 38 % — LOW (ref 39–50)
HGB BLD-MCNC: 12 G/DL — LOW (ref 13–17)
LIDOCAIN IGE QN: 25.9 U/L — SIGNIFICANT CHANGE UP (ref 7–60)
MAGNESIUM SERPL-MCNC: 2.1 MG/DL — SIGNIFICANT CHANGE UP (ref 1.6–2.6)
MCHC RBC-ENTMCNC: 26.7 PG — LOW (ref 27–34)
MCHC RBC-ENTMCNC: 31.6 % — LOW (ref 32–36)
MCV RBC AUTO: 84.6 FL — SIGNIFICANT CHANGE UP (ref 80–100)
NRBC # FLD: 0 K/UL — LOW (ref 25–125)
PHOSPHATE SERPL-MCNC: 2.9 MG/DL — SIGNIFICANT CHANGE UP (ref 2.5–4.5)
PLATELET # BLD AUTO: 219 K/UL — SIGNIFICANT CHANGE UP (ref 150–400)
PMV BLD: 9.6 FL — SIGNIFICANT CHANGE UP (ref 7–13)
POTASSIUM SERPL-MCNC: 4.5 MMOL/L — SIGNIFICANT CHANGE UP (ref 3.5–5.3)
POTASSIUM SERPL-SCNC: 4.5 MMOL/L — SIGNIFICANT CHANGE UP (ref 3.5–5.3)
RBC # BLD: 4.49 M/UL — SIGNIFICANT CHANGE UP (ref 4.2–5.8)
RBC # FLD: 16.3 % — HIGH (ref 10.3–14.5)
SODIUM SERPL-SCNC: 131 MMOL/L — LOW (ref 135–145)
WBC # BLD: 8.38 K/UL — SIGNIFICANT CHANGE UP (ref 3.8–10.5)
WBC # FLD AUTO: 8.38 K/UL — SIGNIFICANT CHANGE UP (ref 3.8–10.5)

## 2019-02-25 PROCEDURE — 99232 SBSQ HOSP IP/OBS MODERATE 35: CPT

## 2019-02-25 PROCEDURE — 99232 SBSQ HOSP IP/OBS MODERATE 35: CPT | Mod: GC

## 2019-02-25 RX ORDER — SENNA PLUS 8.6 MG/1
2 TABLET ORAL AT BEDTIME
Qty: 0 | Refills: 0 | Status: DISCONTINUED | OUTPATIENT
Start: 2019-02-25 | End: 2019-02-26

## 2019-02-25 RX ORDER — DOCUSATE SODIUM 100 MG
100 CAPSULE ORAL THREE TIMES A DAY
Qty: 0 | Refills: 0 | Status: DISCONTINUED | OUTPATIENT
Start: 2019-02-25 | End: 2019-02-26

## 2019-02-25 RX ADMIN — Medication 30 MILLILITER(S): at 17:20

## 2019-02-25 RX ADMIN — ONDANSETRON 4 MILLIGRAM(S): 8 TABLET, FILM COATED ORAL at 11:58

## 2019-02-25 RX ADMIN — Medication 30 MILLILITER(S): at 11:58

## 2019-02-25 RX ADMIN — Medication 1 APPLICATION(S): at 17:20

## 2019-02-25 RX ADMIN — CETIRIZINE HYDROCHLORIDE 10 MILLIGRAM(S): 10 TABLET ORAL at 17:20

## 2019-02-25 RX ADMIN — Medication 30 MILLILITER(S): at 23:52

## 2019-02-25 RX ADMIN — Medication 1: at 17:21

## 2019-02-25 RX ADMIN — SIMETHICONE 80 MILLIGRAM(S): 80 TABLET, CHEWABLE ORAL at 23:52

## 2019-02-25 RX ADMIN — PANTOPRAZOLE SODIUM 40 MILLIGRAM(S): 20 TABLET, DELAYED RELEASE ORAL at 09:19

## 2019-02-25 RX ADMIN — OXYCODONE HYDROCHLORIDE 5 MILLIGRAM(S): 5 TABLET ORAL at 13:00

## 2019-02-25 RX ADMIN — Medication 30 MILLILITER(S): at 05:30

## 2019-02-25 RX ADMIN — SIMETHICONE 80 MILLIGRAM(S): 80 TABLET, CHEWABLE ORAL at 17:20

## 2019-02-25 RX ADMIN — Medication 100 MILLIGRAM(S): at 21:41

## 2019-02-25 RX ADMIN — CETIRIZINE HYDROCHLORIDE 10 MILLIGRAM(S): 10 TABLET ORAL at 05:30

## 2019-02-25 RX ADMIN — SIMVASTATIN 40 MILLIGRAM(S): 20 TABLET, FILM COATED ORAL at 21:41

## 2019-02-25 RX ADMIN — SIMETHICONE 80 MILLIGRAM(S): 80 TABLET, CHEWABLE ORAL at 11:58

## 2019-02-25 RX ADMIN — OXYCODONE HYDROCHLORIDE 5 MILLIGRAM(S): 5 TABLET ORAL at 00:10

## 2019-02-25 RX ADMIN — Medication 1 APPLICATION(S): at 05:30

## 2019-02-25 RX ADMIN — OXYCODONE HYDROCHLORIDE 5 MILLIGRAM(S): 5 TABLET ORAL at 12:01

## 2019-02-25 RX ADMIN — Medication 1: at 12:44

## 2019-02-25 RX ADMIN — Medication 100 MILLIGRAM(S): at 17:20

## 2019-02-25 RX ADMIN — SIMETHICONE 80 MILLIGRAM(S): 80 TABLET, CHEWABLE ORAL at 05:30

## 2019-02-25 RX ADMIN — SENNA PLUS 2 TABLET(S): 8.6 TABLET ORAL at 21:41

## 2019-02-25 NOTE — PROGRESS NOTE ADULT - PROBLEM SELECTOR PROBLEM 5
Type 2 diabetes mellitus with complication, unspecified whether long term insulin use
Coronary artery disease involving native heart, angina presence unspecified, unspecified vessel or lesion type
Coronary artery disease involving native heart, angina presence unspecified, unspecified vessel or lesion type
Type 2 diabetes mellitus with complication, unspecified whether long term insulin use

## 2019-02-25 NOTE — PROGRESS NOTE ADULT - PROBLEM SELECTOR PLAN 6
hold aspirin  c/w statin for now
scd
scd

## 2019-02-25 NOTE — PROGRESS NOTE ADULT - PROBLEM SELECTOR PLAN 3
-was on prednisone and topical triamcinolone for skin rash without improvement, states he has the skin rash for 3 years   -dermatology consult appreciated, rash appears improving, f/u derm recs, cont,  clobetasol topical, zyrtec, benadryl prn  blood culture neg to date
-was on prednisone and topical triamcinolone for skin rash without improvement, states he has the skin rash for 3 years   -dermatology consult appreciated, rash appears improving, f/u derm recs, cont,  clobetasol topical, zyrtec, benadryl prn  blood culture neg to date
-h/o gastric cancer s/p gastrojejunostomy and chemoradiation in 2006, along with 3 months of postprandial vomiting  -CT negative for bowel obstruction or ileus  -GI consult appreciated for MRCP today  f/u results, pt reports syncopal episode prior to attempted EGD 6 months ago?  will get cards consult for clearance per GI recs  cont liquid diet for now
-h/o gastric cancer s/p gastrojejunostomy and chemoradiation in 2006, along with 3 months of postprandial vomiting  -CT negative for bowel obstruction or ileus  -GI consult, pt reports syncopal episode prior to attempted EGD 6 months ago?
-was on prednisone and topical triamcinolone for skin rash without improvement, states he has the skin rash for 3 years   -dermatology consult appreciated, rash appears improving, f/u derm recs, cont,  clobetasol topical, zyrtec, benadryl prn  blood culture neg to date
-was on prednisone and topical triamcinolone for skin rash without improvement, states he has the skin rash for 3 years   -dermatology consult appreciated, rash appears improving, f/u derm recs, cont,  clobetasol topical, zyrtec, benadryl prn  blood culture neg to date

## 2019-02-25 NOTE — PROGRESS NOTE ADULT - PROBLEM SELECTOR PLAN 8
SIADH  fluid restrict and monitor
?d/t decr po intake  refusing repeat labs  will trial empiric fluids to see if better

## 2019-02-25 NOTE — PROGRESS NOTE ADULT - PROBLEM SELECTOR PLAN 1
-MRI/A brain (2/20) Bifrontal small contusion hematomas, and bitemporal contusions "small amounts of subarachnoid hemorrhage within the   hemispheric sulci and refluxed into the ventricular system. Small   bifrontal subdural hygromas versus chronic subdural hematomas.  Intracranial MR angiography demonstrates no significant stenosis,   evidence for aneurysm, or vascular malformation."  Extracranial MR angiography demonstrates no flow-limiting stenosis by   NASCET criteria.  -neurosx recs appreciated, no surgical intervention, hold ASA for at least 2 weeks, and f/u outpatient with Dr. Asaf Brown.   -TTE with nl lv fn  -If change in neurological status order immediate CT head.   -neuro checks q4h   -Tylenol prn pain
-MRI/A brain (2/20) Bifrontal small contusion hematomas, and bitemporal contusions "small amounts of subarachnoid hemorrhage within the   hemispheric sulci and refluxed into the ventricular system. Small   bifrontal subdural hygromas versus chronic subdural hematomas.  Intracranial MR angiography demonstrates no significant stenosis,   evidence for aneurysm, or vascular malformation."  Extracranial MR angiography demonstrates no flow-limiting stenosis by   NASCET criteria.  -neurosx recs appreciated, no surgical intervention, hold ASA for at least 2 weeks, and f/u outpatient with Dr. Asaf Brown.   -TTE with nl lv fn  -If change in neurological status order immediate CT head.   -neuro checks q4h   -Tylenol prn pain
-MRI/A brain (2/20) Bifrontal small contusion hematomas, and bitemporal contusions as   described with small amounts of subarachnoid hemorrhage within the   hemispheric sulci and refluxed into the ventricular system. Small   bifrontal subdural hygromas versus chronic subdural hematomas.  Intracranial MR angiography demonstrates no significant stenosis,   evidence for aneurysm, or vascular malformation.  Extracranial MR angiography demonstrates no flow-limiting stenosis by   NASCET criteria.  -neurosx recs appreciated, no surgical intervention, hold ASA for at least 2 weeks, and f/u outpatient with Dr. Asaf Brown.   -TTE pending  -If change in neurological status order immediate CT head.   -neuro checks q4h   -Tylenol prn pain
-MRI/A brain (2/20) Bifrontal small contusion hematomas, and bitemporal contusions as   described with small amounts of subarachnoid hemorrhage within the   hemispheric sulci and refluxed into the ventricular system. Small   bifrontal subdural hygromas versus chronic subdural hematomas.  Intracranial MR angiography demonstrates no significant stenosis,   evidence for aneurysm, or vascular malformation.  Extracranial MR angiography demonstrates no flow-limiting stenosis by   NASCET criteria.  -neurosx recs appreciated, no surgical intervention, hold ASA for at least 2 weeks, and f/u outpatient with Dr. Asaf Brown.   -TTE pending  -If change in neurological status order immediate CT head.   -neuro checks q4h   -Tylenol prn pain
-MRI/A brain (2/20) Bifrontal small contusion hematomas, and bitemporal contusions as   described with small amounts of subarachnoid hemorrhage within the   hemispheric sulci and refluxed into the ventricular system. Small   bifrontal subdural hygromas versus chronic subdural hematomas.  Intracranial MR angiography demonstrates no significant stenosis,   evidence for aneurysm, or vascular malformation.  Extracranial MR angiography demonstrates no flow-limiting stenosis by   NASCET criteria.  -neurosx recs appreciated, no surgical intervention, hold ASA for at least 2 weeks, and f/u outpatient with Dr. Asaf Brown.   -TTE with nl lv fn  -If change in neurological status order immediate CT head.   -neuro checks q4h   -Tylenol prn pain
-MRI/A brain (2/20) Bifrontal small contusion hematomas, and bitemporal contusions as   described with small amounts of subarachnoid hemorrhage within the   hemispheric sulci and refluxed into the ventricular system. Small   bifrontal subdural hygromas versus chronic subdural hematomas.  Intracranial MR angiography demonstrates no significant stenosis,   evidence for aneurysm, or vascular malformation.  Extracranial MR angiography demonstrates no flow-limiting stenosis by   NASCET criteria.  -neurosx recs appreciated, no surgical intervention, hold ASA for at least 2 weeks, and f/u outpatient with Dr. Asaf Brown.   -TTE with nl lv fn  -If change in neurological status order immediate CT head.   -neuro checks q4h   -Tylenol prn pain

## 2019-02-25 NOTE — PROGRESS NOTE ADULT - PROBLEM SELECTOR PROBLEM 4
Vomiting, intractability of vomiting not specified, presence of nausea not specified, unspecified vomiting type
Type 2 diabetes mellitus with complication, unspecified whether long term insulin use
Type 2 diabetes mellitus with complication, unspecified whether long term insulin use
Vomiting, intractability of vomiting not specified, presence of nausea not specified, unspecified vomiting type

## 2019-02-25 NOTE — PROGRESS NOTE ADULT - PROBLEM SELECTOR PROBLEM 6
Coronary artery disease involving native heart, angina presence unspecified, unspecified vessel or lesion type
Prophylactic measure
Prophylactic measure

## 2019-02-25 NOTE — PROGRESS NOTE ADULT - SUBJECTIVE AND OBJECTIVE BOX
Mor Woodard MD  Interventional Cardiology / Advance Heart Failure and Cardiac Transplant Specialist  [place] Office : [phone] [place] [address] [place]. [address]  Tel:   [place] Office : [address] [facility] [place] [address]  Tel: [phone]  Cell : [phone]    Subjective : Pt lying in bed comfortable, not in distress, denies any chest pain or SOB, vomiting this AM  	  MEDICATIONS:      cetirizine 10 milliGRAM(s) Oral <User Schedule>    acetaminophen   Tablet .. 650 milliGRAM(s) Oral every 6 hours PRN  diphenhydrAMINE 25 milliGRAM(s) Oral every 4 hours PRN  ondansetron Injectable 4 milliGRAM(s) IV Push every 8 hours PRN  oxyCODONE    IR 5 milliGRAM(s) Oral every 6 hours PRN    aluminum hydroxide/magnesium hydroxide/simethicone Suspension 30 milliLiter(s) Oral every 6 hours  docusate sodium 100 milliGRAM(s) Oral three times a day  pantoprazole    Tablet 40 milliGRAM(s) Oral before breakfast  senna 2 Tablet(s) Oral at bedtime  simethicone 80 milliGRAM(s) Chew every 6 hours    dextrose 40% Gel 15 Gram(s) Oral once PRN  dextrose 50% Injectable 12.5 Gram(s) IV Push once  dextrose 50% Injectable 25 Gram(s) IV Push once  dextrose 50% Injectable 25 Gram(s) IV Push once  glucagon  Injectable 1 milliGRAM(s) IntraMuscular once PRN  insulin lispro (HumaLOG) corrective regimen sliding scale   SubCutaneous three times a day before meals  insulin lispro (HumaLOG) corrective regimen sliding scale   SubCutaneous at bedtime  simvastatin 40 milliGRAM(s) Oral at bedtime    clobetasol 0.05% Cream 1 Application(s) Topical two times a day  dextrose 5%. 1000 milliLiter(s) IV Continuous <Continuous>      PHYSICAL EXAM:  T(C): 37.6 (02-25-19 @ 14:30), Max: 37.6 (02-25-19 @ 14:30)  HR: 86 (02-25-19 @ 14:30) (74 - 99)  BP: 110/49 (02-25-19 @ 14:30) (110/49 - 142/75)  RR: 18 (02-25-19 @ 14:30) (16 - 18)  SpO2: 99% (02-25-19 @ 14:30) (99% - 100%)  Wt(kg): --  I&O's Summary    Height (cm): 165.1 (02-25 @ 12:44)  Weight (kg): 47.8 (02-25 @ 12:44)  BMI (kg/m2): 17.5 (02-25 @ 12:44)  BSA (m2): 1.51 (02-25 @ 12:44)    	  HEENT:   Normal oral mucosa, PERRL, EOMI	  Cardiovascular: Normal S1 S2, No JVD, No murmurs, No edema  Respiratory: Lungs clear to auscultation	  Gastrointestinal:  Soft, Non-tender, + BS	  Extremities: Normal range of motion, No clubbing, cyanosis or edema                                    12.0   8.38  )-----------( 219      ( 25 Feb 2019 06:18 )             38.0     02-25    131<L>  |  95<L>  |  12  ----------------------------<  125<H>  4.5   |  24  |  0.81    Ca    8.8      25 Feb 2019 06:18  Phos  2.9     02-25  Mg     2.1     02-25      proBNP:   Lipid Profile:   HgA1c:   TSH:

## 2019-02-25 NOTE — PROGRESS NOTE ADULT - PROBLEM SELECTOR PROBLEM 3
Skin rash
Vomiting, intractability of vomiting not specified, presence of nausea not specified, unspecified vomiting type
Vomiting, intractability of vomiting not specified, presence of nausea not specified, unspecified vomiting type

## 2019-02-25 NOTE — PROGRESS NOTE ADULT - PROBLEM SELECTOR PLAN 5
-ISS, monitor FS
hold aspirin  reports taking statin, but pharmacy has not included on list. pt not on BB likely d/t previous syncopal episodes admission July 2018. Will c/w statin for now
hold aspirin  reports taking statin, but pharmacy has not included on list. pt not on BB likely d/t previous syncopal episodes admission July 2018. Will c/w statin for now

## 2019-02-25 NOTE — PROGRESS NOTE ADULT - PROBLEM SELECTOR PLAN 4
-h/o gastric cancer s/p gastrojejunostomy and chemoradiation in 2006, along with 3 months of postprandial vomiting  -CT negative for bowel obstruction or ileus  -GI consult appreciated, MRCP revealed Dilated main pancreatic duct down to the neck, likely secondary to an intraductal calculus better seen on recent CT; consider   EUS  for possible EUS per GI  seen by cardiology and neurosurg for eval for anesthesia for planned procedure
-ISS, monitor FS
-ISS, monitor FS
-h/o gastric cancer s/p gastrojejunostomy and chemoradiation in 2006, along with  months of postprandial vomiting  -CT negative for bowel obstruction or ileus  -GI consult appreciated, MRCP revealed Dilated main pancreatic duct down to the neck, likely secondary to an intraductal calculus better seen on recent CT; consider   EUS  will trial standing maalox and simethicone   seen by cardiology and neurosurg for eval for anesthesia for planned procedure  not sure if pt will be getting inpt procedure   small bowel series ordered for chronic vomiting with bloating- hx is unclear
-h/o gastric cancer s/p gastrojejunostomy and chemoradiation in 2006, along with 3 months of postprandial vomiting  -CT negative for bowel obstruction or ileus  -GI consult appreciated, MRCP revealed Dilated main pancreatic duct down to the neck, likely secondary to an intraductal calculus better seen on recent CT; consider   EUS  for possible EUS per GI  pt reports syncopal episode prior to attempted EGD 6 months ago?  cards consult appreciated  cont liquid diet for now, f/u with GI if diet can be advanced since pt with no abdominal pain
-h/o gastric cancer s/p gastrojejunostomy and chemoradiation in 2006, along with 3 months of postprandial vomiting  -CT negative for bowel obstruction or ileus  -GI consult appreciated, MRCP revealed Dilated main pancreatic duct down to the neck, likely secondary to an intraductal calculus better seen on recent CT; consider   EUS  will trial standing maalox and simethicone   seen by cardiology and neurosurg for eval for anesthesia for planned procedure

## 2019-02-25 NOTE — PROGRESS NOTE ADULT - SUBJECTIVE AND OBJECTIVE BOX
Dr. Trotter pager [phone]    Patient is a 78y old  Male who presents with a chief complaint of headache, rash (25 Feb 2019 11:58)      SUBJECTIVE / OVERNIGHT EVENTS: pt seen at 3p- still w/ gassy feeling w/ nausea in abd  no other complaints  explained that GI might not do EUS as inpt - and that he could possibly go home 2/26    MEDICATIONS  (STANDING):  aluminum hydroxide/magnesium hydroxide/simethicone Suspension 30 milliLiter(s) Oral every 6 hours  cetirizine 10 milliGRAM(s) Oral <User Schedule>  clobetasol 0.05% Cream 1 Application(s) Topical two times a day  dextrose 5%. 1000 milliLiter(s) (50 mL/Hr) IV Continuous <Continuous>  dextrose 50% Injectable 12.5 Gram(s) IV Push once  dextrose 50% Injectable 25 Gram(s) IV Push once  dextrose 50% Injectable 25 Gram(s) IV Push once  docusate sodium 100 milliGRAM(s) Oral three times a day  insulin lispro (HumaLOG) corrective regimen sliding scale   SubCutaneous three times a day before meals  insulin lispro (HumaLOG) corrective regimen sliding scale   SubCutaneous at bedtime  pantoprazole    Tablet 40 milliGRAM(s) Oral before breakfast  senna 2 Tablet(s) Oral at bedtime  simethicone 80 milliGRAM(s) Chew every 6 hours  simvastatin 40 milliGRAM(s) Oral at bedtime    MEDICATIONS  (PRN):  acetaminophen   Tablet .. 650 milliGRAM(s) Oral every 6 hours PRN Mild Pain (1 - 3), Moderate Pain (4 - 6)  dextrose 40% Gel 15 Gram(s) Oral once PRN Blood Glucose LESS THAN 70 milliGRAM(s)/deciliter  diphenhydrAMINE 25 milliGRAM(s) Oral every 4 hours PRN Rash and/or Itching  glucagon  Injectable 1 milliGRAM(s) IntraMuscular once PRN Glucose LESS THAN 70 milligrams/deciliter  ondansetron Injectable 4 milliGRAM(s) IV Push every 8 hours PRN Nausea and/or Vomiting  oxyCODONE    IR 5 milliGRAM(s) Oral every 6 hours PRN Severe Pain (7 - 10)      Meds ordered within last 24hours  docusate sodium: [Ordered as COLACE]  100 milliGRAM(s), Oral, three times a day  Provider's Contact #: [phone] (02-25 @ 15:06)  senna:   2 Tablet(s), Oral, at bedtime  Provider's Contact #: [phone] (02-25 @ 15:06)      T(C): 37.6 (02-25-19 @ 14:30), Max: 37.6 (02-25-19 @ 14:30)  HR: 86 (02-25-19 @ 14:30) (74 - 99)  BP: 110/49 (02-25-19 @ 14:30) (110/49 - 142/75)  RR: 18 (02-25-19 @ 14:30) (16 - 18)  SpO2: 99% (02-25-19 @ 14:30) (99% - 100%)    CAPILLARY BLOOD GLUCOSE      POCT Blood Glucose.: 195 mg/dL (25 Feb 2019 17:11)  POCT Blood Glucose.: 126 mg/dL (25 Feb 2019 08:17)  POCT Blood Glucose.: 142 mg/dL (24 Feb 2019 22:04)    I&O's Summary      PHYSICAL EXAM:  GENERAL: NAD  CHEST/LUNG: Clear to auscultation bilaterally; No wheeze  HEART: Regular rate and rhythm; No murmurs, rubs, or gallops  ABDOMEN: Soft, Nontender, Nondistended; Bowel sounds present  EXTREMITIES:  No clubbing, cyanosis, or edema  NEURO: A&Ox3      LABS:                        12.0   8.38  )-----------( 219      ( 25 Feb 2019 06:18 )             38.0     02-25    131<L>  |  95<L>  |  12  ----------------------------<  125<H>  4.5   |  24  |  0.81    Ca    8.8      25 Feb 2019 06:18  Phos  2.9     02-25  Mg     2.1     02-25        CARDIAC MARKERS ( 24 Feb 2019 15:18 )  x     / x     / 33 u/L / x     / x              RADIOLOGY & ADDITIONAL TESTS:    Imaging Personally Reviewed:    Consultant(s) Notes Reviewed:      Care Discussed with Consultants/Other Providers:

## 2019-02-25 NOTE — PROGRESS NOTE ADULT - PROBLEM SELECTOR PLAN 2
sounds more like sciatica  f/u hip xray- normal  tylenol prn  warm packs  f/u PT recs  pt now c/o pain in both legs- will get MRI L spine r/o sciatica
-was on prednisone and topical triamcinolone for skin rash without improvement, states he has the skin rash for 3 years  -d/c prednisone   -dermatology consult appreciated for possible bx today, on clobetasol topical  -loratadine prn  blood culture neg to date
-was on prednisone and topical triamcinolone for skin rash without improvement, states he has the skin rash for 3 years  -d/c prednisone   -dermatology consult, doesn't think he had prior biopsy or received diagnosis  -loratadine prn
MRI spine w/ blood in thecal sac likely d/t SAH   neurosurg asked to comment
will get rt hip xray, monitor for now, cont pain control
sounds more like sciatica  f/u hip xray  tylenol prn  warm packs  f/u PT recs

## 2019-02-25 NOTE — PROGRESS NOTE ADULT - PROBLEM SELECTOR PROBLEM 2
Hip pain, acute, right
Skin rash
Skin rash
Hip pain, acute, right

## 2019-02-25 NOTE — PROGRESS NOTE ADULT - SUBJECTIVE AND OBJECTIVE BOX
Chief Complaint:  Patient is a 78y old  Male who presents with a chief complaint of headache, rash (24 Feb 2019 16:23)      Interval Events: No events overnight.  Still with poor oral intake.    Allergies:  No Known Allergies      Hospital Medications:  acetaminophen   Tablet .. 650 milliGRAM(s) Oral every 6 hours PRN  aluminum hydroxide/magnesium hydroxide/simethicone Suspension 30 milliLiter(s) Oral every 6 hours  cetirizine 10 milliGRAM(s) Oral <User Schedule>  clobetasol 0.05% Cream 1 Application(s) Topical two times a day  dextrose 40% Gel 15 Gram(s) Oral once PRN  dextrose 5%. 1000 milliLiter(s) IV Continuous <Continuous>  dextrose 50% Injectable 12.5 Gram(s) IV Push once  dextrose 50% Injectable 25 Gram(s) IV Push once  dextrose 50% Injectable 25 Gram(s) IV Push once  diphenhydrAMINE 25 milliGRAM(s) Oral every 4 hours PRN  glucagon  Injectable 1 milliGRAM(s) IntraMuscular once PRN  insulin lispro (HumaLOG) corrective regimen sliding scale   SubCutaneous three times a day before meals  insulin lispro (HumaLOG) corrective regimen sliding scale   SubCutaneous at bedtime  ondansetron Injectable 4 milliGRAM(s) IV Push every 8 hours PRN  oxyCODONE    IR 5 milliGRAM(s) Oral every 6 hours PRN  pantoprazole    Tablet 40 milliGRAM(s) Oral before breakfast  simethicone 80 milliGRAM(s) Chew every 6 hours  simvastatin 40 milliGRAM(s) Oral at bedtime      PMHX/PSHX:  GERD (gastroesophageal reflux disease)  Gastric cancer  Carotid stenosis  JACK (iron deficiency anemia)  HLD (hyperlipidemia)  HTN (hypertension)  CAD (coronary artery disease)  Hives  DM (diabetes mellitus)  BPH (Benign Prostatic Hypertrophy)  GERD (Gastroesophageal Reflux Disease)  S/P CABG X 3  Hypercholesteremia  CAD (Coronary Artery Disease)  S/P Radiation > 12 Weeks  S/P Chemotherapy, Time Since Greater than 12 Weeks  Anemia  BPH (Benign Prostatic Hypertrophy)  GERD (Gastroesophageal Reflux Disease)  Dyslipidemia  HTN - Hypertension  CAD (Coronary Artery Disease)  History of Stomach Cancer  S/P Gastrectomy  Atypical Chest Pain  Status Post Gastrectomy  S/P CABG X 4  Status Post Gastrectomy  S/P CABG X 4      Family history:  No pertinent family history in first degree relatives      ROS:     General:  No wt loss, fevers, chills, night sweats, fatigue,   Eyes:  Good vision, no reported pain  ENT:  No sore throat, pain, runny nose, dysphagia  CV:  No pain, palpitations, hypo/hypertension  Resp:  No dyspnea, cough, tachypnea, wheezing  GI:  See HPI  :  No pain, bleeding, incontinence, nocturia  Muscle:  No pain, weakness  Neuro:  No weakness, tingling, memory problems  Psych:  No fatigue, insomnia, mood problems, depression  Endocrine:  No polyuria, polydipsia, cold/heat intolerance  Heme:  No petechiae, ecchymosis, easy bruisability  Skin:  No rash, edema      PHYSICAL EXAM:     GENERAL: NAD  HEENT:  NC/AT,  conjunctivae clear, sclera -anicteric  CHEST:  Full & symmetric excursion, no increased effort  ABDOMEN:  Soft, non-tender, non-distended, normoactive bowel sounds  EXTREMITIES:  no cyanosis,clubbing or edema  SKIN:  No rash  NEURO:  Alert, oriented    Vital Signs:  Vital Signs Last 24 Hrs  T(C): 36.6 (25 Feb 2019 05:27), Max: 37.2 (24 Feb 2019 18:14)  T(F): 97.9 (25 Feb 2019 05:27), Max: 98.9 (24 Feb 2019 18:14)  HR: 99 (25 Feb 2019 05:27) (74 - 99)  BP: 142/75 (25 Feb 2019 05:27) (113/51 - 149/57)  BP(mean): --  RR: 16 (25 Feb 2019 05:27) (16 - 18)  SpO2: 100% (25 Feb 2019 05:27) (98% - 100%)  Daily     Daily     LABS:                        10.8   7.24  )-----------( 226      ( 24 Feb 2019 06:10 )             34.0     02-24    130<L>  |  97<L>  |  14  ----------------------------<  193<H>  4.9   |  20<L>  |  0.81    Ca    8.6      24 Feb 2019 12:47  Phos  2.8     02-24  Mg     1.9     02-24            Amylase Serum80      Lipase serum33.0       Ammonia--      Imaging:    reviewed

## 2019-02-26 VITALS
DIASTOLIC BLOOD PRESSURE: 59 MMHG | TEMPERATURE: 98 F | HEART RATE: 92 BPM | OXYGEN SATURATION: 95 % | RESPIRATION RATE: 16 BRPM | SYSTOLIC BLOOD PRESSURE: 149 MMHG

## 2019-02-26 LAB
ANION GAP SERPL CALC-SCNC: 12 MMO/L — SIGNIFICANT CHANGE UP (ref 7–14)
BUN SERPL-MCNC: 18 MG/DL — SIGNIFICANT CHANGE UP (ref 7–23)
CALCIUM SERPL-MCNC: 9.2 MG/DL — SIGNIFICANT CHANGE UP (ref 8.4–10.5)
CHLORIDE SERPL-SCNC: 92 MMOL/L — LOW (ref 98–107)
CO2 SERPL-SCNC: 27 MMOL/L — SIGNIFICANT CHANGE UP (ref 22–31)
CREAT SERPL-MCNC: 0.97 MG/DL — SIGNIFICANT CHANGE UP (ref 0.5–1.3)
GLUCOSE BLDC GLUCOMTR-MCNC: 126 MG/DL — HIGH (ref 70–99)
GLUCOSE BLDC GLUCOMTR-MCNC: 158 MG/DL — HIGH (ref 70–99)
GLUCOSE SERPL-MCNC: 121 MG/DL — HIGH (ref 70–99)
HCT VFR BLD CALC: 36.8 % — LOW (ref 39–50)
HGB BLD-MCNC: 11.8 G/DL — LOW (ref 13–17)
MAGNESIUM SERPL-MCNC: 2.3 MG/DL — SIGNIFICANT CHANGE UP (ref 1.6–2.6)
MCHC RBC-ENTMCNC: 26.5 PG — LOW (ref 27–34)
MCHC RBC-ENTMCNC: 32.1 % — SIGNIFICANT CHANGE UP (ref 32–36)
MCV RBC AUTO: 82.5 FL — SIGNIFICANT CHANGE UP (ref 80–100)
NRBC # FLD: 0 K/UL — LOW (ref 25–125)
PHOSPHATE SERPL-MCNC: 3.4 MG/DL — SIGNIFICANT CHANGE UP (ref 2.5–4.5)
PLATELET # BLD AUTO: 269 K/UL — SIGNIFICANT CHANGE UP (ref 150–400)
PMV BLD: 9.9 FL — SIGNIFICANT CHANGE UP (ref 7–13)
POTASSIUM SERPL-MCNC: 4.6 MMOL/L — SIGNIFICANT CHANGE UP (ref 3.5–5.3)
POTASSIUM SERPL-SCNC: 4.6 MMOL/L — SIGNIFICANT CHANGE UP (ref 3.5–5.3)
RBC # BLD: 4.46 M/UL — SIGNIFICANT CHANGE UP (ref 4.2–5.8)
RBC # FLD: 16.4 % — HIGH (ref 10.3–14.5)
SODIUM SERPL-SCNC: 131 MMOL/L — LOW (ref 135–145)
WBC # BLD: 7.64 K/UL — SIGNIFICANT CHANGE UP (ref 3.8–10.5)
WBC # FLD AUTO: 7.64 K/UL — SIGNIFICANT CHANGE UP (ref 3.8–10.5)

## 2019-02-26 PROCEDURE — 99232 SBSQ HOSP IP/OBS MODERATE 35: CPT

## 2019-02-26 RX ORDER — METFORMIN HYDROCHLORIDE 850 MG/1
1 TABLET ORAL
Qty: 30 | Refills: 0
Start: 2019-02-26 | End: 2019-03-27

## 2019-02-26 RX ORDER — SENNA PLUS 8.6 MG/1
2 TABLET ORAL
Qty: 14 | Refills: 0 | OUTPATIENT
Start: 2019-02-26 | End: 2019-03-04

## 2019-02-26 RX ORDER — ACETAMINOPHEN 500 MG
2 TABLET ORAL
Qty: 0 | Refills: 0 | COMMUNITY
Start: 2019-02-26

## 2019-02-26 RX ORDER — OXYCODONE HYDROCHLORIDE 5 MG/1
1 TABLET ORAL
Qty: 12 | Refills: 0 | OUTPATIENT
Start: 2019-02-26 | End: 2019-02-28

## 2019-02-26 RX ORDER — INFLUENZA VIRUS VACCINE 15; 15; 15; 15 UG/.5ML; UG/.5ML; UG/.5ML; UG/.5ML
0.5 SUSPENSION INTRAMUSCULAR ONCE
Qty: 0 | Refills: 0 | Status: COMPLETED | OUTPATIENT
Start: 2019-02-26 | End: 2019-02-26

## 2019-02-26 RX ORDER — DOCUSATE SODIUM 100 MG
1 CAPSULE ORAL
Qty: 60 | Refills: 0 | OUTPATIENT
Start: 2019-02-26 | End: 2019-03-27

## 2019-02-26 RX ORDER — SIMVASTATIN 20 MG/1
1 TABLET, FILM COATED ORAL
Qty: 30 | Refills: 0
Start: 2019-02-26 | End: 2019-03-27

## 2019-02-26 RX ORDER — SIMETHICONE 80 MG/1
1 TABLET, CHEWABLE ORAL
Qty: 28 | Refills: 0 | OUTPATIENT
Start: 2019-02-26 | End: 2019-03-04

## 2019-02-26 RX ADMIN — Medication 30 MILLILITER(S): at 12:43

## 2019-02-26 RX ADMIN — Medication 1 APPLICATION(S): at 05:29

## 2019-02-26 RX ADMIN — SIMETHICONE 80 MILLIGRAM(S): 80 TABLET, CHEWABLE ORAL at 11:11

## 2019-02-26 RX ADMIN — Medication 100 MILLIGRAM(S): at 14:39

## 2019-02-26 RX ADMIN — OXYCODONE HYDROCHLORIDE 5 MILLIGRAM(S): 5 TABLET ORAL at 11:10

## 2019-02-26 RX ADMIN — Medication 30 MILLILITER(S): at 05:28

## 2019-02-26 RX ADMIN — CETIRIZINE HYDROCHLORIDE 10 MILLIGRAM(S): 10 TABLET ORAL at 05:29

## 2019-02-26 RX ADMIN — Medication 1: at 12:43

## 2019-02-26 RX ADMIN — OXYCODONE HYDROCHLORIDE 5 MILLIGRAM(S): 5 TABLET ORAL at 12:00

## 2019-02-26 RX ADMIN — SIMETHICONE 80 MILLIGRAM(S): 80 TABLET, CHEWABLE ORAL at 05:29

## 2019-02-26 RX ADMIN — INFLUENZA VIRUS VACCINE 0.5 MILLILITER(S): 15; 15; 15; 15 SUSPENSION INTRAMUSCULAR at 15:42

## 2019-02-26 RX ADMIN — PANTOPRAZOLE SODIUM 40 MILLIGRAM(S): 20 TABLET, DELAYED RELEASE ORAL at 05:30

## 2019-02-26 RX ADMIN — Medication 100 MILLIGRAM(S): at 05:29

## 2019-02-26 NOTE — PROGRESS NOTE ADULT - ASSESSMENT
78 year old male with PMH CAD, DM, c/o intermittent pruritic rashes x4 years, worse x1 month, nausea and vomiting on and off, often assoc. with rash, weakness, headaches and dizziness x2-3 weeks resulting in falls (without striking head or LOC) following the start of prednisone prescribed for rash.
EKG SR 1st degree AV block , LBBB (old)    Echo: < from: Transthoracic Echocardiogram (02.21.19 @ 21:03) >  opening.  2. Normal left ventricular internal dimensions and wall  thicknesses.  3. Normal left ventricular systolic function. No segmental  wall motion abnormalities.  4. Normal right ventricular size and function.  5. Estimatedpulmonary artery systolic pressure equals 40  mm Hg, assuming right atrial pressure equals 10  mm Hg,  consistent with mild pulmonary hypertension.    < end of copied text >    NST:   < from: Nuclear Stress Test-Pharmacologic (04.03.17 @ 10:00) >  * Myocardial Perfusion SPECT results are abnormal.  * There is a medium sized moderate to severe defect in  proximal to mid septal wall  and mild defect in  proximal  to mid inferior wall which are predominantly reversible  consistent with moderate to severe basal septal  ischemia  and mild proximal to mid inferior ischemia  * Post-stress gated wall motion analysis was performed  (LVEF = 55 %;LVEDV = 67 ml.), revealing normal LV  function. There was paradoxical motion of the septum (LBBB  and post CABG). RV function appeared normal.    < end of copied text >      Assessment and Plan     1) Perioperative risk assessment:  Pt with positive stress in 4/17 as above, medically managed , Echo with normal LV function, CP episode today , EKG with LBBB and CE negative , ?? GI related, no ischemic evsl ath this time given CT head showing Age-indeterminate left anterior frontal lobe and left temporal lobe contusions with acute hemorrhage in these areas as well as acute hemorrhage in the anterior right frontal region which may be intraparenchymal or subarachnoid in location therefore no ischemic eval at this time , had a detailed discussion with daughter at bedside, Asa on hold due to CT head findings, restart when ok with neurology. Given positive stress pt at moderate risk of MACE with EGD,     2) Anemia/ N / V: Planned for EGD     3) DVT PPX : SCD
77 yo m with intracranial bleed vs SAH as well as worsening diffuse rash
77 yo m with intracranial bleed vs SAH as well as worsening diffuse rash.
78 year old male with PMH CAD, DM, c/o intermittent pruritic rashes x4 years, worse x1 month, nausea and vomiting on and off, often assoc. with rash, weakness, headaches and dizziness x2-3 weeks resulting in falls (without striking head or LOC) following the start of prednisone prescribed for rash.
79 yo m with intracranial bleed vs SAH as well as worsening diffuse rash
EKG SR 1st degree AV block , LBBB (old)    Echo: < from: Transthoracic Echocardiogram (02.21.19 @ 21:03) >  opening.  2. Normal left ventricular internal dimensions and wall  thicknesses.  3. Normal left ventricular systolic function. No segmental  wall motion abnormalities.  4. Normal right ventricular size and function.  5. Estimatedpulmonary artery systolic pressure equals 40  mm Hg, assuming right atrial pressure equals 10  mm Hg,  consistent with mild pulmonary hypertension.    < end of copied text >    NST:   < from: Nuclear Stress Test-Pharmacologic (04.03.17 @ 10:00) >  * Myocardial Perfusion SPECT results are abnormal.  * There is a medium sized moderate to severe defect in  proximal to mid septal wall  and mild defect in  proximal  to mid inferior wall which are predominantly reversible  consistent with moderate to severe basal septal  ischemia  and mild proximal to mid inferior ischemia  * Post-stress gated wall motion analysis was performed  (LVEF = 55 %;LVEDV = 67 ml.), revealing normal LV  function. There was paradoxical motion of the septum (LBBB  and post CABG). RV function appeared normal.    < end of copied text >      Assessment and Plan     1) Perioperative risk assessment:  Pt with positive stress in 4/17 as above, medically managed , Echo with normal LV function, CP episode today , EKG with LBBB and CE negative , ?? GI related, no ischemic evsl ath this time given CT head showing Age-indeterminate left anterior frontal lobe and left temporal lobe contusions with acute hemorrhage in these areas as well as acute hemorrhage in the anterior right frontal region which may be intraparenchymal or subarachnoid in location therefore no ischemic eval at this time , had a detailed discussion with daughter at bedside, Asa on hold due to CT head findings, restart when ok with neurology. Given positive stress pt at moderate risk of MACE with EGD,     2) Anemia/ N / V: Planned for EGD     3) DVT PPX : SCD
EKG SR 1st degree AV block , LBBB (old)    Echo: < from: Transthoracic Echocardiogram (02.21.19 @ 21:03) >  opening.  2. Normal left ventricular internal dimensions and wall  thicknesses.  3. Normal left ventricular systolic function. No segmental  wall motion abnormalities.  4. Normal right ventricular size and function.  5. Estimatedpulmonary artery systolic pressure equals 40  mm Hg, assuming right atrial pressure equals 10  mm Hg,  consistent with mild pulmonary hypertension.    < end of copied text >    NST:   < from: Nuclear Stress Test-Pharmacologic (04.03.17 @ 10:00) >  * Myocardial Perfusion SPECT results are abnormal.  * There is a medium sized moderate to severe defect in  proximal to mid septal wall  and mild defect in  proximal  to mid inferior wall which are predominantly reversible  consistent with moderate to severe basal septal  ischemia  and mild proximal to mid inferior ischemia  * Post-stress gated wall motion analysis was performed  (LVEF = 55 %;LVEDV = 67 ml.), revealing normal LV  function. There was paradoxical motion of the septum (LBBB  and post CABG). RV function appeared normal.    < end of copied text >      Assessment and Plan     1) Perioperative risk assessment:  Pt with positive stress in 4/17 as above, medically managed , Echo with normal LV function, CP episode today , EKG with LBBB and CE negative , ?? GI related, no ischemic evsl ath this time given CT head showing Age-indeterminate left anterior frontal lobe and left temporal lobe contusions with acute hemorrhage in these areas as well as acute hemorrhage in the anterior right frontal region which may be intraparenchymal or subarachnoid in location therefore no ischemic eval at this time , had a detailed discussion with daughter at bedside, Asa on hold due to CT head findings, restart when ok with neurology. Given positive stress pt at moderate risk of MACE with EGD, EGD cancelled out pt follow up    2) Anemia/ N / V: EGD cancelled out pt follow up    3) DVT PPX : SCD
EKG SR 1st degree AV block , LBBB (old)    Echo: < from: Transthoracic Echocardiogram (02.21.19 @ 21:03) >  opening.  2. Normal left ventricular internal dimensions and wall  thicknesses.  3. Normal left ventricular systolic function. No segmental  wall motion abnormalities.  4. Normal right ventricular size and function.  5. Estimatedpulmonary artery systolic pressure equals 40  mm Hg, assuming right atrial pressure equals 10  mm Hg,  consistent with mild pulmonary hypertension.    < end of copied text >    NST:   < from: Nuclear Stress Test-Pharmacologic (04.03.17 @ 10:00) >  * Myocardial Perfusion SPECT results are abnormal.  * There is a medium sized moderate to severe defect in  proximal to mid septal wall  and mild defect in  proximal  to mid inferior wall which are predominantly reversible  consistent with moderate to severe basal septal  ischemia  and mild proximal to mid inferior ischemia  * Post-stress gated wall motion analysis was performed  (LVEF = 55 %;LVEDV = 67 ml.), revealing normal LV  function. There was paradoxical motion of the septum (LBBB  and post CABG). RV function appeared normal.    < end of copied text >      Assessment and Plan     1) Perioperative risk assessment:  Pt with positive stress in 4/17 as above, medically managed , denies CP on exertion , Echo with normal LV function,  no active symptoms and CT head showing Age-indeterminate left anterior frontal lobe and left temporal lobe contusions with acute hemorrhage in these areas as well as acute hemorrhage in the anterior right frontal region which may be intraparenchymal or subarachnoid in location therefore no ischemic eval at this time , had a detailed discussion with daughter at bedside, Asa on hold due to CT head findings, restart when ok with neurology. Given positive stress pt at moderate risk of MACE with EGD,    2) Anemia/ N / V: Planned for EGD     3) DVT PPX : SCD
EKG SR 1st degree AV block , LBBB (old)    Echo: < from: Transthoracic Echocardiogram (02.21.19 @ 21:03) >  opening.  2. Normal left ventricular internal dimensions and wall  thicknesses.  3. Normal left ventricular systolic function. No segmental  wall motion abnormalities.  4. Normal right ventricular size and function.  5. Estimatedpulmonary artery systolic pressure equals 40  mm Hg, assuming right atrial pressure equals 10  mm Hg,  consistent with mild pulmonary hypertension.    < end of copied text >    NST:   < from: Nuclear Stress Test-Pharmacologic (04.03.17 @ 10:00) >  * Myocardial Perfusion SPECT results are abnormal.  * There is a medium sized moderate to severe defect in  proximal to mid septal wall  and mild defect in  proximal  to mid inferior wall which are predominantly reversible  consistent with moderate to severe basal septal  ischemia  and mild proximal to mid inferior ischemia  * Post-stress gated wall motion analysis was performed  (LVEF = 55 %;LVEDV = 67 ml.), revealing normal LV  function. There was paradoxical motion of the septum (LBBB  and post CABG). RV function appeared normal.    < end of copied text >      Assessment and Plan     1) Perioperative risk assessment:  Pt with positive stress in 4/17 as above, medically managed , denies CP on exertion , Echo with normal LV function, Given positive stress pt at moderate risk of MACE with EGD, no active symptoms therefore no ischemic eval warranted, had a detailed discussion with Grand daughter , she states he has not followed up with cardiology for many years,      2) Anemia/ N / V: Planned for EGD should be on ASA given h/o CAD CABG, especially given ischemia on last stress with evidence of ischemia     3) DVT PPX : SCD
Impression:  1) n/v - unclear etiology, could be 2/2 brain bleeds/neurologic issues, vs pancreatitis from stone, vs unrelated etiologies like abnormal motility, GJ stenosis  2) 6mm PD stone with upstream PD dilatation, likely 2/2 chronic panc  3) H/o gastric ca s/p surgery (B2?) in 2006 with chemoradiation.  Attempted EGD previously, however, patient snycopized prior to procedure  4) H/p CABG on ASA, CHF, last TTE 2017    Recs:  - please check lipase, amylase  - obtain neurosurgical clearance for anesthesia given contusions on imaging  - appreciate cardiology input - pt at moderate risk for MACE with anesthesia  - liquid diet as tolerated for now; try to add Ensure clear if patient tolerates for calorie supplementation  - will discuss role for intervention for PD stone removal or stenting with advanced team
Impression:  1) n/v - unclear etiology, could be 2/2 brain bleeds/neurologic issues, vs pancreatitis from stone, vs unrelated etiologies like abnormal motility, GJ stenosis  2) 6mm PD stone with upstream PD dilatation, likely 2/2 chronic panc  3) H/o gastric ca s/p surgery (B2?) in 2006 with chemoradiation.  Attempted EGD previously, however, patient syncopized prior to procedure  4) H/p CABG on ASA, CHF, last TTE 2017    Recs:  - appreciate cardiology input - pt at moderate risk for MACE with anesthesia  - liquid diet as tolerated for now; try to add Ensure clear if patient tolerates for calorie supplementation  - will discuss role for intervention for PD stone removal or stenting with advanced team, perhaps this week or as outpatient  - can check small bowel series given persistent n/v
77 yo m with intracranial bleed vs SAH as well as worsening diffuse rash.

## 2019-02-26 NOTE — PROGRESS NOTE ADULT - SUBJECTIVE AND OBJECTIVE BOX
Mor Woodard MD  Interventional Cardiology / Advance Heart Failure and Cardiac Transplant Specialist  [place] Office : [address] [place] [address] [place]. [address]  Tel:   [place] Office : [address] [facility] [place] [address]  Tel: [phone]  Cell : [phone]    Subjective : Pt lying in bed comfortable, not in distress, denies any chest pain or SOB, no vomiting  	  MEDICATIONS:      cetirizine 10 milliGRAM(s) Oral <User Schedule>    acetaminophen   Tablet .. 650 milliGRAM(s) Oral every 6 hours PRN  diphenhydrAMINE 25 milliGRAM(s) Oral every 4 hours PRN  ondansetron Injectable 4 milliGRAM(s) IV Push every 8 hours PRN  oxyCODONE    IR 5 milliGRAM(s) Oral every 6 hours PRN    aluminum hydroxide/magnesium hydroxide/simethicone Suspension 30 milliLiter(s) Oral every 6 hours  docusate sodium 100 milliGRAM(s) Oral three times a day  pantoprazole    Tablet 40 milliGRAM(s) Oral before breakfast  senna 2 Tablet(s) Oral at bedtime  simethicone 80 milliGRAM(s) Chew every 6 hours    dextrose 40% Gel 15 Gram(s) Oral once PRN  dextrose 50% Injectable 12.5 Gram(s) IV Push once  dextrose 50% Injectable 25 Gram(s) IV Push once  dextrose 50% Injectable 25 Gram(s) IV Push once  glucagon  Injectable 1 milliGRAM(s) IntraMuscular once PRN  insulin lispro (HumaLOG) corrective regimen sliding scale   SubCutaneous three times a day before meals  insulin lispro (HumaLOG) corrective regimen sliding scale   SubCutaneous at bedtime  simvastatin 40 milliGRAM(s) Oral at bedtime    clobetasol 0.05% Cream 1 Application(s) Topical two times a day  dextrose 5%. 1000 milliLiter(s) IV Continuous <Continuous>      PHYSICAL EXAM:  T(C): 36.9 (02-26-19 @ 05:26), Max: 37.6 (02-25-19 @ 14:30)  HR: 66 (02-26-19 @ 05:26) (66 - 86)  BP: 109/46 (02-26-19 @ 05:26) (104/49 - 110/49)  RR: 18 (02-26-19 @ 05:26) (17 - 18)  SpO2: 99% (02-26-19 @ 05:26) (99% - 100%)  Wt(kg): --  I&O's Summary    Height (cm): 165.1 (02-25 @ 12:44)  Weight (kg): 47.8 (02-25 @ 12:44)  BMI (kg/m2): 17.5 (02-25 @ 12:44)  BSA (m2): 1.51 (02-25 @ 12:44)    Appearance: chachectic  HEENT:   Normal oral mucosa, PERRL, EOMI	  Cardiovascular: Normal S1 S2, No JVD, No murmurs, No edema  Respiratory: Lungs clear to auscultation	  Gastrointestinal:  Soft, Non-tender, + BS	  Extremities: Normal range of motion, No clubbing, cyanosis or edema                                    11.8   7.64  )-----------( 269      ( 26 Feb 2019 06:00 )             36.8     02-26    131<L>  |  92<L>  |  18  ----------------------------<  121<H>  4.6   |  27  |  0.97    Ca    9.2      26 Feb 2019 06:00  Phos  3.4     02-26  Mg     2.3     02-26      proBNP:   Lipid Profile:   HgA1c:   TSH:

## 2019-02-26 NOTE — CHART NOTE - NSCHARTNOTEFT_GEN_A_CORE
pt seen and examined at 130p    was c/o leg pain but able to stand and walk around in the room unassisted  no gas this am since he had a BM  d/w GI- due to risk for MACE no EGD or EUS planned  for dc home w/ outpt f/u w GI and neurosurg  will start metformin on d/c  time 40min  updated dgtr austyn at bedside

## 2019-02-26 NOTE — PROGRESS NOTE ADULT - REASON FOR ADMISSION
headache, rash
headache, rash, ICH
headache, rash

## 2019-02-26 NOTE — PROGRESS NOTE ADULT - PROVIDER SPECIALTY LIST ADULT
Cardiology
Gastroenterology
Gastroenterology
Hospitalist
Neurosurgery
Cardiology
Hospitalist
Hospitalist

## 2019-03-07 ENCOUNTER — INPATIENT (INPATIENT)
Facility: HOSPITAL | Age: 79
LOS: 11 days | Discharge: HOME HEALTH | End: 2019-03-19
Attending: HOSPITALIST | Admitting: HOSPITALIST
Payer: MEDICARE

## 2019-03-07 VITALS
RESPIRATION RATE: 16 BRPM | HEIGHT: 69 IN | OXYGEN SATURATION: 99 % | TEMPERATURE: 98 F | DIASTOLIC BLOOD PRESSURE: 54 MMHG | HEART RATE: 63 BPM | WEIGHT: 164.91 LBS | SYSTOLIC BLOOD PRESSURE: 131 MMHG

## 2019-03-07 DIAGNOSIS — S06.2X9A DIFFUSE TRAUMATIC BRAIN INJURY WITH LOSS OF CONSCIOUSNESS OF UNSPECIFIED DURATION, INITIAL ENCOUNTER: ICD-10-CM

## 2019-03-07 LAB
ALBUMIN SERPL ELPH-MCNC: 3.1 G/DL — LOW (ref 3.3–5)
ALP SERPL-CCNC: 61 U/L — SIGNIFICANT CHANGE UP (ref 40–120)
ALT FLD-CCNC: 16 U/L — SIGNIFICANT CHANGE UP (ref 4–41)
ANION GAP SERPL CALC-SCNC: 12 MMO/L — SIGNIFICANT CHANGE UP (ref 7–14)
APTT BLD: 28.5 SEC — SIGNIFICANT CHANGE UP (ref 27.5–36.3)
AST SERPL-CCNC: 19 U/L — SIGNIFICANT CHANGE UP (ref 4–40)
BASE EXCESS BLDV CALC-SCNC: 1.1 MMOL/L — SIGNIFICANT CHANGE UP
BASOPHILS # BLD AUTO: 0.01 K/UL — SIGNIFICANT CHANGE UP (ref 0–0.2)
BASOPHILS NFR BLD AUTO: 0.2 % — SIGNIFICANT CHANGE UP (ref 0–2)
BILIRUB SERPL-MCNC: 0.5 MG/DL — SIGNIFICANT CHANGE UP (ref 0.2–1.2)
BLOOD GAS VENOUS - CREATININE: 0.79 MG/DL — SIGNIFICANT CHANGE UP (ref 0.5–1.3)
BUN SERPL-MCNC: 12 MG/DL — SIGNIFICANT CHANGE UP (ref 7–23)
CALCIUM SERPL-MCNC: 8.9 MG/DL — SIGNIFICANT CHANGE UP (ref 8.4–10.5)
CHLORIDE BLDV-SCNC: 103 MMOL/L — SIGNIFICANT CHANGE UP (ref 96–108)
CHLORIDE SERPL-SCNC: 99 MMOL/L — SIGNIFICANT CHANGE UP (ref 98–107)
CO2 SERPL-SCNC: 23 MMOL/L — SIGNIFICANT CHANGE UP (ref 22–31)
CREAT SERPL-MCNC: 0.88 MG/DL — SIGNIFICANT CHANGE UP (ref 0.5–1.3)
EOSINOPHIL # BLD AUTO: 0.26 K/UL — SIGNIFICANT CHANGE UP (ref 0–0.5)
EOSINOPHIL NFR BLD AUTO: 5.2 % — SIGNIFICANT CHANGE UP (ref 0–6)
GAS PNL BLDV: 131 MMOL/L — LOW (ref 136–146)
GLUCOSE BLDV-MCNC: 86 — SIGNIFICANT CHANGE UP (ref 70–99)
GLUCOSE SERPL-MCNC: 88 MG/DL — SIGNIFICANT CHANGE UP (ref 70–99)
HCO3 BLDV-SCNC: 25 MMOL/L — SIGNIFICANT CHANGE UP (ref 20–27)
HCT VFR BLD CALC: 33.4 % — LOW (ref 39–50)
HCT VFR BLDV CALC: 33.2 % — LOW (ref 39–51)
HGB BLD-MCNC: 10.4 G/DL — LOW (ref 13–17)
HGB BLDV-MCNC: 10.7 G/DL — LOW (ref 13–17)
IMM GRANULOCYTES NFR BLD AUTO: 0.2 % — SIGNIFICANT CHANGE UP (ref 0–1.5)
INR BLD: 1.14 — SIGNIFICANT CHANGE UP (ref 0.88–1.17)
LACTATE BLDV-MCNC: 1.5 MMOL/L — SIGNIFICANT CHANGE UP (ref 0.5–2)
LIDOCAIN IGE QN: 15 U/L — SIGNIFICANT CHANGE UP (ref 7–60)
LYMPHOCYTES # BLD AUTO: 0.91 K/UL — LOW (ref 1–3.3)
LYMPHOCYTES # BLD AUTO: 18.1 % — SIGNIFICANT CHANGE UP (ref 13–44)
MCHC RBC-ENTMCNC: 26.6 PG — LOW (ref 27–34)
MCHC RBC-ENTMCNC: 31.1 % — LOW (ref 32–36)
MCV RBC AUTO: 85.4 FL — SIGNIFICANT CHANGE UP (ref 80–100)
MONOCYTES # BLD AUTO: 0.52 K/UL — SIGNIFICANT CHANGE UP (ref 0–0.9)
MONOCYTES NFR BLD AUTO: 10.3 % — SIGNIFICANT CHANGE UP (ref 2–14)
NEUTROPHILS # BLD AUTO: 3.32 K/UL — SIGNIFICANT CHANGE UP (ref 1.8–7.4)
NEUTROPHILS NFR BLD AUTO: 66 % — SIGNIFICANT CHANGE UP (ref 43–77)
NRBC # FLD: 0 K/UL — LOW (ref 25–125)
PCO2 BLDV: 45 MMHG — SIGNIFICANT CHANGE UP (ref 41–51)
PH BLDV: 7.38 PH — SIGNIFICANT CHANGE UP (ref 7.32–7.43)
PLATELET # BLD AUTO: 245 K/UL — SIGNIFICANT CHANGE UP (ref 150–400)
PMV BLD: 10.1 FL — SIGNIFICANT CHANGE UP (ref 7–13)
PO2 BLDV: 55 MMHG — HIGH (ref 35–40)
POTASSIUM BLDV-SCNC: 4.1 MMOL/L — SIGNIFICANT CHANGE UP (ref 3.4–4.5)
POTASSIUM SERPL-MCNC: 4.3 MMOL/L — SIGNIFICANT CHANGE UP (ref 3.5–5.3)
POTASSIUM SERPL-SCNC: 4.3 MMOL/L — SIGNIFICANT CHANGE UP (ref 3.5–5.3)
PROT SERPL-MCNC: 6 G/DL — SIGNIFICANT CHANGE UP (ref 6–8.3)
PROTHROM AB SERPL-ACNC: 12.7 SEC — SIGNIFICANT CHANGE UP (ref 9.8–13.1)
RBC # BLD: 3.91 M/UL — LOW (ref 4.2–5.8)
RBC # FLD: 15.9 % — HIGH (ref 10.3–14.5)
SAO2 % BLDV: 87 % — HIGH (ref 60–85)
SODIUM SERPL-SCNC: 134 MMOL/L — LOW (ref 135–145)
WBC # BLD: 5.03 K/UL — SIGNIFICANT CHANGE UP (ref 3.8–10.5)
WBC # FLD AUTO: 5.03 K/UL — SIGNIFICANT CHANGE UP (ref 3.8–10.5)

## 2019-03-07 PROCEDURE — 71045 X-RAY EXAM CHEST 1 VIEW: CPT | Mod: 26

## 2019-03-07 PROCEDURE — 74177 CT ABD & PELVIS W/CONTRAST: CPT | Mod: 26

## 2019-03-07 RX ORDER — FAMOTIDINE 10 MG/ML
20 INJECTION INTRAVENOUS ONCE
Qty: 0 | Refills: 0 | Status: COMPLETED | OUTPATIENT
Start: 2019-03-07 | End: 2019-03-07

## 2019-03-07 RX ORDER — ONDANSETRON 8 MG/1
4 TABLET, FILM COATED ORAL ONCE
Qty: 0 | Refills: 0 | Status: COMPLETED | OUTPATIENT
Start: 2019-03-07 | End: 2019-03-07

## 2019-03-07 RX ORDER — SODIUM CHLORIDE 9 MG/ML
3 INJECTION INTRAMUSCULAR; INTRAVENOUS; SUBCUTANEOUS ONCE
Qty: 0 | Refills: 0 | Status: COMPLETED | OUTPATIENT
Start: 2019-03-07 | End: 2019-03-07

## 2019-03-07 RX ORDER — SODIUM CHLORIDE 9 MG/ML
1000 INJECTION INTRAMUSCULAR; INTRAVENOUS; SUBCUTANEOUS ONCE
Qty: 0 | Refills: 0 | Status: COMPLETED | OUTPATIENT
Start: 2019-03-07 | End: 2019-03-07

## 2019-03-07 RX ADMIN — FAMOTIDINE 20 MILLIGRAM(S): 10 INJECTION INTRAVENOUS at 22:11

## 2019-03-07 RX ADMIN — ONDANSETRON 4 MILLIGRAM(S): 8 TABLET, FILM COATED ORAL at 22:12

## 2019-03-07 RX ADMIN — SODIUM CHLORIDE 1000 MILLILITER(S): 9 INJECTION INTRAMUSCULAR; INTRAVENOUS; SUBCUTANEOUS at 22:12

## 2019-03-07 RX ADMIN — SODIUM CHLORIDE 3 MILLILITER(S): 9 INJECTION INTRAMUSCULAR; INTRAVENOUS; SUBCUTANEOUS at 22:12

## 2019-03-07 NOTE — ED ADULT NURSE NOTE - NSIMPLEMENTINTERV_GEN_ALL_ED
Implemented All Universal Safety Interventions:  [place] to call system. Call bell, personal items and telephone within reach. Instruct patient to call for assistance. Room bathroom lighting operational. Non-slip footwear when patient is off stretcher. Physically safe environment: no spills, clutter or unnecessary equipment. Stretcher in lowest position, wheels locked, appropriate side rails in place.

## 2019-03-07 NOTE — ED ADULT NURSE NOTE - ED STAT RN HANDOFF DETAILS
pt in stable condition, resting but arousable to verbal stimuli and touch, pt at baseline mental status. Report given to RN Pilar, pt waiting to be brought over to ESSU2.

## 2019-03-07 NOTE — ED PROVIDER NOTE - CLINICAL SUMMARY MEDICAL DECISION MAKING FREE TEXT BOX
Pt with vomiting and diarrhea, denies abx use, wimll image, check labs, hydrate and give iv anti-emetic.

## 2019-03-07 NOTE — ED ADULT NURSE NOTE - OBJECTIVE STATEMENT
pt received to Tr-A, c/o nausea, vomiting, diarrhea and inability to tolerate PO intake x 1 day. pt denies blood in emesis , urine or stool. pt received to Tr-A, c/o nausea, vomiting, diarrhea and inability to tolerate PO intake x 1 day. pt denies blood in emesis , urine or stool.  currently endorses nausea and diffuse abdominal pain. PMH gastric CA. denies chest pain, SOB, fever, chills. IV placed, labs sent.

## 2019-03-07 NOTE — ED ADULT TRIAGE NOTE - CHIEF COMPLAINT QUOTE
Pt vomiting and diarrhea since Sunday.  Denies abd pain or fever.  PMH gastric cancer.  Not on chemo

## 2019-03-08 DIAGNOSIS — R30.0 DYSURIA: ICD-10-CM

## 2019-03-08 DIAGNOSIS — R11.2 NAUSEA WITH VOMITING, UNSPECIFIED: ICD-10-CM

## 2019-03-08 DIAGNOSIS — I10 ESSENTIAL (PRIMARY) HYPERTENSION: ICD-10-CM

## 2019-03-08 DIAGNOSIS — C16.9 MALIGNANT NEOPLASM OF STOMACH, UNSPECIFIED: ICD-10-CM

## 2019-03-08 DIAGNOSIS — Z29.9 ENCOUNTER FOR PROPHYLACTIC MEASURES, UNSPECIFIED: ICD-10-CM

## 2019-03-08 DIAGNOSIS — R19.7 DIARRHEA, UNSPECIFIED: ICD-10-CM

## 2019-03-08 DIAGNOSIS — E78.5 HYPERLIPIDEMIA, UNSPECIFIED: ICD-10-CM

## 2019-03-08 DIAGNOSIS — E11.9 TYPE 2 DIABETES MELLITUS WITHOUT COMPLICATIONS: ICD-10-CM

## 2019-03-08 PROCEDURE — 99223 1ST HOSP IP/OBS HIGH 75: CPT

## 2019-03-08 RX ORDER — ONDANSETRON 8 MG/1
4 TABLET, FILM COATED ORAL ONCE
Qty: 0 | Refills: 0 | Status: COMPLETED | OUTPATIENT
Start: 2019-03-08 | End: 2019-03-08

## 2019-03-08 RX ORDER — SODIUM CHLORIDE 9 MG/ML
1000 INJECTION, SOLUTION INTRAVENOUS
Qty: 0 | Refills: 0 | Status: DISCONTINUED | OUTPATIENT
Start: 2019-03-08 | End: 2019-03-19

## 2019-03-08 RX ORDER — ONDANSETRON 8 MG/1
4 TABLET, FILM COATED ORAL ONCE
Qty: 0 | Refills: 0 | Status: DISCONTINUED | OUTPATIENT
Start: 2019-03-08 | End: 2019-03-08

## 2019-03-08 RX ORDER — ONDANSETRON 8 MG/1
4 TABLET, FILM COATED ORAL EVERY 6 HOURS
Qty: 0 | Refills: 0 | Status: DISCONTINUED | OUTPATIENT
Start: 2019-03-08 | End: 2019-03-08

## 2019-03-08 RX ORDER — DEXTROSE 50 % IN WATER 50 %
15 SYRINGE (ML) INTRAVENOUS ONCE
Qty: 0 | Refills: 0 | Status: DISCONTINUED | OUTPATIENT
Start: 2019-03-08 | End: 2019-03-19

## 2019-03-08 RX ORDER — DEXTROSE 50 % IN WATER 50 %
25 SYRINGE (ML) INTRAVENOUS ONCE
Qty: 0 | Refills: 0 | Status: DISCONTINUED | OUTPATIENT
Start: 2019-03-08 | End: 2019-03-19

## 2019-03-08 RX ORDER — INSULIN LISPRO 100/ML
VIAL (ML) SUBCUTANEOUS
Qty: 0 | Refills: 0 | Status: DISCONTINUED | OUTPATIENT
Start: 2019-03-08 | End: 2019-03-19

## 2019-03-08 RX ORDER — ONDANSETRON 8 MG/1
4 TABLET, FILM COATED ORAL EVERY 6 HOURS
Qty: 0 | Refills: 0 | Status: DISCONTINUED | OUTPATIENT
Start: 2019-03-08 | End: 2019-03-19

## 2019-03-08 RX ORDER — SODIUM CHLORIDE 9 MG/ML
1000 INJECTION, SOLUTION INTRAVENOUS
Qty: 0 | Refills: 0 | Status: DISCONTINUED | OUTPATIENT
Start: 2019-03-08 | End: 2019-03-11

## 2019-03-08 RX ORDER — GLUCAGON INJECTION, SOLUTION 0.5 MG/.1ML
1 INJECTION, SOLUTION SUBCUTANEOUS ONCE
Qty: 0 | Refills: 0 | Status: DISCONTINUED | OUTPATIENT
Start: 2019-03-08 | End: 2019-03-19

## 2019-03-08 RX ORDER — PANTOPRAZOLE SODIUM 20 MG/1
40 TABLET, DELAYED RELEASE ORAL
Qty: 0 | Refills: 0 | Status: DISCONTINUED | OUTPATIENT
Start: 2019-03-08 | End: 2019-03-09

## 2019-03-08 RX ORDER — LORATADINE 10 MG/1
10 TABLET ORAL DAILY
Qty: 0 | Refills: 0 | Status: DISCONTINUED | OUTPATIENT
Start: 2019-03-08 | End: 2019-03-19

## 2019-03-08 RX ORDER — SIMVASTATIN 20 MG/1
40 TABLET, FILM COATED ORAL AT BEDTIME
Qty: 0 | Refills: 0 | Status: DISCONTINUED | OUTPATIENT
Start: 2019-03-08 | End: 2019-03-11

## 2019-03-08 RX ORDER — DEXTROSE 50 % IN WATER 50 %
12.5 SYRINGE (ML) INTRAVENOUS ONCE
Qty: 0 | Refills: 0 | Status: DISCONTINUED | OUTPATIENT
Start: 2019-03-08 | End: 2019-03-19

## 2019-03-08 RX ADMIN — ONDANSETRON 4 MILLIGRAM(S): 8 TABLET, FILM COATED ORAL at 00:57

## 2019-03-08 RX ADMIN — ONDANSETRON 4 MILLIGRAM(S): 8 TABLET, FILM COATED ORAL at 10:40

## 2019-03-08 RX ADMIN — SODIUM CHLORIDE 75 MILLILITER(S): 9 INJECTION, SOLUTION INTRAVENOUS at 10:41

## 2019-03-08 RX ADMIN — SIMVASTATIN 40 MILLIGRAM(S): 20 TABLET, FILM COATED ORAL at 22:08

## 2019-03-08 RX ADMIN — LORATADINE 10 MILLIGRAM(S): 10 TABLET ORAL at 12:37

## 2019-03-08 RX ADMIN — SODIUM CHLORIDE 75 MILLILITER(S): 9 INJECTION, SOLUTION INTRAVENOUS at 22:09

## 2019-03-08 RX ADMIN — ONDANSETRON 4 MILLIGRAM(S): 8 TABLET, FILM COATED ORAL at 03:51

## 2019-03-08 NOTE — H&P ADULT - HISTORY OF PRESENT ILLNESS
79 yo m h/o cad/cabg 2002, diastolic chf,  gastric ca s/p surgery 2006, chemoradiation. HTN, HLD, chronic diffuse  pruritic rash for last 4 years on multiple courses of prednisone, currently on taper since discharge from [facility] last week. Also with h/o DM, JACK, GERD, carotid stenosis. 79 yo m h/o cad/cabg 2002, diastolic chf,  gastric ca s/p surgery 2006, chemoradiation. HTN, HLD, dm, JACK, GERD, carotid stenosis. chronic diffuse  pruritic rash, recently admitted here ( feb 19-26th for rash, headache, nausea, vomiting and wt loss, had MRI which revealed SAH, seen by GI and was advised to f/u as outpt for EUS) came in c/o having vomiting unable to eat or keep food down, reports feeling that his" throat is scraping or itching" and non bloody diarrhea 4 days after he was discharged from the hospital, pt also reports that he has lost a lot weight lately, unable to quantify, also reports having dysuria since a couple of days. He denies any sob, abdominal pain, chest pain, fever, chills, headache or any other complaints.    In the ED pt was given ivf  Vital Signs Last 24 Hrs  T(C): 37 (08 Mar 2019 08:01), Max: 37 (08 Mar 2019 08:01)  T(F): 98.6 (08 Mar 2019 08:01), Max: 98.6 (08 Mar 2019 08:01)  HR: 69 (08 Mar 2019 08:01) (63 - 90)  BP: 110/62 (08 Mar 2019 08:01) (110/62 - 131/54)  BP(mean): --  RR: 16 (08 Mar 2019 08:01) (16 - 16)  SpO2: 99% (08 Mar 2019 08:01) (99% - 100%) 77 yo m h/o cad/cabg 2002, diastolic chf,  gastric ca s/p surgery 2006, chemoradiation. HTN, HLD, dm, JACK, GERD, carotid stenosis. chronic diffuse  pruritic rash, recently admitted here ( feb 19-26th for rash, headache, nausea, vomiting and wt loss, had MRI which revealed SAH, MRCP revealed Dilated main pancreatic duct down to the neck, likely secondary to an intraductal calculus seen by GI and was advised to f/u as outpt for EUS) came in c/o having vomiting unable to eat or keep food down, reports feeling that his" throat is scraping or itching" and non bloody diarrhea 4 days after he was discharged from the hospital, pt also reports that he has lost a lot weight lately, unable to quantify, also reports having dysuria since a couple of days. He denies any sob, abdominal pain, chest pain, fever, chills, headache or any other complaints.    In the ED pt was given ivf  Vital Signs Last 24 Hrs  T(C): 37 (08 Mar 2019 08:01), Max: 37 (08 Mar 2019 08:01)  T(F): 98.6 (08 Mar 2019 08:01), Max: 98.6 (08 Mar 2019 08:01)  HR: 69 (08 Mar 2019 08:01) (63 - 90)  BP: 110/62 (08 Mar 2019 08:01) (110/62 - 131/54)  BP(mean): --  RR: 16 (08 Mar 2019 08:01) (16 - 16)  SpO2: 99% (08 Mar 2019 08:01) (99% - 100%) 77 yo m h/o cad/cabg 2002, diastolic chf,  gastric ca s/p surgery 2006, chemoradiation. HTN, HLD, dm, JACK, GERD, carotid stenosis. chronic diffuse  pruritic rash, recently admitted here ( feb 19-26th for rash, headache, nausea, vomiting and wt loss, had MRI which revealed SAH, MRCP revealed Dilated main pancreatic duct down to the neck, likely secondary to an intraductal calculus seen by GI and was advised to f/u as outpt for EUS) came in c/o having vomiting unable to eat or keep food down, reports feeling that his" throat is scraping or itching" and non bloody diarrhea 4 days after he was discharged from the hospital, pt also reports that he has lost a lot weight lately, unable to quantify, also reports having dysuria since a couple of days. He denies any sob, abdominal pain, chest pain, fever, chills, headache or any other complaints.    In the ED pt was given ivf, ct abd/pelvis done and admitted to medicine for further evaluation.  Vital Signs Last 24 Hrs  T(C): 37 (08 Mar 2019 08:01), Max: 37 (08 Mar 2019 08:01)  T(F): 98.6 (08 Mar 2019 08:01), Max: 98.6 (08 Mar 2019 08:01)  HR: 69 (08 Mar 2019 08:01) (63 - 90)  BP: 110/62 (08 Mar 2019 08:01) (110/62 - 131/54)  BP(mean): --  RR: 16 (08 Mar 2019 08:01) (16 - 16)  SpO2: 99% (08 Mar 2019 08:01) (99% - 100%)

## 2019-03-08 NOTE — ED ADULT NURSE REASSESSMENT NOTE - NS ED NURSE REASSESS COMMENT FT1
Pt. c/o nausea and epigastric pain after he started eating. ADS notified and pt. to be given Zofran and IVF as ordered. Will continue to monitor.

## 2019-03-08 NOTE — ED ADULT NURSE REASSESSMENT NOTE - NS ED NURSE REASSESS COMMENT FT1
Notified ADS that FS was 72 with no diet order. NP stated she will put in a soft diet order. Will provide breakfast tray for pt. Will continue to monitor.

## 2019-03-08 NOTE — H&P ADULT - PROBLEM SELECTOR PLAN 8
Given recent SAH, will keep on venodynes  IMPROVE VTE Individual Risk Assessment    RISK                                                          Points  [] Previous VTE                                           3  [] Thrombophilia                                        2  [] Lower limb paralysis                              2   [] Current Cancer                                       2   [x] Immobilization > 24 hrs                        1  [] ICU/CCU stay > 24 hours                       1  [x] Age > 60                                                   1    IMPROVE VTE Score: 2

## 2019-03-08 NOTE — H&P ADULT - PROBLEM SELECTOR PLAN 1
Ct abd/pelvis with Pt wants to try soft diet, will keep on soft diet, iv hydration, antiemetics, GI consult Ct abd/pelvis showed mild fluid distended stomach and distal esophagus with wall thickening,   Pt wants to try soft diet, will keep on soft diet, iv hydration, antiemetics, GI consult for possible EUS.

## 2019-03-08 NOTE — CONSULT NOTE ADULT - SUBJECTIVE AND OBJECTIVE BOX
Chief Complaint:  Patient is a 78y old  Male who presents with a chief complaint of vomiting and diarrhea (08 Mar 2019 08:40)      HPI:    Allergies:  No Known Allergies      Home Medications:    Hospital Medications:  clobetasol 0.05% Ointment 1 Application(s) Topical two times a day PRN  dextrose 40% Gel 15 Gram(s) Oral once PRN  dextrose 5% + sodium chloride 0.45%. 1000 milliLiter(s) IV Continuous <Continuous>  dextrose 5%. 1000 milliLiter(s) IV Continuous <Continuous>  dextrose 50% Injectable 12.5 Gram(s) IV Push once  dextrose 50% Injectable 25 Gram(s) IV Push once  dextrose 50% Injectable 25 Gram(s) IV Push once  glucagon  Injectable 1 milliGRAM(s) IntraMuscular once PRN  insulin lispro (HumaLOG) corrective regimen sliding scale   SubCutaneous three times a day before meals  loratadine 10 milliGRAM(s) Oral daily  ondansetron Injectable 4 milliGRAM(s) IV Push every 6 hours PRN  pantoprazole    Tablet 40 milliGRAM(s) Oral before breakfast  simvastatin 40 milliGRAM(s) Oral at bedtime      PMHX/PSHX:  GERD (gastroesophageal reflux disease)  Gastric cancer  Carotid stenosis  JACK (iron deficiency anemia)  HLD (hyperlipidemia)  HTN (hypertension)  CAD (coronary artery disease)  Hives  DM (diabetes mellitus)  BPH (Benign Prostatic Hypertrophy)  GERD (Gastroesophageal Reflux Disease)  S/P CABG X 3  Hypercholesteremia  CAD (Coronary Artery Disease)  S/P Radiation > 12 Weeks  S/P Chemotherapy, Time Since Greater than 12 Weeks  Anemia  BPH (Benign Prostatic Hypertrophy)  GERD (Gastroesophageal Reflux Disease)  Dyslipidemia  HTN - Hypertension  CAD (Coronary Artery Disease)  History of Stomach Cancer  S/P Gastrectomy  Atypical Chest Pain  Status Post Gastrectomy  S/P CABG X 4  Status Post Gastrectomy  S/P CABG X 4      Family history:  Family history of early CAD (Sibling)  Family history of diabetes mellitus (Mother)  No pertinent family history in first degree relatives      Social History:     ROS:     General:  No wt loss, fevers, chills, night sweats, fatigue,   Eyes:  Good vision, no reported pain  ENT:  No sore throat, pain, runny nose, dysphagia  CV:  No pain, palpitations, hypo/hypertension  Resp:  No dyspnea, cough, tachypnea, wheezing  GI:  See HPI  :  No pain, bleeding, incontinence, nocturia  Muscle:  No pain, weakness  Neuro:  No weakness, tingling, memory problems  Psych:  No fatigue, insomnia, mood problems, depression  Endocrine:  No polyuria, polydipsia, cold/heat intolerance  Heme:  No petechiae, ecchymosis, easy bruisability  Skin:  No rash, edema      PHYSICAL EXAM:     GENERAL:  Appears stated age, well-groomed, well-nourished, no distress  HEENT:  NC/AT,  conjunctivae clear and pink,  no JVD  CHEST:  Full & symmetric excursion, no increased effort, breath sounds clear  HEART:  Regular rhythm, S1, S2, no murmur/rub/S3/S4, no abdominal bruit, no edema  ABDOMEN:  Soft, non-tender, non-distended, normoactive bowel sounds,  no masses ,  EXTREMITIES:  no cyanosis,clubbing or edema  SKIN:  No rash/erythema/ecchymoses/petechiae/wounds/abscess/warm/dry  NEURO:  Alert, oriented    Vital Signs:  Vital Signs Last 24 Hrs  T(C): 36.9 (08 Mar 2019 11:48), Max: 37 (08 Mar 2019 08:01)  T(F): 98.4 (08 Mar 2019 11:48), Max: 98.6 (08 Mar 2019 08:01)  HR: 65 (08 Mar 2019 11:48) (63 - 90)  BP: 107/54 (08 Mar 2019 11:48) (107/54 - 131/54)  BP(mean): --  RR: 18 (08 Mar 2019 11:48) (16 - 18)  SpO2: 100% (08 Mar 2019 11:48) (99% - 100%)  Daily Height in cm: 175.26 (07 Mar 2019 19:09)    Daily     LABS:                        10.4   5.03  )-----------( 245      ( 07 Mar 2019 21:50 )             33.4     03-07    134<L>  |  99  |  12  ----------------------------<  88  4.3   |  23  |  0.88    Ca    8.9      07 Mar 2019 21:50    TPro  6.0  /  Alb  3.1<L>  /  TBili  0.5  /  DBili  x   /  AST  19  /  ALT  16  /  AlkPhos  61  03-07    LIVER FUNCTIONS - ( 07 Mar 2019 21:50 )  Alb: 3.1 g/dL / Pro: 6.0 g/dL / ALK PHOS: 61 u/L / ALT: 16 u/L / AST: 19 u/L / GGT: x           PT/INR - ( 07 Mar 2019 21:50 )   PT: 12.7 SEC;   INR: 1.14          PTT - ( 07 Mar 2019 21:50 )  PTT:28.5 SEC    Amylase Serum--      Lipase serum15.0       Ammonia--      Imaging: Chief Complaint:  Patient is a 78y old  Male who presents with a chief complaint of vomiting and diarrhea (08 Mar 2019 08:40)      HPI:  77 yo m h/o cad/cabg 2002, diastolic chf,  gastric ca s/p surgery 2006, chemoradiation. HTN, HLD, dm, JACK, GERD, carotid stenosis, chronic diffuse pruritic rash, p/w vomiting, diarrhea and wt loss.  Recently admitted (Feb 19-26th) for rash, headache, nausea, vomiting and wt loss.  Last admission MRI head revealed SAH and MRCP revealed dilated main pancreatic duct down to the neck, likely secondary to an intraductal calculus with plan for outpt followup with GI for EUS.  Pt endorses 4 month hx of postprandial vomiting to both solids and liquids. Denies melena, hematochezia. Denies dysphagia, odynophagia.    Allergies:  No Known Allergies      Home Medications:  metFORMIN 500 mg oral tablet: 1 tab(s) orally once a day   clobetasol 0.05% topical ointment: Apply topically to affected area 2 times a day to affected area  simethicone 80 mg oral tablet, chewable: 1 tab(s) orally every 6 hours, As Needed  simvastatin 40 mg oral tablet: 1 tab(s) orally once a day (at bedtime)  esomeprazole 40 mg oral delayed release capsule: 1 cap(s) orally once a day  levocetirizine 5 mg oral tablet: 1 tab(s) orally 2 times a day, As Needed  nitroglycerin 0.4 mg sublingual tablet: 1 tab(s) sublingual every 5 minutes, As Needed    Hospital Medications:  clobetasol 0.05% Ointment 1 Application(s) Topical two times a day PRN  dextrose 40% Gel 15 Gram(s) Oral once PRN  dextrose 5% + sodium chloride 0.45%. 1000 milliLiter(s) IV Continuous <Continuous>  dextrose 5%. 1000 milliLiter(s) IV Continuous <Continuous>  dextrose 50% Injectable 12.5 Gram(s) IV Push once  dextrose 50% Injectable 25 Gram(s) IV Push once  dextrose 50% Injectable 25 Gram(s) IV Push once  glucagon  Injectable 1 milliGRAM(s) IntraMuscular once PRN  insulin lispro (HumaLOG) corrective regimen sliding scale   SubCutaneous three times a day before meals  loratadine 10 milliGRAM(s) Oral daily  ondansetron Injectable 4 milliGRAM(s) IV Push every 6 hours PRN  pantoprazole    Tablet 40 milliGRAM(s) Oral before breakfast  simvastatin 40 milliGRAM(s) Oral at bedtime      PMHX/PSHX:  GERD (gastroesophageal reflux disease)  Gastric cancer  Carotid stenosis  JACK (iron deficiency anemia)  HLD (hyperlipidemia)  HTN (hypertension)  CAD (coronary artery disease)  Hives  DM (diabetes mellitus)  BPH (Benign Prostatic Hypertrophy)  GERD (Gastroesophageal Reflux Disease)  S/P CABG X 3  Hypercholesteremia  CAD (Coronary Artery Disease)  S/P Radiation > 12 Weeks  S/P Chemotherapy, Time Since Greater than 12 Weeks  Anemia  BPH (Benign Prostatic Hypertrophy)  GERD (Gastroesophageal Reflux Disease)  Dyslipidemia  HTN - Hypertension  CAD (Coronary Artery Disease)  History of Stomach Cancer  S/P Gastrectomy  Atypical Chest Pain  Status Post Gastrectomy  S/P CABG X 4  Status Post Gastrectomy  S/P CABG X 4      Family history:  Family history of early CAD (Sibling)  Family history of diabetes mellitus (Mother)  No pertinent family history in first degree relatives      Social History:  lives with wife, son and daughter in law, no tobacco, alcohol or drug use    ROS:     General:  No wt loss, fevers, chills, night sweats, fatigue,   Eyes:  Good vision, no reported pain  ENT:  No sore throat, pain, runny nose, dysphagia  CV:  No pain, palpitations, hypo/hypertension  Resp:  No dyspnea, cough, tachypnea, wheezing  GI:  See HPI  :  No pain, bleeding, incontinence, nocturia  Muscle:  No pain, weakness  Neuro:  No weakness, tingling, memory problems  Psych:  No fatigue, insomnia, mood problems, depression  Endocrine:  No polyuria, polydipsia, cold/heat intolerance  Heme:  No petechiae, ecchymosis, easy bruisability  Skin:  No rash, edema      PHYSICAL EXAM:     GENERAL:  Appears stated age, well-groomed, well-nourished, no distress  HEENT:  NC/AT,  conjunctivae clear and pink,  no JVD  CHEST:  Full & symmetric excursion, no increased effort, breath sounds clear  HEART:  Regular rhythm, S1, S2, no murmur/rub/S3/S4, no abdominal bruit, no edema  ABDOMEN:  Soft, non-tender, non-distended, normoactive bowel sounds,  no masses ,  EXTREMITIES:  no cyanosis,clubbing or edema  SKIN:  No rash/erythema/ecchymoses/petechiae/wounds/abscess/warm/dry  NEURO:  Alert, oriented    Vital Signs:  Vital Signs Last 24 Hrs  T(C): 36.9 (08 Mar 2019 11:48), Max: 37 (08 Mar 2019 08:01)  T(F): 98.4 (08 Mar 2019 11:48), Max: 98.6 (08 Mar 2019 08:01)  HR: 65 (08 Mar 2019 11:48) (63 - 90)  BP: 107/54 (08 Mar 2019 11:48) (107/54 - 131/54)  RR: 18 (08 Mar 2019 11:48) (16 - 18)  SpO2: 100% (08 Mar 2019 11:48) (99% - 100%)  Daily Height in cm: 175.26 (07 Mar 2019 19:09)    Daily     LABS:                        10.4   5.03  )-----------( 245      ( 07 Mar 2019 21:50 )             33.4     03-07    134<L>  |  99  |  12  ----------------------------<  88  4.3   |  23  |  0.88    Ca    8.9      07 Mar 2019 21:50    TPro  6.0  /  Alb  3.1<L>  /  TBili  0.5  /  DBili  x   /  AST  19  /  ALT  16  /  AlkPhos  61  03-07    LIVER FUNCTIONS - ( 07 Mar 2019 21:50 )  Alb: 3.1 g/dL / Pro: 6.0 g/dL / ALK PHOS: 61 u/L / ALT: 16 u/L / AST: 19 u/L / GGT: x           PT/INR - ( 07 Mar 2019 21:50 )   PT: 12.7 SEC;   INR: 1.14          PTT - ( 07 Mar 2019 21:50 )  PTT:28.5 SEC    Amylase Serum--      Lipase serum15.0       Ammonia--      Imaging:  CT abd: No bowel obstruction.  Mild fluid distended stomach and distal esophagus with wall thickening,   compatible with clinical history of vomiting. Possibility of esophagitis   or gastroesophageal reflux disease considered. Consider nonemergent   endoscopic evaluation if there is concern for underlying malignancy.  Additional findings as mentioned above. Chief Complaint:  Patient is a 78y old  Male who presents with a chief complaint of vomiting and diarrhea (08 Mar 2019 08:40)    HPI:  79 yo m h/o cad/cabg 2002, diastolic chf,  gastric ca s/p surgery 2006, chemoradiation. HTN, HLD, dm, JACK, GERD, carotid stenosis, chronic diffuse pruritic rash, p/w vomiting, diarrhea and wt loss.  Recently admitted (Feb 19-26th) for rash, headache, nausea, vomiting and wt loss.  Last admission MRI head revealed SAH, MRCP revealed dilated main pancreatic duct down to the neck, likely secondary to an intraductal calculus with plan for outpt followup with GI for EUS.  Pt endorses 4 month hx of postprandial vomiting to both solids and liquids. This has worsened since his most recent discharge (2/26) to the point that he has been unable eat for the past week. Denies melena, hematochezia. Denies dysphagia, odynophagia.    Allergies:  No Known Allergies    Home Medications:  metFORMIN 500 mg oral tablet: 1 tab(s) orally once a day   clobetasol 0.05% topical ointment: Apply topically to affected area 2 times a day to affected area  simethicone 80 mg oral tablet, chewable: 1 tab(s) orally every 6 hours, As Needed  simvastatin 40 mg oral tablet: 1 tab(s) orally once a day (at bedtime)  esomeprazole 40 mg oral delayed release capsule: 1 cap(s) orally once a day  levocetirizine 5 mg oral tablet: 1 tab(s) orally 2 times a day, As Needed  nitroglycerin 0.4 mg sublingual tablet: 1 tab(s) sublingual every 5 minutes, As Needed    Hospital Medications:  clobetasol 0.05% Ointment 1 Application(s) Topical two times a day PRN  dextrose 40% Gel 15 Gram(s) Oral once PRN  dextrose 5% + sodium chloride 0.45%. 1000 milliLiter(s) IV Continuous <Continuous>  dextrose 5%. 1000 milliLiter(s) IV Continuous <Continuous>  dextrose 50% Injectable 12.5 Gram(s) IV Push once  dextrose 50% Injectable 25 Gram(s) IV Push once  dextrose 50% Injectable 25 Gram(s) IV Push once  glucagon  Injectable 1 milliGRAM(s) IntraMuscular once PRN  insulin lispro (HumaLOG) corrective regimen sliding scale   SubCutaneous three times a day before meals  loratadine 10 milliGRAM(s) Oral daily  ondansetron Injectable 4 milliGRAM(s) IV Push every 6 hours PRN  pantoprazole    Tablet 40 milliGRAM(s) Oral before breakfast  simvastatin 40 milliGRAM(s) Oral at bedtime      PMHX/PSHX:  GERD (gastroesophageal reflux disease)  Gastric cancer  Carotid stenosis  JACK (iron deficiency anemia)  HLD (hyperlipidemia)  HTN (hypertension)  CAD (coronary artery disease)  Hives  DM (diabetes mellitus)  BPH (Benign Prostatic Hypertrophy)  GERD (Gastroesophageal Reflux Disease)  S/P CABG X 3  Hypercholesteremia  CAD (Coronary Artery Disease)  S/P Radiation > 12 Weeks  S/P Chemotherapy, Time Since Greater than 12 Weeks  Anemia  BPH (Benign Prostatic Hypertrophy)  GERD (Gastroesophageal Reflux Disease)  Dyslipidemia  HTN - Hypertension  CAD (Coronary Artery Disease)  History of Stomach Cancer  S/P Gastrectomy  Atypical Chest Pain  Status Post Gastrectomy  S/P CABG X 4  Status Post Gastrectomy  S/P CABG X 4    Family history:  Family history of early CAD (Sibling)  Family history of diabetes mellitus (Mother)  No pertinent family history in first degree relatives    Social History:  lives with wife, son and daughter in law, no tobacco, alcohol or drug use    ROS:   General:  +weight loss (not sure how much), endorses fevers (which he felt yesterday), chills, fatigue,   Eyes:  no vision change  ENT: no sore throat, pain, runny nose, dysphagia  CV:  no pain, palpitations  Resp:  no dyspnea, cough, tachypnea, wheezing  GI:  See HPI  :  + occasional pain/burning with urination   Muscle:  +weakness  Endocrine:  no polyuria, polydipsia, cold/heat intolerance  Heme:  no petechiae, ecchymosis, easy bruisability  Skin:  hand recent rash which resolved after last hospitalization in february 2019      PHYSICAL EXAM:   GENERAL:  thin elderly man no acute distress  EYES: EOMI, PERRL   OROPHARYNX: MMM, good dentition   CHEST:  Full & symmetric excursion, no increased effort, breath sounds clear  HEART:  Regular rhythm, S1, S2, no murmur/rub/S3/S4, no abdominal bruit, no edema  ABDOMEN:  Soft, non-tender, non-distended, normoactive bowel sounds,  no masses ,  EXTREMITIES:  no cyanosis,clubbing or edema  SKIN:  No rash/erythema/ecchymoses/petechiae/wounds/abscess/warm/dry  NEURO:  Alert, oriented    Vital Signs:  Vital Signs Last 24 Hrs  T(C): 36.9 (08 Mar 2019 11:48), Max: 37 (08 Mar 2019 08:01)  T(F): 98.4 (08 Mar 2019 11:48), Max: 98.6 (08 Mar 2019 08:01)  HR: 65 (08 Mar 2019 11:48) (63 - 90)  BP: 107/54 (08 Mar 2019 11:48) (107/54 - 131/54)  RR: 18 (08 Mar 2019 11:48) (16 - 18)  SpO2: 100% (08 Mar 2019 11:48) (99% - 100%)  Daily Height in cm: 175.26 (07 Mar 2019 19:09)      LABS:                        10.4   5.03  )-----------( 245      ( 07 Mar 2019 21:50 )             33.4     03-07    134<L>  |  99  |  12  ----------------------------<  88  4.3   |  23  |  0.88    Ca    8.9      07 Mar 2019 21:50    TPro  6.0  /  Alb  3.1<L>  /  TBili  0.5  /  DBili  x   /  AST  19  /  ALT  16  /  AlkPhos  61  03-07    LIVER FUNCTIONS - ( 07 Mar 2019 21:50 )  Alb: 3.1 g/dL / Pro: 6.0 g/dL / ALK PHOS: 61 u/L / ALT: 16 u/L / AST: 19 u/L / GGT: x           PT/INR - ( 07 Mar 2019 21:50 )   PT: 12.7 SEC;   INR: 1.14       PTT - ( 07 Mar 2019 21:50 )  PTT:28.5 SEC    Amylase Serum--      Lipase serum15.0       Ammonia    Imaging:  CT abd: No bowel obstruction.  Mild fluid distended stomach and distal esophagus with wall thickening,   compatible with clinical history of vomiting. Possibility of esophagitis   or gastroesophageal reflux disease considered. Consider nonemergent   endoscopic evaluation if there is concern for underlying malignancy.  Additional findings as mentioned above. Chief Complaint:  Patient is a 78y old  Male who presents with a chief complaint of vomiting and diarrhea (08 Mar 2019 08:40)    HPI:  77 yo man w/ hx of CAD/CABG 2002, diastolic CHF, carotid stenosis, HTN, HLD, DM, gastric cancer s/p surgery 2006, chemoradiation, JACK, GERD, chronic diffuse pruritic rash, p/w vomiting, diarrhea and wt loss.  He was recently admitted (2/19 - 2/16) for rash, headache, nausea, vomiting and wt loss.  MRI head during last admission revealed SAH and MRCP revealed dilated main pancreatic duct down to the neck thought to be secondary to an intraductal calculus with plan for outpt followup with GI for EUS.  Pt endorses 4 month hx of postprandial vomiting to both solids and liquids. This has worsened since his most recent discharge (2/26) to the point that he has been unable eat for the past week. Pt also endorses diarrhea which began 4 days PTA which he describes and non-watery and non-bloody occurring 2-3 times per day. Denies melena, hematochezia. Denies dysphagia, odynophagia.    Allergies:  No Known Allergies    Home Medications:  metFORMIN 500 mg oral tablet: 1 tab(s) orally once a day   clobetasol 0.05% topical ointment: Apply topically to affected area 2 times a day to affected area  simethicone 80 mg oral tablet, chewable: 1 tab(s) orally every 6 hours, As Needed  simvastatin 40 mg oral tablet: 1 tab(s) orally once a day (at bedtime)  esomeprazole 40 mg oral delayed release capsule: 1 cap(s) orally once a day  levocetirizine 5 mg oral tablet: 1 tab(s) orally 2 times a day, As Needed  nitroglycerin 0.4 mg sublingual tablet: 1 tab(s) sublingual every 5 minutes, As Needed    Hospital Medications:  clobetasol 0.05% Ointment 1 Application(s) Topical two times a day PRN  dextrose 40% Gel 15 Gram(s) Oral once PRN  dextrose 5% + sodium chloride 0.45%. 1000 milliLiter(s) IV Continuous <Continuous>  dextrose 5%. 1000 milliLiter(s) IV Continuous <Continuous>  dextrose 50% Injectable 12.5 Gram(s) IV Push once  dextrose 50% Injectable 25 Gram(s) IV Push once  dextrose 50% Injectable 25 Gram(s) IV Push once  glucagon  Injectable 1 milliGRAM(s) IntraMuscular once PRN  insulin lispro (HumaLOG) corrective regimen sliding scale   SubCutaneous three times a day before meals  loratadine 10 milliGRAM(s) Oral daily  ondansetron Injectable 4 milliGRAM(s) IV Push every 6 hours PRN  pantoprazole    Tablet 40 milliGRAM(s) Oral before breakfast  simvastatin 40 milliGRAM(s) Oral at bedtime      PMHX/PSHX:  GERD (gastroesophageal reflux disease)  Gastric cancer  Carotid stenosis  JACK (iron deficiency anemia)  HLD (hyperlipidemia)  HTN (hypertension)  CAD (coronary artery disease)  Hives  DM (diabetes mellitus)  BPH (Benign Prostatic Hypertrophy)  GERD (Gastroesophageal Reflux Disease)  S/P CABG X 3  Hypercholesteremia  CAD (Coronary Artery Disease)  S/P Radiation > 12 Weeks  S/P Chemotherapy, Time Since Greater than 12 Weeks  Anemia  BPH (Benign Prostatic Hypertrophy)  GERD (Gastroesophageal Reflux Disease)  Dyslipidemia  HTN - Hypertension  CAD (Coronary Artery Disease)  History of Stomach Cancer  S/P Gastrectomy  Atypical Chest Pain  Status Post Gastrectomy  S/P CABG X 4  Status Post Gastrectomy  S/P CABG X 4    Family history:  Family history of early CAD (Sibling)  Family history of diabetes mellitus (Mother)  No pertinent family history in first degree relatives    Social History:  lives with wife, son and daughter in law, no tobacco, alcohol or drug use    ROS:   General:  +weight loss (not sure how much), endorses fevers (which he felt yesterday), chills, fatigue,   Eyes:  no vision change  ENT: no sore throat, pain, runny nose, dysphagia  CV:  no pain, palpitations  Resp:  no dyspnea, cough, tachypnea, wheezing  GI:  See HPI  :  + occasional pain/burning with urination   Muscle:  +weakness  Endocrine:  no polyuria, polydipsia, cold/heat intolerance  Heme:  no petechiae, ecchymosis, easy bruisability  Skin:  hand recent rash which resolved after last hospitalization in february 2019      PHYSICAL EXAM:   GENERAL:  thin elderly man no acute distress  EYES: EOMI, PERRL   OROPHARYNX: MMM, good dentition   CHEST:  Full & symmetric excursion, no increased effort, breath sounds clear  HEART:  Regular rhythm, S1, S2, no murmur/rub/S3/S4, no abdominal bruit, no edema  ABDOMEN:  Soft, non-tender, non-distended, normoactive bowel sounds,  no masses ,  EXTREMITIES:  no cyanosis,clubbing or edema  SKIN:  No rash/erythema/ecchymoses/petechiae/wounds/abscess/warm/dry  NEURO:  Alert, oriented    Vital Signs:  Vital Signs Last 24 Hrs  T(C): 36.9 (08 Mar 2019 11:48), Max: 37 (08 Mar 2019 08:01)  T(F): 98.4 (08 Mar 2019 11:48), Max: 98.6 (08 Mar 2019 08:01)  HR: 65 (08 Mar 2019 11:48) (63 - 90)  BP: 107/54 (08 Mar 2019 11:48) (107/54 - 131/54)  RR: 18 (08 Mar 2019 11:48) (16 - 18)  SpO2: 100% (08 Mar 2019 11:48) (99% - 100%)  Daily Height in cm: 175.26 (07 Mar 2019 19:09)      LABS:                        10.4   5.03  )-----------( 245      ( 07 Mar 2019 21:50 )             33.4     03-07    134<L>  |  99  |  12  ----------------------------<  88  4.3   |  23  |  0.88    Ca    8.9      07 Mar 2019 21:50    TPro  6.0  /  Alb  3.1<L>  /  TBili  0.5  /  DBili  x   /  AST  19  /  ALT  16  /  AlkPhos  61  03-07    LIVER FUNCTIONS - ( 07 Mar 2019 21:50 )  Alb: 3.1 g/dL / Pro: 6.0 g/dL / ALK PHOS: 61 u/L / ALT: 16 u/L / AST: 19 u/L / GGT: x           PT/INR - ( 07 Mar 2019 21:50 )   PT: 12.7 SEC;   INR: 1.14       PTT - ( 07 Mar 2019 21:50 )  PTT:28.5 SEC    Amylase Serum--      Lipase serum15.0       Ammonia    Imaging:  CT abd: No bowel obstruction.  Mild fluid distended stomach and distal esophagus with wall thickening,   compatible with clinical history of vomiting. Possibility of esophagitis   or gastroesophageal reflux disease considered. Consider nonemergent   endoscopic evaluation if there is concern for underlying malignancy.  Additional findings as mentioned above. Chief Complaint:  Patient is a 78y old  Male who presents with a chief complaint of vomiting and diarrhea (08 Mar 2019 08:40)    HPI:  79 yo man w/ hx of CAD/CABG 2002, diastolic CHF, carotid stenosis, HTN, HLD, DM, gastric cancer s/p surgery 2006, chemoradiation, Iron deficiency (etiology?), GERD, chronic diffuse pruritic rash, recent intracranial hemorrhage p/w vomiting, diarrhea and wt loss.  He was recently admitted (2/19 - 2/16) for rash, headache, nausea, vomiting and wt loss.  MRI head during last admission revealed SAH and MRCP revealed dilated main pancreatic duct down to the neck thought to be secondary to an intraductal calculus with plan for outpt followup with GI for EUS.  Pt endorses w years of progressive postprandial vomiting to both solids and liquids. He has early satiety and bloating but not pain. He has lost an unscpecified amount of weight and can barely tolerate any PO. This has worsened since his most recent discharge (2/26) to the point that he has been unable eat for the past week. Pt also endorses diarrhea which began 4 days PTA which he describes and non-watery and non-bloody occurring 2-3 times per day. Denies melena, hematochezia. Denies dysphagia, odynophagia. The patient Endorses NSAID use several times per week. In 2015 he had an EGD which demonstrated possible marginal ulcers and biopsies showed low grade dysplasia. The patient does not recall how this was followed up.   The patient denies ethanol use and has never had pancreatitis as far as he can recall.  Allergies:  No Known Allergies    Home Medications:  metFORMIN 500 mg oral tablet: 1 tab(s) orally once a day   clobetasol 0.05% topical ointment: Apply topically to affected area 2 times a day to affected area  simethicone 80 mg oral tablet, chewable: 1 tab(s) orally every 6 hours, As Needed  simvastatin 40 mg oral tablet: 1 tab(s) orally once a day (at bedtime)  esomeprazole 40 mg oral delayed release capsule: 1 cap(s) orally once a day  levocetirizine 5 mg oral tablet: 1 tab(s) orally 2 times a day, As Needed  nitroglycerin 0.4 mg sublingual tablet: 1 tab(s) sublingual every 5 minutes, As Needed    Hospital Medications:  clobetasol 0.05% Ointment 1 Application(s) Topical two times a day PRN  dextrose 40% Gel 15 Gram(s) Oral once PRN  dextrose 5% + sodium chloride 0.45%. 1000 milliLiter(s) IV Continuous <Continuous>  dextrose 5%. 1000 milliLiter(s) IV Continuous <Continuous>  dextrose 50% Injectable 12.5 Gram(s) IV Push once  dextrose 50% Injectable 25 Gram(s) IV Push once  dextrose 50% Injectable 25 Gram(s) IV Push once  glucagon  Injectable 1 milliGRAM(s) IntraMuscular once PRN  insulin lispro (HumaLOG) corrective regimen sliding scale   SubCutaneous three times a day before meals  loratadine 10 milliGRAM(s) Oral daily  ondansetron Injectable 4 milliGRAM(s) IV Push every 6 hours PRN  pantoprazole    Tablet 40 milliGRAM(s) Oral before breakfast  simvastatin 40 milliGRAM(s) Oral at bedtime      PMHX/PSHX:  GERD (gastroesophageal reflux disease)  Gastric cancer  Carotid stenosis  JACK (iron deficiency anemia)  HLD (hyperlipidemia)  HTN (hypertension)  CAD (coronary artery disease)  Hives  DM (diabetes mellitus)  BPH (Benign Prostatic Hypertrophy)  GERD (Gastroesophageal Reflux Disease)  S/P CABG X 3  Hypercholesteremia  CAD (Coronary Artery Disease)  S/P Radiation > 12 Weeks  S/P Chemotherapy, Time Since Greater than 12 Weeks  Anemia  BPH (Benign Prostatic Hypertrophy)  GERD (Gastroesophageal Reflux Disease)  Dyslipidemia  HTN - Hypertension  CAD (Coronary Artery Disease)  History of Stomach Cancer  S/P Gastrectomy  Atypical Chest Pain  Status Post Gastrectomy  S/P CABG X 4  Status Post Gastrectomy  S/P CABG X 4    Family history:  Family history of early CAD (Sibling)  Family history of diabetes mellitus (Mother)  No pertinent family history in first degree relatives    Social History:  lives with wife, son and daughter in law, no tobacco, alcohol or drug use    ROS:   General:  +weight loss (not sure how much), endorses fevers (which he felt yesterday), chills, fatigue,   Eyes:  no vision change  ENT: no sore throat, pain, runny nose, dysphagia  CV:  no pain, palpitations  Resp:  no dyspnea, cough, tachypnea, wheezing  GI:  See HPI  :  + occasional pain/burning with urination   Muscle:  +weakness  Endocrine:  no polyuria, polydipsia, cold/heat intolerance  Heme:  no petechiae, ecchymosis, easy bruisability  Skin:  hand recent rash which resolved after last hospitalization in february 2019      PHYSICAL EXAM:   GENERAL:  thin elderly man no acute distress  EYES: EOMI, PERRL   OROPHARYNX: MMM, good dentition   CHEST:  Full & symmetric excursion, no increased effort, breath sounds clear  HEART:  Regular rhythm, S1, S2, no murmur/rub/S3/S4, no abdominal bruit, no edema  ABDOMEN:  Soft, non-tender, non-distended, normoactive bowel sounds,  no masses , + succussion splash  EXTREMITIES:  no cyanosis,clubbing or edema  SKIN:  No rash/erythema/ecchymoses/petechiae/wounds/abscess/warm/dry  NEURO:  Alert, oriented    Vital Signs:  Vital Signs Last 24 Hrs  T(C): 36.9 (08 Mar 2019 11:48), Max: 37 (08 Mar 2019 08:01)  T(F): 98.4 (08 Mar 2019 11:48), Max: 98.6 (08 Mar 2019 08:01)  HR: 65 (08 Mar 2019 11:48) (63 - 90)  BP: 107/54 (08 Mar 2019 11:48) (107/54 - 131/54)  RR: 18 (08 Mar 2019 11:48) (16 - 18)  SpO2: 100% (08 Mar 2019 11:48) (99% - 100%)  Daily Height in cm: 175.26 (07 Mar 2019 19:09)      LABS:                        10.4   5.03  )-----------( 245      ( 07 Mar 2019 21:50 )             33.4     03-07    134<L>  |  99  |  12  ----------------------------<  88  4.3   |  23  |  0.88    Ca    8.9      07 Mar 2019 21:50    TPro  6.0  /  Alb  3.1<L>  /  TBili  0.5  /  DBili  x   /  AST  19  /  ALT  16  /  AlkPhos  61  03-07    LIVER FUNCTIONS - ( 07 Mar 2019 21:50 )  Alb: 3.1 g/dL / Pro: 6.0 g/dL / ALK PHOS: 61 u/L / ALT: 16 u/L / AST: 19 u/L / GGT: x           PT/INR - ( 07 Mar 2019 21:50 )   PT: 12.7 SEC;   INR: 1.14       PTT - ( 07 Mar 2019 21:50 )  PTT:28.5 SEC    Amylase Serum--      Lipase serum15.0       Ammonia    Imaging:  CT abd: No bowel obstruction.  Mild fluid distended stomach and distal esophagus with wall thickening,   compatible with clinical history of vomiting. Possibility of esophagitis   or gastroesophageal reflux disease considered. Consider nonemergent   endoscopic evaluation if there is concern for underlying malignancy.  Additional findings as mentioned above.

## 2019-03-08 NOTE — H&P ADULT - NSHPLABSRESULTS_GEN_ALL_CORE
labs reviewed significant for anemia, mildly low Na.  cxr imaging reviewed  ct abd/pelvis results reviewed

## 2019-03-08 NOTE — H&P ADULT - ASSESSMENT
79 yo m h/o cad/cabg 2002, diastolic chf,  gastric ca s/p surgery 2006, chemoradiation. HTN, HLD, dm, JACK, GERD, carotid stenosis. chronic diffuse  pruritic rash, recently admitted here ( feb 19-26th for rash, headache, nausea, vomiting and wt loss, had MRI which revealed SAH, seen by GI and was advised to f/u as outpt for EUS) 79 yo m h/o cad/cabg 2002, diastolic chf,  gastric ca s/p surgery 2006, chemoradiation. HTN, HLD, dm, JACK, GERD, carotid stenosis. chronic diffuse  pruritic rash, recently admitted here ( feb 19-26th for rash, headache, nausea, vomiting and wt loss, had MRI which revealed SAH, MRCP revealed Dilated main pancreatic duct down to the neck, likely secondary to an intraductal calculus seen by GI and was advised to f/u as outpt for EUS) a/w vomiting, diarrhea and wt loss.

## 2019-03-08 NOTE — CONSULT NOTE ADULT - ATTENDING COMMENTS
Gastric outlet obstruction with concern for gastrojejunal stenosis vs recurrence of prior gastric malignancy. CT images personally reviewed. Awaiting upper GI series with small bowel follow through. Pending results of the UGI, likely will need EGD Monday for further evaluation. Would not advance diet beyond CLD at this point.

## 2019-03-08 NOTE — CONSULT NOTE ADULT - ASSESSMENT
*** INCOMPLETE NOTE ***    Impression:  1) n/v - unclear etiology, could be 2/2 brain bleeds/neurologic issues, vs pancreatitis from stone, vs unrelated etiologies like abnormal motility, GJ stenosis  2) 6mm PD stone with upstream PD dilatation, likely 2/2 chronic panc  3) H/o gastric ca s/p surgery (B2?) in 2006 with chemoradiation.  Attempted EGD previously, however, patient syncopized prior to procedure  4) H/p CABG on ASA, CHF, last TTE 2017  5) Watery diarrhea    Recs:  - pt moderate risk for MACE with anesthesia per cardiac eval last admission on 2/26/2019 (positive stress in 4/17 as above, medically managed , Echo with normal LV function, EKG with LBBB   - can check small bowel series given persistent n/v  - f/u cdiff given recent hospitalization, cont iv hydration. *** INCOMPLETE NOTE PENDING DISCUSSION WITH ATTENDING ***    Impression:  #Nausea/vomiting: Etiology unclear, working dx includes gastrojejunal stenosis, vs complication of brain bleeds vs pancreatitis from stone  - Pt moderate risk for major adverse cardiac event (MACE) with anesthesia per cardiac eval last admission on 2/26/2019 (positive stress in 4/17, Echo with normal LV function as of 2/2019, EKG with LBBB)   #Pancreatic duct stone (6mm) a/w upstream PD dilatation.  Likely 2/2 chronic pancreatitis   #H/o gastric ca s/p surgery (B2?) in 2006 with chemoradiation: Attempted EGD previously, however, patient syncopized prior to procedure  #Diarrhea: Etiology unclear, however given acute onset (past week) favors infectious/toxic source such as viral gastroenteritis    Recommendations  - Check small bowel series given persistent nausea and vomiting   - F/u C. diff given recent hospitalization, GI PCR and supportive management for diarrhea Impression:  #Nausea/vomiting: Consistent with gastric outlet obstruction from gastrojejunal stenosis (gastric distention on CT) vs more distal stenosis vs. less chronic pancreatitis w intraductal calculus stone given lack of pain  #elevated cardiac risk: the patient had cardiac assessment last admission and was deemed high risk  #Pancreatic duct stone (6mm) a/w upstream PD dilatation.  Patient has no hx of ethanol use, unclear why would have chronic pancreatitis. Symptoms not so consistent with chronci panc  #H/o gastric ca s/p surgery (B2?) in 2006 with chemoradiation: Attempted EGD previously, however, patient syncopized prior to procedure  #Acute Diarrhea: Rule out infectious etiology/cdiff  #recent intracranial hemorrhage ?etiology?  #prior low grade dysplasia/marginal ulcers  Recommendations  - Check upper GI series w small bowel follow through   - F/u C. diff given recent hospitalization, GI PCR and supportive management for diarrhea  - cardiology evaluation for risk stratification  - clear liquid diet with anti-emetics as tolerated  - Neuro evaluation  - IV PPI BID to decrease gastric acid hypersecretion  - Eventual endoscopic evaluation of stomach and pancreas if safe Impression:  #Nausea/vomiting: Consistent with gastric outlet obstruction from gastrojejunal stenosis (gastric distention on CT) vs more distal stenosis (?radiation) vs. less likely chronic pancreatitis w intraductal calculus stone given lack of pain  #elevated cardiac risk: the patient had cardiac assessment last admission and was deemed high risk. Had episode of syncope in hospital?  #Pancreatic duct stone (6mm) a/w upstream PD dilatation.  Patient has no hx of ethanol use, unclear why would have chronic pancreatitis. Symptoms not so consistent with chronci panc  #H/o gastric ca s/p surgery (B2?) in 2006 with chemoradiation: Attempted EGD previously, however, patient syncopized prior to procedure  #Acute Diarrhea: Rule out infectious etiology/cdiff  #recent intracranial hemorrhage ?etiology?  #prior low grade dysplasia/marginal ulcers  Recommendations  - Check upper GI series w small bowel follow through   - F/u C. diff given recent hospitalization, GI PCR and supportive management for diarrhea  - cardiology evaluation for risk stratification  - clear liquid diet with anti-emetics as tolerated  - Neuro evaluation to r/o central etiology  - IV PPI BID to decrease gastric acid hypersecretion  - Eventual endoscopic evaluation of stomach and pancreas if safe

## 2019-03-08 NOTE — H&P ADULT - FAMILY HISTORY
Mother  Still living? No  Family history of diabetes mellitus, Age at diagnosis: Age Unknown     Sibling  Still living? Yes, Estimated age: Age Unknown  Family history of early CAD, Age at diagnosis: Age Unknown

## 2019-03-09 LAB
ALBUMIN SERPL ELPH-MCNC: 1.4 G/DL — LOW (ref 3.3–5)
ALBUMIN SERPL ELPH-MCNC: 3 G/DL — LOW (ref 3.3–5)
ALP SERPL-CCNC: 30 U/L — LOW (ref 40–120)
ALP SERPL-CCNC: 60 U/L — SIGNIFICANT CHANGE UP (ref 40–120)
ALT FLD-CCNC: 14 U/L — SIGNIFICANT CHANGE UP (ref 4–41)
ALT FLD-CCNC: 7 U/L — SIGNIFICANT CHANGE UP (ref 4–41)
ANION GAP SERPL CALC-SCNC: 11 MMO/L — SIGNIFICANT CHANGE UP (ref 7–14)
ANION GAP SERPL CALC-SCNC: 7 MMO/L — SIGNIFICANT CHANGE UP (ref 7–14)
AST SERPL-CCNC: 10 U/L — SIGNIFICANT CHANGE UP (ref 4–40)
AST SERPL-CCNC: 21 U/L — SIGNIFICANT CHANGE UP (ref 4–40)
BILIRUB SERPL-MCNC: 0.3 MG/DL — SIGNIFICANT CHANGE UP (ref 0.2–1.2)
BILIRUB SERPL-MCNC: < 0.2 MG/DL — LOW (ref 0.2–1.2)
BUN SERPL-MCNC: 3 MG/DL — LOW (ref 7–23)
BUN SERPL-MCNC: 5 MG/DL — LOW (ref 7–23)
CALCIUM SERPL-MCNC: 4.7 MG/DL — CRITICAL LOW (ref 8.4–10.5)
CALCIUM SERPL-MCNC: 8.6 MG/DL — SIGNIFICANT CHANGE UP (ref 8.4–10.5)
CHLORIDE SERPL-SCNC: 87 MMOL/L — LOW (ref 98–107)
CHLORIDE SERPL-SCNC: 97 MMOL/L — LOW (ref 98–107)
CO2 SERPL-SCNC: 14 MMOL/L — LOW (ref 22–31)
CO2 SERPL-SCNC: 24 MMOL/L — SIGNIFICANT CHANGE UP (ref 22–31)
CREAT SERPL-MCNC: 0.5 MG/DL — SIGNIFICANT CHANGE UP (ref 0.5–1.3)
CREAT SERPL-MCNC: 0.88 MG/DL — SIGNIFICANT CHANGE UP (ref 0.5–1.3)
GLUCOSE SERPL-MCNC: 155 MG/DL — HIGH (ref 70–99)
GLUCOSE SERPL-MCNC: > 1650 MG/DL — CRITICAL HIGH (ref 70–99)
HCT VFR BLD CALC: 30.2 % — LOW (ref 39–50)
HGB BLD-MCNC: 9.4 G/DL — LOW (ref 13–17)
MAGNESIUM SERPL-MCNC: 1.9 MG/DL — SIGNIFICANT CHANGE UP (ref 1.6–2.6)
MCHC RBC-ENTMCNC: 26.8 PG — LOW (ref 27–34)
MCHC RBC-ENTMCNC: 31.1 % — LOW (ref 32–36)
MCV RBC AUTO: 86 FL — SIGNIFICANT CHANGE UP (ref 80–100)
NRBC # FLD: 0 K/UL — LOW (ref 25–125)
PHOSPHATE SERPL-MCNC: 3.4 MG/DL — SIGNIFICANT CHANGE UP (ref 2.5–4.5)
PLATELET # BLD AUTO: 215 K/UL — SIGNIFICANT CHANGE UP (ref 150–400)
PMV BLD: 10.3 FL — SIGNIFICANT CHANGE UP (ref 7–13)
POTASSIUM SERPL-MCNC: 2.3 MMOL/L — CRITICAL LOW (ref 3.5–5.3)
POTASSIUM SERPL-MCNC: 4.1 MMOL/L — SIGNIFICANT CHANGE UP (ref 3.5–5.3)
POTASSIUM SERPL-SCNC: 2.3 MMOL/L — CRITICAL LOW (ref 3.5–5.3)
POTASSIUM SERPL-SCNC: 4.1 MMOL/L — SIGNIFICANT CHANGE UP (ref 3.5–5.3)
PROT SERPL-MCNC: 3.2 G/DL — LOW (ref 6–8.3)
PROT SERPL-MCNC: 6.1 G/DL — SIGNIFICANT CHANGE UP (ref 6–8.3)
RBC # BLD: 3.51 M/UL — LOW (ref 4.2–5.8)
RBC # FLD: 15.7 % — HIGH (ref 10.3–14.5)
SODIUM SERPL-SCNC: 108 MMOL/L — CRITICAL LOW (ref 135–145)
SODIUM SERPL-SCNC: 132 MMOL/L — LOW (ref 135–145)
WBC # BLD: 3.78 K/UL — LOW (ref 3.8–10.5)
WBC # FLD AUTO: 3.78 K/UL — LOW (ref 3.8–10.5)

## 2019-03-09 PROCEDURE — 99232 SBSQ HOSP IP/OBS MODERATE 35: CPT | Mod: GC

## 2019-03-09 PROCEDURE — 99233 SBSQ HOSP IP/OBS HIGH 50: CPT

## 2019-03-09 PROCEDURE — 70450 CT HEAD/BRAIN W/O DYE: CPT | Mod: 26

## 2019-03-09 RX ORDER — PANTOPRAZOLE SODIUM 20 MG/1
40 TABLET, DELAYED RELEASE ORAL EVERY 12 HOURS
Qty: 0 | Refills: 0 | Status: DISCONTINUED | OUTPATIENT
Start: 2019-03-09 | End: 2019-03-17

## 2019-03-09 RX ORDER — SIMETHICONE 80 MG/1
80 TABLET, CHEWABLE ORAL ONCE
Qty: 0 | Refills: 0 | Status: COMPLETED | OUTPATIENT
Start: 2019-03-09 | End: 2019-03-09

## 2019-03-09 RX ORDER — ENOXAPARIN SODIUM 100 MG/ML
40 INJECTION SUBCUTANEOUS DAILY
Qty: 0 | Refills: 0 | Status: DISCONTINUED | OUTPATIENT
Start: 2019-03-09 | End: 2019-03-09

## 2019-03-09 RX ORDER — DIPHENHYDRAMINE HCL 50 MG
25 CAPSULE ORAL EVERY 6 HOURS
Qty: 0 | Refills: 0 | Status: DISCONTINUED | OUTPATIENT
Start: 2019-03-09 | End: 2019-03-19

## 2019-03-09 RX ADMIN — PANTOPRAZOLE SODIUM 40 MILLIGRAM(S): 20 TABLET, DELAYED RELEASE ORAL at 17:46

## 2019-03-09 RX ADMIN — Medication 1 APPLICATION(S): at 05:49

## 2019-03-09 RX ADMIN — Medication 25 MILLIGRAM(S): at 21:33

## 2019-03-09 RX ADMIN — PANTOPRAZOLE SODIUM 40 MILLIGRAM(S): 20 TABLET, DELAYED RELEASE ORAL at 05:48

## 2019-03-09 RX ADMIN — LORATADINE 10 MILLIGRAM(S): 10 TABLET ORAL at 14:13

## 2019-03-09 RX ADMIN — SODIUM CHLORIDE 75 MILLILITER(S): 9 INJECTION, SOLUTION INTRAVENOUS at 21:34

## 2019-03-09 RX ADMIN — Medication 30 MILLILITER(S): at 15:55

## 2019-03-09 RX ADMIN — SIMETHICONE 80 MILLIGRAM(S): 80 TABLET, CHEWABLE ORAL at 21:33

## 2019-03-09 RX ADMIN — SIMVASTATIN 40 MILLIGRAM(S): 20 TABLET, FILM COATED ORAL at 22:19

## 2019-03-09 NOTE — PROGRESS NOTE ADULT - ASSESSMENT
79 yo m h/o cad/cabg 2002, diastolic chf,  gastric ca s/p surgery 2006, chemoradiation. HTN, HLD, dm, JACK, GERD, carotid stenosis. chronic diffuse  pruritic rash, recently admitted here ( feb 19-26th for rash, headache, nausea, vomiting and wt loss, had MRI which revealed SAH, MRCP revealed Dilated main pancreatic duct down to the neck, likely secondary to an intraductal calculus seen by GI and was advised to f/u as outpt for EUS) a/w vomiting, diarrhea and wt loss.

## 2019-03-09 NOTE — PROGRESS NOTE ADULT - ASSESSMENT
Impression:  # Nausea/vomiting: CT Findings concerning for gastric outlet obstruction potentially related to stenosis given history of surgery/radiation. Less likely gastroenteritis, pancreatitis and biliary pathology/  # Gastric cancer s/p surgery / chemoradiation.  # Prior low grade dysplasia/marginal ulcers  # Pancreatic duct stone (6mm)  # Acute Diarrhea  # Recent intracranial hemorrhage: Unclear etiology: Seen on CT Head 2/2019    Recommendations  - Check upper GI series w small bowel follow through   - Clear liquid diet for now.  - Check C. Diff and Stool PCR given recent hospitalization  - Cardiology evaluation for risk stratification  - Neuro evaluation to r/o central etiology  - IV PPI BID to decrease gastric acid hypersecretion  - Eventual endoscopic evaluation of stomach and pancreas if cleared    Nadia Pino MD  Gastroenterology Fellow  [phone]  Please page on call fellow on weekends and after 5pm on weekdays

## 2019-03-09 NOTE — PROGRESS NOTE ADULT - SUBJECTIVE AND OBJECTIVE BOX
Patient is a 78y old  Male who presents with a chief complaint of vomiting and diarrhea (09 Mar 2019 09:36)      SUBJECTIVE / OVERNIGHT EVENTS:    Pt reports N/V has improved. still has diarrhea. Denies abd pain     Review of Systems:    RESPIRATORY: No cough, wheezing, chills or hemoptysis; No shortness of breath  CARDIOVASCULAR: No chest pain, palpitations, dizziness, or leg swelling  GASTROINTESTINAL: No abdominal or epigastric pain. + nausea, vomiting, diarrhea No melena or hematochezia.    MEDICATIONS  (STANDING):  dextrose 5% + sodium chloride 0.45%. 1000 milliLiter(s) (75 mL/Hr) IV Continuous <Continuous>  dextrose 5%. 1000 milliLiter(s) (50 mL/Hr) IV Continuous <Continuous>  dextrose 50% Injectable 12.5 Gram(s) IV Push once  dextrose 50% Injectable 25 Gram(s) IV Push once  dextrose 50% Injectable 25 Gram(s) IV Push once  insulin lispro (HumaLOG) corrective regimen sliding scale   SubCutaneous three times a day before meals  loratadine 10 milliGRAM(s) Oral daily  pantoprazole  Injectable 40 milliGRAM(s) IV Push every 12 hours  simvastatin 40 milliGRAM(s) Oral at bedtime    MEDICATIONS  (PRN):  aluminum hydroxide/magnesium hydroxide/simethicone Suspension 30 milliLiter(s) Oral every 4 hours PRN Dyspepsia  clobetasol 0.05% Ointment 1 Application(s) Topical two times a day PRN rash  dextrose 40% Gel 15 Gram(s) Oral once PRN Blood Glucose LESS THAN 70 milliGRAM(s)/deciliter  diphenhydrAMINE 25 milliGRAM(s) Oral every 6 hours PRN Rash and/or Itching  glucagon  Injectable 1 milliGRAM(s) IntraMuscular once PRN Glucose LESS THAN 70 milligrams/deciliter  ondansetron Injectable 4 milliGRAM(s) IV Push every 6 hours PRN Nausea and/or Vomiting      PHYSICAL EXAM:  T(C): 36.5 (03-09-19 @ 14:14), Max: 36.8 (03-08-19 @ 21:25)  HR: 70 (03-09-19 @ 14:14) (62 - 70)  BP: 122/57 (03-09-19 @ 14:14) (122/50 - 124/53)  RR: 18 (03-09-19 @ 14:14) (18 - 18)  SpO2: 99% (03-09-19 @ 14:14) (97% - 99%)  I&O's Summary    GENERAL: chronically ill appearing  male lying in bed in NAD   MENTAL STATUS/PSYCH:  AAO x3   HEAD:  Atraumatic, Normocephalic  EYES: EOMI, PERRLA, conjunctiva and sclera clear  NECK: Supple, No elevated JVD  CHEST/LUNG: Clear to auscultation bilaterally; No wheeze  HEART: Regular rate and rhythm; No murmurs, rubs, or gallops  ABDOMEN: Soft, Nontender, Nondistended; Bowel sounds present  EXTREMITIES:  2+ Peripheral Pulses, No clubbing, cyanosis, or edema  NEUROLOGY: CN II-XII grossly intact, moving all extremities  SKIN: No rashes or lesions    LABS:  CAPILLARY BLOOD GLUCOSE      POCT Blood Glucose.: 107 mg/dL (09 Mar 2019 17:24)  POCT Blood Glucose.: 106 mg/dL (09 Mar 2019 12:29)  POCT Blood Glucose.: 136 mg/dL (09 Mar 2019 08:19)  POCT Blood Glucose.: 145 mg/dL (08 Mar 2019 23:24)                          9.4    3.78  )-----------( 215      ( 09 Mar 2019 05:55 )             30.2     03-09    132<L>  |  97<L>  |  5<L>  ----------------------------<  155<H>  4.1   |  24  |  0.88    Ca    8.6      09 Mar 2019 10:43  Phos  3.4     03-09  Mg     1.9     03-09    TPro  6.1  /  Alb  3.0<L>  /  TBili  0.3  /  DBili  x   /  AST  21  /  ALT  14  /  AlkPhos  60  03-09    PT/INR - ( 07 Mar 2019 21:50 )   PT: 12.7 SEC;   INR: 1.14          PTT - ( 07 Mar 2019 21:50 )  PTT:28.5 SEC          RADIOLOGY & ADDITIONAL TESTS:    Imaging Personally Reviewed:    Consultant(s) Notes Reviewed:      Care Discussed with Consultants/Other Providers:

## 2019-03-09 NOTE — PROGRESS NOTE ADULT - PROBLEM SELECTOR PLAN 1
Ct abd/pelvis showed mild fluid distended stomach and distal esophagus with wall thickening,   Ct head no acute pathology   GI consult appreciated- will order upper gi series. likely will need EGD if cleared by cardiology  cont supportive care IVF, zofran prn, liquid diet

## 2019-03-09 NOTE — PROGRESS NOTE ADULT - SUBJECTIVE AND OBJECTIVE BOX
Chief Complaint:  Patient is a 78y old  Male who presents with a chief complaint of vomiting and diarrhea (08 Mar 2019 12:45)    Interval Events:   No acute overnight events. Patient denies any specific complaints. Patient denies nausea, vomiting and diarrhea.     Allergies:  No Known Allergies    Hospital Medications:  clobetasol 0.05% Ointment 1 Application(s) Topical two times a day PRN  dextrose 40% Gel 15 Gram(s) Oral once PRN  dextrose 5% + sodium chloride 0.45%. 1000 milliLiter(s) IV Continuous <Continuous>  dextrose 5%. 1000 milliLiter(s) IV Continuous <Continuous>  dextrose 50% Injectable 12.5 Gram(s) IV Push once  dextrose 50% Injectable 25 Gram(s) IV Push once  dextrose 50% Injectable 25 Gram(s) IV Push once  glucagon  Injectable 1 milliGRAM(s) IntraMuscular once PRN  insulin lispro (HumaLOG) corrective regimen sliding scale   SubCutaneous three times a day before meals  loratadine 10 milliGRAM(s) Oral daily  ondansetron Injectable 4 milliGRAM(s) IV Push every 6 hours PRN  pantoprazole    Tablet 40 milliGRAM(s) Oral before breakfast  simvastatin 40 milliGRAM(s) Oral at bedtime    PMHX/PSHX:  GERD (gastroesophageal reflux disease)  Gastric cancer  Carotid stenosis  JACK (iron deficiency anemia)  HLD (hyperlipidemia)  HTN (hypertension)  CAD (coronary artery disease)  Hives  DM (diabetes mellitus)  BPH (Benign Prostatic Hypertrophy)  GERD (Gastroesophageal Reflux Disease)  S/P CABG X 3  Hypercholesteremia  CAD (Coronary Artery Disease)  S/P Radiation > 12 Weeks  S/P Chemotherapy, Time Since Greater than 12 Weeks  Anemia  BPH (Benign Prostatic Hypertrophy)  GERD (Gastroesophageal Reflux Disease)  Dyslipidemia  HTN - Hypertension  CAD (Coronary Artery Disease)  History of Stomach Cancer  S/P Gastrectomy  Atypical Chest Pain  Status Post Gastrectomy  S/P CABG X 4  Status Post Gastrectomy  S/P CABG X 4    ROS:   General:  No fevers, chills or night sweats  ENT:  No sore throat or dysphagia  CV:  No pain or palpitations  Resp:  No dyspnea, cough or  wheezing  GI:  No pain, No nausea, No vomiting, No rectal bleeding, No tarry stools,  Skin:  No rash or edema    PHYSICAL EXAM:   Vital Signs:  Vital Signs Last 24 Hrs  T(C): 36.6 (09 Mar 2019 05:45), Max: 37.1 (08 Mar 2019 14:39)  T(F): 97.9 (09 Mar 2019 05:45), Max: 98.8 (08 Mar 2019 14:39)  HR: 62 (09 Mar 2019 05:45) (62 - 69)  BP: 124/53 (09 Mar 2019 05:45) (107/54 - 129/52)  BP(mean): --  RR: 18 (09 Mar 2019 05:45) (18 - 18)  SpO2: 99% (09 Mar 2019 05:45) (97% - 100%)  Daily     Daily     GENERAL:  thin elderly man no acute distress  EYES: EOMI, PERRL   OROPHARYNX: MMM, good dentition   CHEST:  Full & symmetric excursion, no increased effort, breath sounds clear  HEART:  Regular rhythm, S1, S2,  ABDOMEN:  Soft, non-tender, non-distended, normoactive bowel sounds,  EXTREMITIES:  no cyanosis,clubbing or edema  SKIN:  No rash/erythema  NEURO:  Alert, oriented    LABS:                        9.4    3.78  )-----------( 215      ( 09 Mar 2019 05:55 )             30.2     Mean Cell Volume: 86.0 fL (03-09-19 @ 05:55)    03-09    108<LL>  |  87<L>  |  3<L>  ----------------------------<  > 1650<HH>  2.3<LL>   |  14<L>  |  0.50    Ca    4.7<LL>      09 Mar 2019 05:55    TPro  3.2<L>  /  Alb  1.4<L>  /  TBili  < 0.2<L>  /  DBili  x   /  AST  10  /  ALT  7   /  AlkPhos  30<L>  03-09    LIVER FUNCTIONS - ( 09 Mar 2019 05:55 )  Alb: 1.4 g/dL / Pro: 3.2 g/dL / ALK PHOS: 30 u/L / ALT: 7 u/L / AST: 10 u/L / GGT: x           PT/INR - ( 07 Mar 2019 21:50 )   PT: 12.7 SEC;   INR: 1.14          PTT - ( 07 Mar 2019 21:50 )  PTT:28.5 SEC    Imaging:    < from: CT Abdomen and Pelvis w/ Oral Cont and w/ IV Cont (03.07.19 @ 23:41) >  IMPRESSION:     No bowel obstruction.    Mild fluid distended stomach and distal esophagus with wall thickening,   compatible with clinical history of vomiting. Possibility of esophagitis   or gastroesophageal reflux disease considered. Consider nonemergent   endoscopic evaluation if there is concern for underlying malignancy.    Additional findings as mentioned above.    < end of copied text >

## 2019-03-10 PROCEDURE — 99233 SBSQ HOSP IP/OBS HIGH 50: CPT

## 2019-03-10 RX ORDER — ACETAMINOPHEN 500 MG
650 TABLET ORAL ONCE
Qty: 0 | Refills: 0 | Status: COMPLETED | OUTPATIENT
Start: 2019-03-10 | End: 2019-03-10

## 2019-03-10 RX ADMIN — Medication 1: at 17:15

## 2019-03-10 RX ADMIN — ONDANSETRON 4 MILLIGRAM(S): 8 TABLET, FILM COATED ORAL at 17:37

## 2019-03-10 RX ADMIN — LORATADINE 10 MILLIGRAM(S): 10 TABLET ORAL at 11:34

## 2019-03-10 RX ADMIN — Medication 25 MILLIGRAM(S): at 09:17

## 2019-03-10 RX ADMIN — Medication 30 MILLILITER(S): at 23:53

## 2019-03-10 RX ADMIN — SIMVASTATIN 40 MILLIGRAM(S): 20 TABLET, FILM COATED ORAL at 21:48

## 2019-03-10 RX ADMIN — Medication 25 MILLIGRAM(S): at 17:31

## 2019-03-10 RX ADMIN — Medication 650 MILLIGRAM(S): at 18:41

## 2019-03-10 RX ADMIN — Medication 30 MILLILITER(S): at 15:35

## 2019-03-10 RX ADMIN — PANTOPRAZOLE SODIUM 40 MILLIGRAM(S): 20 TABLET, DELAYED RELEASE ORAL at 17:31

## 2019-03-10 RX ADMIN — Medication 650 MILLIGRAM(S): at 18:11

## 2019-03-10 RX ADMIN — PANTOPRAZOLE SODIUM 40 MILLIGRAM(S): 20 TABLET, DELAYED RELEASE ORAL at 05:47

## 2019-03-10 NOTE — PROGRESS NOTE ADULT - PROBLEM SELECTOR PLAN 1
Ct abd/pelvis showed mild fluid distended stomach and distal esophagus with wall thickening,   Ct head no acute pathology   GI consult appreciated- rec upper GI series and EGD but pt currently refusing, wants to try eating first   advance diet to soft  cont supportive care IVF, zofran prn

## 2019-03-10 NOTE — PROGRESS NOTE ADULT - SUBJECTIVE AND OBJECTIVE BOX
Patient is a 78y old  Male who presents with a chief complaint of vomiting and diarrhea (09 Mar 2019 17:33)      SUBJECTIVE / OVERNIGHT EVENTS:    No acute events. Pt states his N/V has improved wants to eat    Review of Systems:    RESPIRATORY: No cough, wheezing, chills or hemoptysis; No shortness of breath  CARDIOVASCULAR: No chest pain, palpitations, dizziness, or leg swelling  GASTROINTESTINAL: No abdominal or epigastric pain. + nausea, vomiting; No diarrhea or constipation. No melena or hematochezia.      MEDICATIONS  (STANDING):  dextrose 5% + sodium chloride 0.45%. 1000 milliLiter(s) (75 mL/Hr) IV Continuous <Continuous>  dextrose 5%. 1000 milliLiter(s) (50 mL/Hr) IV Continuous <Continuous>  dextrose 50% Injectable 12.5 Gram(s) IV Push once  dextrose 50% Injectable 25 Gram(s) IV Push once  dextrose 50% Injectable 25 Gram(s) IV Push once  insulin lispro (HumaLOG) corrective regimen sliding scale   SubCutaneous three times a day before meals  loratadine 10 milliGRAM(s) Oral daily  pantoprazole  Injectable 40 milliGRAM(s) IV Push every 12 hours  simvastatin 40 milliGRAM(s) Oral at bedtime    MEDICATIONS  (PRN):  aluminum hydroxide/magnesium hydroxide/simethicone Suspension 30 milliLiter(s) Oral every 4 hours PRN Dyspepsia  clobetasol 0.05% Ointment 1 Application(s) Topical two times a day PRN rash  dextrose 40% Gel 15 Gram(s) Oral once PRN Blood Glucose LESS THAN 70 milliGRAM(s)/deciliter  diphenhydrAMINE 25 milliGRAM(s) Oral every 6 hours PRN Rash and/or Itching  glucagon  Injectable 1 milliGRAM(s) IntraMuscular once PRN Glucose LESS THAN 70 milligrams/deciliter  ondansetron Injectable 4 milliGRAM(s) IV Push every 6 hours PRN Nausea and/or Vomiting      PHYSICAL EXAM:  T(C): 36.2 (03-10-19 @ 10:45), Max: 36.7 (03-10-19 @ 05:42)  HR: 74 (03-10-19 @ 10:45) (67 - 74)  BP: 149/60 (03-10-19 @ 10:45) (115/48 - 149/60)  RR: 18 (03-10-19 @ 10:45) (18 - 18)  SpO2: 100% (03-10-19 @ 10:45) (98% - 100%)  I&O's Summary    GENERAL: chronically ill appearing  male lying in bed in NAD   MENTAL STATUS/PSYCH:  AAO x3   HEAD:  Atraumatic, Normocephalic  EYES: EOMI, PERRLA, conjunctiva and sclera clear  NECK: Supple, No elevated JVD  CHEST/LUNG: Clear to auscultation bilaterally; No wheeze  HEART: Regular rate and rhythm; No murmurs, rubs, or gallops  ABDOMEN: Soft, Nontender, Nondistended; Bowel sounds present  EXTREMITIES:  2+ Peripheral Pulses, No clubbing, cyanosis, or edema  NEUROLOGY: CN II-XII grossly intact, moving all extremities  SKIN: No rashes or lesions    LABS:  CAPILLARY BLOOD GLUCOSE      POCT Blood Glucose.: 150 mg/dL (10 Mar 2019 12:34)  POCT Blood Glucose.: 110 mg/dL (10 Mar 2019 07:29)  POCT Blood Glucose.: 175 mg/dL (09 Mar 2019 21:19)  POCT Blood Glucose.: 107 mg/dL (09 Mar 2019 17:24)                          9.4    3.78  )-----------( 215      ( 09 Mar 2019 05:55 )             30.2     03-09    132<L>  |  97<L>  |  5<L>  ----------------------------<  155<H>  4.1   |  24  |  0.88    Ca    8.6      09 Mar 2019 10:43  Phos  3.4     03-09  Mg     1.9     03-09    TPro  6.1  /  Alb  3.0<L>  /  TBili  0.3  /  DBili  x   /  AST  21  /  ALT  14  /  AlkPhos  60  03-09              RADIOLOGY & ADDITIONAL TESTS:    Imaging Personally Reviewed:    Consultant(s) Notes Reviewed:      Care Discussed with Consultants/Other Providers:

## 2019-03-11 LAB
ALBUMIN SERPL ELPH-MCNC: 3 G/DL — LOW (ref 3.3–5)
ALP SERPL-CCNC: 62 U/L — SIGNIFICANT CHANGE UP (ref 40–120)
ALT FLD-CCNC: 16 U/L — SIGNIFICANT CHANGE UP (ref 4–41)
ANION GAP SERPL CALC-SCNC: 10 MMO/L — SIGNIFICANT CHANGE UP (ref 7–14)
ANION GAP SERPL CALC-SCNC: 9 MMO/L — SIGNIFICANT CHANGE UP (ref 7–14)
AST SERPL-CCNC: 22 U/L — SIGNIFICANT CHANGE UP (ref 4–40)
BILIRUB SERPL-MCNC: 0.3 MG/DL — SIGNIFICANT CHANGE UP (ref 0.2–1.2)
BUN SERPL-MCNC: 5 MG/DL — LOW (ref 7–23)
BUN SERPL-MCNC: 6 MG/DL — LOW (ref 7–23)
CALCIUM SERPL-MCNC: 8.5 MG/DL — SIGNIFICANT CHANGE UP (ref 8.4–10.5)
CALCIUM SERPL-MCNC: 8.6 MG/DL — SIGNIFICANT CHANGE UP (ref 8.4–10.5)
CHLORIDE SERPL-SCNC: 93 MMOL/L — LOW (ref 98–107)
CHLORIDE SERPL-SCNC: 96 MMOL/L — LOW (ref 98–107)
CO2 SERPL-SCNC: 23 MMOL/L — SIGNIFICANT CHANGE UP (ref 22–31)
CO2 SERPL-SCNC: 24 MMOL/L — SIGNIFICANT CHANGE UP (ref 22–31)
CREAT SERPL-MCNC: 0.8 MG/DL — SIGNIFICANT CHANGE UP (ref 0.5–1.3)
CREAT SERPL-MCNC: 0.83 MG/DL — SIGNIFICANT CHANGE UP (ref 0.5–1.3)
GLUCOSE SERPL-MCNC: 125 MG/DL — HIGH (ref 70–99)
GLUCOSE SERPL-MCNC: 125 MG/DL — HIGH (ref 70–99)
HCT VFR BLD CALC: 34.5 % — LOW (ref 39–50)
HGB BLD-MCNC: 11.2 G/DL — LOW (ref 13–17)
MCHC RBC-ENTMCNC: 26.6 PG — LOW (ref 27–34)
MCHC RBC-ENTMCNC: 32.5 % — SIGNIFICANT CHANGE UP (ref 32–36)
MCV RBC AUTO: 81.9 FL — SIGNIFICANT CHANGE UP (ref 80–100)
NRBC # FLD: 0 K/UL — LOW (ref 25–125)
PLATELET # BLD AUTO: 239 K/UL — SIGNIFICANT CHANGE UP (ref 150–400)
PMV BLD: 9.9 FL — SIGNIFICANT CHANGE UP (ref 7–13)
POTASSIUM SERPL-MCNC: 4 MMOL/L — SIGNIFICANT CHANGE UP (ref 3.5–5.3)
POTASSIUM SERPL-MCNC: 4.2 MMOL/L — SIGNIFICANT CHANGE UP (ref 3.5–5.3)
POTASSIUM SERPL-SCNC: 4 MMOL/L — SIGNIFICANT CHANGE UP (ref 3.5–5.3)
POTASSIUM SERPL-SCNC: 4.2 MMOL/L — SIGNIFICANT CHANGE UP (ref 3.5–5.3)
PROT SERPL-MCNC: 6 G/DL — SIGNIFICANT CHANGE UP (ref 6–8.3)
RBC # BLD: 4.21 M/UL — SIGNIFICANT CHANGE UP (ref 4.2–5.8)
RBC # FLD: 15.4 % — HIGH (ref 10.3–14.5)
SODIUM SERPL-SCNC: 126 MMOL/L — LOW (ref 135–145)
SODIUM SERPL-SCNC: 129 MMOL/L — LOW (ref 135–145)
WBC # BLD: 4.43 K/UL — SIGNIFICANT CHANGE UP (ref 3.8–10.5)
WBC # FLD AUTO: 4.43 K/UL — SIGNIFICANT CHANGE UP (ref 3.8–10.5)

## 2019-03-11 PROCEDURE — 99232 SBSQ HOSP IP/OBS MODERATE 35: CPT | Mod: GC

## 2019-03-11 PROCEDURE — 99233 SBSQ HOSP IP/OBS HIGH 50: CPT

## 2019-03-11 RX ORDER — FAMOTIDINE 10 MG/ML
20 INJECTION INTRAVENOUS AT BEDTIME
Qty: 0 | Refills: 0 | Status: DISCONTINUED | OUTPATIENT
Start: 2019-03-11 | End: 2019-03-11

## 2019-03-11 RX ORDER — SODIUM CHLORIDE 9 MG/ML
1000 INJECTION INTRAMUSCULAR; INTRAVENOUS; SUBCUTANEOUS
Qty: 0 | Refills: 0 | Status: DISCONTINUED | OUTPATIENT
Start: 2019-03-11 | End: 2019-03-12

## 2019-03-11 RX ORDER — BACLOFEN 100 %
5 POWDER (GRAM) MISCELLANEOUS THREE TIMES A DAY
Qty: 0 | Refills: 0 | Status: DISCONTINUED | OUTPATIENT
Start: 2019-03-11 | End: 2019-03-19

## 2019-03-11 RX ADMIN — Medication 30 MILLILITER(S): at 16:15

## 2019-03-11 RX ADMIN — Medication 25 MILLIGRAM(S): at 12:17

## 2019-03-11 RX ADMIN — ONDANSETRON 4 MILLIGRAM(S): 8 TABLET, FILM COATED ORAL at 16:15

## 2019-03-11 RX ADMIN — LORATADINE 10 MILLIGRAM(S): 10 TABLET ORAL at 12:17

## 2019-03-11 RX ADMIN — SODIUM CHLORIDE 75 MILLILITER(S): 9 INJECTION INTRAMUSCULAR; INTRAVENOUS; SUBCUTANEOUS at 22:57

## 2019-03-11 RX ADMIN — Medication 5 MILLIGRAM(S): at 22:46

## 2019-03-11 RX ADMIN — Medication 30 MILLILITER(S): at 22:46

## 2019-03-11 RX ADMIN — PANTOPRAZOLE SODIUM 40 MILLIGRAM(S): 20 TABLET, DELAYED RELEASE ORAL at 18:02

## 2019-03-11 RX ADMIN — PANTOPRAZOLE SODIUM 40 MILLIGRAM(S): 20 TABLET, DELAYED RELEASE ORAL at 05:29

## 2019-03-11 NOTE — PROGRESS NOTE ADULT - ASSESSMENT
77 yo m h/o cad/cabg 2002, diastolic chf,  gastric ca s/p surgery 2006, chemoradiation. HTN, HLD, dm, JACK, GERD, carotid stenosis. chronic diffuse  pruritic rash, recently admitted here ( feb 19-26th for rash, headache, nausea, vomiting and wt loss, had MRI which revealed SAH, MRCP revealed Dilated main pancreatic duct down to the neck, likely secondary to an intraductal calculus seen by GI and was advised to f/u as outpt for EUS) a/w vomiting, diarrhea and wt loss.

## 2019-03-11 NOTE — PROGRESS NOTE ADULT - SUBJECTIVE AND OBJECTIVE BOX
Patient is a 78y old  Male who presents with a chief complaint of vomiting and diarrhea (10 Mar 2019 13:13)      SUBJECTIVE / OVERNIGHT EVENTS:    c/o heartburn and gas pain. not able tolerate any solids     Review of Systems:    RESPIRATORY: No cough, wheezing, chills or hemoptysis; No shortness of breath  CARDIOVASCULAR: No chest pain, palpitations, dizziness, or leg swelling  GASTROINTESTINAL: No abdominal or epigastric pain. No nausea, vomiting, or hematemesis; No diarrhea or constipation. No melena or hematochezia.    MEDICATIONS  (STANDING):  dextrose 5%. 1000 milliLiter(s) (50 mL/Hr) IV Continuous <Continuous>  dextrose 50% Injectable 12.5 Gram(s) IV Push once  dextrose 50% Injectable 25 Gram(s) IV Push once  dextrose 50% Injectable 25 Gram(s) IV Push once  insulin lispro (HumaLOG) corrective regimen sliding scale   SubCutaneous three times a day before meals  loratadine 10 milliGRAM(s) Oral daily  pantoprazole  Injectable 40 milliGRAM(s) IV Push every 12 hours  simvastatin 40 milliGRAM(s) Oral at bedtime    MEDICATIONS  (PRN):  aluminum hydroxide/magnesium hydroxide/simethicone Suspension 30 milliLiter(s) Oral every 4 hours PRN Dyspepsia  clobetasol 0.05% Ointment 1 Application(s) Topical two times a day PRN rash  dextrose 40% Gel 15 Gram(s) Oral once PRN Blood Glucose LESS THAN 70 milliGRAM(s)/deciliter  diphenhydrAMINE 25 milliGRAM(s) Oral every 6 hours PRN Rash and/or Itching  glucagon  Injectable 1 milliGRAM(s) IntraMuscular once PRN Glucose LESS THAN 70 milligrams/deciliter  ondansetron Injectable 4 milliGRAM(s) IV Push every 6 hours PRN Nausea and/or Vomiting      PHYSICAL EXAM:  T(C): 37 (03-11-19 @ 10:42), Max: 37 (03-11-19 @ 10:42)  HR: 67 (03-11-19 @ 10:42) (65 - 68)  BP: 130/69 (03-11-19 @ 10:42) (123/55 - 137/59)  RR: 17 (03-11-19 @ 10:42) (17 - 19)  SpO2: 100% (03-11-19 @ 10:42) (100% - 100%)  I&O's Summary    10 Mar 2019 07:01  -  11 Mar 2019 07:00  --------------------------------------------------------  IN: 990 mL / OUT: 0 mL / NET: 990 mL        GENERAL: chronically ill appearing  male lying in bed in NAD   MENTAL STATUS/PSYCH:  AAO x3   HEAD:  Atraumatic, Normocephalic  EYES: EOMI, PERRLA, conjunctiva and sclera clear  NECK: Supple, No elevated JVD  CHEST/LUNG: Clear to auscultation bilaterally; No wheeze  HEART: Regular rate and rhythm; No murmurs, rubs, or gallops  ABDOMEN: Soft, Nontender, Nondistended; Bowel sounds present  EXTREMITIES:  2+ Peripheral Pulses, No clubbing, cyanosis, or edema  NEUROLOGY: CN II-XII grossly intact, moving all extremities  SKIN: No rashes or lesions    LABS:  CAPILLARY BLOOD GLUCOSE      POCT Blood Glucose.: 135 mg/dL (11 Mar 2019 12:15)  POCT Blood Glucose.: 133 mg/dL (11 Mar 2019 08:15)  POCT Blood Glucose.: 149 mg/dL (10 Mar 2019 22:40)  POCT Blood Glucose.: 173 mg/dL (10 Mar 2019 17:13)                          11.2   4.43  )-----------( 239      ( 11 Mar 2019 06:30 )             34.5     03-11    129<L>  |  96<L>  |  5<L>  ----------------------------<  125<H>  4.0   |  24  |  0.80    Ca    8.5      11 Mar 2019 06:30    TPro  6.0  /  Alb  3.0<L>  /  TBili  0.3  /  DBili  x   /  AST  22  /  ALT  16  /  AlkPhos  62  03-11              RADIOLOGY & ADDITIONAL TESTS:    Imaging Personally Reviewed:    Consultant(s) Notes Reviewed:      Care Discussed with Consultants/Other Providers:

## 2019-03-11 NOTE — CHART NOTE - NSCHARTNOTEFT_GEN_A_CORE
Patient currently refusing EGD and UGIS & prefers to eat  GI will sign off  Please call back if clinical status changes    Nadia Pino MD  Gastroenterology Fellow  [phone]  Please page on call fellow on weekends and after 5pm on weekdays

## 2019-03-11 NOTE — PROGRESS NOTE ADULT - PROBLEM SELECTOR PLAN 1
Ct abd/pelvis showed mild fluid distended stomach and distal esophagus with wall thickening,   Ct head no acute pathology   GI consult appreciated- rec upper GI series and EGD r/o gastric outlet obstruction but pt currently refusing  cont liquid diet for now  cont supportive care IVF, zofran prn, PPI

## 2019-03-12 LAB
ANION GAP SERPL CALC-SCNC: 11 MMO/L — SIGNIFICANT CHANGE UP (ref 7–14)
BUN SERPL-MCNC: 4 MG/DL — LOW (ref 7–23)
CALCIUM SERPL-MCNC: 8.7 MG/DL — SIGNIFICANT CHANGE UP (ref 8.4–10.5)
CHLORIDE SERPL-SCNC: 98 MMOL/L — SIGNIFICANT CHANGE UP (ref 98–107)
CO2 SERPL-SCNC: 21 MMOL/L — LOW (ref 22–31)
CREAT SERPL-MCNC: 0.88 MG/DL — SIGNIFICANT CHANGE UP (ref 0.5–1.3)
GLUCOSE SERPL-MCNC: 89 MG/DL — SIGNIFICANT CHANGE UP (ref 70–99)
HCT VFR BLD CALC: 38.5 % — LOW (ref 39–50)
HGB BLD-MCNC: 12.6 G/DL — LOW (ref 13–17)
MCHC RBC-ENTMCNC: 26.8 PG — LOW (ref 27–34)
MCHC RBC-ENTMCNC: 32.7 % — SIGNIFICANT CHANGE UP (ref 32–36)
MCV RBC AUTO: 81.7 FL — SIGNIFICANT CHANGE UP (ref 80–100)
NRBC # FLD: 0 K/UL — LOW (ref 25–125)
PLATELET # BLD AUTO: 262 K/UL — SIGNIFICANT CHANGE UP (ref 150–400)
PMV BLD: 9.8 FL — SIGNIFICANT CHANGE UP (ref 7–13)
POTASSIUM SERPL-MCNC: 4.4 MMOL/L — SIGNIFICANT CHANGE UP (ref 3.5–5.3)
POTASSIUM SERPL-SCNC: 4.4 MMOL/L — SIGNIFICANT CHANGE UP (ref 3.5–5.3)
RBC # BLD: 4.71 M/UL — SIGNIFICANT CHANGE UP (ref 4.2–5.8)
RBC # FLD: 15.2 % — HIGH (ref 10.3–14.5)
SODIUM SERPL-SCNC: 130 MMOL/L — LOW (ref 135–145)
WBC # BLD: 4.23 K/UL — SIGNIFICANT CHANGE UP (ref 3.8–10.5)
WBC # FLD AUTO: 4.23 K/UL — SIGNIFICANT CHANGE UP (ref 3.8–10.5)

## 2019-03-12 PROCEDURE — 99233 SBSQ HOSP IP/OBS HIGH 50: CPT

## 2019-03-12 PROCEDURE — 93010 ELECTROCARDIOGRAM REPORT: CPT

## 2019-03-12 PROCEDURE — 74245: CPT | Mod: 26

## 2019-03-12 RX ORDER — SODIUM CHLORIDE 9 MG/ML
1000 INJECTION INTRAMUSCULAR; INTRAVENOUS; SUBCUTANEOUS
Qty: 0 | Refills: 0 | Status: DISCONTINUED | OUTPATIENT
Start: 2019-03-12 | End: 2019-03-13

## 2019-03-12 RX ORDER — DOCUSATE SODIUM 100 MG
100 CAPSULE ORAL THREE TIMES A DAY
Qty: 0 | Refills: 0 | Status: DISCONTINUED | OUTPATIENT
Start: 2019-03-12 | End: 2019-03-19

## 2019-03-12 RX ORDER — SENNA PLUS 8.6 MG/1
2 TABLET ORAL AT BEDTIME
Qty: 0 | Refills: 0 | Status: DISCONTINUED | OUTPATIENT
Start: 2019-03-12 | End: 2019-03-19

## 2019-03-12 RX ORDER — POLYETHYLENE GLYCOL 3350 17 G/17G
17 POWDER, FOR SOLUTION ORAL DAILY
Qty: 0 | Refills: 0 | Status: DISCONTINUED | OUTPATIENT
Start: 2019-03-12 | End: 2019-03-19

## 2019-03-12 RX ADMIN — Medication 25 MILLIGRAM(S): at 14:09

## 2019-03-12 RX ADMIN — POLYETHYLENE GLYCOL 3350 17 GRAM(S): 17 POWDER, FOR SOLUTION ORAL at 17:09

## 2019-03-12 RX ADMIN — Medication 30 MILLILITER(S): at 17:09

## 2019-03-12 RX ADMIN — Medication 100 MILLIGRAM(S): at 22:52

## 2019-03-12 RX ADMIN — Medication 30 MILLILITER(S): at 23:51

## 2019-03-12 RX ADMIN — Medication 5 MILLIGRAM(S): at 22:52

## 2019-03-12 RX ADMIN — SENNA PLUS 2 TABLET(S): 8.6 TABLET ORAL at 22:52

## 2019-03-12 RX ADMIN — Medication 5 MILLIGRAM(S): at 14:09

## 2019-03-12 RX ADMIN — PANTOPRAZOLE SODIUM 40 MILLIGRAM(S): 20 TABLET, DELAYED RELEASE ORAL at 17:10

## 2019-03-12 RX ADMIN — Medication 5 MILLIGRAM(S): at 05:24

## 2019-03-12 RX ADMIN — PANTOPRAZOLE SODIUM 40 MILLIGRAM(S): 20 TABLET, DELAYED RELEASE ORAL at 05:24

## 2019-03-12 NOTE — PROGRESS NOTE ADULT - SUBJECTIVE AND OBJECTIVE BOX
Patient is a 78y old  Male who presents with a chief complaint of vomiting and diarrhea (11 Mar 2019 14:24)      SUBJECTIVE / OVERNIGHT EVENTS:    Pt reports heartburn has slightly improved. Denies abd pain, diarrhea     Review of Systems:    RESPIRATORY: No cough, wheezing, chills or hemoptysis; No shortness of breath  CARDIOVASCULAR: No chest pain, palpitations, dizziness, or leg swelling  GASTROINTESTINAL: No abdominal or epigastric pain. No nausea, vomiting, or hematemesis; No diarrhea or constipation. No melena or hematochezia.    MEDICATIONS  (STANDING):  aluminum hydroxide/magnesium hydroxide/simethicone Suspension 30 milliLiter(s) Oral every 6 hours  baclofen 5 milliGRAM(s) Oral three times a day  dextrose 5%. 1000 milliLiter(s) (50 mL/Hr) IV Continuous <Continuous>  dextrose 50% Injectable 12.5 Gram(s) IV Push once  dextrose 50% Injectable 25 Gram(s) IV Push once  dextrose 50% Injectable 25 Gram(s) IV Push once  insulin lispro (HumaLOG) corrective regimen sliding scale   SubCutaneous three times a day before meals  loratadine 10 milliGRAM(s) Oral daily  pantoprazole  Injectable 40 milliGRAM(s) IV Push every 12 hours  sodium chloride 0.9%. 1000 milliLiter(s) (75 mL/Hr) IV Continuous <Continuous>    MEDICATIONS  (PRN):  clobetasol 0.05% Ointment 1 Application(s) Topical two times a day PRN rash  dextrose 40% Gel 15 Gram(s) Oral once PRN Blood Glucose LESS THAN 70 milliGRAM(s)/deciliter  diphenhydrAMINE 25 milliGRAM(s) Oral every 6 hours PRN Rash and/or Itching  glucagon  Injectable 1 milliGRAM(s) IntraMuscular once PRN Glucose LESS THAN 70 milligrams/deciliter  ondansetron Injectable 4 milliGRAM(s) IV Push every 6 hours PRN Nausea and/or Vomiting      PHYSICAL EXAM:  T(C): 36.3 (03-12-19 @ 10:14), Max: 37 (03-11-19 @ 10:42)  HR: 65 (03-12-19 @ 10:14) (50 - 68)  BP: 109/44 (03-12-19 @ 10:14) (106/52 - 130/69)  RR: 16 (03-12-19 @ 10:14) (16 - 19)  SpO2: 100% (03-12-19 @ 10:14) (94% - 100%)  I&O's Summary    GENERAL: chronically ill appearing  male lying in bed in NAD   MENTAL STATUS/PSYCH:  AAO x3   HEAD:  Atraumatic, Normocephalic  EYES: EOMI, PERRLA, conjunctiva and sclera clear  NECK: Supple, No elevated JVD  CHEST/LUNG: Clear to auscultation bilaterally; No wheeze  HEART: Regular rate and rhythm; No murmurs, rubs, or gallops  ABDOMEN: Soft, Nontender, Nondistended; Bowel sounds present  EXTREMITIES:  2+ Peripheral Pulses, No clubbing, cyanosis, or edema  NEUROLOGY: CN II-XII grossly intact, moving all extremities  SKIN: No rashes or lesions      LABS:  CAPILLARY BLOOD GLUCOSE      POCT Blood Glucose.: 86 mg/dL (12 Mar 2019 08:16)  POCT Blood Glucose.: 114 mg/dL (11 Mar 2019 21:41)  POCT Blood Glucose.: 131 mg/dL (11 Mar 2019 17:02)  POCT Blood Glucose.: 135 mg/dL (11 Mar 2019 12:15)                          12.6   4.23  )-----------( 262      ( 12 Mar 2019 05:10 )             38.5     03-12    130<L>  |  98  |  4<L>  ----------------------------<  89  4.4   |  21<L>  |  0.88    Ca    8.7      12 Mar 2019 05:10    TPro  6.0  /  Alb  3.0<L>  /  TBili  0.3  /  DBili  x   /  AST  22  /  ALT  16  /  AlkPhos  62  03-11              RADIOLOGY & ADDITIONAL TESTS:    Imaging Personally Reviewed:    Consultant(s) Notes Reviewed:      Care Discussed with Consultants/Other Providers:

## 2019-03-12 NOTE — PROGRESS NOTE ADULT - ASSESSMENT
79 yo m h/o cad/cabg 2002, diastolic chf,  gastric ca s/p surgery 2006, chemoradiation. HTN, HLD, dm, JACK, GERD, carotid stenosis. chronic diffuse  pruritic rash, recently admitted here ( feb 19-26th ) for rash, headache, nausea, vomiting and wt loss, had MRI which revealed SAH, MRCP revealed Dilated main pancreatic duct down to the neck. Etiology for n/v inc. GERD vs. gastric outlet obstruction vs. pancreatic duct stone

## 2019-03-13 ENCOUNTER — APPOINTMENT (OUTPATIENT)
Dept: NEUROSURGERY | Facility: CLINIC | Age: 79
End: 2019-03-13

## 2019-03-13 LAB
ANION GAP SERPL CALC-SCNC: 12 MMO/L — SIGNIFICANT CHANGE UP (ref 7–14)
BUN SERPL-MCNC: 7 MG/DL — SIGNIFICANT CHANGE UP (ref 7–23)
CALCIUM SERPL-MCNC: 8.7 MG/DL — SIGNIFICANT CHANGE UP (ref 8.4–10.5)
CHLORIDE SERPL-SCNC: 97 MMOL/L — LOW (ref 98–107)
CO2 SERPL-SCNC: 22 MMOL/L — SIGNIFICANT CHANGE UP (ref 22–31)
CREAT SERPL-MCNC: 0.93 MG/DL — SIGNIFICANT CHANGE UP (ref 0.5–1.3)
GLUCOSE SERPL-MCNC: 143 MG/DL — HIGH (ref 70–99)
HCT VFR BLD CALC: 35 % — LOW (ref 39–50)
HGB BLD-MCNC: 11.5 G/DL — LOW (ref 13–17)
MCHC RBC-ENTMCNC: 26.6 PG — LOW (ref 27–34)
MCHC RBC-ENTMCNC: 32.9 % — SIGNIFICANT CHANGE UP (ref 32–36)
MCV RBC AUTO: 81 FL — SIGNIFICANT CHANGE UP (ref 80–100)
NRBC # FLD: 0 K/UL — LOW (ref 25–125)
PLATELET # BLD AUTO: 292 K/UL — SIGNIFICANT CHANGE UP (ref 150–400)
PMV BLD: 9.9 FL — SIGNIFICANT CHANGE UP (ref 7–13)
POTASSIUM SERPL-MCNC: 4.3 MMOL/L — SIGNIFICANT CHANGE UP (ref 3.5–5.3)
POTASSIUM SERPL-SCNC: 4.3 MMOL/L — SIGNIFICANT CHANGE UP (ref 3.5–5.3)
RBC # BLD: 4.32 M/UL — SIGNIFICANT CHANGE UP (ref 4.2–5.8)
RBC # FLD: 15.6 % — HIGH (ref 10.3–14.5)
SODIUM SERPL-SCNC: 131 MMOL/L — LOW (ref 135–145)
WBC # BLD: 4.11 K/UL — SIGNIFICANT CHANGE UP (ref 3.8–10.5)
WBC # FLD AUTO: 4.11 K/UL — SIGNIFICANT CHANGE UP (ref 3.8–10.5)

## 2019-03-13 PROCEDURE — 99233 SBSQ HOSP IP/OBS HIGH 50: CPT

## 2019-03-13 PROCEDURE — 99232 SBSQ HOSP IP/OBS MODERATE 35: CPT | Mod: GC

## 2019-03-13 RX ORDER — SODIUM CHLORIDE 9 MG/ML
1000 INJECTION INTRAMUSCULAR; INTRAVENOUS; SUBCUTANEOUS
Qty: 0 | Refills: 0 | Status: DISCONTINUED | OUTPATIENT
Start: 2019-03-13 | End: 2019-03-13

## 2019-03-13 RX ORDER — SIMETHICONE 80 MG/1
80 TABLET, CHEWABLE ORAL
Qty: 0 | Refills: 0 | Status: DISCONTINUED | OUTPATIENT
Start: 2019-03-13 | End: 2019-03-19

## 2019-03-13 RX ORDER — ASPIRIN/CALCIUM CARB/MAGNESIUM 324 MG
81 TABLET ORAL DAILY
Qty: 0 | Refills: 0 | Status: DISCONTINUED | OUTPATIENT
Start: 2019-03-13 | End: 2019-03-19

## 2019-03-13 RX ADMIN — PANTOPRAZOLE SODIUM 40 MILLIGRAM(S): 20 TABLET, DELAYED RELEASE ORAL at 05:38

## 2019-03-13 RX ADMIN — Medication 30 MILLILITER(S): at 11:45

## 2019-03-13 RX ADMIN — Medication 5 MILLIGRAM(S): at 21:24

## 2019-03-13 RX ADMIN — LORATADINE 10 MILLIGRAM(S): 10 TABLET ORAL at 11:46

## 2019-03-13 RX ADMIN — SIMETHICONE 80 MILLIGRAM(S): 80 TABLET, CHEWABLE ORAL at 17:53

## 2019-03-13 RX ADMIN — Medication 30 MILLILITER(S): at 17:53

## 2019-03-13 RX ADMIN — Medication 1 APPLICATION(S): at 11:47

## 2019-03-13 RX ADMIN — Medication 100 MILLIGRAM(S): at 11:46

## 2019-03-13 RX ADMIN — SENNA PLUS 2 TABLET(S): 8.6 TABLET ORAL at 21:25

## 2019-03-13 RX ADMIN — Medication 30 MILLILITER(S): at 05:37

## 2019-03-13 RX ADMIN — Medication 100 MILLIGRAM(S): at 05:38

## 2019-03-13 RX ADMIN — Medication 100 MILLIGRAM(S): at 21:26

## 2019-03-13 RX ADMIN — PANTOPRAZOLE SODIUM 40 MILLIGRAM(S): 20 TABLET, DELAYED RELEASE ORAL at 17:54

## 2019-03-13 RX ADMIN — Medication 30 MILLILITER(S): at 23:24

## 2019-03-13 RX ADMIN — Medication 5 MILLIGRAM(S): at 05:37

## 2019-03-13 RX ADMIN — Medication 5 MILLIGRAM(S): at 11:46

## 2019-03-13 RX ADMIN — POLYETHYLENE GLYCOL 3350 17 GRAM(S): 17 POWDER, FOR SOLUTION ORAL at 11:45

## 2019-03-13 RX ADMIN — SODIUM CHLORIDE 75 MILLILITER(S): 9 INJECTION INTRAMUSCULAR; INTRAVENOUS; SUBCUTANEOUS at 05:39

## 2019-03-13 NOTE — PROGRESS NOTE ADULT - ASSESSMENT
Impression:  # Nausea/vomiting: CT Findings concerning for GOO, however Upper GI series negative. Less likely pancreatitis / biliary pathology given labs. Less likely gastroenteritis given prolonged duration of symptoms.   # Gastric cancer s/p surgery / chemoradiation.  # Prior low grade dysplasia/marginal ulcers  # Pancreatic duct stone (6mm)  # Acute Diarrhea  # Recent intracranial hemorrhage: Unclear etiology: Seen on CT Head 2/2019    Recommendations  - Diet as tolerated  - Check C. Diff and Stool PCR if diarrhea recurs  - Cardiology evaluation for risk stratification given history of syncope prior to previous EGD and significant cardiac history  - Neuro evaluation to r/o central etiology  - IV PPI BID to decrease gastric acid hypersecretion  - Eventual endoscopic evaluation of stomach and pancreas if cleared    Nadia Pino MD  Gastroenterology Fellow  [phone]  Please page on call fellow on weekends and after 5pm on weekdays Impression:  # Nausea/vomiting: prior low grade dysplasia with marginal ulcers would raise the possibility of new gastric cancer, ulceration or stricture  # Gastric cancer s/p surgery / chemoradiation in 2006.  # Thickening of the esophagus noted on CAT scan likely due to GERD, erosive esophatitis, rule out Andrade's esophagus, bile esophagitis  # Pancreatic duct stone (6mm)  # Acute Diarrhea resolved spontaneously without workup  # Recent intracranial hemorrhage without sequelae: Unclear etiology:  noted on CT Head 2/2019  # Anemia with past history of iron deficiency anemia, this is common s/p gastrectomy as well as B-12 deficiency    Recommendations  - Diet as tolerated  - Check C. Diff and Stool PCR if diarrhea recurs  - Cardiology  and anesthesia evaluation for risk stratification given history of syncope prior to previous EGD and significant cardiac history  - Neuro evaluation to r/o central etiology  - IV PPI BID to decrease gastric acid secretion  - Endoscopic evaluation of stomach and pancreas after clearance    Nadia Pino MD  Gastroenterology Fellow  [phone]  Please page on call fellow on weekends and after 5pm on weekdays

## 2019-03-13 NOTE — CONSULT NOTE ADULT - SUBJECTIVE AND OBJECTIVE BOX
Mor Woodard MD  Interventional Cardiology   [place] Office : [phone] [place] [address] [place] [address]  Tel:   [place] Office : [address] [facility] [place] [address]  Tel: [phone]  Cell : [phone]    HISTORY OF PRESENT ILLNESS:    77 yo m h/o cad/cabg 2002, diastolic chf,  gastric ca s/p surgery 2006, chemoradiation. HTN, HLD, dm, JACK, GERD, carotid stenosis. chronic diffuse  pruritic rash, recently admitted here ( feb 19-26th for rash, headache, nausea, vomiting and wt loss, had MRI which revealed SAH, MRCP revealed Dilated main pancreatic duct down to the neck, likely secondary to an intraductal calculus seen by GI and was advised to f/u as outpt for EUS) came in c/o having vomiting unable to eat or keep food down, reports feeling that his" throat is scraping or itching" and non bloody diarrhea 4 days after he was discharged from the hospital, pt also reports that he has lost a lot weight lately, unable to quantify, also reports having dysuria since a couple of days. He denies any sob, abdominal pain, chest pain, fever, chills, headache or any other complaints.    PAST MEDICAL & SURGICAL HISTORY:  GERD (gastroesophageal reflux disease)  Gastric cancer: S/P RT and chemo  Carotid stenosis  JACK (iron deficiency anemia)  HLD (hyperlipidemia)  HTN (hypertension)  CAD (coronary artery disease): S/P CABG  Hives: on Prednisone  DM (diabetes mellitus)  S/P Gastrectomy: 2006 due to gastric cancer  S/P CABG X 4: 2002    	    MEDICATIONS:  aspirin enteric coated 81 milliGRAM(s) Oral daily      loratadine 10 milliGRAM(s) Oral daily    baclofen 5 milliGRAM(s) Oral three times a day  diphenhydrAMINE 25 milliGRAM(s) Oral every 6 hours PRN  ondansetron Injectable 4 milliGRAM(s) IV Push every 6 hours PRN    aluminum hydroxide/magnesium hydroxide/simethicone Suspension 30 milliLiter(s) Oral every 6 hours  bisacodyl Suppository 10 milliGRAM(s) Rectal daily PRN  docusate sodium 100 milliGRAM(s) Oral three times a day  pantoprazole  Injectable 40 milliGRAM(s) IV Push every 12 hours  polyethylene glycol 3350 17 Gram(s) Oral daily  senna 2 Tablet(s) Oral at bedtime  simethicone 80 milliGRAM(s) Chew four times a day PRN    dextrose 40% Gel 15 Gram(s) Oral once PRN  dextrose 50% Injectable 12.5 Gram(s) IV Push once  dextrose 50% Injectable 25 Gram(s) IV Push once  dextrose 50% Injectable 25 Gram(s) IV Push once  glucagon  Injectable 1 milliGRAM(s) IntraMuscular once PRN  insulin lispro (HumaLOG) corrective regimen sliding scale   SubCutaneous three times a day before meals    clobetasol 0.05% Ointment 1 Application(s) Topical two times a day PRN  dextrose 5%. 1000 milliLiter(s) IV Continuous <Continuous>      FAMILY HISTORY:  Family history of early CAD (Sibling): brother  Family history of diabetes mellitus (Mother): mother        Allergies    No Known Allergies    Intolerances    	      PHYSICAL EXAM:  T(C): 36.4 (03-13-19 @ 10:18), Max: 36.8 (03-12-19 @ 22:20)  HR: 72 (03-13-19 @ 10:18) (67 - 88)  BP: 112/50 (03-13-19 @ 10:18) (105/59 - 127/61)  RR: 18 (03-13-19 @ 10:18) (18 - 18)  SpO2: 100% (03-13-19 @ 10:18) (100% - 100%)  Wt(kg): --  I&O's Summary    12 Mar 2019 07:01  -  13 Mar 2019 07:00  --------------------------------------------------------  IN: 1650 mL / OUT: 0 mL / NET: 1650 mL          LABS:	 	    CARDIAC MARKERS:    Appearance: chachectic  HEENT:   Normal oral mucosa, PERRL, EOMI	  Cardiovascular: Normal S1 S2, No JVD, No murmurs, No edema  Respiratory: Lungs clear to auscultation	  Gastrointestinal:  Soft, Non-tender, + BS	  Extremities: Normal range of motion, No clubbing, cyanosis or edema                              11.5   4.11  )-----------( 292      ( 13 Mar 2019 05:58 )             35.0     03-13    131<L>  |  97<L>  |  7   ----------------------------<  143<H>  4.3   |  22  |  0.93    Ca    8.7      13 Mar 2019 05:58      proBNP:   Lipid Profile:   HgA1c:   TSH:     ASSESSMENT/PLAN:

## 2019-03-13 NOTE — CONSULT NOTE ADULT - ASSESSMENT
EKG SR 1st degree AV block , LBBB (old)    Echo: < from: Transthoracic Echocardiogram (02.21.19 @ 21:03) >  opening.  2. Normal left ventricular internal dimensions and wall  thicknesses.  3. Normal left ventricular systolic function. No segmental  wall motion abnormalities.  4. Normal right ventricular size and function.  5. Estimatedpulmonary artery systolic pressure equals 40  mm Hg, assuming right atrial pressure equals 10  mm Hg,  consistent with mild pulmonary hypertension.    < end of copied text >    NST:   < from: Nuclear Stress Test-Pharmacologic (04.03.17 @ 10:00) >  * Myocardial Perfusion SPECT results are abnormal.  * There is a medium sized moderate to severe defect in  proximal to mid septal wall  and mild defect in  proximal  to mid inferior wall which are predominantly reversible  consistent with moderate to severe basal septal  ischemia  and mild proximal to mid inferior ischemia  * Post-stress gated wall motion analysis was performed  (LVEF = 55 %;LVEDV = 67 ml.), revealing normal LV  function. There was paradoxical motion of the septum (LBBB  and post CABG). RV function appeared normal.    < end of copied text >      Assessment and Plan     1) Perioperative risk assessment:  Pt with positive stress in 4/17 as above, medically managed , Echo with normal LV function , EKG with LBBB , Chest pain after food likely GI related, no ischemic eval at this time given CT findings from previous admission  , had a detailed discussion with daughter.  Asa on hold due to CT head findings, restart when ok with neurosurgery. Given positive stress pt at moderate risk of MACE with EGD, optimized from a cardiac standpoint     2) Anemia/ N / V:  f/U GI     3) DVT PPX : SCD

## 2019-03-13 NOTE — PROGRESS NOTE ADULT - SUBJECTIVE AND OBJECTIVE BOX
Chief Complaint:  Patient is a 78y old  Male who presents with a chief complaint of vomiting and diarrhea (13 Mar 2019 10:54)    Interval Events:   No acute overnight events. Patient complains of poor appetite and nausea, without vomiting.    Allergies:  No Known Allergies    Hospital Medications:  aluminum hydroxide/magnesium hydroxide/simethicone Suspension 30 milliLiter(s) Oral every 6 hours  baclofen 5 milliGRAM(s) Oral three times a day  bisacodyl Suppository 10 milliGRAM(s) Rectal daily PRN  clobetasol 0.05% Ointment 1 Application(s) Topical two times a day PRN  dextrose 40% Gel 15 Gram(s) Oral once PRN  dextrose 5%. 1000 milliLiter(s) IV Continuous <Continuous>  dextrose 50% Injectable 12.5 Gram(s) IV Push once  dextrose 50% Injectable 25 Gram(s) IV Push once  dextrose 50% Injectable 25 Gram(s) IV Push once  diphenhydrAMINE 25 milliGRAM(s) Oral every 6 hours PRN  docusate sodium 100 milliGRAM(s) Oral three times a day  glucagon  Injectable 1 milliGRAM(s) IntraMuscular once PRN  insulin lispro (HumaLOG) corrective regimen sliding scale   SubCutaneous three times a day before meals  loratadine 10 milliGRAM(s) Oral daily  ondansetron Injectable 4 milliGRAM(s) IV Push every 6 hours PRN  pantoprazole  Injectable 40 milliGRAM(s) IV Push every 12 hours  polyethylene glycol 3350 17 Gram(s) Oral daily  senna 2 Tablet(s) Oral at bedtime  sodium chloride 0.9%. 1000 milliLiter(s) IV Continuous <Continuous>    PMHX/PSHX:  GERD (gastroesophageal reflux disease)  Gastric cancer  Carotid stenosis  JACK (iron deficiency anemia)  HLD (hyperlipidemia)  HTN (hypertension)  CAD (coronary artery disease)  Hives  DM (diabetes mellitus)  BPH (Benign Prostatic Hypertrophy)  GERD (Gastroesophageal Reflux Disease)  S/P CABG X 3  Hypercholesteremia  CAD (Coronary Artery Disease)  S/P Radiation > 12 Weeks  S/P Chemotherapy, Time Since Greater than 12 Weeks  Anemia  BPH (Benign Prostatic Hypertrophy)  GERD (Gastroesophageal Reflux Disease)  Dyslipidemia  HTN - Hypertension  CAD (Coronary Artery Disease)  History of Stomach Cancer  S/P Gastrectomy  Atypical Chest Pain  Status Post Gastrectomy  S/P CABG X 4  Status Post Gastrectomy  S/P CABG X 4    ROS:   General:  No fevers, chills or night sweats  ENT:  No sore throat or dysphagia  CV:  No pain or palpitations  Resp:  No dyspnea, cough or  wheezing  GI:  No pain, No nausea, No vomiting, No rectal bleeding, No tarry stools,  Skin:  No rash or edema    PHYSICAL EXAM:   Vital Signs:  Vital Signs Last 24 Hrs  T(C): 36.4 (13 Mar 2019 10:18), Max: 36.8 (12 Mar 2019 22:20)  T(F): 97.6 (13 Mar 2019 10:18), Max: 98.2 (12 Mar 2019 22:20)  HR: 72 (13 Mar 2019 10:18) (67 - 88)  BP: 112/50 (13 Mar 2019 10:18) (105/59 - 143/68)  BP(mean): --  RR: 18 (13 Mar 2019 10:18) (18 - 18)  SpO2: 100% (13 Mar 2019 10:18) (98% - 100%)  Daily     Daily     GENERAL:  NAD  EYES: EOMI, PERRL   CHEST:  Full & symmetric excursion, no increased effort, breath sounds clear  HEART:  Regular rhythm, S1, S2,  ABDOMEN:  Soft, non-tender, non-distended, normoactive bowel sounds,  EXTREMITIES:  no cyanosis,clubbing or edema  SKIN:  No rash/erythema  NEURO:  Alert, oriented    LABS:                        11.5   4.11  )-----------( 292      ( 13 Mar 2019 05:58 )             35.0     Mean Cell Volume: 81.0 fL (03-13-19 @ 05:58)    03-13    131<L>  |  97<L>  |  7   ----------------------------<  143<H>  4.3   |  22  |  0.93    Ca    8.7      13 Mar 2019 05:58    Imaging: Chief Complaint:  Patient is a 78y old  Male who presents with a chief complaint of vomiting and diarrhea (13 Mar 2019 10:54)    Interval Events:   No acute overnight events. Patient complains of poor appetite and nausea, without vomiting.    Allergies:  No Known Allergies    Hospital Medications:  aluminum hydroxide/magnesium hydroxide/simethicone Suspension 30 milliLiter(s) Oral every 6 hours  baclofen 5 milliGRAM(s) Oral three times a day  bisacodyl Suppository 10 milliGRAM(s) Rectal daily PRN  clobetasol 0.05% Ointment 1 Application(s) Topical two times a day PRN  dextrose 40% Gel 15 Gram(s) Oral once PRN  dextrose 5%. 1000 milliLiter(s) IV Continuous <Continuous>  dextrose 50% Injectable 12.5 Gram(s) IV Push once  dextrose 50% Injectable 25 Gram(s) IV Push once  dextrose 50% Injectable 25 Gram(s) IV Push once  diphenhydrAMINE 25 milliGRAM(s) Oral every 6 hours PRN  docusate sodium 100 milliGRAM(s) Oral three times a day  glucagon  Injectable 1 milliGRAM(s) IntraMuscular once PRN  insulin lispro (HumaLOG) corrective regimen sliding scale   SubCutaneous three times a day before meals  loratadine 10 milliGRAM(s) Oral daily  ondansetron Injectable 4 milliGRAM(s) IV Push every 6 hours PRN  pantoprazole  Injectable 40 milliGRAM(s) IV Push every 12 hours  polyethylene glycol 3350 17 Gram(s) Oral daily  senna 2 Tablet(s) Oral at bedtime  sodium chloride 0.9%. 1000 milliLiter(s) IV Continuous <Continuous>    PMHX/PSHX:  GERD (gastroesophageal reflux disease)  Gastric cancer with subtotal gastrectomy  Carotid stenosis  JACK (iron deficiency anemia)  HLD (hyperlipidemia)  HTN (hypertension)  CAD (coronary artery disease)  Hives  DM (diabetes mellitus)  BPH (Benign Prostatic Hypertrophy)  GERD (Gastroesophageal Reflux Disease)  S/P CABG X 3  Hypercholesteremia  CAD (Coronary Artery Disease)  S/P Radiation > 12 Weeks  S/P Chemotherapy, Time Since Greater than 12 Weeks  Anemia  BPH (Benign Prostatic Hypertrophy)  GERD (Gastroesophageal Reflux Disease)  Dyslipidemia  HTN - Hypertension  CAD (Coronary Artery Disease)  History of Stomach Cancer  S/P Gastrectomy  Atypical Chest Pain  Status Post Gastrectomy  S/P CABG X 4  Status Post Gastrectomy  S/P CABG X 4    ROS:   General:  No fevers, chills or night sweats  ENT:  No sore throat or dysphagia  CV:  No pain or palpitations  Resp:  No dyspnea, cough or  wheezing  GI:  No pain, No nausea, No vomiting, No rectal bleeding, No tarry stools,  Skin:  No rash or edema    PHYSICAL EXAM:   Vital Signs:  Vital Signs Last 24 Hrs  T(C): 36.4 (13 Mar 2019 10:18), Max: 36.8 (12 Mar 2019 22:20)  T(F): 97.6 (13 Mar 2019 10:18), Max: 98.2 (12 Mar 2019 22:20)  HR: 72 (13 Mar 2019 10:18) (67 - 88)  BP: 112/50 (13 Mar 2019 10:18) (105/59 - 143/68)  BP(mean): --  RR: 18 (13 Mar 2019 10:18) (18 - 18)  SpO2: 100% (13 Mar 2019 10:18) (98% - 100%)  Daily     Daily     GENERAL:  NAD  EYES: EOMI, PERRL   CHEST:  Full & symmetric excursion, no increased effort, breath sounds clear  HEART:  Regular rhythm, S1, S2,  ABDOMEN:  Soft, non-tender, non-distended, normoactive bowel sounds,  EXTREMITIES:  no cyanosis,clubbing or edema  SKIN:  No rash/erythema  NEURO:  Alert, oriented    LABS:                        11.5   4.11  )-----------( 292      ( 13 Mar 2019 05:58 )             35.0     Mean Cell Volume: 81.0 fL (03-13-19 @ 05:58)    03-13    131<L>  |  97<L>  |  7   ----------------------------<  143<H>  4.3   |  22  |  0.93    Ca    8.7      13 Mar 2019 05:58    Imaging:

## 2019-03-13 NOTE — PROGRESS NOTE ADULT - SUBJECTIVE AND OBJECTIVE BOX
Patient is a 78y old  Male who presents with a chief complaint of vomiting and diarrhea (12 Mar 2019 10:26)      SUBJECTIVE / OVERNIGHT EVENTS:    pt cont to report heartburn. Able to tolerate liquids Had small bowel series no obstruction     Review of Systems:    RESPIRATORY: No cough, wheezing, chills or hemoptysis; No shortness of breath  CARDIOVASCULAR: No chest pain, palpitations, dizziness, or leg swelling  GASTROINTESTINAL: + epigastric pain. + nausea, No vomiting, or hematemesis. No melena or hematochezia.    MEDICATIONS  (STANDING):  aluminum hydroxide/magnesium hydroxide/simethicone Suspension 30 milliLiter(s) Oral every 6 hours  baclofen 5 milliGRAM(s) Oral three times a day  dextrose 5%. 1000 milliLiter(s) (50 mL/Hr) IV Continuous <Continuous>  dextrose 50% Injectable 12.5 Gram(s) IV Push once  dextrose 50% Injectable 25 Gram(s) IV Push once  dextrose 50% Injectable 25 Gram(s) IV Push once  docusate sodium 100 milliGRAM(s) Oral three times a day  insulin lispro (HumaLOG) corrective regimen sliding scale   SubCutaneous three times a day before meals  loratadine 10 milliGRAM(s) Oral daily  pantoprazole  Injectable 40 milliGRAM(s) IV Push every 12 hours  polyethylene glycol 3350 17 Gram(s) Oral daily  senna 2 Tablet(s) Oral at bedtime  sodium chloride 0.9%. 1000 milliLiter(s) (75 mL/Hr) IV Continuous <Continuous>    MEDICATIONS  (PRN):  bisacodyl Suppository 10 milliGRAM(s) Rectal daily PRN Constipation  clobetasol 0.05% Ointment 1 Application(s) Topical two times a day PRN rash  dextrose 40% Gel 15 Gram(s) Oral once PRN Blood Glucose LESS THAN 70 milliGRAM(s)/deciliter  diphenhydrAMINE 25 milliGRAM(s) Oral every 6 hours PRN Rash and/or Itching  glucagon  Injectable 1 milliGRAM(s) IntraMuscular once PRN Glucose LESS THAN 70 milligrams/deciliter  ondansetron Injectable 4 milliGRAM(s) IV Push every 6 hours PRN Nausea and/or Vomiting      PHYSICAL EXAM:  T(C): 36.4 (03-13-19 @ 10:18), Max: 36.8 (03-12-19 @ 22:20)  HR: 72 (03-13-19 @ 10:18) (67 - 88)  BP: 112/50 (03-13-19 @ 10:18) (105/59 - 143/68)  RR: 18 (03-13-19 @ 10:18) (18 - 18)  SpO2: 100% (03-13-19 @ 10:18) (98% - 100%)  I&O's Summary    12 Mar 2019 07:01  -  13 Mar 2019 07:00  --------------------------------------------------------  IN: 1650 mL / OUT: 0 mL / NET: 1650 mL      GENERAL: chronically ill appearing  male lying in bed in NAD   MENTAL STATUS/PSYCH:  AAO x3   HEAD:  Atraumatic, Normocephalic  EYES: EOMI, PERRLA, conjunctiva and sclera clear  NECK: Supple, No elevated JVD  CHEST/LUNG: Clear to auscultation bilaterally; No wheeze  HEART: Regular rate and rhythm; No murmurs, rubs, or gallops  ABDOMEN: Soft, Nontender, Nondistended; Bowel sounds present  EXTREMITIES:  2+ Peripheral Pulses, No clubbing, cyanosis, or edema  NEUROLOGY: CN II-XII grossly intact, moving all extremities  SKIN: No rashes or lesions    LABS:  CAPILLARY BLOOD GLUCOSE      POCT Blood Glucose.: 99 mg/dL (13 Mar 2019 08:09)  POCT Blood Glucose.: 88 mg/dL (12 Mar 2019 22:32)  POCT Blood Glucose.: 128 mg/dL (12 Mar 2019 18:48)  POCT Blood Glucose.: 71 mg/dL (12 Mar 2019 13:39)                          11.5   4.11  )-----------( 292      ( 13 Mar 2019 05:58 )             35.0     03-13    131<L>  |  97<L>  |  7   ----------------------------<  143<H>  4.3   |  22  |  0.93    Ca    8.7      13 Mar 2019 05:58                RADIOLOGY & ADDITIONAL TESTS:    Imaging Personally Reviewed:    Consultant(s) Notes Reviewed:      Care Discussed with Consultants/Other Providers:

## 2019-03-13 NOTE — PROGRESS NOTE ADULT - ASSESSMENT
77 yo m h/o cad/cabg 2002, diastolic chf,  gastric ca s/p surgery 2006, chemoradiation. HTN, HLD, dm, JACK, GERD, carotid stenosis. chronic diffuse  pruritic rash, recently admitted here ( feb 19-26th ) for rash, headache, nausea, vomiting and wt loss, had MRI which revealed SAH, MRCP revealed Dilated main pancreatic duct down to the neck. Etiology for n/v inc. GERD  vs. pancreatic duct stone. No gastric outlet obstruction on Barium swallow test

## 2019-03-13 NOTE — CHART NOTE - NSCHARTNOTEFT_GEN_A_CORE
patient had a stable CT head on 3/9/19, initial brain injury was 2/19/19 and ASA was held since. RISK VS BENEFIT of putting patient back on ASA is to be determined by primary team. With stable head CT, off ASA for over a month, patient can be placed on ASA per primary team if needed.    COMPARISON: Prior brain MRI examination from 2/20/2019. Prior head CT   examination from 2/19/2019.     FINDINGS: There is no acute intracranial hemorrhage, mass effect, shift of   the midline structures, hydrocephalus, or herniation. Small areas of   encephalomalacia and gliosis are notable within the lateral basal frontal   lobes related to prior contusion.     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     The imaged portions of the paranasal sinuses, and mastoid air cells are well   aerated. The calvarium is intact. There is evidence of bilateral cataract   removal. Bilateral senile scleral plaques are noted.     IMPRESSION: No acute intracranial findings.     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     Tiny areas of encephalomalacia and gliosis within the frontal lobes related   to prior contusion.

## 2019-03-14 LAB
ANION GAP SERPL CALC-SCNC: 13 MMO/L — SIGNIFICANT CHANGE UP (ref 7–14)
BUN SERPL-MCNC: 10 MG/DL — SIGNIFICANT CHANGE UP (ref 7–23)
CALCIUM SERPL-MCNC: 9 MG/DL — SIGNIFICANT CHANGE UP (ref 8.4–10.5)
CHLORIDE SERPL-SCNC: 93 MMOL/L — LOW (ref 98–107)
CO2 SERPL-SCNC: 24 MMOL/L — SIGNIFICANT CHANGE UP (ref 22–31)
CREAT SERPL-MCNC: 1 MG/DL — SIGNIFICANT CHANGE UP (ref 0.5–1.3)
GLUCOSE SERPL-MCNC: 101 MG/DL — HIGH (ref 70–99)
HCT VFR BLD CALC: 38.7 % — LOW (ref 39–50)
HGB BLD-MCNC: 12.6 G/DL — LOW (ref 13–17)
MCHC RBC-ENTMCNC: 26.5 PG — LOW (ref 27–34)
MCHC RBC-ENTMCNC: 32.6 % — SIGNIFICANT CHANGE UP (ref 32–36)
MCV RBC AUTO: 81.5 FL — SIGNIFICANT CHANGE UP (ref 80–100)
NRBC # FLD: 0 K/UL — LOW (ref 25–125)
PLATELET # BLD AUTO: 304 K/UL — SIGNIFICANT CHANGE UP (ref 150–400)
PMV BLD: 9.4 FL — SIGNIFICANT CHANGE UP (ref 7–13)
POTASSIUM SERPL-MCNC: 3.7 MMOL/L — SIGNIFICANT CHANGE UP (ref 3.5–5.3)
POTASSIUM SERPL-SCNC: 3.7 MMOL/L — SIGNIFICANT CHANGE UP (ref 3.5–5.3)
RBC # BLD: 4.75 M/UL — SIGNIFICANT CHANGE UP (ref 4.2–5.8)
RBC # FLD: 15.6 % — HIGH (ref 10.3–14.5)
SODIUM SERPL-SCNC: 130 MMOL/L — LOW (ref 135–145)
WBC # BLD: 5.65 K/UL — SIGNIFICANT CHANGE UP (ref 3.8–10.5)
WBC # FLD AUTO: 5.65 K/UL — SIGNIFICANT CHANGE UP (ref 3.8–10.5)

## 2019-03-14 PROCEDURE — 99233 SBSQ HOSP IP/OBS HIGH 50: CPT

## 2019-03-14 PROCEDURE — 99232 SBSQ HOSP IP/OBS MODERATE 35: CPT | Mod: GC

## 2019-03-14 RX ORDER — LANOLIN ALCOHOL/MO/W.PET/CERES
3 CREAM (GRAM) TOPICAL AT BEDTIME
Qty: 0 | Refills: 0 | Status: DISCONTINUED | OUTPATIENT
Start: 2019-03-14 | End: 2019-03-19

## 2019-03-14 RX ORDER — SODIUM CHLORIDE 9 MG/ML
1000 INJECTION INTRAMUSCULAR; INTRAVENOUS; SUBCUTANEOUS
Qty: 0 | Refills: 0 | Status: DISCONTINUED | OUTPATIENT
Start: 2019-03-14 | End: 2019-03-15

## 2019-03-14 RX ORDER — DEXTROSE 50 % IN WATER 50 %
25 SYRINGE (ML) INTRAVENOUS ONCE
Qty: 0 | Refills: 0 | Status: COMPLETED | OUTPATIENT
Start: 2019-03-14 | End: 2019-03-14

## 2019-03-14 RX ORDER — SODIUM CHLORIDE 9 MG/ML
1000 INJECTION INTRAMUSCULAR; INTRAVENOUS; SUBCUTANEOUS
Qty: 0 | Refills: 0 | Status: DISCONTINUED | OUTPATIENT
Start: 2019-03-14 | End: 2019-03-14

## 2019-03-14 RX ORDER — ATORVASTATIN CALCIUM 80 MG/1
20 TABLET, FILM COATED ORAL AT BEDTIME
Qty: 0 | Refills: 0 | Status: DISCONTINUED | OUTPATIENT
Start: 2019-03-14 | End: 2019-03-19

## 2019-03-14 RX ADMIN — Medication 25 GRAM(S): at 12:26

## 2019-03-14 RX ADMIN — Medication 100 MILLIGRAM(S): at 21:25

## 2019-03-14 RX ADMIN — Medication 5 MILLIGRAM(S): at 22:50

## 2019-03-14 RX ADMIN — Medication 1: at 08:20

## 2019-03-14 RX ADMIN — PANTOPRAZOLE SODIUM 40 MILLIGRAM(S): 20 TABLET, DELAYED RELEASE ORAL at 05:10

## 2019-03-14 RX ADMIN — SODIUM CHLORIDE 75 MILLILITER(S): 9 INJECTION INTRAMUSCULAR; INTRAVENOUS; SUBCUTANEOUS at 08:19

## 2019-03-14 RX ADMIN — SIMETHICONE 80 MILLIGRAM(S): 80 TABLET, CHEWABLE ORAL at 17:50

## 2019-03-14 RX ADMIN — PANTOPRAZOLE SODIUM 40 MILLIGRAM(S): 20 TABLET, DELAYED RELEASE ORAL at 17:50

## 2019-03-14 RX ADMIN — Medication 1 APPLICATION(S): at 08:20

## 2019-03-14 RX ADMIN — ATORVASTATIN CALCIUM 20 MILLIGRAM(S): 80 TABLET, FILM COATED ORAL at 22:50

## 2019-03-14 RX ADMIN — SODIUM CHLORIDE 75 MILLILITER(S): 9 INJECTION INTRAMUSCULAR; INTRAVENOUS; SUBCUTANEOUS at 12:27

## 2019-03-14 RX ADMIN — Medication 30 MILLILITER(S): at 22:50

## 2019-03-14 RX ADMIN — Medication 3 MILLIGRAM(S): at 22:50

## 2019-03-14 NOTE — PROGRESS NOTE ADULT - SUBJECTIVE AND OBJECTIVE BOX
Patient is a 78y old  Male who presents with a chief complaint of vomiting and diarrhea (13 Mar 2019 12:58)      SUBJECTIVE / OVERNIGHT EVENTS:    Reports persistent heartburn, slightly improved since admission. Agree to have EGD     Review of Systems:    RESPIRATORY: No cough, wheezing, chills or hemoptysis; No shortness of breath  CARDIOVASCULAR: No chest pain, palpitations, dizziness, or leg swelling  GASTROINTESTINAL: No abdominal or epigastric pain. +nausea, vomiting, no hematemesis; No diarrhea or constipation. No melena or hematochezia.      MEDICATIONS  (STANDING):  aspirin enteric coated 81 milliGRAM(s) Oral daily  baclofen 5 milliGRAM(s) Oral three times a day  dextrose 5%. 1000 milliLiter(s) (50 mL/Hr) IV Continuous <Continuous>  dextrose 50% Injectable 12.5 Gram(s) IV Push once  dextrose 50% Injectable 25 Gram(s) IV Push once  dextrose 50% Injectable 25 Gram(s) IV Push once  docusate sodium 100 milliGRAM(s) Oral three times a day  insulin lispro (HumaLOG) corrective regimen sliding scale   SubCutaneous three times a day before meals  loratadine 10 milliGRAM(s) Oral daily  pantoprazole  Injectable 40 milliGRAM(s) IV Push every 12 hours  polyethylene glycol 3350 17 Gram(s) Oral daily  senna 2 Tablet(s) Oral at bedtime  sodium chloride 0.9%. 1000 milliLiter(s) (75 mL/Hr) IV Continuous <Continuous>    MEDICATIONS  (PRN):  bisacodyl Suppository 10 milliGRAM(s) Rectal daily PRN Constipation  clobetasol 0.05% Ointment 1 Application(s) Topical two times a day PRN rash  dextrose 40% Gel 15 Gram(s) Oral once PRN Blood Glucose LESS THAN 70 milliGRAM(s)/deciliter  diphenhydrAMINE 25 milliGRAM(s) Oral every 6 hours PRN Rash and/or Itching  glucagon  Injectable 1 milliGRAM(s) IntraMuscular once PRN Glucose LESS THAN 70 milligrams/deciliter  ondansetron Injectable 4 milliGRAM(s) IV Push every 6 hours PRN Nausea and/or Vomiting  simethicone 80 milliGRAM(s) Chew four times a day PRN Gas      PHYSICAL EXAM:  T(C): 36.2 (03-14-19 @ 12:52), Max: 37 (03-14-19 @ 05:08)  HR: 64 (03-14-19 @ 12:52) (62 - 90)  BP: 114/50 (03-14-19 @ 12:52) (114/50 - 129/76)  RR: 16 (03-14-19 @ 12:52) (16 - 19)  SpO2: 100% (03-14-19 @ 12:52) (94% - 100%)  I&O's Summary    GENERAL: chronically ill appearing  male lying in bed in NAD   MENTAL STATUS/PSYCH:  AAO x3   HEAD:  Atraumatic, Normocephalic  EYES: EOMI, PERRLA, conjunctiva and sclera clear  NECK: Supple, No elevated JVD  CHEST/LUNG: Clear to auscultation bilaterally; No wheeze  HEART: Regular rate and rhythm; No murmurs, rubs, or gallops  ABDOMEN: Soft, Nontender, Nondistended; Bowel sounds present  EXTREMITIES:  2+ Peripheral Pulses, No clubbing, cyanosis, or edema  NEUROLOGY: CN II-XII grossly intact, moving all extremities  SKIN: No rashes or lesions      LABS:  CAPILLARY BLOOD GLUCOSE      POCT Blood Glucose.: 233 mg/dL (14 Mar 2019 12:44)  POCT Blood Glucose.: 222 mg/dL (14 Mar 2019 12:42)  POCT Blood Glucose.: 48 mg/dL (14 Mar 2019 12:18)  POCT Blood Glucose.: 184 mg/dL (14 Mar 2019 07:51)  POCT Blood Glucose.: 99 mg/dL (13 Mar 2019 22:33)  POCT Blood Glucose.: 116 mg/dL (13 Mar 2019 16:46)                          12.6   5.65  )-----------( 304      ( 14 Mar 2019 06:30 )             38.7     03-14    130<L>  |  93<L>  |  10  ----------------------------<  101<H>  3.7   |  24  |  1.00    Ca    9.0      14 Mar 2019 06:30                RADIOLOGY & ADDITIONAL TESTS:    Imaging Personally Reviewed:    Consultant(s) Notes Reviewed:      Care Discussed with Consultants/Other Providers:

## 2019-03-14 NOTE — PROGRESS NOTE ADULT - ASSESSMENT
79 yo m h/o cad/cabg 2002, diastolic chf,  gastric ca s/p surgery 2006, chemoradiation. HTN, HLD, dm, JACK, GERD, carotid stenosis. chronic diffuse  pruritic rash, recently admitted here ( feb 19-26th ) for rash, headache, nausea, vomiting and wt loss, had MRI which revealed SAH, MRCP revealed Dilated main pancreatic duct down to the neck. Etiology for n/v inc. GERD  vs. pancreatic duct stone. No gastric outlet obstruction on Barium swallow test    Problem/Plan - 1:  ·  Problem: Nausea and vomiting.  Plan: Ct abd/pelvis showed mild fluid distended stomach and distal esophagus with wall thickening,   Ct head no acute pathology  Upper GI series reviewed on gastric outlet obstruction   GI consult appreciated- plan for EGD pending cardiology/anesthesia clearance. Both services called  cont liquid diet for now. advance as tolerated   cont supportive care IVF, zofran prn, PPI. Changed maloox to standing. added baclofen for better symptom control   monitor electrolytes. replete K, mag     Problem/Plan - 2:  ·  Problem: CAD.  Plan: asymptomatic. hx of abnormal stress. cont asa, statin      Problem/Plan - 3:  ·  Problem: Gastric cancer.  Plan: H/o gastric cancer in the past now with wt loss. f/u oncology as outpt      Problem/Plan - 4:  ·  Problem: HTN (hypertension).  Plan: Pt not on any meds, monitor bp and start meds prn.      Problem/Plan - 5:  ·  Problem: HLD (hyperlipidemia).  Plan: cont statin.      Problem/Plan - 6:  Problem: DM (diabetes mellitus). Plan: cont consistent carb diet, monitor finger stick glucose, SS insulin for now     Problem/Plan - 7:  ·  Problem: Need for prophylactic measure.  Plan: Given recent SAH, will keep on venodynes  IMPROVE VTE Individual Risk Assessment.

## 2019-03-14 NOTE — CHART NOTE - NSCHARTNOTEFT_GEN_A_CORE
Plan is for pt to have EGD tomorrow, anesthesia evaluated patient and cleared pt for procedure. Anesthesia note in paper chart.

## 2019-03-14 NOTE — PROGRESS NOTE ADULT - SUBJECTIVE AND OBJECTIVE BOX
Chief Complaint:  Patient is a 78y old  Male who presents with a chief complaint of vomiting and diarrhea (14 Mar 2019 14:16)    Interval Events:   No acute overnight events.   Allergies:  No Known Allergies    Hospital Medications:  aspirin enteric coated 81 milliGRAM(s) Oral daily  atorvastatin 20 milliGRAM(s) Oral at bedtime  baclofen 5 milliGRAM(s) Oral three times a day  bisacodyl Suppository 10 milliGRAM(s) Rectal daily PRN  clobetasol 0.05% Ointment 1 Application(s) Topical two times a day PRN  dextrose 40% Gel 15 Gram(s) Oral once PRN  dextrose 5%. 1000 milliLiter(s) IV Continuous <Continuous>  dextrose 50% Injectable 12.5 Gram(s) IV Push once  dextrose 50% Injectable 25 Gram(s) IV Push once  dextrose 50% Injectable 25 Gram(s) IV Push once  diphenhydrAMINE 25 milliGRAM(s) Oral every 6 hours PRN  docusate sodium 100 milliGRAM(s) Oral three times a day  glucagon  Injectable 1 milliGRAM(s) IntraMuscular once PRN  insulin lispro (HumaLOG) corrective regimen sliding scale   SubCutaneous three times a day before meals  loratadine 10 milliGRAM(s) Oral daily  ondansetron Injectable 4 milliGRAM(s) IV Push every 6 hours PRN  pantoprazole  Injectable 40 milliGRAM(s) IV Push every 12 hours  polyethylene glycol 3350 17 Gram(s) Oral daily  senna 2 Tablet(s) Oral at bedtime  simethicone 80 milliGRAM(s) Chew four times a day PRN  sodium chloride 0.9%. 1000 milliLiter(s) IV Continuous <Continuous>    PMHX/PSHX:  GERD (gastroesophageal reflux disease)  Gastric cancer  Carotid stenosis  JACK (iron deficiency anemia)  HLD (hyperlipidemia)  HTN (hypertension)  CAD (coronary artery disease)  Hives  DM (diabetes mellitus)  BPH (Benign Prostatic Hypertrophy)  GERD (Gastroesophageal Reflux Disease)  S/P CABG X 3  Hypercholesteremia  CAD (Coronary Artery Disease)  S/P Radiation > 12 Weeks  S/P Chemotherapy, Time Since Greater than 12 Weeks  Anemia  BPH (Benign Prostatic Hypertrophy)  GERD (Gastroesophageal Reflux Disease)  Dyslipidemia  HTN - Hypertension  CAD (Coronary Artery Disease)  History of Stomach Cancer  S/P Gastrectomy  Atypical Chest Pain  Status Post Gastrectomy  S/P CABG X 4  Status Post Gastrectomy  S/P CABG X 4    ROS:   General:  No fevers, chills or night sweats  ENT:  No sore throat or dysphagia  CV:  No pain or palpitations  Resp:  No dyspnea, cough or  wheezing  GI:  No pain, No nausea, No vomiting, No rectal bleeding, No tarry stools,  Skin:  No rash or edema    PHYSICAL EXAM:   Vital Signs:  Vital Signs Last 24 Hrs  T(C): 36.2 (14 Mar 2019 12:52), Max: 37 (14 Mar 2019 05:08)  T(F): 97.2 (14 Mar 2019 12:52), Max: 98.6 (14 Mar 2019 05:08)  HR: 64 (14 Mar 2019 12:52) (62 - 90)  BP: 114/50 (14 Mar 2019 12:52) (114/50 - 129/76)  BP(mean): --  RR: 16 (14 Mar 2019 12:52) (16 - 19)  SpO2: 100% (14 Mar 2019 12:52) (94% - 100%)  Daily     Daily     GENERAL:  NAD  EYES: EOMI, PERRL   CHEST:  Full & symmetric excursion, no increased effort, breath sounds clear  HEART:  Regular rhythm, S1, S2,  ABDOMEN:  Soft, non-tender, non-distended, normoactive bowel sounds,  EXTREMITIES:  no cyanosis,clubbing or edema  SKIN:  No rash/erythema  NEURO:  Alert, oriented    LABS:                        12.6   5.65  )-----------( 304      ( 14 Mar 2019 06:30 )             38.7     Mean Cell Volume: 81.5 fL (03-14-19 @ 06:30)    03-14    130<L>  |  93<L>  |  10  ----------------------------<  101<H>  3.7   |  24  |  1.00    Ca    9.0      14 Mar 2019 06:30    Imaging:

## 2019-03-14 NOTE — PROGRESS NOTE ADULT - SUBJECTIVE AND OBJECTIVE BOX
Mor Woodard MD  Interventional Cardiology  [place] Office : [phone] [place] [address]. [phone]  Tel:   [place] Office : [phone] [facility] [place] [address]  Tel: [phone]  Cell : [phone]    Subjective : Pt lying in bed comfortable, not in distress, denies any chest pain or SOB  	  MEDICATIONS:  aspirin enteric coated 81 milliGRAM(s) Oral daily      guaiFENesin    Syrup 100 milliGRAM(s) Oral every 6 hours PRN  loratadine 10 milliGRAM(s) Oral daily    baclofen 5 milliGRAM(s) Oral three times a day  diphenhydrAMINE 25 milliGRAM(s) Oral every 6 hours PRN  melatonin 3 milliGRAM(s) Oral at bedtime PRN  ondansetron Injectable 4 milliGRAM(s) IV Push every 6 hours PRN    aluminum hydroxide/magnesium hydroxide/simethicone Suspension 30 milliLiter(s) Oral every 4 hours PRN  bisacodyl Suppository 10 milliGRAM(s) Rectal daily PRN  docusate sodium 100 milliGRAM(s) Oral three times a day  pantoprazole  Injectable 40 milliGRAM(s) IV Push every 12 hours  polyethylene glycol 3350 17 Gram(s) Oral daily  senna 2 Tablet(s) Oral at bedtime  simethicone 80 milliGRAM(s) Chew four times a day PRN    atorvastatin 20 milliGRAM(s) Oral at bedtime  dextrose 40% Gel 15 Gram(s) Oral once PRN  dextrose 50% Injectable 12.5 Gram(s) IV Push once  dextrose 50% Injectable 25 Gram(s) IV Push once  dextrose 50% Injectable 25 Gram(s) IV Push once  glucagon  Injectable 1 milliGRAM(s) IntraMuscular once PRN  insulin lispro (HumaLOG) corrective regimen sliding scale   SubCutaneous three times a day before meals    clobetasol 0.05% Ointment 1 Application(s) Topical two times a day PRN  dextrose 5%. 1000 milliLiter(s) IV Continuous <Continuous>  sodium chloride 0.9%. 1000 milliLiter(s) IV Continuous <Continuous>      PHYSICAL EXAM:  T(C): 36.7 (03-14-19 @ 20:43), Max: 37 (03-14-19 @ 05:08)  HR: 70 (03-14-19 @ 20:43) (62 - 70)  BP: 111/49 (03-14-19 @ 20:43) (111/49 - 126/56)  RR: 17 (03-14-19 @ 20:43) (16 - 17)  SpO2: 94% (03-14-19 @ 20:43) (94% - 100%)  Wt(kg): --  I&O's Summary    14 Mar 2019 07:01  -  14 Mar 2019 23:43  --------------------------------------------------------  IN: 1600 mL / OUT: 0 mL / NET: 1600 mL  Appearance: chachectic  HEENT:   Normal oral mucosa, PERRL, EOMI	  Cardiovascular: Normal S1 S2, No JVD, No murmurs, No edema  Respiratory: Lungs clear to auscultation	  Gastrointestinal:  Soft, Non-tender, + BS	  Extremities: Normal range of motion, No clubbing, cyanosis or edema                                    12.6   5.65  )-----------( 304      ( 14 Mar 2019 06:30 )             38.7     03-14    130<L>  |  93<L>  |  10  ----------------------------<  101<H>  3.7   |  24  |  1.00    Ca    9.0      14 Mar 2019 06:30      proBNP:   Lipid Profile:   HgA1c:   TSH:

## 2019-03-14 NOTE — PROGRESS NOTE ADULT - ASSESSMENT
Impression:  # Nausea/vomiting: prior low grade dysplasia with marginal ulcers would raise the possibility of new gastric cancer, ulceration or stricture  # Gastric cancer s/p surgery / chemoradiation in 2006.  # Thickening of the esophagus noted on CAT scan likely due to GERD, erosive esophatitis, rule out Andrade's esophagus, bile esophagitis  # Pancreatic duct stone (6mm)  # Acute Diarrhea resolved spontaneously without workup  # Recent intracranial hemorrhage without sequelae: Unclear etiology:  noted on CT Head 2/2019  # Anemia with past history of iron deficiency anemia, this is common s/p gastrectomy as well as B-12 deficiency    Recommendations  - Diet as tolerated  - Check C. Diff and Stool PCR if diarrhea recurs  - Cardiology  and anesthesia evaluation for risk stratification given history of syncope prior to previous EGD and significant cardiac history  - IV PPI BID to decrease gastric acid secretion  - Endoscopic evaluation of stomach and pancreas after clearance    Nadia Pino MD  Gastroenterology Fellow  [phone]  Please page on call fellow on weekends and after 5pm on weekdays

## 2019-03-14 NOTE — PROGRESS NOTE ADULT - ASSESSMENT
EKG SR 1st degree AV block , LBBB (old)    Echo: < from: Transthoracic Echocardiogram (02.21.19 @ 21:03) >  opening.  2. Normal left ventricular internal dimensions and wall  thicknesses.  3. Normal left ventricular systolic function. No segmental  wall motion abnormalities.  4. Normal right ventricular size and function.  5. Estimatedpulmonary artery systolic pressure equals 40  mm Hg, assuming right atrial pressure equals 10  mm Hg,  consistent with mild pulmonary hypertension.    < end of copied text >    NST:   < from: Nuclear Stress Test-Pharmacologic (04.03.17 @ 10:00) >  * Myocardial Perfusion SPECT results are abnormal.  * There is a medium sized moderate to severe defect in  proximal to mid septal wall  and mild defect in  proximal  to mid inferior wall which are predominantly reversible  consistent with moderate to severe basal septal  ischemia  and mild proximal to mid inferior ischemia  * Post-stress gated wall motion analysis was performed  (LVEF = 55 %;LVEDV = 67 ml.), revealing normal LV  function. There was paradoxical motion of the septum (LBBB  and post CABG). RV function appeared normal.    < end of copied text >      Assessment and Plan     1) Perioperative risk assessment:  Pt with positive stress in 4/17 as above, medically managed , Echo with normal LV function , EKG with LBBB , Chest pain after food likely GI related, no ischemic eval at this time , had a detailed discussion with daughter.  Asa restarted Given positive stress pt at moderate risk of MACE with EGD, optimized from a cardiac standpoint     2) Anemia/ N / V:  f/U GI     3) DVT PPX : SCD

## 2019-03-15 LAB
ANION GAP SERPL CALC-SCNC: 10 MMO/L — SIGNIFICANT CHANGE UP (ref 7–14)
BUN SERPL-MCNC: 8 MG/DL — SIGNIFICANT CHANGE UP (ref 7–23)
CALCIUM SERPL-MCNC: 8.5 MG/DL — SIGNIFICANT CHANGE UP (ref 8.4–10.5)
CHLORIDE SERPL-SCNC: 103 MMOL/L — SIGNIFICANT CHANGE UP (ref 98–107)
CO2 SERPL-SCNC: 23 MMOL/L — SIGNIFICANT CHANGE UP (ref 22–31)
CREAT SERPL-MCNC: 0.97 MG/DL — SIGNIFICANT CHANGE UP (ref 0.5–1.3)
GLUCOSE SERPL-MCNC: 87 MG/DL — SIGNIFICANT CHANGE UP (ref 70–99)
HCT VFR BLD CALC: 34.2 % — LOW (ref 39–50)
HGB BLD-MCNC: 10.9 G/DL — LOW (ref 13–17)
MCHC RBC-ENTMCNC: 26.7 PG — LOW (ref 27–34)
MCHC RBC-ENTMCNC: 31.9 % — LOW (ref 32–36)
MCV RBC AUTO: 83.8 FL — SIGNIFICANT CHANGE UP (ref 80–100)
NRBC # FLD: 0 K/UL — LOW (ref 25–125)
PLATELET # BLD AUTO: 266 K/UL — SIGNIFICANT CHANGE UP (ref 150–400)
PMV BLD: 9.5 FL — SIGNIFICANT CHANGE UP (ref 7–13)
POTASSIUM SERPL-MCNC: 4.2 MMOL/L — SIGNIFICANT CHANGE UP (ref 3.5–5.3)
POTASSIUM SERPL-SCNC: 4.2 MMOL/L — SIGNIFICANT CHANGE UP (ref 3.5–5.3)
RBC # BLD: 4.08 M/UL — LOW (ref 4.2–5.8)
RBC # FLD: 15.9 % — HIGH (ref 10.3–14.5)
SODIUM SERPL-SCNC: 136 MMOL/L — SIGNIFICANT CHANGE UP (ref 135–145)
WBC # BLD: 9.66 K/UL — SIGNIFICANT CHANGE UP (ref 3.8–10.5)
WBC # FLD AUTO: 9.66 K/UL — SIGNIFICANT CHANGE UP (ref 3.8–10.5)

## 2019-03-15 PROCEDURE — 99232 SBSQ HOSP IP/OBS MODERATE 35: CPT | Mod: GC

## 2019-03-15 PROCEDURE — 99233 SBSQ HOSP IP/OBS HIGH 50: CPT

## 2019-03-15 RX ORDER — SODIUM CHLORIDE 9 MG/ML
1000 INJECTION INTRAMUSCULAR; INTRAVENOUS; SUBCUTANEOUS
Qty: 0 | Refills: 0 | Status: DISCONTINUED | OUTPATIENT
Start: 2019-03-15 | End: 2019-03-17

## 2019-03-15 RX ORDER — SODIUM CHLORIDE 9 MG/ML
500 INJECTION INTRAMUSCULAR; INTRAVENOUS; SUBCUTANEOUS ONCE
Qty: 0 | Refills: 0 | Status: DISCONTINUED | OUTPATIENT
Start: 2019-03-15 | End: 2019-03-17

## 2019-03-15 RX ADMIN — Medication 5 MILLIGRAM(S): at 21:46

## 2019-03-15 RX ADMIN — Medication 81 MILLIGRAM(S): at 17:35

## 2019-03-15 RX ADMIN — ATORVASTATIN CALCIUM 20 MILLIGRAM(S): 80 TABLET, FILM COATED ORAL at 21:46

## 2019-03-15 RX ADMIN — PANTOPRAZOLE SODIUM 40 MILLIGRAM(S): 20 TABLET, DELAYED RELEASE ORAL at 06:11

## 2019-03-15 RX ADMIN — PANTOPRAZOLE SODIUM 40 MILLIGRAM(S): 20 TABLET, DELAYED RELEASE ORAL at 17:35

## 2019-03-15 NOTE — DIETITIAN INITIAL EVALUATION ADULT. - ADHERENCE
No diet restrictions followed PTA, monitors BGs 2 times a day, before and after meals. Usual BG ranges:  mg/dL. 2/21- Hba1c: 6.8%

## 2019-03-15 NOTE — DIETITIAN INITIAL EVALUATION ADULT. - NS AS NUTRI INTERV MEALS SNACK
When medically feasible advance diet as tolerate- consider startiing with Clear liquid diet and advance at tolerated.

## 2019-03-15 NOTE — PROGRESS NOTE ADULT - ASSESSMENT
Impression:  # Nausea/vomiting: prior low grade dysplasia with marginal ulcers would raise the possibility of new gastric cancer, ulceration or stricture  # Gastric cancer s/p surgery / chemoradiation in 2006.  # Thickening of the esophagus noted on CAT scan likely due to GERD, erosive esophatitis, rule out Andrade's esophagus, bile esophagitis  # Pancreatic duct stone (6mm)  # Acute Diarrhea resolved spontaneously without workup  # Recent intracranial hemorrhage without sequelae: Unclear etiology:  noted on CT Head 2/2019  # Anemia with past history of iron deficiency anemia, this is common s/p gastrectomy as well as B-12 deficiency    Recommendations  - Diet as tolerated  - EGD canceled given hemodynamic instability.   - IV PPI BID to decrease gastric acid secretion  - Endoscopic evaluation when optimized    Nadia Pino MD  Gastroenterology Fellow  [phone]  Please page on call fellow on weekends and after 5pm on weekdays

## 2019-03-15 NOTE — DIETITIAN INITIAL EVALUATION ADULT. - PROBLEM SELECTOR PLAN 1
Ct abd/pelvis showed mild fluid distended stomach and distal esophagus with wall thickening,   Pt wants to try soft diet, will keep on soft diet, iv hydration, antiemetics, GI consult for possible EUS.

## 2019-03-15 NOTE — PROGRESS NOTE ADULT - SUBJECTIVE AND OBJECTIVE BOX
Patient is a 78y old  Male who presents with a chief complaint of vomiting and diarrhea (14 Mar 2019 16:34)      SUBJECTIVE / OVERNIGHT EVENTS:    No acute event. Pt does not report new symptoms. awaiting EGD     Review of Systems:    RESPIRATORY: No cough, wheezing, chills or hemoptysis; No shortness of breath  CARDIOVASCULAR: No chest pain, palpitations, dizziness, or leg swelling  GASTROINTESTINAL: No abdominal or epigastric pain. + nausea, vomiting. No diarrhea or constipation. No melena or hematochezia.      MEDICATIONS  (STANDING):  aspirin enteric coated 81 milliGRAM(s) Oral daily  atorvastatin 20 milliGRAM(s) Oral at bedtime  baclofen 5 milliGRAM(s) Oral three times a day  dextrose 5%. 1000 milliLiter(s) (50 mL/Hr) IV Continuous <Continuous>  dextrose 50% Injectable 12.5 Gram(s) IV Push once  dextrose 50% Injectable 25 Gram(s) IV Push once  dextrose 50% Injectable 25 Gram(s) IV Push once  docusate sodium 100 milliGRAM(s) Oral three times a day  insulin lispro (HumaLOG) corrective regimen sliding scale   SubCutaneous three times a day before meals  loratadine 10 milliGRAM(s) Oral daily  pantoprazole  Injectable 40 milliGRAM(s) IV Push every 12 hours  polyethylene glycol 3350 17 Gram(s) Oral daily  senna 2 Tablet(s) Oral at bedtime  sodium chloride 0.9%. 1000 milliLiter(s) (75 mL/Hr) IV Continuous <Continuous>    MEDICATIONS  (PRN):  aluminum hydroxide/magnesium hydroxide/simethicone Suspension 30 milliLiter(s) Oral every 4 hours PRN Dyspepsia  bisacodyl Suppository 10 milliGRAM(s) Rectal daily PRN Constipation  clobetasol 0.05% Ointment 1 Application(s) Topical two times a day PRN rash  dextrose 40% Gel 15 Gram(s) Oral once PRN Blood Glucose LESS THAN 70 milliGRAM(s)/deciliter  diphenhydrAMINE 25 milliGRAM(s) Oral every 6 hours PRN Rash and/or Itching  glucagon  Injectable 1 milliGRAM(s) IntraMuscular once PRN Glucose LESS THAN 70 milligrams/deciliter  guaiFENesin    Syrup 100 milliGRAM(s) Oral every 6 hours PRN Cough  melatonin 3 milliGRAM(s) Oral at bedtime PRN Insomnia  ondansetron Injectable 4 milliGRAM(s) IV Push every 6 hours PRN Nausea and/or Vomiting  simethicone 80 milliGRAM(s) Chew four times a day PRN Gas      PHYSICAL EXAM:  T(C): 37.2 (03-15-19 @ 05:11), Max: 37.2 (03-15-19 @ 05:11)  HR: 114 (03-15-19 @ 05:11) (64 - 114)  BP: 112/44 (03-15-19 @ 06:11) (84/53 - 114/50)  RR: 17 (03-15-19 @ 06:11) (16 - 17)  SpO2: 99% (03-15-19 @ 06:11) (94% - 100%)  I&O's Summary    14 Mar 2019 07:01  -  15 Mar 2019 07:00  --------------------------------------------------------  IN: 2500 mL / OUT: 0 mL / NET: 2500 mL      GENERAL: chronically ill appearing  male lying in bed in NAD   MENTAL STATUS/PSYCH:  AAO x3   HEAD:  Atraumatic, Normocephalic  EYES: EOMI, PERRLA, conjunctiva and sclera clear  NECK: Supple, No elevated JVD  CHEST/LUNG: Clear to auscultation bilaterally; No wheeze  HEART: Regular rate and rhythm; No murmurs, rubs, or gallops  ABDOMEN: Soft, Nontender, Nondistended; Bowel sounds present  EXTREMITIES:  2+ Peripheral Pulses, No clubbing, cyanosis, or edema  NEUROLOGY: CN II-XII grossly intact, moving all extremities  SKIN: No rashes or lesions    LABS:  CAPILLARY BLOOD GLUCOSE      POCT Blood Glucose.: 95 mg/dL (15 Mar 2019 08:09)  POCT Blood Glucose.: 90 mg/dL (15 Mar 2019 03:37)  POCT Blood Glucose.: 117 mg/dL (14 Mar 2019 22:27)  POCT Blood Glucose.: 84 mg/dL (14 Mar 2019 16:22)  POCT Blood Glucose.: 233 mg/dL (14 Mar 2019 12:44)  POCT Blood Glucose.: 222 mg/dL (14 Mar 2019 12:42)  POCT Blood Glucose.: 48 mg/dL (14 Mar 2019 12:18)                          10.9   9.66  )-----------( 266      ( 15 Mar 2019 05:30 )             34.2     03-15    136  |  103  |  8   ----------------------------<  87  4.2   |  23  |  0.97    Ca    8.5      15 Mar 2019 05:30                RADIOLOGY & ADDITIONAL TESTS:    Imaging Personally Reviewed:    Consultant(s) Notes Reviewed:      Care Discussed with Consultants/Other Providers:

## 2019-03-15 NOTE — DIETITIAN INITIAL EVALUATION ADULT. - PERTINENT MEDS FT
MEDICATIONS  (STANDING):  aspirin enteric coated 81 milliGRAM(s) Oral daily  atorvastatin 20 milliGRAM(s) Oral at bedtime  baclofen 5 milliGRAM(s) Oral three times a day  dextrose 5%. 1000 milliLiter(s) (50 mL/Hr) IV Continuous <Continuous>  dextrose 50% Injectable 12.5 Gram(s) IV Push once  dextrose 50% Injectable 25 Gram(s) IV Push once  dextrose 50% Injectable 25 Gram(s) IV Push once  docusate sodium 100 milliGRAM(s) Oral three times a day  insulin lispro (HumaLOG) corrective regimen sliding scale   SubCutaneous three times a day before meals  loratadine 10 milliGRAM(s) Oral daily  pantoprazole  Injectable 40 milliGRAM(s) IV Push every 12 hours  polyethylene glycol 3350 17 Gram(s) Oral daily  senna 2 Tablet(s) Oral at bedtime  sodium chloride 0.9%. 1000 milliLiter(s) (75 mL/Hr) IV Continuous <Continuous>    MEDICATIONS  (PRN):  aluminum hydroxide/magnesium hydroxide/simethicone Suspension 30 milliLiter(s) Oral every 4 hours PRN Dyspepsia  bisacodyl Suppository 10 milliGRAM(s) Rectal daily PRN Constipation  clobetasol 0.05% Ointment 1 Application(s) Topical two times a day PRN rash  dextrose 40% Gel 15 Gram(s) Oral once PRN Blood Glucose LESS THAN 70 milliGRAM(s)/deciliter  diphenhydrAMINE 25 milliGRAM(s) Oral every 6 hours PRN Rash and/or Itching  glucagon  Injectable 1 milliGRAM(s) IntraMuscular once PRN Glucose LESS THAN 70 milligrams/deciliter  guaiFENesin    Syrup 100 milliGRAM(s) Oral every 6 hours PRN Cough  melatonin 3 milliGRAM(s) Oral at bedtime PRN Insomnia  ondansetron Injectable 4 milliGRAM(s) IV Push every 6 hours PRN Nausea and/or Vomiting  simethicone 80 milliGRAM(s) Chew four times a day PRN Gas

## 2019-03-15 NOTE — CHART NOTE - NSCHARTNOTEFT_GEN_A_CORE
Notified by GI fellow that anesthesia canceled EGD due to hypotension and tachycardia. Went to evaluate the patient, pt BP 85/61 and HR sinus tachycardia . ordered EKG- no changes, pt has LBBB, sinus tachy and had some PVCs. d/w cardiologist Dr. Woodard, pt SBP has soft ranging from 80-low 100's. HR has been 100-110, no need to monitor pt on tele on the floor as per Dr. Woodard, can send back to medicine unit and continue to monitor vital signs. 500cc NS bolus given and NS maintenance IVF at 75cc/hr ordered. d/w attending Dr. Calvin. will continue to monitor blood pressure.

## 2019-03-15 NOTE — DIETITIAN INITIAL EVALUATION ADULT. - NS AS NUTRI INTERV MEDICAL AND FOOD SUPPLEMENTS
When PO diet is reinstated- recommend Glucerna Therapeutic Nutrition 240mls 2x daily (440kcals, 20g protein) to help with caloric and protein intake.

## 2019-03-15 NOTE — DIETITIAN INITIAL EVALUATION ADULT. - PERTINENT LABORATORY DATA
03-15 Na136 mmol/L Glu 87 mg/dL K+ 4.2 mmol/L Cr  0.97 mg/dL BUN 8 mg/dL 03-09 Phos 3.4 mg/dL 03-11 Alb 3.0 g/dL<L> 02-21 [id number] 6.8 %<H>. POCT 95, 90, 117, 84 mg/dL

## 2019-03-15 NOTE — DIETITIAN INITIAL EVALUATION ADULT. - OTHER INFO
Patient seen for extended length of stay. Per chart: 79 yo m h/o cad/cabg 2002, diastolic chf,  gastric ca s/p surgery 2006, chemoradiation. HTN, HLD, dm, JACK, GERD, carotid stenosis. chronic diffuse  pruritic rash, recently admitted here ( feb 19-26th ) for rash, headache, nausea, vomiting and wt loss, had MRI which revealed SAH, MRCP revealed Dilated main pancreatic duct down to the neck. Etiology for n/v inc. GERD  vs. pancreatic duct stone. No gastric outlet obstruction on Barium swallow test. Patient currently NPO pending EGD. RDN offered nutritional supplement for after procedure- however patient reported they are too sweet. Glucerna supplement offered and patient was amenable to try.  Patient denies any nausea/diarrhea/constipation. Patient confirmed vomiting yesterday (3/14)- patient on Zofran. Denies any difficulty chewing and swallowing. Patient reports no food allergies or intolerances. Patient unable to provide weight history- reported he does not monitor his weight. Patient however endorsed recent weight change. Weight per flowsheet: 164 pounds. (? accuracy of weight- patient doesn't not appear 164 pounds). RDN obtained weight via bed scale 118.36 pounds. No edema noted.

## 2019-03-15 NOTE — DIETITIAN INITIAL EVALUATION ADULT. - PHYSICAL APPEARANCE
Nutrition focused physical exam conducted - found signs of malnutrition. Subcutaneous fat loss: [moderate] Orbital fat pads region. Muscle wasting: [moderate]Temples region, [moderate]Clavicle region, [moderate]Shoulder region, [moderate]Calf region.

## 2019-03-15 NOTE — PROGRESS NOTE ADULT - ATTENDING COMMENTS
Problem/Plan - 1:  ·  Problem: Nausea and vomiting.  Plan: Ct abd/pelvis showed mild fluid distended stomach and distal esophagus with wall thickening,   Ct head no acute pathology   GI consult appreciated- rec upper GI series and EGD r/o gastric outlet obstruction. Pt agreed to try upper GI series today   cont liquid diet for now. NPO before test  cont supportive care IVF, zofran prn, PPI. Changed maloox to standing. added baclofen for better symptom control   monitor electrolytes. replete K, mag     Problem/Plan - 2:  ·  Problem: Watery diarrhea.  Plan: resolved.      Problem/Plan - 3:  ·  Problem: Gastric cancer.  Plan: H/o gastric cancer in the past now with wt loss. f/u oncology as outpt      Problem/Plan - 4:  ·  Problem: HTN (hypertension).  Plan: Pt not on any meds, monitor bp and start meds prn.      Problem/Plan - 5:  ·  Problem: HLD (hyperlipidemia).  Plan: cont statin.      Problem/Plan - 6:  Problem: DM (diabetes mellitus). Plan: cont consistent carb diet, monitor finger stick glucose, SS insulin for now     Problem/Plan - 7:  ·  Problem: Need for prophylactic measure.  Plan: Given recent SAH, will keep on venodynes  IMPROVE VTE Individual Risk Assessment
Problem/Plan - 1:  ·  Problem: Nausea and vomiting.  Plan: Ct abd/pelvis showed mild fluid distended stomach and distal esophagus with wall thickening,   Ct head no acute pathology  Upper GI series reviewed on gastric outlet obstruction   GI consult appreciated- will f/u Re: need for EGD  cont liquid diet for now. advance as tolerated   cont supportive care IVF, zofran prn, PPI. Changed maloox to standing. added baclofen for better symptom control   monitor electrolytes. replete K, mag     Problem/Plan - 2:  ·  Problem: Watery diarrhea.  Plan: resolved.      Problem/Plan - 3:  ·  Problem: Gastric cancer.  Plan: H/o gastric cancer in the past now with wt loss. f/u oncology as outpt      Problem/Plan - 4:  ·  Problem: HTN (hypertension).  Plan: Pt not on any meds, monitor bp and start meds prn.      Problem/Plan - 5:  ·  Problem: HLD (hyperlipidemia).  Plan: cont statin.      Problem/Plan - 6:  Problem: DM (diabetes mellitus). Plan: cont consistent carb diet, monitor finger stick glucose, SS insulin for now     Problem/Plan - 7:  ·  Problem: Need for prophylactic measure.  Plan: Given recent SAH, will keep on venodynes  IMPROVE VTE Individual Risk Assessment
I have seen and evaluated the patient with the GI Fellow and GI Team.  I agree with the findings, formulation and plan of care as documented in the GI fellow's note, except as noted.
I have seen and evaluated the patient with the GI Fellow and GI Team.  I agree with the findings, formulation and plan of care as documented in the GI fellow's note, except as noted.
See Attestation from Consult Note, signed today.
I have seen and evaluated the patient with the GI Fellow and GI Team.  I agree with the findings, formulation and plan of care as documented in the GI fellow's note, except as noted.

## 2019-03-15 NOTE — PROGRESS NOTE ADULT - SUBJECTIVE AND OBJECTIVE BOX
Mor Woodard MD  Interventional Cardiology  [place] Office : [phone] [place] [address]. [phone]  Tel:   [place] Office : [address] [facility] [place] [address]  Tel: [phone]  Cell : [phone]    Subjective : Pt lying in bed comfortable, not in distress, denies any chest pain or SOB  	  MEDICATIONS:  aspirin enteric coated 81 milliGRAM(s) Oral daily      guaiFENesin    Syrup 100 milliGRAM(s) Oral every 6 hours PRN  loratadine 10 milliGRAM(s) Oral daily    baclofen 5 milliGRAM(s) Oral three times a day  diphenhydrAMINE 25 milliGRAM(s) Oral every 6 hours PRN  melatonin 3 milliGRAM(s) Oral at bedtime PRN  ondansetron Injectable 4 milliGRAM(s) IV Push every 6 hours PRN    aluminum hydroxide/magnesium hydroxide/simethicone Suspension 30 milliLiter(s) Oral every 4 hours PRN  bisacodyl Suppository 10 milliGRAM(s) Rectal daily PRN  docusate sodium 100 milliGRAM(s) Oral three times a day  pantoprazole  Injectable 40 milliGRAM(s) IV Push every 12 hours  polyethylene glycol 3350 17 Gram(s) Oral daily  senna 2 Tablet(s) Oral at bedtime  simethicone 80 milliGRAM(s) Chew four times a day PRN    atorvastatin 20 milliGRAM(s) Oral at bedtime  dextrose 40% Gel 15 Gram(s) Oral once PRN  dextrose 50% Injectable 12.5 Gram(s) IV Push once  dextrose 50% Injectable 25 Gram(s) IV Push once  dextrose 50% Injectable 25 Gram(s) IV Push once  glucagon  Injectable 1 milliGRAM(s) IntraMuscular once PRN  insulin lispro (HumaLOG) corrective regimen sliding scale   SubCutaneous three times a day before meals    clobetasol 0.05% Ointment 1 Application(s) Topical two times a day PRN  dextrose 5%. 1000 milliLiter(s) IV Continuous <Continuous>  sodium chloride 0.9%. 1000 milliLiter(s) IV Continuous <Continuous>      PHYSICAL EXAM:  T(C): 37.2 (03-15-19 @ 05:11), Max: 37.2 (03-15-19 @ 05:11)  HR: 114 (03-15-19 @ 05:11) (64 - 114)  BP: 112/44 (03-15-19 @ 06:11) (84/53 - 114/50)  RR: 17 (03-15-19 @ 06:11) (16 - 17)  SpO2: 99% (03-15-19 @ 06:11) (94% - 100%)  Wt(kg): --  I&O's Summary    14 Mar 2019 07:01  -  15 Mar 2019 07:00  --------------------------------------------------------  IN: 2500 mL / OUT: 0 mL / NET: 2500 mL      Height (cm): 175.26 (03-15 @ 10:56)  Weight (kg): 74.8 (03-15 @ 10:56)  BMI (kg/m2): 24.4 (03-15 @ 10:56)  BSA (m2): 1.9 (03-15 @ 10:56)      Appearance: chachectic  HEENT:   Normal oral mucosa, PERRL, EOMI	  Cardiovascular: Normal S1 S2, No JVD, No murmurs, No edema  Respiratory: Lungs clear to auscultation	  Gastrointestinal:  Soft, Non-tender, + BS	  Extremities: Normal range of motion, No clubbing, cyanosis or edema                                  10.9   9.66  )-----------( 266      ( 15 Mar 2019 05:30 )             34.2     03-15    136  |  103  |  8   ----------------------------<  87  4.2   |  23  |  0.97    Ca    8.5      15 Mar 2019 05:30      proBNP:   Lipid Profile:   HgA1c:   TSH:

## 2019-03-15 NOTE — DIETITIAN INITIAL EVALUATION ADULT. - ENERGY NEEDS
Ht: patient reported height as 65 inches Wt: 118.36 pounds BMI: 19.6 kg/m2 IBW: 136 pounds (+/-10%) %IBW: 86%  Edema: no edema noted. Skin: intact, no pressure injuries noted

## 2019-03-15 NOTE — PROGRESS NOTE ADULT - ASSESSMENT
EKG SR 1st degree AV block , LBBB (old)    Echo: < from: Transthoracic Echocardiogram (02.21.19 @ 21:03) >  opening.  2. Normal left ventricular internal dimensions and wall  thicknesses.  3. Normal left ventricular systolic function. No segmental  wall motion abnormalities.  4. Normal right ventricular size and function.  5. Estimatedpulmonary artery systolic pressure equals 40  mm Hg, assuming right atrial pressure equals 10  mm Hg,  consistent with mild pulmonary hypertension.    < end of copied text >    NST:   < from: Nuclear Stress Test-Pharmacologic (04.03.17 @ 10:00) >  * Myocardial Perfusion SPECT results are abnormal.  * There is a medium sized moderate to severe defect in  proximal to mid septal wall  and mild defect in  proximal  to mid inferior wall which are predominantly reversible  consistent with moderate to severe basal septal  ischemia  and mild proximal to mid inferior ischemia  * Post-stress gated wall motion analysis was performed  (LVEF = 55 %;LVEDV = 67 ml.), revealing normal LV  function. There was paradoxical motion of the septum (LBBB  and post CABG). RV function appeared normal.    < end of copied text >      Assessment and Plan     1) Perioperative risk assessment:  Pt with positive stress in 4/17 as above, medically managed , Echo with normal LV function , EKG with LBBB , Chest pain after food likely GI related, no ischemic eval at this time , had a detailed discussion with daughter.  Asa restarted Given positive stress pt at moderate risk of MACE with EGD, optimized from a cardiac standpoint , planned for EGD today     2) Anemia/ N / V:  f/U GI     3) DVT PPX : SCD

## 2019-03-15 NOTE — PROGRESS NOTE ADULT - SUBJECTIVE AND OBJECTIVE BOX
Chief Complaint:  Patient is a 78y old  Male who presents with a chief complaint of vomiting and diarrhea (15 Mar 2019 11:38)    Interval Events:   Patient went for EGD - canceled given hypotension and tachycardia.    Allergies:  No Known Allergies    Hospital Medications:  aluminum hydroxide/magnesium hydroxide/simethicone Suspension 30 milliLiter(s) Oral every 4 hours PRN  aspirin enteric coated 81 milliGRAM(s) Oral daily  atorvastatin 20 milliGRAM(s) Oral at bedtime  baclofen 5 milliGRAM(s) Oral three times a day  bisacodyl Suppository 10 milliGRAM(s) Rectal daily PRN  clobetasol 0.05% Ointment 1 Application(s) Topical two times a day PRN  dextrose 40% Gel 15 Gram(s) Oral once PRN  dextrose 5%. 1000 milliLiter(s) IV Continuous <Continuous>  dextrose 50% Injectable 12.5 Gram(s) IV Push once  dextrose 50% Injectable 25 Gram(s) IV Push once  dextrose 50% Injectable 25 Gram(s) IV Push once  diphenhydrAMINE 25 milliGRAM(s) Oral every 6 hours PRN  docusate sodium 100 milliGRAM(s) Oral three times a day  glucagon  Injectable 1 milliGRAM(s) IntraMuscular once PRN  guaiFENesin    Syrup 100 milliGRAM(s) Oral every 6 hours PRN  insulin lispro (HumaLOG) corrective regimen sliding scale   SubCutaneous three times a day before meals  loratadine 10 milliGRAM(s) Oral daily  melatonin 3 milliGRAM(s) Oral at bedtime PRN  ondansetron Injectable 4 milliGRAM(s) IV Push every 6 hours PRN  pantoprazole  Injectable 40 milliGRAM(s) IV Push every 12 hours  polyethylene glycol 3350 17 Gram(s) Oral daily  senna 2 Tablet(s) Oral at bedtime  simethicone 80 milliGRAM(s) Chew four times a day PRN  sodium chloride 0.9% Bolus 500 milliLiter(s) IV Bolus once  sodium chloride 0.9%. 1000 milliLiter(s) IV Continuous <Continuous>    PMHX/PSHX:  GERD (gastroesophageal reflux disease)  Gastric cancer  Carotid stenosis  JACK (iron deficiency anemia)  HLD (hyperlipidemia)  HTN (hypertension)  CAD (coronary artery disease)  Hives  DM (diabetes mellitus)  BPH (Benign Prostatic Hypertrophy)  GERD (Gastroesophageal Reflux Disease)  S/P CABG X 3  Hypercholesteremia  CAD (Coronary Artery Disease)  S/P Radiation > 12 Weeks  S/P Chemotherapy, Time Since Greater than 12 Weeks  Anemia  BPH (Benign Prostatic Hypertrophy)  GERD (Gastroesophageal Reflux Disease)  Dyslipidemia  HTN - Hypertension  CAD (Coronary Artery Disease)  History of Stomach Cancer  S/P Gastrectomy  Atypical Chest Pain  Status Post Gastrectomy  S/P CABG X 4  Status Post Gastrectomy  S/P CABG X 4    ROS:   General:  No fevers, chills or night sweats  ENT:  No sore throat or dysphagia  CV:  No pain or palpitations  Resp:  No dyspnea, cough or  wheezing  GI:  No pain, No nausea, No vomiting, No rectal bleeding, No tarry stools,  Skin:  No rash or edema    PHYSICAL EXAM:   Vital Signs:  Vital Signs Last 24 Hrs  T(C): 36.8 (15 Mar 2019 13:10), Max: 37.2 (15 Mar 2019 05:11)  T(F): 98.3 (15 Mar 2019 13:10), Max: 99 (15 Mar 2019 05:11)  HR: 110 (15 Mar 2019 13:10) (70 - 114)  BP: 98/62 (15 Mar 2019 13:10) (84/53 - 112/44)  BP(mean): --  RR: 17 (15 Mar 2019 13:10) (16 - 17)  SpO2: 100% (15 Mar 2019 13:10) (94% - 100%)  Daily Height in cm: 175.26 (15 Mar 2019 10:56)    Daily Weight in k.6 (15 Mar 2019 12:55)    GENERAL:  NAD  EYES: EOMI, PERRL   CHEST:  Full & symmetric excursion, no increased effort, breath sounds clear  HEART:  Regular rhythm, S1, S2,  ABDOMEN:  Soft, non-tender, non-distended, normoactive bowel sounds,  EXTREMITIES:  no cyanosis,clubbing or edema  SKIN:  No rash/erythema  NEURO:  Alert, oriented    LABS:                        10.9   9.66  )-----------( 266      ( 15 Mar 2019 05:30 )             34.2     Mean Cell Volume: 83.8 fL (03-15-19 @ 05:30)    03-15    136  |  103  |  8   ----------------------------<  87  4.2   |  23  |  0.97    Ca    8.5      15 Mar 2019 05:30      Imaging:

## 2019-03-15 NOTE — PROGRESS NOTE ADULT - NSHPATTENDINGPLANDISCUSS_GEN_ALL_CORE
np, pt
np, pt
pt, pa
pt, pa
GI Fellow Dr. Pino
pt, pa

## 2019-03-15 NOTE — PROGRESS NOTE ADULT - ASSESSMENT
79 yo m h/o cad/cabg 2002, diastolic chf,  gastric ca s/p surgery 2006, chemoradiation. HTN, HLD, dm, JACK, GERD, carotid stenosis. chronic diffuse  pruritic rash, recently admitted here ( feb 19-26th ) for rash, headache, nausea, vomiting and wt loss, had MRI which revealed SAH, MRCP revealed Dilated main pancreatic duct down to the neck. Etiology for n/v inc. GERD  vs. pancreatic duct stone. No gastric outlet obstruction on Barium swallow test    Problem/Plan - 1:  ·  Problem: Nausea and vomiting.  Plan: Ct abd/pelvis showed mild fluid distended stomach and distal esophagus with wall thickening,   Ct head no acute pathology  Upper GI series reviewed on gastric outlet obstruction   GI consult appreciated- plan for EGD today. will f/u recs  cont liquid diet post egd. advance as tolerated   cont supportive care IVF, zofran prn, PPI. Changed maloox to standing. added baclofen for better symptom control   monitor electrolytes. replete K, mag     Problem/Plan - 2:  ·  Problem: CAD.  Plan: asymptomatic. hx of abnormal stress. cont asa, statin      Problem/Plan - 3:  ·  Problem: Gastric cancer.  Plan: H/o gastric cancer in the past now with wt loss. f/u oncology as outpt      Problem/Plan - 4:  ·  Problem: HTN (hypertension).  Plan: Pt not on any meds, monitor bp and start meds prn.      Problem/Plan - 5:  ·  Problem: HLD (hyperlipidemia).  Plan: cont statin.      Problem/Plan - 6:  Problem: DM (diabetes mellitus). Plan: cont consistent carb diet, monitor finger stick glucose, SS insulin for now     Problem/Plan - 7:  ·  Problem: Need for prophylactic measure.  Plan: Given recent SAH, will keep on venodynes  I

## 2019-03-16 DIAGNOSIS — I95.9 HYPOTENSION, UNSPECIFIED: ICD-10-CM

## 2019-03-16 DIAGNOSIS — R19.7 DIARRHEA, UNSPECIFIED: ICD-10-CM

## 2019-03-16 DIAGNOSIS — R11.2 NAUSEA WITH VOMITING, UNSPECIFIED: ICD-10-CM

## 2019-03-16 LAB
ALBUMIN SERPL ELPH-MCNC: 2.7 G/DL — LOW (ref 3.3–5)
ALP SERPL-CCNC: 53 U/L — SIGNIFICANT CHANGE UP (ref 40–120)
ALT FLD-CCNC: 9 U/L — SIGNIFICANT CHANGE UP (ref 4–41)
ANION GAP SERPL CALC-SCNC: 12 MMO/L — SIGNIFICANT CHANGE UP (ref 7–14)
ANION GAP SERPL CALC-SCNC: 9 MMO/L — SIGNIFICANT CHANGE UP (ref 7–14)
AST SERPL-CCNC: 14 U/L — SIGNIFICANT CHANGE UP (ref 4–40)
BILIRUB SERPL-MCNC: 0.5 MG/DL — SIGNIFICANT CHANGE UP (ref 0.2–1.2)
BLD GP AB SCN SERPL QL: NEGATIVE — SIGNIFICANT CHANGE UP
BUN SERPL-MCNC: 10 MG/DL — SIGNIFICANT CHANGE UP (ref 7–23)
BUN SERPL-MCNC: 12 MG/DL — SIGNIFICANT CHANGE UP (ref 7–23)
CALCIUM SERPL-MCNC: 5.8 MG/DL — CRITICAL LOW (ref 8.4–10.5)
CALCIUM SERPL-MCNC: 7.9 MG/DL — LOW (ref 8.4–10.5)
CHLORIDE SERPL-SCNC: 102 MMOL/L — SIGNIFICANT CHANGE UP (ref 98–107)
CHLORIDE SERPL-SCNC: 112 MMOL/L — HIGH (ref 98–107)
CO2 SERPL-SCNC: 17 MMOL/L — LOW (ref 22–31)
CO2 SERPL-SCNC: 21 MMOL/L — LOW (ref 22–31)
CREAT SERPL-MCNC: 0.83 MG/DL — SIGNIFICANT CHANGE UP (ref 0.5–1.3)
CREAT SERPL-MCNC: 1 MG/DL — SIGNIFICANT CHANGE UP (ref 0.5–1.3)
GLUCOSE SERPL-MCNC: 78 MG/DL — SIGNIFICANT CHANGE UP (ref 70–99)
GLUCOSE SERPL-MCNC: 90 MG/DL — SIGNIFICANT CHANGE UP (ref 70–99)
HCT VFR BLD CALC: 27 % — LOW (ref 39–50)
HGB BLD-MCNC: 8.6 G/DL — LOW (ref 13–17)
MCHC RBC-ENTMCNC: 26.7 PG — LOW (ref 27–34)
MCHC RBC-ENTMCNC: 31.9 % — LOW (ref 32–36)
MCV RBC AUTO: 83.9 FL — SIGNIFICANT CHANGE UP (ref 80–100)
NRBC # FLD: 0 K/UL — LOW (ref 25–125)
PLATELET # BLD AUTO: 208 K/UL — SIGNIFICANT CHANGE UP (ref 150–400)
PMV BLD: 9.7 FL — SIGNIFICANT CHANGE UP (ref 7–13)
POTASSIUM SERPL-MCNC: 3.1 MMOL/L — LOW (ref 3.5–5.3)
POTASSIUM SERPL-MCNC: 4.1 MMOL/L — SIGNIFICANT CHANGE UP (ref 3.5–5.3)
POTASSIUM SERPL-SCNC: 3.1 MMOL/L — LOW (ref 3.5–5.3)
POTASSIUM SERPL-SCNC: 4.1 MMOL/L — SIGNIFICANT CHANGE UP (ref 3.5–5.3)
PROT SERPL-MCNC: 5.5 G/DL — LOW (ref 6–8.3)
RBC # BLD: 3.22 M/UL — LOW (ref 4.2–5.8)
RBC # FLD: 16.1 % — HIGH (ref 10.3–14.5)
RH IG SCN BLD-IMP: POSITIVE — SIGNIFICANT CHANGE UP
SODIUM SERPL-SCNC: 135 MMOL/L — SIGNIFICANT CHANGE UP (ref 135–145)
SODIUM SERPL-SCNC: 138 MMOL/L — SIGNIFICANT CHANGE UP (ref 135–145)
WBC # BLD: 5.77 K/UL — SIGNIFICANT CHANGE UP (ref 3.8–10.5)
WBC # FLD AUTO: 5.77 K/UL — SIGNIFICANT CHANGE UP (ref 3.8–10.5)

## 2019-03-16 PROCEDURE — 99233 SBSQ HOSP IP/OBS HIGH 50: CPT

## 2019-03-16 RX ADMIN — ATORVASTATIN CALCIUM 20 MILLIGRAM(S): 80 TABLET, FILM COATED ORAL at 22:21

## 2019-03-16 RX ADMIN — Medication 5 MILLIGRAM(S): at 14:00

## 2019-03-16 RX ADMIN — Medication 25 MILLIGRAM(S): at 22:29

## 2019-03-16 RX ADMIN — SODIUM CHLORIDE 75 MILLILITER(S): 9 INJECTION INTRAMUSCULAR; INTRAVENOUS; SUBCUTANEOUS at 22:21

## 2019-03-16 RX ADMIN — Medication 100 MILLIGRAM(S): at 08:57

## 2019-03-16 RX ADMIN — PANTOPRAZOLE SODIUM 40 MILLIGRAM(S): 20 TABLET, DELAYED RELEASE ORAL at 05:11

## 2019-03-16 RX ADMIN — SODIUM CHLORIDE 75 MILLILITER(S): 9 INJECTION INTRAMUSCULAR; INTRAVENOUS; SUBCUTANEOUS at 05:11

## 2019-03-16 RX ADMIN — Medication 81 MILLIGRAM(S): at 14:01

## 2019-03-16 RX ADMIN — Medication 100 MILLIGRAM(S): at 22:21

## 2019-03-16 RX ADMIN — LORATADINE 10 MILLIGRAM(S): 10 TABLET ORAL at 14:01

## 2019-03-16 RX ADMIN — SIMETHICONE 80 MILLIGRAM(S): 80 TABLET, CHEWABLE ORAL at 16:10

## 2019-03-16 RX ADMIN — PANTOPRAZOLE SODIUM 40 MILLIGRAM(S): 20 TABLET, DELAYED RELEASE ORAL at 17:34

## 2019-03-16 RX ADMIN — Medication 30 MILLILITER(S): at 14:02

## 2019-03-16 RX ADMIN — Medication 1: at 17:33

## 2019-03-16 RX ADMIN — Medication 5 MILLIGRAM(S): at 22:21

## 2019-03-16 RX ADMIN — Medication 5 MILLIGRAM(S): at 05:11

## 2019-03-16 RX ADMIN — SODIUM CHLORIDE 75 MILLILITER(S): 9 INJECTION INTRAMUSCULAR; INTRAVENOUS; SUBCUTANEOUS at 08:57

## 2019-03-16 RX ADMIN — Medication 100 MILLIGRAM(S): at 14:01

## 2019-03-16 RX ADMIN — POLYETHYLENE GLYCOL 3350 17 GRAM(S): 17 POWDER, FOR SOLUTION ORAL at 14:00

## 2019-03-16 NOTE — PROGRESS NOTE ADULT - SUBJECTIVE AND OBJECTIVE BOX
Mor Woodard MD  Interventional Cardiology  [place] Office : [phone] [place] [address] [place]. [address]  Tel:   [place] Office : [address] [facility] [place] [address]  Tel: [phone]  Cell : [phone]    Subjective : Pt lying in bed comfortable, not in distress, denies any chest pain or SOB  	  MEDICATIONS:  aspirin enteric coated 81 milliGRAM(s) Oral daily      guaiFENesin    Syrup 100 milliGRAM(s) Oral every 6 hours PRN  loratadine 10 milliGRAM(s) Oral daily    baclofen 5 milliGRAM(s) Oral three times a day  diphenhydrAMINE 25 milliGRAM(s) Oral every 6 hours PRN  melatonin 3 milliGRAM(s) Oral at bedtime PRN  ondansetron Injectable 4 milliGRAM(s) IV Push every 6 hours PRN    aluminum hydroxide/magnesium hydroxide/simethicone Suspension 30 milliLiter(s) Oral every 4 hours PRN  bisacodyl Suppository 10 milliGRAM(s) Rectal daily PRN  docusate sodium 100 milliGRAM(s) Oral three times a day  pantoprazole  Injectable 40 milliGRAM(s) IV Push every 12 hours  polyethylene glycol 3350 17 Gram(s) Oral daily  senna 2 Tablet(s) Oral at bedtime  simethicone 80 milliGRAM(s) Chew four times a day PRN    atorvastatin 20 milliGRAM(s) Oral at bedtime  dextrose 40% Gel 15 Gram(s) Oral once PRN  dextrose 50% Injectable 12.5 Gram(s) IV Push once  dextrose 50% Injectable 25 Gram(s) IV Push once  dextrose 50% Injectable 25 Gram(s) IV Push once  glucagon  Injectable 1 milliGRAM(s) IntraMuscular once PRN  insulin lispro (HumaLOG) corrective regimen sliding scale   SubCutaneous three times a day before meals    clobetasol 0.05% Ointment 1 Application(s) Topical two times a day PRN  dextrose 5%. 1000 milliLiter(s) IV Continuous <Continuous>  sodium chloride 0.9% Bolus 500 milliLiter(s) IV Bolus once  sodium chloride 0.9%. 1000 milliLiter(s) IV Continuous <Continuous>      PHYSICAL EXAM:  T(C): 36.3 (03-16-19 @ 14:00), Max: 36.7 (03-15-19 @ 18:51)  HR: 85 (03-16-19 @ 14:00) (72 - 114)  BP: 118/47 (03-16-19 @ 14:00) (66/43 - 118/47)  RR: 18 (03-16-19 @ 14:00) (17 - 20)  SpO2: 100% (03-16-19 @ 14:00) (96% - 100%)  Wt(kg): --  I&O's Summary    15 Mar 2019 07:01  -  16 Mar 2019 07:00  --------------------------------------------------------  IN: 1890 mL / OUT: 0 mL / NET: 1890 mL        Appearance: chachectic  HEENT:   Normal oral mucosa, PERRL, EOMI	  Cardiovascular: Normal S1 S2, No JVD, No murmurs, No edema  Respiratory: Lungs clear to auscultation	  Gastrointestinal:  Soft, Non-tender, + BS	  Extremities: Normal range of motion, No clubbing, cyanosis or edema                                      8.6    5.77  )-----------( 208      ( 16 Mar 2019 06:00 )             27.0     03-16    135  |  102  |  12  ----------------------------<  90  4.1   |  21<L>  |  1.00    Ca    7.9<L>      16 Mar 2019 08:30    TPro  5.5<L>  /  Alb  2.7<L>  /  TBili  0.5  /  DBili  x   /  AST  14  /  ALT  9   /  AlkPhos  53  03-16    proBNP:   Lipid Profile:   HgA1c:   TSH:

## 2019-03-16 NOTE — PROGRESS NOTE ADULT - ASSESSMENT
79 yo m h/o cad/cabg 2002, diastolic chf,  gastric ca s/p surgery 2006, chemoradiation. HTN, HLD, dm, JACK, GERD, carotid stenosis. chronic diffuse  pruritic rash, recently admitted here ( feb 19-26th ) for rash, headache, nausea, vomiting and wt loss, had MRI which revealed SAH, MRCP revealed Dilated main pancreatic duct down to the neck. Etiology for n/v inc. GERD  vs. pancreatic duct stone. No gastric outlet obstruction on Barium swallow test

## 2019-03-16 NOTE — PROGRESS NOTE ADULT - ASSESSMENT
EKG SR 1st degree AV block , LBBB (old)    Echo: < from: Transthoracic Echocardiogram (02.21.19 @ 21:03) >  opening.  2. Normal left ventricular internal dimensions and wall  thicknesses.  3. Normal left ventricular systolic function. No segmental  wall motion abnormalities.  4. Normal right ventricular size and function.  5. Estimatedpulmonary artery systolic pressure equals 40  mm Hg, assuming right atrial pressure equals 10  mm Hg,  consistent with mild pulmonary hypertension.    < end of copied text >    NST:   < from: Nuclear Stress Test-Pharmacologic (04.03.17 @ 10:00) >  * Myocardial Perfusion SPECT results are abnormal.  * There is a medium sized moderate to severe defect in  proximal to mid septal wall  and mild defect in  proximal  to mid inferior wall which are predominantly reversible  consistent with moderate to severe basal septal  ischemia  and mild proximal to mid inferior ischemia  * Post-stress gated wall motion analysis was performed  (LVEF = 55 %;LVEDV = 67 ml.), revealing normal LV  function. There was paradoxical motion of the septum (LBBB  and post CABG). RV function appeared normal.    < end of copied text >      Assessment and Plan     1) Perioperative risk assessment:  Pt with positive stress in 4/17 as above, medically managed , Echo with normal LV function , EKG with LBBB , Chest pain after food likely GI related, no ischemic eval at this time , had a detailed discussion with daughter.  Asa restarted Given positive stress pt at moderate risk of MACE with EGD, optimized from a cardiac standpoint , EGD cancelled yesterday 2/2 Hypotension and tachycardia, volume down on exam c/w IVF for now     2) Anemia/ N / V:  f/U GI     3) DVT PPX : SCD

## 2019-03-16 NOTE — PROGRESS NOTE ADULT - NSICDXPROBLEM_GEN_ALL_CORE_FT
PROBLEM DIAGNOSES  Problem: Nausea and vomiting  Assessment and Plan: - Ct abd/pelvis reviewed: mild fluid distended stomach and distal esophagus with wall thickening,   - Ct head no acute pathology  - Upper GI series reviewed no gastric outlet obstruction   - GI consult appreciated- EGD cancelled 2/2 hypotension on Friday, reschedule pending improvement in BP   - Cont liquid diet for now  - C/W zofran prn, PPI. Changed maalox to standing. added baclofen for better symptom control, continue Simethicone for gas relief   - monitor electrolytes. replete K, mag    Problem: Hypotension  Assessment and Plan: - Likely hypovolemic from N/V and poor PO intake  - Continue IVF hydration  - Consider trial of Midodrine     Problem: DM (diabetes mellitus)  Assessment and Plan: - Continue carb consistent diet  - On HISS     Problem: Gastric cancer  Assessment and Plan: - H/o gastric cancer in the past now with wt loss.  - EGD pending, concern for possible recurrence?    Problem: HLD (hyperlipidemia)  Assessment and Plan: - Continue statin     Problem: Need for prophylactic measure  Assessment and Plan: - IMPROVE 2  - On Venodynes given recent SAH PROBLEM DIAGNOSES  Problem: Nausea and vomiting  Assessment and Plan: - Ct abd/pelvis reviewed: mild fluid distended stomach and distal esophagus with wall thickening,   - Ct head no acute pathology  - Upper GI series reviewed no gastric outlet obstruction   - GI consult appreciated- EGD cancelled 2/2 hypotension on Friday, reschedule pending improvement in BP   - Cont liquid diet for now  - C/W zofran prn, PPI. Continue maalox, added baclofen for better symptom control, continue Simethicone for gas relief   - monitor electrolytes. replete K, mag    Problem: Hypotension  Assessment and Plan: - Likely hypovolemic from N/V and poor PO intake  - Continue IVF hydration  - Consider trial of Midodrine     Problem: DM (diabetes mellitus)  Assessment and Plan: - Continue carb consistent diet  - On HISS     Problem: Gastric cancer  Assessment and Plan: - H/o gastric cancer in the past now with wt loss.  - EGD pending, concern for possible recurrence?    Problem: HLD (hyperlipidemia)  Assessment and Plan: - Continue statin     Problem: Need for prophylactic measure  Assessment and Plan: - IMPROVE 2  - On Venodynes given recent SAH

## 2019-03-16 NOTE — PROVIDER CONTACT NOTE (CRITICAL VALUE NOTIFICATION) - BACKGROUND
Patient admitted with nausea/ vomiting from unknown cause. History of GERD, gastric cancer, diarrhea, gastrectomy.

## 2019-03-16 NOTE — PROGRESS NOTE ADULT - SUBJECTIVE AND OBJECTIVE BOX
Patient is a 78y old  Male who presents with a chief complaint of vomiting and diarrhea (15 Mar 2019 17:13)        SUBJECTIVE / OVERNIGHT EVENTS: Pt with excess amounts of gas, makes it difficult for him to eat or drink because he keeps belching.        MEDICATIONS  (STANDING):  aspirin enteric coated 81 milliGRAM(s) Oral daily  atorvastatin 20 milliGRAM(s) Oral at bedtime  baclofen 5 milliGRAM(s) Oral three times a day  dextrose 5%. 1000 milliLiter(s) (50 mL/Hr) IV Continuous <Continuous>  dextrose 50% Injectable 12.5 Gram(s) IV Push once  dextrose 50% Injectable 25 Gram(s) IV Push once  dextrose 50% Injectable 25 Gram(s) IV Push once  docusate sodium 100 milliGRAM(s) Oral three times a day  insulin lispro (HumaLOG) corrective regimen sliding scale   SubCutaneous three times a day before meals  loratadine 10 milliGRAM(s) Oral daily  pantoprazole  Injectable 40 milliGRAM(s) IV Push every 12 hours  polyethylene glycol 3350 17 Gram(s) Oral daily  senna 2 Tablet(s) Oral at bedtime  sodium chloride 0.9% Bolus 500 milliLiter(s) IV Bolus once  sodium chloride 0.9%. 1000 milliLiter(s) (75 mL/Hr) IV Continuous <Continuous>    MEDICATIONS  (PRN):  aluminum hydroxide/magnesium hydroxide/simethicone Suspension 30 milliLiter(s) Oral every 4 hours PRN Dyspepsia  bisacodyl Suppository 10 milliGRAM(s) Rectal daily PRN Constipation  clobetasol 0.05% Ointment 1 Application(s) Topical two times a day PRN rash  dextrose 40% Gel 15 Gram(s) Oral once PRN Blood Glucose LESS THAN 70 milliGRAM(s)/deciliter  diphenhydrAMINE 25 milliGRAM(s) Oral every 6 hours PRN Rash and/or Itching  glucagon  Injectable 1 milliGRAM(s) IntraMuscular once PRN Glucose LESS THAN 70 milligrams/deciliter  guaiFENesin    Syrup 100 milliGRAM(s) Oral every 6 hours PRN Cough  melatonin 3 milliGRAM(s) Oral at bedtime PRN Insomnia  ondansetron Injectable 4 milliGRAM(s) IV Push every 6 hours PRN Nausea and/or Vomiting  simethicone 80 milliGRAM(s) Chew four times a day PRN Gas      Vital Signs Last 24 Hrs  T(C): 36.3 (16 Mar 2019 14:00), Max: 36.7 (15 Mar 2019 18:51)  T(F): 97.3 (16 Mar 2019 14:00), Max: 98.1 (15 Mar 2019 18:51)  HR: 85 (16 Mar 2019 14:00) (72 - 114)  BP: 118/47 (16 Mar 2019 14:00) (66/43 - 118/47)  BP(mean): --  RR: 18 (16 Mar 2019 14:00) (17 - 20)  SpO2: 100% (16 Mar 2019 14:00) (96% - 100%)  CAPILLARY BLOOD GLUCOSE      POCT Blood Glucose.: 107 mg/dL (16 Mar 2019 12:24)  POCT Blood Glucose.: 108 mg/dL (16 Mar 2019 07:56)  POCT Blood Glucose.: 128 mg/dL (15 Mar 2019 21:20)  POCT Blood Glucose.: 108 mg/dL (15 Mar 2019 17:34)  POCT Blood Glucose.: 63 mg/dL (15 Mar 2019 16:34)    I&O's Summary    15 Mar 2019 07:01  -  16 Mar 2019 07:00  --------------------------------------------------------  IN: 1890 mL / OUT: 0 mL / NET: 1890 mL          PHYSICAL EXAM  GENERAL: NAD, well-developed, cachectic   HEAD:  Atraumatic, Normocephalic  EYES: EOMI, PERRLA, conjunctiva and sclera clear  NECK: Supple, No JVD  CHEST/LUNG: Clear to auscultation bilaterally; No wheeze  HEART: Regular rate and rhythm; No murmurs, rubs, or gallops  ABDOMEN: Soft, Nontender, Nondistended; Bowel sounds present  EXTREMITIES:  2+ Peripheral Pulses, No clubbing, cyanosis, or edema  PSYCH: AAOx3  SKIN: No rashes or lesions    LABS:                        8.6    5.77  )-----------( 208      ( 16 Mar 2019 06:00 )             27.0     03-16    135  |  102  |  12  ----------------------------<  90  4.1   |  21<L>  |  1.00    Ca    7.9<L>      16 Mar 2019 08:30    TPro  5.5<L>  /  Alb  2.7<L>  /  TBili  0.5  /  DBili  x   /  AST  14  /  ALT  9   /  AlkPhos  53  03-16        RADIOLOGY & ADDITIONAL TESTS:    Imaging Personally Reviewed:  < from: CT Head No Cont (03.09.19 @ 16:36) >  IMPRESSION: No acute intracranial findings.    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    Tiny areas of encephalomalacia and gliosis within the frontal lobes   related to prior contusion.    < end of copied text >  < from: CT Abdomen and Pelvis w/ Oral Cont and w/ IV Cont (03.07.19 @ 23:41) >  IMPRESSION:     No bowel obstruction.    Mild fluid distended stomach and distal esophagus with wall thickening,   compatible with clinical history of vomiting. Possibility of esophagitis   or gastroesophageal reflux disease considered. Consider nonemergent   endoscopic evaluation if there is concern for underlying malignancy.    Additional findings as mentioned above.    < end of copied text >    Consultant(s) Notes Reviewed:  GI, cards

## 2019-03-16 NOTE — PROVIDER CONTACT NOTE (CRITICAL VALUE NOTIFICATION) - ACTION/TREATMENT ORDERED:
awaiting order to redraw specimen  will continue to monitor
No further treatment ordered at this time.

## 2019-03-16 NOTE — PROVIDER CONTACT NOTE (CRITICAL VALUE NOTIFICATION) - ASSESSMENT
No acute signs of distress. Patient denies muscle cramping/ spams, tingling, shortness of breath, chest pain.

## 2019-03-17 DIAGNOSIS — Z86.79 PERSONAL HISTORY OF OTHER DISEASES OF THE CIRCULATORY SYSTEM: ICD-10-CM

## 2019-03-17 LAB
ANION GAP SERPL CALC-SCNC: 10 MMO/L — SIGNIFICANT CHANGE UP (ref 7–14)
BUN SERPL-MCNC: 9 MG/DL — SIGNIFICANT CHANGE UP (ref 7–23)
CALCIUM SERPL-MCNC: 9 MG/DL — SIGNIFICANT CHANGE UP (ref 8.4–10.5)
CHLORIDE SERPL-SCNC: 103 MMOL/L — SIGNIFICANT CHANGE UP (ref 98–107)
CO2 SERPL-SCNC: 25 MMOL/L — SIGNIFICANT CHANGE UP (ref 22–31)
CREAT SERPL-MCNC: 0.85 MG/DL — SIGNIFICANT CHANGE UP (ref 0.5–1.3)
GLUCOSE SERPL-MCNC: 89 MG/DL — SIGNIFICANT CHANGE UP (ref 70–99)
HCT VFR BLD CALC: 34.9 % — LOW (ref 39–50)
HGB BLD-MCNC: 11.6 G/DL — LOW (ref 13–17)
MCHC RBC-ENTMCNC: 27.2 PG — SIGNIFICANT CHANGE UP (ref 27–34)
MCHC RBC-ENTMCNC: 33.2 % — SIGNIFICANT CHANGE UP (ref 32–36)
MCV RBC AUTO: 81.7 FL — SIGNIFICANT CHANGE UP (ref 80–100)
NRBC # FLD: 0.02 K/UL — LOW (ref 25–125)
PLATELET # BLD AUTO: 267 K/UL — SIGNIFICANT CHANGE UP (ref 150–400)
PMV BLD: 10.1 FL — SIGNIFICANT CHANGE UP (ref 7–13)
POTASSIUM SERPL-MCNC: 4 MMOL/L — SIGNIFICANT CHANGE UP (ref 3.5–5.3)
POTASSIUM SERPL-SCNC: 4 MMOL/L — SIGNIFICANT CHANGE UP (ref 3.5–5.3)
RBC # BLD: 4.27 M/UL — SIGNIFICANT CHANGE UP (ref 4.2–5.8)
RBC # FLD: 16.3 % — HIGH (ref 10.3–14.5)
SODIUM SERPL-SCNC: 138 MMOL/L — SIGNIFICANT CHANGE UP (ref 135–145)
WBC # BLD: 6.19 K/UL — SIGNIFICANT CHANGE UP (ref 3.8–10.5)
WBC # FLD AUTO: 6.19 K/UL — SIGNIFICANT CHANGE UP (ref 3.8–10.5)

## 2019-03-17 PROCEDURE — 99233 SBSQ HOSP IP/OBS HIGH 50: CPT

## 2019-03-17 RX ORDER — PANTOPRAZOLE SODIUM 20 MG/1
40 TABLET, DELAYED RELEASE ORAL
Qty: 0 | Refills: 0 | Status: DISCONTINUED | OUTPATIENT
Start: 2019-03-17 | End: 2019-03-19

## 2019-03-17 RX ORDER — SODIUM CHLORIDE 9 MG/ML
1000 INJECTION INTRAMUSCULAR; INTRAVENOUS; SUBCUTANEOUS
Qty: 0 | Refills: 0 | Status: DISCONTINUED | OUTPATIENT
Start: 2019-03-17 | End: 2019-03-18

## 2019-03-17 RX ADMIN — Medication 3 MILLIGRAM(S): at 21:16

## 2019-03-17 RX ADMIN — LORATADINE 10 MILLIGRAM(S): 10 TABLET ORAL at 12:38

## 2019-03-17 RX ADMIN — SODIUM CHLORIDE 75 MILLILITER(S): 9 INJECTION INTRAMUSCULAR; INTRAVENOUS; SUBCUTANEOUS at 05:30

## 2019-03-17 RX ADMIN — PANTOPRAZOLE SODIUM 40 MILLIGRAM(S): 20 TABLET, DELAYED RELEASE ORAL at 05:31

## 2019-03-17 RX ADMIN — Medication 5 MILLIGRAM(S): at 12:38

## 2019-03-17 RX ADMIN — Medication 100 MILLIGRAM(S): at 12:38

## 2019-03-17 RX ADMIN — SODIUM CHLORIDE 100 MILLILITER(S): 9 INJECTION INTRAMUSCULAR; INTRAVENOUS; SUBCUTANEOUS at 15:46

## 2019-03-17 RX ADMIN — POLYETHYLENE GLYCOL 3350 17 GRAM(S): 17 POWDER, FOR SOLUTION ORAL at 12:38

## 2019-03-17 RX ADMIN — Medication 81 MILLIGRAM(S): at 12:38

## 2019-03-17 RX ADMIN — Medication 5 MILLIGRAM(S): at 05:30

## 2019-03-17 RX ADMIN — SIMETHICONE 80 MILLIGRAM(S): 80 TABLET, CHEWABLE ORAL at 15:45

## 2019-03-17 RX ADMIN — Medication 100 MILLIGRAM(S): at 05:31

## 2019-03-17 RX ADMIN — Medication 5 MILLIGRAM(S): at 21:16

## 2019-03-17 RX ADMIN — Medication 100 MILLIGRAM(S): at 21:15

## 2019-03-17 RX ADMIN — ATORVASTATIN CALCIUM 20 MILLIGRAM(S): 80 TABLET, FILM COATED ORAL at 21:18

## 2019-03-17 RX ADMIN — SENNA PLUS 2 TABLET(S): 8.6 TABLET ORAL at 21:16

## 2019-03-17 RX ADMIN — SIMETHICONE 80 MILLIGRAM(S): 80 TABLET, CHEWABLE ORAL at 21:18

## 2019-03-17 NOTE — PROGRESS NOTE ADULT - ASSESSMENT
EKG SR 1st degree AV block , LBBB (old)    Echo: < from: Transthoracic Echocardiogram (02.21.19 @ 21:03) >  opening.  2. Normal left ventricular internal dimensions and wall  thicknesses.  3. Normal left ventricular systolic function. No segmental  wall motion abnormalities.  4. Normal right ventricular size and function.  5. Estimatedpulmonary artery systolic pressure equals 40  mm Hg, assuming right atrial pressure equals 10  mm Hg,  consistent with mild pulmonary hypertension.    < end of copied text >    NST:   < from: Nuclear Stress Test-Pharmacologic (04.03.17 @ 10:00) >  * Myocardial Perfusion SPECT results are abnormal.  * There is a medium sized moderate to severe defect in  proximal to mid septal wall  and mild defect in  proximal  to mid inferior wall which are predominantly reversible  consistent with moderate to severe basal septal  ischemia  and mild proximal to mid inferior ischemia  * Post-stress gated wall motion analysis was performed  (LVEF = 55 %;LVEDV = 67 ml.), revealing normal LV  function. There was paradoxical motion of the septum (LBBB  and post CABG). RV function appeared normal.    < end of copied text >      Assessment and Plan     1) Perioperative risk assessment:  Pt with positive stress in 4/17 as above, medically managed , Echo with normal LV function , EKG with LBBB , Chest pain after food likely GI related, no ischemic eval at this time , had a detailed discussion with daughter.  Asa restarted Given positive stress pt at moderate risk of MACE with EGD, optimized from a cardiac standpoint , EGD cancelled  2/2 Hypotension and tachycardia, volume down on exam c/w IVF for now , BP and Hr improved     2) Anemia/ N / V:  f/U GI     3) DVT PPX : SCD

## 2019-03-17 NOTE — CHART NOTE - NSCHARTNOTEFT_GEN_A_CORE
Gastroenterology Brief Note   - EGD cancelled Friday due to hypotension, positive stress test, and concern for new ST depression by endoscopy team   - patient now has been HD stable and cardiology has cleared by patient   - called by medicine attending who also contacted GI attending requesting patient be re-considered for endoscopy   - given updates inpatient status will put patient on Endoscopy schedule   - tenative EGD tomorrow pending discusiion with ansthesia in the AM   - NPO after midnight Gastroenterology Brief Note   - EGD cancelled Friday due to hypotension, positive stress test, and concern for new ST depression by endoscopy team   - patient now has been HD stable and cardiology has cleared by patient   - called by medicine attending who also contacted GI attending requesting patient be re-considered for endoscopy   - given updates inpatient status will put patient on Endoscopy schedule   - tentative EGD tomorrow pending discussion with aesthesia in the AM   - NPO after midnight

## 2019-03-17 NOTE — CHART NOTE - NSCHARTNOTEFT_GEN_A_CORE
Gastroenterology Brief Note   - EGD canceled due to new ST depression and hypotension   - will need further cardiac evaluation and optimization before EGD scheduled   - positive stress test that has not been addressed

## 2019-03-17 NOTE — PROGRESS NOTE ADULT - SUBJECTIVE AND OBJECTIVE BOX
Patient is a 78y old  Male who presents with a chief complaint of vomiting and diarrhea (16 Mar 2019 15:41)      SUBJECTIVE / OVERNIGHT EVENTS: Still nauseous with belching.  Able to tolerate small bites of food with sips of fluids.  Denies CP, SOB.       MEDICATIONS  (STANDING):  aspirin enteric coated 81 milliGRAM(s) Oral daily  atorvastatin 20 milliGRAM(s) Oral at bedtime  baclofen 5 milliGRAM(s) Oral three times a day  dextrose 5%. 1000 milliLiter(s) (50 mL/Hr) IV Continuous <Continuous>  dextrose 50% Injectable 12.5 Gram(s) IV Push once  dextrose 50% Injectable 25 Gram(s) IV Push once  dextrose 50% Injectable 25 Gram(s) IV Push once  docusate sodium 100 milliGRAM(s) Oral three times a day  insulin lispro (HumaLOG) corrective regimen sliding scale   SubCutaneous three times a day before meals  loratadine 10 milliGRAM(s) Oral daily  pantoprazole  Injectable 40 milliGRAM(s) IV Push every 12 hours  polyethylene glycol 3350 17 Gram(s) Oral daily  senna 2 Tablet(s) Oral at bedtime  sodium chloride 0.9%. 1000 milliLiter(s) (100 mL/Hr) IV Continuous <Continuous>    MEDICATIONS  (PRN):  aluminum hydroxide/magnesium hydroxide/simethicone Suspension 30 milliLiter(s) Oral every 4 hours PRN Dyspepsia  bisacodyl Suppository 10 milliGRAM(s) Rectal daily PRN Constipation  clobetasol 0.05% Ointment 1 Application(s) Topical two times a day PRN rash  dextrose 40% Gel 15 Gram(s) Oral once PRN Blood Glucose LESS THAN 70 milliGRAM(s)/deciliter  diphenhydrAMINE 25 milliGRAM(s) Oral every 6 hours PRN Rash and/or Itching  glucagon  Injectable 1 milliGRAM(s) IntraMuscular once PRN Glucose LESS THAN 70 milligrams/deciliter  guaiFENesin    Syrup 100 milliGRAM(s) Oral every 6 hours PRN Cough  melatonin 3 milliGRAM(s) Oral at bedtime PRN Insomnia  ondansetron Injectable 4 milliGRAM(s) IV Push every 6 hours PRN Nausea and/or Vomiting  simethicone 80 milliGRAM(s) Chew four times a day PRN Gas      Vital Signs Last 24 Hrs  T(C): 36.3 (17 Mar 2019 12:36), Max: 37 (16 Mar 2019 21:39)  T(F): 97.3 (17 Mar 2019 12:36), Max: 98.6 (16 Mar 2019 21:39)  HR: 68 (17 Mar 2019 12:36) (64 - 85)  BP: 126/54 (17 Mar 2019 12:36) (114/48 - 138/55)  BP(mean): --  RR: 18 (17 Mar 2019 12:36) (18 - 19)  SpO2: 99% (17 Mar 2019 12:36) (97% - 100%)  CAPILLARY BLOOD GLUCOSE      POCT Blood Glucose.: 150 mg/dL (17 Mar 2019 12:29)  POCT Blood Glucose.: 84 mg/dL (17 Mar 2019 08:31)  POCT Blood Glucose.: 92 mg/dL (16 Mar 2019 22:26)  POCT Blood Glucose.: 166 mg/dL (16 Mar 2019 17:22)      PHYSICAL EXAM  GENERAL: NAD, well-developed, cachectic  HEAD:  Atraumatic, Normocephalic  EYES: EOMI, PERRLA, conjunctiva and sclera clear  NECK: Supple, No JVD  CHEST/LUNG: Clear to auscultation bilaterally; No wheeze  HEART: Regular rate and rhythm; No murmurs, rubs, or gallops  ABDOMEN: Soft, Nontender, Nondistended; Bowel sounds present  EXTREMITIES:  2+ Peripheral Pulses, No clubbing, cyanosis, or edema  PSYCH: AAOx3  SKIN: No rashes or lesions    LABS:                        11.6   6.19  )-----------( 267      ( 17 Mar 2019 07:30 )             34.9     03-17    138  |  103  |  9   ----------------------------<  89  4.0   |  25  |  0.85    Ca    9.0      17 Mar 2019 07:30    TPro  5.5<L>  /  Alb  2.7<L>  /  TBili  0.5  /  DBili  x   /  AST  14  /  ALT  9   /  AlkPhos  53  03-16        RADIOLOGY & ADDITIONAL TESTS:    Imaging Personally Reviewed:  < from: CT Head No Cont (03.09.19 @ 16:36) >  IMPRESSION: No acute intracranial findings.    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    Tiny areas of encephalomalacia and gliosis within the frontal lobes   related to prior contusion.    < end of copied text >    < from: CT Abdomen and Pelvis w/ Oral Cont and w/ IV Cont (03.07.19 @ 23:41) >  IMPRESSION:     No bowel obstruction.    Mild fluid distended stomach and distal esophagus with wall thickening,   compatible with clinical history of vomiting. Possibility of esophagitis   or gastroesophageal reflux disease considered. Consider nonemergent   endoscopic evaluation if there is concern for underlying malignancy.    < end of copied text >    Consultant(s) Notes Reviewed:  Cards, GI

## 2019-03-17 NOTE — PROGRESS NOTE ADULT - SUBJECTIVE AND OBJECTIVE BOX
Mor Woodard MD  Interventional Cardiology  [place] Office : [phone] [place] [address]. [address]  Tel:   [place] Office : [address] [facility] [place] [address]  Tel: [phone]  Cell : [phone]    Subjective : Pt lying in bed comfortable, not in distress, denies any chest pain or SOB  	  MEDICATIONS:  aspirin enteric coated 81 milliGRAM(s) Oral daily  guaiFENesin    Syrup 100 milliGRAM(s) Oral every 6 hours PRN  loratadine 10 milliGRAM(s) Oral daily  baclofen 5 milliGRAM(s) Oral three times a day  diphenhydrAMINE 25 milliGRAM(s) Oral every 6 hours PRN  melatonin 3 milliGRAM(s) Oral at bedtime PRN  ondansetron Injectable 4 milliGRAM(s) IV Push every 6 hours PRN  aluminum hydroxide/magnesium hydroxide/simethicone Suspension 30 milliLiter(s) Oral every 4 hours PRN  bisacodyl Suppository 10 milliGRAM(s) Rectal daily PRN  docusate sodium 100 milliGRAM(s) Oral three times a day  pantoprazole    Tablet 40 milliGRAM(s) Oral before breakfast  polyethylene glycol 3350 17 Gram(s) Oral daily  senna 2 Tablet(s) Oral at bedtime  simethicone 80 milliGRAM(s) Chew four times a day PRN  atorvastatin 20 milliGRAM(s) Oral at bedtime  dextrose 40% Gel 15 Gram(s) Oral once PRN  dextrose 50% Injectable 12.5 Gram(s) IV Push once  dextrose 50% Injectable 25 Gram(s) IV Push once  dextrose 50% Injectable 25 Gram(s) IV Push once  glucagon  Injectable 1 milliGRAM(s) IntraMuscular once PRN  insulin lispro (HumaLOG) corrective regimen sliding scale   SubCutaneous three times a day before meals    clobetasol 0.05% Ointment 1 Application(s) Topical two times a day PRN  dextrose 5%. 1000 milliLiter(s) IV Continuous <Continuous>  sodium chloride 0.9%. 1000 milliLiter(s) IV Continuous <Continuous>      PHYSICAL EXAM:  T(C): 36.3 (03-17-19 @ 12:36), Max: 37 (03-16-19 @ 21:39)  HR: 68 (03-17-19 @ 12:36) (64 - 85)  BP: 126/54 (03-17-19 @ 12:36) (114/48 - 138/55)  RR: 18 (03-17-19 @ 12:36) (18 - 19)  SpO2: 99% (03-17-19 @ 12:36) (97% - 100%)  Wt(kg): --  I&O's Summary        Appearance: chachectic  HEENT:   Normal oral mucosa, PERRL, EOMI	  Cardiovascular: Normal S1 S2, No JVD, No murmurs, No edema  Respiratory: Lungs clear to auscultation	  Gastrointestinal:  Soft, Non-tender, + BS	  Extremities: Normal range of motion, No clubbing, cyanosis or edema                                    11.6   6.19  )-----------( 267      ( 17 Mar 2019 07:30 )             34.9     03-17    138  |  103  |  9   ----------------------------<  89  4.0   |  25  |  0.85    Ca    9.0      17 Mar 2019 07:30    TPro  5.5<L>  /  Alb  2.7<L>  /  TBili  0.5  /  DBili  x   /  AST  14  /  ALT  9   /  AlkPhos  53  03-16    proBNP:   Lipid Profile:   HgA1c:   TSH:

## 2019-03-17 NOTE — PROGRESS NOTE ADULT - NSICDXPROBLEM_GEN_ALL_CORE_FT
PROBLEM DIAGNOSES  Problem: Nausea and vomiting  Assessment and Plan: - Ct abd/pelvis reviewed: mild fluid distended stomach and distal esophagus with wall thickening,   - Ct head no acute pathology  - Upper GI series reviewed no gastric outlet obstruction   - GI consult appreciated- EGD cancelled 2/2 hypotension on Friday. Cardiac clearance documented in chart and remains medically optimized with improved NP.   - Cont liquid diet for now  - C/W zofran prn, PPI. Continue maalox, added baclofen for better symptom control, continue Simethicone for gas relief   - monitor electrolytes. replete K, mag    Problem: DM (diabetes mellitus)  Assessment and Plan: - Continue carb consistent diet  - On HISS     Problem: Gastric cancer  Assessment and Plan: - H/o gastric cancer in the past now with wt loss.  - EGD pending, concern for possible recurrence    Problem: HLD (hyperlipidemia)  Assessment and Plan: - Continue statin     Problem: Need for prophylactic measure  Assessment and Plan: - IMPROVE 2  - On Venodynes given recent SAH PROBLEM DIAGNOSES  Problem: Nausea and vomiting  Assessment and Plan: - Ct abd/pelvis reviewed: mild fluid distended stomach and distal esophagus with wall thickening,   - Ct head no acute pathology  - Upper GI series reviewed no gastric outlet obstruction   - GI consult appreciated- EGD cancelled 2/2 hypotension on Friday. Cardiac clearance documented in chart and remains medically optimized with improved NP.   - Cont liquid diet for now  - C/W zofran prn, PPI. Continue maalox, added baclofen for better symptom control, continue Simethicone for gas relief   - monitor electrolytes. replete K, mag    Problem: DM (diabetes mellitus)  Assessment and Plan: - Continue carb consistent diet  - On HISS     Problem: Gastric cancer  Assessment and Plan: - H/o gastric cancer in the past now with wt loss.  - EGD pending, concern for possible recurrence    Problem: HLD (hyperlipidemia)  Assessment and Plan: - Continue statin     Problem: History of subdural hematoma  Assessment and Plan: - Pt recently discharged where he was found to have SAH and chronic SDH  - No acute neurosurgical intervention at the time was advised and pt did not f/u with neurosx as outpatient   - Pt now restarted on ASA due to hx of CAD   - CT head with no evidence of acute intracranial hemorrhage and no new neuro deficits on exam   - Will continue to monitor     Problem: Need for prophylactic measure  Assessment and Plan: - IMPROVE 2  - On Venodynes given recent SAH PROBLEM DIAGNOSES  Problem: Nausea and vomiting  Assessment and Plan: - Ct abd/pelvis reviewed: mild fluid distended stomach and distal esophagus with wall thickening,   - Ct head no acute pathology  - Upper GI series reviewed no gastric outlet obstruction   - GI consult appreciated- EGD cancelled 2/2 hypotension on Friday. Cardiac clearance documented in chart and remains medically optimized with improved NP.   - Cont liquid diet for now  - C/W zofran prn, PPI. Continue maalox, added baclofen for better symptom control, continue Simethicone for gas relief   - monitor electrolytes. replete K, mag  - Case d/w with GI fellow and will reevaluate for EGD tomorrow     Problem: DM (diabetes mellitus)  Assessment and Plan: - Continue carb consistent diet  - On HISS     Problem: Gastric cancer  Assessment and Plan: - H/o gastric cancer in the past now with wt loss.  - EGD pending, concern for possible recurrence    Problem: HLD (hyperlipidemia)  Assessment and Plan: - Continue statin     Problem: History of subdural hematoma  Assessment and Plan: - Pt recently discharged where he was found to have SAH and chronic SDH  - No acute neurosurgical intervention at the time was advised and pt did not f/u with neurosx as outpatient   - Pt now restarted on ASA due to hx of CAD   - CT head with no evidence of acute intracranial hemorrhage and no new neuro deficits on exam   - Will continue to monitor     Problem: Need for prophylactic measure  Assessment and Plan: - IMPROVE 2  - On Venodynes given recent SAH

## 2019-03-18 ENCOUNTER — RESULT REVIEW (OUTPATIENT)
Age: 79
End: 2019-03-18

## 2019-03-18 ENCOUNTER — APPOINTMENT (OUTPATIENT)
Dept: GASTROENTEROLOGY | Facility: CLINIC | Age: 79
End: 2019-03-18

## 2019-03-18 LAB
ANION GAP SERPL CALC-SCNC: 7 MMO/L — SIGNIFICANT CHANGE UP (ref 7–14)
BUN SERPL-MCNC: 7 MG/DL — SIGNIFICANT CHANGE UP (ref 7–23)
CALCIUM SERPL-MCNC: 7.3 MG/DL — LOW (ref 8.4–10.5)
CHLORIDE SERPL-SCNC: 111 MMOL/L — HIGH (ref 98–107)
CO2 SERPL-SCNC: 21 MMOL/L — LOW (ref 22–31)
CREAT SERPL-MCNC: 0.67 MG/DL — SIGNIFICANT CHANGE UP (ref 0.5–1.3)
GLUCOSE SERPL-MCNC: 85 MG/DL — SIGNIFICANT CHANGE UP (ref 70–99)
HCT VFR BLD CALC: 27.5 % — LOW (ref 39–50)
HGB BLD-MCNC: 9 G/DL — LOW (ref 13–17)
MCHC RBC-ENTMCNC: 26.9 PG — LOW (ref 27–34)
MCHC RBC-ENTMCNC: 32.7 % — SIGNIFICANT CHANGE UP (ref 32–36)
MCV RBC AUTO: 82.3 FL — SIGNIFICANT CHANGE UP (ref 80–100)
NRBC # FLD: 0 K/UL — LOW (ref 25–125)
PLATELET # BLD AUTO: 222 K/UL — SIGNIFICANT CHANGE UP (ref 150–400)
PMV BLD: 9.7 FL — SIGNIFICANT CHANGE UP (ref 7–13)
POTASSIUM SERPL-MCNC: 3.3 MMOL/L — LOW (ref 3.5–5.3)
POTASSIUM SERPL-SCNC: 3.3 MMOL/L — LOW (ref 3.5–5.3)
RBC # BLD: 3.34 M/UL — LOW (ref 4.2–5.8)
RBC # FLD: 15.9 % — HIGH (ref 10.3–14.5)
SODIUM SERPL-SCNC: 139 MMOL/L — SIGNIFICANT CHANGE UP (ref 135–145)
WBC # BLD: 5.91 K/UL — SIGNIFICANT CHANGE UP (ref 3.8–10.5)
WBC # FLD AUTO: 5.91 K/UL — SIGNIFICANT CHANGE UP (ref 3.8–10.5)

## 2019-03-18 PROCEDURE — 88342 IMHCHEM/IMCYTCHM 1ST ANTB: CPT | Mod: 26

## 2019-03-18 PROCEDURE — 88305 TISSUE EXAM BY PATHOLOGIST: CPT | Mod: 26

## 2019-03-18 PROCEDURE — 43245 EGD DILATE STRICTURE: CPT | Mod: GC

## 2019-03-18 PROCEDURE — 88312 SPECIAL STAINS GROUP 1: CPT | Mod: 26

## 2019-03-18 PROCEDURE — 43239 EGD BIOPSY SINGLE/MULTIPLE: CPT | Mod: 59,GC

## 2019-03-18 PROCEDURE — 99233 SBSQ HOSP IP/OBS HIGH 50: CPT

## 2019-03-18 RX ORDER — POTASSIUM CHLORIDE 20 MEQ
20 PACKET (EA) ORAL ONCE
Qty: 0 | Refills: 0 | Status: COMPLETED | OUTPATIENT
Start: 2019-03-18 | End: 2019-03-18

## 2019-03-18 RX ORDER — POTASSIUM CHLORIDE 20 MEQ
10 PACKET (EA) ORAL
Qty: 0 | Refills: 0 | Status: COMPLETED | OUTPATIENT
Start: 2019-03-18 | End: 2019-03-18

## 2019-03-18 RX ORDER — SUCRALFATE 1 G
1 TABLET ORAL
Qty: 0 | Refills: 0 | Status: DISCONTINUED | OUTPATIENT
Start: 2019-03-18 | End: 2019-03-19

## 2019-03-18 RX ADMIN — SENNA PLUS 2 TABLET(S): 8.6 TABLET ORAL at 21:51

## 2019-03-18 RX ADMIN — Medication 81 MILLIGRAM(S): at 13:36

## 2019-03-18 RX ADMIN — SIMETHICONE 80 MILLIGRAM(S): 80 TABLET, CHEWABLE ORAL at 13:36

## 2019-03-18 RX ADMIN — Medication 100 MILLIEQUIVALENT(S): at 08:53

## 2019-03-18 RX ADMIN — SIMETHICONE 80 MILLIGRAM(S): 80 TABLET, CHEWABLE ORAL at 06:03

## 2019-03-18 RX ADMIN — Medication 100 MILLIGRAM(S): at 21:51

## 2019-03-18 RX ADMIN — Medication 1: at 16:47

## 2019-03-18 RX ADMIN — Medication 5 MILLIGRAM(S): at 06:03

## 2019-03-18 RX ADMIN — SIMETHICONE 80 MILLIGRAM(S): 80 TABLET, CHEWABLE ORAL at 22:49

## 2019-03-18 RX ADMIN — ATORVASTATIN CALCIUM 20 MILLIGRAM(S): 80 TABLET, FILM COATED ORAL at 21:51

## 2019-03-18 RX ADMIN — Medication 5 MILLIGRAM(S): at 13:41

## 2019-03-18 RX ADMIN — Medication 100 MILLIEQUIVALENT(S): at 10:10

## 2019-03-18 RX ADMIN — Medication 100 MILLIGRAM(S): at 06:04

## 2019-03-18 RX ADMIN — Medication 100 MILLIGRAM(S): at 13:41

## 2019-03-18 RX ADMIN — Medication 5 MILLIGRAM(S): at 21:51

## 2019-03-18 RX ADMIN — SODIUM CHLORIDE 100 MILLILITER(S): 9 INJECTION INTRAMUSCULAR; INTRAVENOUS; SUBCUTANEOUS at 03:12

## 2019-03-18 RX ADMIN — SODIUM CHLORIDE 100 MILLILITER(S): 9 INJECTION INTRAMUSCULAR; INTRAVENOUS; SUBCUTANEOUS at 08:54

## 2019-03-18 RX ADMIN — PANTOPRAZOLE SODIUM 40 MILLIGRAM(S): 20 TABLET, DELAYED RELEASE ORAL at 06:04

## 2019-03-18 RX ADMIN — Medication 1 GRAM(S): at 17:27

## 2019-03-18 RX ADMIN — Medication 20 MILLIEQUIVALENT(S): at 13:36

## 2019-03-18 RX ADMIN — LORATADINE 10 MILLIGRAM(S): 10 TABLET ORAL at 13:36

## 2019-03-18 RX ADMIN — POLYETHYLENE GLYCOL 3350 17 GRAM(S): 17 POWDER, FOR SOLUTION ORAL at 13:36

## 2019-03-18 RX ADMIN — SIMETHICONE 80 MILLIGRAM(S): 80 TABLET, CHEWABLE ORAL at 17:04

## 2019-03-18 NOTE — PROGRESS NOTE ADULT - SUBJECTIVE AND OBJECTIVE BOX
Patient is a 78y old  Male who presents with a chief complaint of vomiting and diarrhea (18 Mar 2019 10:43)      SUBJECTIVE / OVERNIGHT EVENTS: Pt seen post EGD, currently eating and tolerating diet.  No longer feels nauseous, still with some gas but much improved.       MEDICATIONS  (STANDING):  aspirin enteric coated 81 milliGRAM(s) Oral daily  atorvastatin 20 milliGRAM(s) Oral at bedtime  baclofen 5 milliGRAM(s) Oral three times a day  dextrose 5%. 1000 milliLiter(s) (50 mL/Hr) IV Continuous <Continuous>  dextrose 50% Injectable 12.5 Gram(s) IV Push once  dextrose 50% Injectable 25 Gram(s) IV Push once  dextrose 50% Injectable 25 Gram(s) IV Push once  docusate sodium 100 milliGRAM(s) Oral three times a day  insulin lispro (HumaLOG) corrective regimen sliding scale   SubCutaneous three times a day before meals  loratadine 10 milliGRAM(s) Oral daily  pantoprazole    Tablet 40 milliGRAM(s) Oral before breakfast  polyethylene glycol 3350 17 Gram(s) Oral daily  senna 2 Tablet(s) Oral at bedtime  sucralfate suspension 1 Gram(s) Oral two times a day    MEDICATIONS  (PRN):  aluminum hydroxide/magnesium hydroxide/simethicone Suspension 30 milliLiter(s) Oral every 4 hours PRN Dyspepsia  bisacodyl Suppository 10 milliGRAM(s) Rectal daily PRN Constipation  clobetasol 0.05% Ointment 1 Application(s) Topical two times a day PRN rash  dextrose 40% Gel 15 Gram(s) Oral once PRN Blood Glucose LESS THAN 70 milliGRAM(s)/deciliter  diphenhydrAMINE 25 milliGRAM(s) Oral every 6 hours PRN Rash and/or Itching  glucagon  Injectable 1 milliGRAM(s) IntraMuscular once PRN Glucose LESS THAN 70 milligrams/deciliter  guaiFENesin    Syrup 100 milliGRAM(s) Oral every 6 hours PRN Cough  melatonin 3 milliGRAM(s) Oral at bedtime PRN Insomnia  ondansetron Injectable 4 milliGRAM(s) IV Push every 6 hours PRN Nausea and/or Vomiting  simethicone 80 milliGRAM(s) Chew four times a day PRN Gas      Vital Signs Last 24 Hrs  T(C): 36.3 (18 Mar 2019 14:17), Max: 36.7 (17 Mar 2019 21:12)  T(F): 97.4 (18 Mar 2019 14:17), Max: 98 (17 Mar 2019 21:12)  HR: 60 (18 Mar 2019 14:17) (60 - 75)  BP: 153/55 (18 Mar 2019 14:17) (121/57 - 153/55)  BP(mean): --  RR: 18 (18 Mar 2019 14:17) (17 - 18)  SpO2: 100% (18 Mar 2019 14:17) (100% - 100%)  CAPILLARY BLOOD GLUCOSE      POCT Blood Glucose.: 71 mg/dL (18 Mar 2019 13:31)  POCT Blood Glucose.: 82 mg/dL (18 Mar 2019 08:24)  POCT Blood Glucose.: 146 mg/dL (17 Mar 2019 22:09)  POCT Blood Glucose.: 126 mg/dL (17 Mar 2019 16:54)      PHYSICAL EXAM  GENERAL: NAD, well-developed, cachectic   HEAD:  Atraumatic, Normocephalic  EYES: EOMI, PERRLA, conjunctiva and sclera clear  NECK: Supple, No JVD  CHEST/LUNG: Clear to auscultation bilaterally; No wheeze  HEART: Regular rate and rhythm; No murmurs, rubs, or gallops  ABDOMEN: Soft, Nontender, Nondistended; Bowel sounds present  EXTREMITIES:  2+ Peripheral Pulses, No clubbing, cyanosis, or edema  PSYCH: AAOx3      LABS:                        9.0    5.91  )-----------( 222      ( 18 Mar 2019 05:50 )             27.5     03-18    139  |  111<H>  |  7   ----------------------------<  85  3.3<L>   |  21<L>  |  0.67    Ca    7.3<L>      18 Mar 2019 05:50        RADIOLOGY & ADDITIONAL TESTS:    Imaging Personally Reviewed:  < from: Upper Endoscopy (03.18.19 @ 10:35) >  Findings:       LA Grade D (one or more mucosal breaks involving at least 75% of        esophageal circumference) esophagitis with bleeding was found 25 to 36        cm from the incisors. Biopsies were taken with a cold forceps for      histology.       A 4 cm hiatus hernia was found. The hiatal narrowing was 40 cm from the        incisors. The Z-line was 36 cm from the incisors.       Diffuse moderately erythematous mucosa was found in the gastric body.        Biopsies were taken with a cold forceps for histology.       A benign-appearing, intrinsic severe stenosis with a maximal nner        diameter of 6 mm was found at the afferent limb anastomosis, bile was        seen refluxing out. This was traversed after dilation.. A TTS dilator        was passed through the scope. Dilation with an 8-9-10 mm balloon (to a        maximum balloon size of 10 mm) dilator was performed.       The efferent limb anastomosis margin was visualized and traversed        without difficulty.      One gastric ulcer with a suture visible with no stigmata of bleeding was        found in the gastric body. The lesion was 3 mm in largest dimension.                                                                                   Impression:     - LA Grade D reflux esophagitis. Biopsied.                       - 4 cm hiatus hernia.                       - Erythematous mucosa in the gastric body. Biopsied.                       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 Dilated to a maximum size of 10 mm.                       - Gastric ulcer with no stigmata of bleeding.  Recommendation:      - [facility].                       - Await pathology results.                       - Advance diet as tolerated (soft).                       - Use a proton pump inhibitor PO daily.                       - Use sucralfate suspension 1 gram PO BID.    < end of copied text >    Consultant(s) Notes Reviewed:  GI     Care Discussed with Consultants/Other Providers: GI Dr. Pino

## 2019-03-18 NOTE — PROGRESS NOTE ADULT - ASSESSMENT
EKG SR 1st degree AV block , LBBB (old)    Echo: < from: Transthoracic Echocardiogram (02.21.19 @ 21:03) >  opening.  2. Normal left ventricular internal dimensions and wall  thicknesses.  3. Normal left ventricular systolic function. No segmental  wall motion abnormalities.  4. Normal right ventricular size and function.  5. Estimatedpulmonary artery systolic pressure equals 40  mm Hg, assuming right atrial pressure equals 10  mm Hg,  consistent with mild pulmonary hypertension.    < end of copied text >    NST:   < from: Nuclear Stress Test-Pharmacologic (04.03.17 @ 10:00) >  * Myocardial Perfusion SPECT results are abnormal.  * There is a medium sized moderate to severe defect in  proximal to mid septal wall  and mild defect in  proximal  to mid inferior wall which are predominantly reversible  consistent with moderate to severe basal septal  ischemia  and mild proximal to mid inferior ischemia  * Post-stress gated wall motion analysis was performed  (LVEF = 55 %;LVEDV = 67 ml.), revealing normal LV  function. There was paradoxical motion of the septum (LBBB  and post CABG). RV function appeared normal.    < end of copied text >      Assessment and Plan     1) Perioperative risk assessment:  Pt with positive stress in 4/17 as above, medically managed , Echo with normal LV function , EKG with LBBB , Chest pain after food likely GI related, no ischemic eval at this time , had a detailed discussion with daughter.  Asa restarted Given positive stress pt at moderate risk of MACE with EGD, optimized from a cardiac standpoint , EGD cancelled  2/2 Hypotension and tachycardia, volume down on exam c/w IVF for now , BP and Hr improved , planned for EGD today     2) Anemia/ N / V:  f/U GI     3) DVT PPX : SCD

## 2019-03-18 NOTE — PROGRESS NOTE ADULT - ASSESSMENT
77 yo m h/o cad/cabg 2002, diastolic chf,  gastric ca s/p surgery 2006, chemoradiation. HTN, HLD, dm, JACK, GERD, carotid stenosis. chronic diffuse pruritic rash, recently admitted here ( feb 19-26th ) for rash, headache, nausea, vomiting and wt loss, had MRI which revealed SAH, MRCP revealed Dilated main pancreatic duct down to the neck. Etiology for n/v inc. GERD  vs. pancreatic duct stone. No gastric outlet obstruction on Barium swallow test, awaiting EGD

## 2019-03-18 NOTE — PROGRESS NOTE ADULT - NSICDXPROBLEM_GEN_ALL_CORE_FT
PROBLEM DIAGNOSES  Problem: Nausea and vomiting  Assessment and Plan: - Pt now s/p EGD, found to have gastric stenosis (likely postoperative changes), which was likely causing patient's symptoms  - GI team performed dilation during procedure, symptoms can likely recur   - Continue with PPI daily and Carafate BID   - Pt will need close outpatient GI f/u (he has not followed a GI MD regularly despite history of gastric CA)   - Can f/u biopsy results as outpatient (already has appt with Dr. Lay on April 9th 12:30PM)   - Continue soft diet     Problem: DM (diabetes mellitus)  Assessment and Plan: - Continue carb consistent diet  - On HISS     Problem: Gastric cancer  Assessment and Plan: - H/o gastric cancer in the past now with wt loss.  - EGD pending, concern for possible recurrence, biopsies pending, can f/u with GI as outpatient     Problem: HLD (hyperlipidemia)  Assessment and Plan: - Continue statin     Problem: History of subdural hematoma  Assessment and Plan: - Pt recently discharged where he was found to have SAH and chronic SDH  - No acute neurosurgical intervention at the time was advised and pt did not f/u with neurosx as outpatient   - Pt now restarted on ASA due to hx of CAD   - CT head with no evidence of acute intracranial hemorrhage and no new neuro deficits on exam   - Will need outpatient f/u with neurosurgery (Dr. Asaf Brown)     Problem: Need for prophylactic measure  Assessment and Plan: - IMPROVE 2  - On Venodynes given recent SAH   - Anticipate d/c home tomorrow.  Spoke with CM to reinstate home services

## 2019-03-18 NOTE — PROGRESS NOTE ADULT - SUBJECTIVE AND OBJECTIVE BOX
Mor Woodard MD  Interventional Cardiology  [place] Office : [phone] [place] [address]. [id number]  Tel:   [place] Office : [address] [facility] [place] [address]  Tel: [phone]  Cell : [phone]    Subjective : Pt lying in bed comfortable, not in distress, denies any chest pain or SOB  	  MEDICATIONS:  aspirin enteric coated 81 milliGRAM(s) Oral daily  guaiFENesin    Syrup 100 milliGRAM(s) Oral every 6 hours PRN  loratadine 10 milliGRAM(s) Oral daily  baclofen 5 milliGRAM(s) Oral three times a day  diphenhydrAMINE 25 milliGRAM(s) Oral every 6 hours PRN  melatonin 3 milliGRAM(s) Oral at bedtime PRN  ondansetron Injectable 4 milliGRAM(s) IV Push every 6 hours PRN  aluminum hydroxide/magnesium hydroxide/simethicone Suspension 30 milliLiter(s) Oral every 4 hours PRN  bisacodyl Suppository 10 milliGRAM(s) Rectal daily PRN  docusate sodium 100 milliGRAM(s) Oral three times a day  pantoprazole    Tablet 40 milliGRAM(s) Oral before breakfast  polyethylene glycol 3350 17 Gram(s) Oral daily  senna 2 Tablet(s) Oral at bedtime  simethicone 80 milliGRAM(s) Chew four times a day PRN  atorvastatin 20 milliGRAM(s) Oral at bedtime  dextrose 40% Gel 15 Gram(s) Oral once PRN  dextrose 50% Injectable 12.5 Gram(s) IV Push once  dextrose 50% Injectable 25 Gram(s) IV Push once  dextrose 50% Injectable 25 Gram(s) IV Push once  glucagon  Injectable 1 milliGRAM(s) IntraMuscular once PRN  insulin lispro (HumaLOG) corrective regimen sliding scale   SubCutaneous three times a day before meals    clobetasol 0.05% Ointment 1 Application(s) Topical two times a day PRN  dextrose 5%. 1000 milliLiter(s) IV Continuous <Continuous>  potassium chloride    Tablet ER 20 milliEquivalent(s) Oral once  sodium chloride 0.9%. 1000 milliLiter(s) IV Continuous <Continuous>      PHYSICAL EXAM:  T(C): 36.7 (03-18-19 @ 05:55), Max: 36.7 (03-17-19 @ 21:12)  HR: 65 (03-18-19 @ 05:55) (65 - 75)  BP: 121/57 (03-18-19 @ 05:55) (121/57 - 128/61)  RR: 17 (03-18-19 @ 05:55) (17 - 18)  SpO2: 100% (03-18-19 @ 05:55) (99% - 100%)  Wt(kg): --  I&O's Summary    Height (cm): 175.26 (03-18 @ 09:12)  Weight (kg): 74.8 (03-18 @ 09:12)  BMI (kg/m2): 24.4 (03-18 @ 09:12)  BSA (m2): 1.9 (03-18 @ 09:12)        Appearance: chachectic  HEENT:   Normal oral mucosa, PERRL, EOMI	  Cardiovascular: Normal S1 S2, No JVD, No murmurs, No edema  Respiratory: Lungs clear to auscultation	  Gastrointestinal:  Soft, Non-tender, + BS	  Extremities: Normal range of motion, No clubbing, cyanosis or edema                                      9.0    5.91  )-----------( 222      ( 18 Mar 2019 05:50 )             27.5     03-18    139  |  111<H>  |  7   ----------------------------<  85  3.3<L>   |  21<L>  |  0.67    Ca    7.3<L>      18 Mar 2019 05:50      proBNP:   Lipid Profile:   HgA1c:   TSH:

## 2019-03-19 ENCOUNTER — TRANSCRIPTION ENCOUNTER (OUTPATIENT)
Age: 79
End: 2019-03-19

## 2019-03-19 VITALS
OXYGEN SATURATION: 100 % | SYSTOLIC BLOOD PRESSURE: 122 MMHG | DIASTOLIC BLOOD PRESSURE: 63 MMHG | TEMPERATURE: 98 F | HEART RATE: 69 BPM | RESPIRATION RATE: 18 BRPM

## 2019-03-19 LAB
ANION GAP SERPL CALC-SCNC: 9 MMO/L — SIGNIFICANT CHANGE UP (ref 7–14)
BUN SERPL-MCNC: 11 MG/DL — SIGNIFICANT CHANGE UP (ref 7–23)
CALCIUM SERPL-MCNC: 8.5 MG/DL — SIGNIFICANT CHANGE UP (ref 8.4–10.5)
CHLORIDE SERPL-SCNC: 100 MMOL/L — SIGNIFICANT CHANGE UP (ref 98–107)
CO2 SERPL-SCNC: 24 MMOL/L — SIGNIFICANT CHANGE UP (ref 22–31)
CREAT SERPL-MCNC: 0.88 MG/DL — SIGNIFICANT CHANGE UP (ref 0.5–1.3)
GLUCOSE SERPL-MCNC: 100 MG/DL — HIGH (ref 70–99)
POTASSIUM SERPL-MCNC: 4.2 MMOL/L — SIGNIFICANT CHANGE UP (ref 3.5–5.3)
POTASSIUM SERPL-SCNC: 4.2 MMOL/L — SIGNIFICANT CHANGE UP (ref 3.5–5.3)
SODIUM SERPL-SCNC: 133 MMOL/L — LOW (ref 135–145)

## 2019-03-19 PROCEDURE — 99232 SBSQ HOSP IP/OBS MODERATE 35: CPT | Mod: GC

## 2019-03-19 PROCEDURE — 99239 HOSP IP/OBS DSCHRG MGMT >30: CPT

## 2019-03-19 RX ORDER — SUCRALFATE 1 G
10 TABLET ORAL
Qty: 600 | Refills: 0 | OUTPATIENT
Start: 2019-03-19 | End: 2019-04-17

## 2019-03-19 RX ORDER — ASPIRIN/CALCIUM CARB/MAGNESIUM 324 MG
1 TABLET ORAL
Qty: 30 | Refills: 0 | OUTPATIENT
Start: 2019-03-19 | End: 2019-04-17

## 2019-03-19 RX ORDER — DOCUSATE SODIUM 100 MG
1 CAPSULE ORAL
Qty: 90 | Refills: 0
Start: 2019-03-19 | End: 2019-04-17

## 2019-03-19 RX ORDER — SENNA PLUS 8.6 MG/1
2 TABLET ORAL
Qty: 60 | Refills: 0
Start: 2019-03-19 | End: 2019-04-17

## 2019-03-19 RX ORDER — SUCRALFATE 1 G
10 TABLET ORAL
Qty: 600 | Refills: 0
Start: 2019-03-19 | End: 2019-04-17

## 2019-03-19 RX ORDER — SENNA PLUS 8.6 MG/1
2 TABLET ORAL
Qty: 60 | Refills: 0 | OUTPATIENT
Start: 2019-03-19 | End: 2019-04-17

## 2019-03-19 RX ORDER — DOCUSATE SODIUM 100 MG
1 CAPSULE ORAL
Qty: 90 | Refills: 0 | OUTPATIENT
Start: 2019-03-19 | End: 2019-04-17

## 2019-03-19 RX ORDER — POLYETHYLENE GLYCOL 3350 17 G/17G
17 POWDER, FOR SOLUTION ORAL
Qty: 30 | Refills: 0
Start: 2019-03-19 | End: 2019-04-17

## 2019-03-19 RX ORDER — POLYETHYLENE GLYCOL 3350 17 G/17G
17 POWDER, FOR SOLUTION ORAL
Qty: 30 | Refills: 0 | OUTPATIENT
Start: 2019-03-19 | End: 2019-04-17

## 2019-03-19 RX ADMIN — Medication 100 MILLIGRAM(S): at 05:36

## 2019-03-19 RX ADMIN — Medication 100 MILLIGRAM(S): at 14:44

## 2019-03-19 RX ADMIN — Medication 10 MILLIGRAM(S): at 11:14

## 2019-03-19 RX ADMIN — Medication 1 GRAM(S): at 05:36

## 2019-03-19 RX ADMIN — SIMETHICONE 80 MILLIGRAM(S): 80 TABLET, CHEWABLE ORAL at 08:00

## 2019-03-19 RX ADMIN — Medication 30 MILLILITER(S): at 08:01

## 2019-03-19 RX ADMIN — PANTOPRAZOLE SODIUM 40 MILLIGRAM(S): 20 TABLET, DELAYED RELEASE ORAL at 05:36

## 2019-03-19 RX ADMIN — Medication 25 MILLIGRAM(S): at 08:00

## 2019-03-19 RX ADMIN — Medication 5 MILLIGRAM(S): at 14:44

## 2019-03-19 RX ADMIN — Medication 5 MILLIGRAM(S): at 05:36

## 2019-03-19 RX ADMIN — LORATADINE 10 MILLIGRAM(S): 10 TABLET ORAL at 11:14

## 2019-03-19 RX ADMIN — Medication 81 MILLIGRAM(S): at 11:13

## 2019-03-19 RX ADMIN — POLYETHYLENE GLYCOL 3350 17 GRAM(S): 17 POWDER, FOR SOLUTION ORAL at 11:14

## 2019-03-19 NOTE — DISCHARGE NOTE PROVIDER - NSDCCPCAREPLAN_GEN_ALL_CORE_FT
PRINCIPAL DISCHARGE DIAGNOSIS  Diagnosis: Nausea and vomiting  Assessment and Plan of Treatment: Please follow up with your pcp within 1 week of discharge.  Please call to make an appointment.  Status post Endoscopy: positive esophagitis; GJ anastomosis; dilated to a maximum size of 10mm. Positive non-bleeding gastric ulcer; follow up pathology results.   Continue protonix and carafate as prescribed.      SECONDARY DISCHARGE DIAGNOSES  Diagnosis: Gastric cancer  Assessment and Plan of Treatment: History of gastric cancer .  Please follow up with your pcp within 1 week of discharge.  Please call to make an appointment within 1 week of discharge.    Diagnosis: HTN (hypertension)  Assessment and Plan of Treatment: Please follow up with your pcp within 1 week of discharge.  Please call to make an appointment within 1 week of discharge.  You are currently on no blood pressure medications.  Your blood pressure is within normal limits    Diagnosis: HLD (hyperlipidemia)  Assessment and Plan of Treatment: Please follow up with your pcp within 1 week of discharge.  Please call to make an appointment within 1 week of discharge.  continue your medications as prescriebd.    Diagnosis: DM (diabetes mellitus)  Assessment and Plan of Treatment: Continue diabetic diet.  Please follow up with your pcp within 1 week of discharge.  Please call to make an appointment within 1 week of discharge.  Your A1c is 6.8.  Continue to monitor your fingersticks    Diagnosis: History of subdural hematoma  Assessment and Plan of Treatment: CT head on 3/9/19, initial brain injury was 2/19/19 and ASA was held since. repeat stable head CT, off ASA for over a month, patient can be placed on ASA . Please follow up with Neurosurgeon Dr. Mayberry within 1 week of discharge.  Please call to make an appointment within 1 week of discharge.

## 2019-03-19 NOTE — PROGRESS NOTE ADULT - SUBJECTIVE AND OBJECTIVE BOX
Patient is a 78y old  Male who presents with a chief complaint of vomiting and diarrhea (19 Mar 2019 11:38)      SUBJECTIVE / OVERNIGHT EVENTS: No overnight events, tolerating regular diet.  Feels well.       MEDICATIONS  (STANDING):  aspirin enteric coated 81 milliGRAM(s) Oral daily  atorvastatin 20 milliGRAM(s) Oral at bedtime  baclofen 5 milliGRAM(s) Oral three times a day  dextrose 5%. 1000 milliLiter(s) (50 mL/Hr) IV Continuous <Continuous>  dextrose 50% Injectable 12.5 Gram(s) IV Push once  dextrose 50% Injectable 25 Gram(s) IV Push once  dextrose 50% Injectable 25 Gram(s) IV Push once  docusate sodium 100 milliGRAM(s) Oral three times a day  insulin lispro (HumaLOG) corrective regimen sliding scale   SubCutaneous three times a day before meals  loratadine 10 milliGRAM(s) Oral daily  pantoprazole    Tablet 40 milliGRAM(s) Oral before breakfast  polyethylene glycol 3350 17 Gram(s) Oral daily  senna 2 Tablet(s) Oral at bedtime  sucralfate suspension 1 Gram(s) Oral two times a day    MEDICATIONS  (PRN):  aluminum hydroxide/magnesium hydroxide/simethicone Suspension 30 milliLiter(s) Oral every 4 hours PRN Dyspepsia  bisacodyl Suppository 10 milliGRAM(s) Rectal daily PRN Constipation  clobetasol 0.05% Ointment 1 Application(s) Topical two times a day PRN rash  dextrose 40% Gel 15 Gram(s) Oral once PRN Blood Glucose LESS THAN 70 milliGRAM(s)/deciliter  diphenhydrAMINE 25 milliGRAM(s) Oral every 6 hours PRN Rash and/or Itching  glucagon  Injectable 1 milliGRAM(s) IntraMuscular once PRN Glucose LESS THAN 70 milligrams/deciliter  guaiFENesin    Syrup 100 milliGRAM(s) Oral every 6 hours PRN Cough  melatonin 3 milliGRAM(s) Oral at bedtime PRN Insomnia  ondansetron Injectable 4 milliGRAM(s) IV Push every 6 hours PRN Nausea and/or Vomiting  simethicone 80 milliGRAM(s) Chew four times a day PRN Gas      Vital Signs Last 24 Hrs  T(C): 36.8 (19 Mar 2019 05:32), Max: 36.8 (19 Mar 2019 05:32)  T(F): 98.3 (19 Mar 2019 05:32), Max: 98.3 (19 Mar 2019 05:32)  HR: 71 (19 Mar 2019 05:32) (60 - 72)  BP: 106/54 (19 Mar 2019 05:32) (106/54 - 153/55)  BP(mean): --  RR: 17 (19 Mar 2019 05:32) (17 - 18)  SpO2: 99% (19 Mar 2019 05:32) (98% - 100%)  CAPILLARY BLOOD GLUCOSE      POCT Blood Glucose.: 107 mg/dL (19 Mar 2019 12:04)  POCT Blood Glucose.: 108 mg/dL (19 Mar 2019 08:20)  POCT Blood Glucose.: 108 mg/dL (18 Mar 2019 21:04)  POCT Blood Glucose.: 152 mg/dL (18 Mar 2019 16:29)  POCT Blood Glucose.: 71 mg/dL (18 Mar 2019 13:31)      PHYSICAL EXAM  GENERAL: NAD, well-developed, cachectic   HEAD:  Atraumatic, Normocephalic  EYES: EOMI, PERRLA, conjunctiva and sclera clear  NECK: Supple, No JVD  CHEST/LUNG: Clear to auscultation bilaterally; No wheeze  HEART: Regular rate and rhythm; No murmurs, rubs, or gallops  ABDOMEN: Soft, Nontender, Nondistended; Bowel sounds present  EXTREMITIES:  2+ Peripheral Pulses, No clubbing, cyanosis, or edema  PSYCH: AAOx3  SKIN: No rashes or lesions    LABS:                        9.0    5.91  )-----------( 222      ( 18 Mar 2019 05:50 )             27.5     03-19    133<L>  |  100  |  11  ----------------------------<  100<H>  4.2   |  24  |  0.88    Ca    8.5      19 Mar 2019 05:51          RADIOLOGY & ADDITIONAL TESTS:    Imaging Personally Reviewed:  < from: Upper Endoscopy (03.18.19 @ 10:35) >  Findings:       LA Grade D (one or more mucosal breaks involving at least 75% of        esophageal circumference) esophagitis with bleeding was found 25 to 36        cm from the incisors. Biopsies were taken with a cold forceps for      histology.       A 4 cm hiatus hernia was found. The hiatal narrowing was 40 cm from the        incisors. The Z-line was 36 cm from the incisors.       Diffuse moderately erythematous mucosa was found in the gastric body.        Biopsies were taken with a cold forceps for histology.       A benign-appearing, intrinsic severe stenosis with a maximal nner        diameter of 6 mm was found at the afferent limb anastomosis, bile was        seen refluxing out. This was traversed after dilation.. A TTS dilator        was passed through the scope. Dilation with an 8-9-10 mm balloon (to a        maximum balloon size of 10 mm) dilator was performed.       The efferent limb anastomosis margin was visualized and traversed        without difficulty.      One gastric ulcer with a suture visible with no stigmata of bleeding was        found in the gastric body. The lesion was 3 mm in largest dimension.                                                                                   Impression:     - LA Grade D reflux esophagitis. Biopsied.                       - 4 cm hiatus hernia.                       - Erythematous mucosa in the gastric body. Biopsied.                       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 Dilated to a maximum size of 10 mm.                       - Gastric ulcer with no stigmata of bleeding.  Recommendation:      - [facility].                       - Await pathology results.                       - Advance diet as tolerated (soft).                       - Use a proton pump inhibitor PO daily.                       - Use sucralfate suspension 1 gram PO BID.    < end of copied text >    Consultant(s) Notes Reviewed:  GI

## 2019-03-19 NOTE — DISCHARGE NOTE PROVIDER - CARE PROVIDERS DIRECT ADDRESSES
[email or web address]

## 2019-03-19 NOTE — PROGRESS NOTE ADULT - NSICDXPROBLEM_GEN_ALL_CORE_FT
PROBLEM DIAGNOSES  Problem: Nausea and vomiting  Assessment and Plan: - Pt s/p EGD, found to have gastric stenosis (likely postoperative changes), which was likely causing patient's symptoms  - GI team performed dilation during procedure, symptoms can likely recur   - Continue with PPI daily and Carafate BID   - Pt will need close outpatient GI f/u (he has not followed a GI MD regularly despite history of gastric CA)   - Can f/u biopsy results as outpatient (already has appt with GI Dr. Lay on April 9th 12:30PM)   - Continue soft diet     Problem: DM (diabetes mellitus)  Assessment and Plan: - Continue carb consistent diet  - On HISS     Problem: Gastric cancer  Assessment and Plan: - H/o gastric cancer in the past now with wt loss.  - Concern for possible recurrence, biopsies pending, can f/u with GI as outpatient     Problem: HLD (hyperlipidemia)  Assessment and Plan: - Continue statin     Problem: History of subdural hematoma  Assessment and Plan: - Pt recently discharged where he was found to have SAH and chronic SDH  - No acute neurosurgical intervention at the time was advised and pt did not f/u with neurosx as outpatient   - Pt now restarted on ASA due to hx of CAD   - CT head with no evidence of acute intracranial hemorrhage and no new neuro deficits on exam   - Will need outpatient f/u with neurosurgery (Dr. Asaf Brown)     Problem: Need for prophylactic measure  Assessment and Plan: - IMPROVE 2  - On Venodynes given recent SAH   - Medically stable for d/c home today.  Spoke with CM to reinstate home services   - D/C time: 31 minuets including discussion of care with daughter

## 2019-03-19 NOTE — PROGRESS NOTE ADULT - ASSESSMENT
77 yo m h/o cad/cabg 2002, diastolic chf,  gastric ca s/p surgery 2006, chemoradiation. HTN, HLD, dm, JACK, GERD, carotid stenosis. chronic diffuse pruritic rash, recently admitted here ( feb 19-26th ) for rash, headache, nausea, vomiting and wt loss, had MRI which revealed SAH, MRCP revealed Dilated main pancreatic duct down to the neck. Etiology for n/v inc. GERD  vs. pancreatic duct stone. No gastric outlet obstruction on Barium swallow test, s/p EGD showing gastric stenosis requiring dilation.

## 2019-03-19 NOTE — DISCHARGE NOTE PROVIDER - CARE PROVIDER_API CALL
Shaun Mayberry)  Neurological Surgery  [address] [place] [address]  Phone: [phone]  Fax: [phone]  Follow Up Time:     Orion Verduzco)  Gastroenterology; Internal Medicine  [address] [place] [address] [address]  [place] [address]  Phone: [phone]  Fax: [phone]  Follow Up Time:     Mor Woodard)  Cardiovascular Disease; Internal Medicine  [address] [place] [address]  Phone: [phone]  Fax: [phone]  Follow Up Time:

## 2019-03-19 NOTE — PROGRESS NOTE ADULT - REASON FOR ADMISSION
vomiting and diarrhea

## 2019-03-19 NOTE — DISCHARGE NOTE NURSING/CASE MANAGEMENT/SOCIAL WORK - NSDCDPATPORTLINK_GEN_ALL_CORE
You can access the [email or web address] Portal, offered by [facility], by registering with the following website: [email or web address]

## 2019-03-19 NOTE — DISCHARGE NOTE PROVIDER - HOSPITAL COURSE
77 yo m h/o cad/cabg 2002, diastolic chf,  gastric ca s/p surgery 2006, chemoradiation. HTN, HLD, dm, JACK, GERD, carotid stenosis. chronic diffuse  pruritic rash, recently admitted here ( feb 19-26th for rash, headache, nausea, vomiting and wt loss, had MRI which revealed SAH, MRCP revealed Dilated main pancreatic duct down to the neck, likely secondary to an intraductal calculus seen by GI and was advised to f/u as outpt for EUS) a/w vomiting, diarrhea and wt loss.            Nausea and vomiting.      -Ct abd/pelvis showed mild fluid distended stomach and distal esophagus with wall thickening,     -CT head: No acute intracranial findings.    -3/12 upper GI series and small bowel series     -neuro sx called (pt had brain injury last admission)- patient had a stable CT head on 3/9/19, initial brain injury was 2/19/19 and ASA was held since---With stable head CT, off ASA for over a month, patient can be placed on ASA per primary team if needed.    -3/18:    - LA Grade D reflux esophagitis. Biopsied.                         - 4 cm hiatus hernia.                         - Erythematous mucosa in the gastric body. Biopsied.                         - Gastric stenosis was found at the afferent limb GJ                          anastomosis. Dilated to a maximum size of 10 mm.                         - Gastric ulcer with no stigmata of bleeding.                         - Await pathology results.                         - Advance diet as tolerated (soft).                         - Use a proton pump inhibitor PO daily.                         - Use sucralfate suspension 1 gram PO BID.            Gastric cancer.     -H/o gastric cancer in the past now with wt loss        HTN     - Pt not on any meds, monitor bp and start meds prn.         HLD     -cont statin.        DM (diabetes mellitus)    -cont consistent carb diet    -monitor finger    - a1c:6.8        Dispo: Home with Home Care

## 2019-03-19 NOTE — PROGRESS NOTE ADULT - SUBJECTIVE AND OBJECTIVE BOX
Chief Complaint:  Patient is a 78y old  Male who presents with a chief complaint of vomiting and diarrhea (18 Mar 2019 14:39)    Interval Events:   No acute overnight events. Patient denies any specific complaints. Reports nausea has improved post-procedure    Allergies:  No Known Allergies    Hospital Medications:  aluminum hydroxide/magnesium hydroxide/simethicone Suspension 30 milliLiter(s) Oral every 4 hours PRN  aspirin enteric coated 81 milliGRAM(s) Oral daily  atorvastatin 20 milliGRAM(s) Oral at bedtime  baclofen 5 milliGRAM(s) Oral three times a day  bisacodyl Suppository 10 milliGRAM(s) Rectal daily PRN  clobetasol 0.05% Ointment 1 Application(s) Topical two times a day PRN  dextrose 40% Gel 15 Gram(s) Oral once PRN  dextrose 5%. 1000 milliLiter(s) IV Continuous <Continuous>  dextrose 50% Injectable 12.5 Gram(s) IV Push once  dextrose 50% Injectable 25 Gram(s) IV Push once  dextrose 50% Injectable 25 Gram(s) IV Push once  diphenhydrAMINE 25 milliGRAM(s) Oral every 6 hours PRN  docusate sodium 100 milliGRAM(s) Oral three times a day  glucagon  Injectable 1 milliGRAM(s) IntraMuscular once PRN  guaiFENesin    Syrup 100 milliGRAM(s) Oral every 6 hours PRN  insulin lispro (HumaLOG) corrective regimen sliding scale   SubCutaneous three times a day before meals  loratadine 10 milliGRAM(s) Oral daily  melatonin 3 milliGRAM(s) Oral at bedtime PRN  ondansetron Injectable 4 milliGRAM(s) IV Push every 6 hours PRN  pantoprazole    Tablet 40 milliGRAM(s) Oral before breakfast  polyethylene glycol 3350 17 Gram(s) Oral daily  senna 2 Tablet(s) Oral at bedtime  simethicone 80 milliGRAM(s) Chew four times a day PRN  sucralfate suspension 1 Gram(s) Oral two times a day    PMHX/PSHX:  GERD (gastroesophageal reflux disease)  Gastric cancer  Carotid stenosis  JACK (iron deficiency anemia)  HLD (hyperlipidemia)  HTN (hypertension)  CAD (coronary artery disease)  Hives  DM (diabetes mellitus)  BPH (Benign Prostatic Hypertrophy)  GERD (Gastroesophageal Reflux Disease)  S/P CABG X 3  Hypercholesteremia  CAD (Coronary Artery Disease)  S/P Radiation > 12 Weeks  S/P Chemotherapy, Time Since Greater than 12 Weeks  Anemia  BPH (Benign Prostatic Hypertrophy)  GERD (Gastroesophageal Reflux Disease)  Dyslipidemia  HTN - Hypertension  CAD (Coronary Artery Disease)  History of Stomach Cancer  S/P Gastrectomy  Atypical Chest Pain  Status Post Gastrectomy  S/P CABG X 4  Status Post Gastrectomy  S/P CABG X 4      ROS: General:  No fevers, chills or night sweats  ENT:  No sore throat or dysphagia  CV:  No pain or palpitations  Resp:  No dyspnea, cough or  wheezing  GI:  No pain, No nausea, No vomiting, o weight loss, No rectal bleeding, No tarry stools,  Skin:  No rash or edema    PHYSICAL EXAM:   Vital Signs:  Vital Signs Last 24 Hrs  T(C): 36.8 (19 Mar 2019 05:32), Max: 36.8 (19 Mar 2019 05:32)  T(F): 98.3 (19 Mar 2019 05:32), Max: 98.3 (19 Mar 2019 05:32)  HR: 71 (19 Mar 2019 05:32) (60 - 72)  BP: 106/54 (19 Mar 2019 05:32) (106/54 - 153/55)  BP(mean): --  RR: 17 (19 Mar 2019 05:32) (17 - 18)  SpO2: 99% (19 Mar 2019 05:32) (98% - 100%)  Daily     Daily     GENERAL:  NAD, Appears stated age  HEENT:  NC/AT,  conjunctivae clear and pink, sclera -anicteric  CHEST:  Normal Effort, Breath sounds clear  HEART:  RRR, S1 + S2, no murmurs  ABDOMEN:  Soft, non-tender, non-distended, BS+  EXTREMITIES  no cyanosis or edema  SKIN:  Warm & Dry.  NEURO:  Alert, oriented    LABS:                        9.0    5.91  )-----------( 222      ( 18 Mar 2019 05:50 )             27.5       03-19    133<L>  |  100  |  11  ----------------------------<  100<H>  4.2   |  24  |  0.88    Ca    8.5      19 Mar 2019 05:51    Imaging:

## 2019-03-19 NOTE — PROGRESS NOTE ADULT - SUBJECTIVE AND OBJECTIVE BOX
Mor Woodard MD  Interventional Cardiology  [place] Office : [phone] [place] [address]  Tel:   [place] Office : [address] [facility] [place] [address]  Tel: [phone]  Cell : [phone]    Subjective : Pt lying in bed comfortable, not in distress, denies any chest pain or SOB  	  MEDICATIONS:  aspirin enteric coated 81 milliGRAM(s) Oral daily      guaiFENesin    Syrup 100 milliGRAM(s) Oral every 6 hours PRN  loratadine 10 milliGRAM(s) Oral daily    baclofen 5 milliGRAM(s) Oral three times a day  diphenhydrAMINE 25 milliGRAM(s) Oral every 6 hours PRN  melatonin 3 milliGRAM(s) Oral at bedtime PRN  ondansetron Injectable 4 milliGRAM(s) IV Push every 6 hours PRN    aluminum hydroxide/magnesium hydroxide/simethicone Suspension 30 milliLiter(s) Oral every 4 hours PRN  bisacodyl Suppository 10 milliGRAM(s) Rectal daily PRN  docusate sodium 100 milliGRAM(s) Oral three times a day  pantoprazole    Tablet 40 milliGRAM(s) Oral before breakfast  polyethylene glycol 3350 17 Gram(s) Oral daily  senna 2 Tablet(s) Oral at bedtime  simethicone 80 milliGRAM(s) Chew four times a day PRN  sucralfate suspension 1 Gram(s) Oral two times a day    atorvastatin 20 milliGRAM(s) Oral at bedtime  dextrose 40% Gel 15 Gram(s) Oral once PRN  dextrose 50% Injectable 12.5 Gram(s) IV Push once  dextrose 50% Injectable 25 Gram(s) IV Push once  dextrose 50% Injectable 25 Gram(s) IV Push once  glucagon  Injectable 1 milliGRAM(s) IntraMuscular once PRN  insulin lispro (HumaLOG) corrective regimen sliding scale   SubCutaneous three times a day before meals    clobetasol 0.05% Ointment 1 Application(s) Topical two times a day PRN  dextrose 5%. 1000 milliLiter(s) IV Continuous <Continuous>      PHYSICAL EXAM:  T(C): 36.4 (03-19-19 @ 14:35), Max: 36.8 (03-19-19 @ 05:32)  HR: 69 (03-19-19 @ 14:35) (69 - 72)  BP: 122/63 (03-19-19 @ 14:35) (106/54 - 122/63)  RR: 18 (03-19-19 @ 14:35) (17 - 18)  SpO2: 100% (03-19-19 @ 14:35) (98% - 100%)  Wt(kg): --  I&O's Summary        Appearance: chachectic  HEENT:   Normal oral mucosa, PERRL, EOMI	  Cardiovascular: Normal S1 S2, No JVD, No murmurs, No edema  Respiratory: Lungs clear to auscultation	  Gastrointestinal:  Soft, Non-tender, + BS	  Extremities: Normal range of motion, No clubbing, cyanosis or edema                                    9.0    5.91  )-----------( 222      ( 18 Mar 2019 05:50 )             27.5     03-19    133<L>  |  100  |  11  ----------------------------<  100<H>  4.2   |  24  |  0.88    Ca    8.5      19 Mar 2019 05:51      proBNP:   Lipid Profile:   HgA1c:   TSH:

## 2019-03-19 NOTE — PROGRESS NOTE ADULT - ASSESSMENT
EKG SR 1st degree AV block , LBBB (old)    Echo: < from: Transthoracic Echocardiogram (02.21.19 @ 21:03) >  opening.  2. Normal left ventricular internal dimensions and wall  thicknesses.  3. Normal left ventricular systolic function. No segmental  wall motion abnormalities.  4. Normal right ventricular size and function.  5. Estimatedpulmonary artery systolic pressure equals 40  mm Hg, assuming right atrial pressure equals 10  mm Hg,  consistent with mild pulmonary hypertension.    < end of copied text >    NST:   < from: Nuclear Stress Test-Pharmacologic (04.03.17 @ 10:00) >  * Myocardial Perfusion SPECT results are abnormal.  * There is a medium sized moderate to severe defect in  proximal to mid septal wall  and mild defect in  proximal  to mid inferior wall which are predominantly reversible  consistent with moderate to severe basal septal  ischemia  and mild proximal to mid inferior ischemia  * Post-stress gated wall motion analysis was performed  (LVEF = 55 %;LVEDV = 67 ml.), revealing normal LV  function. There was paradoxical motion of the septum (LBBB  and post CABG). RV function appeared normal.    < end of copied text >      Assessment and Plan     1) Perioperative risk assessment/management:  Tolerated EGD and dilatation well, c/w asa , lipitor f/u info given for OP f/u and counseled regarding compliance       2) Anemia/ N / V:  f/U GI     3) DVT PPX : SCD

## 2019-03-19 NOTE — DISCHARGE NOTE PROVIDER - PROVIDER TOKENS
PROVIDER:[TOKEN:[[id number]]],PROVIDER:[TOKEN:[[id number]]],PROVIDER:[TOKEN:[[id number]]]

## 2019-03-28 ENCOUNTER — APPOINTMENT (OUTPATIENT)
Age: 79
End: 2019-03-28

## 2019-03-29 ENCOUNTER — APPOINTMENT (OUTPATIENT)
Dept: CARDIOLOGY | Facility: CLINIC | Age: 79
End: 2019-03-29

## 2019-04-03 ENCOUNTER — INPATIENT (INPATIENT)
Facility: HOSPITAL | Age: 79
LOS: 6 days | Discharge: HOME HEALTH | End: 2019-04-10
Attending: INTERNAL MEDICINE | Admitting: INTERNAL MEDICINE
Payer: MEDICARE

## 2019-04-03 ENCOUNTER — APPOINTMENT (OUTPATIENT)
Dept: NEUROSURGERY | Facility: CLINIC | Age: 79
End: 2019-04-03

## 2019-04-03 VITALS
DIASTOLIC BLOOD PRESSURE: 51 MMHG | HEART RATE: 104 BPM | OXYGEN SATURATION: 98 % | SYSTOLIC BLOOD PRESSURE: 102 MMHG | RESPIRATION RATE: 16 BRPM | TEMPERATURE: 99 F | WEIGHT: 163.14 LBS

## 2019-04-03 DIAGNOSIS — A41.9 SEPSIS, UNSPECIFIED ORGANISM: ICD-10-CM

## 2019-04-03 DIAGNOSIS — I10 ESSENTIAL (PRIMARY) HYPERTENSION: ICD-10-CM

## 2019-04-03 DIAGNOSIS — S06.5X9A TRAUMATIC SUBDURAL HEMORRHAGE WITH LOSS OF CONSCIOUSNESS OF UNSPECIFIED DURATION, INITIAL ENCOUNTER: ICD-10-CM

## 2019-04-03 DIAGNOSIS — E11.9 TYPE 2 DIABETES MELLITUS WITHOUT COMPLICATIONS: ICD-10-CM

## 2019-04-03 DIAGNOSIS — Z29.9 ENCOUNTER FOR PROPHYLACTIC MEASURES, UNSPECIFIED: ICD-10-CM

## 2019-04-03 DIAGNOSIS — J18.9 PNEUMONIA, UNSPECIFIED ORGANISM: ICD-10-CM

## 2019-04-03 DIAGNOSIS — I25.10 ATHEROSCLEROTIC HEART DISEASE OF NATIVE CORONARY ARTERY WITHOUT ANGINA PECTORIS: ICD-10-CM

## 2019-04-03 LAB
ALBUMIN SERPL ELPH-MCNC: 3.6 G/DL — SIGNIFICANT CHANGE UP (ref 3.3–5)
ALP SERPL-CCNC: 75 U/L — SIGNIFICANT CHANGE UP (ref 40–120)
ALT FLD-CCNC: 25 U/L — SIGNIFICANT CHANGE UP (ref 4–41)
ANION GAP SERPL CALC-SCNC: 11 MMO/L — SIGNIFICANT CHANGE UP (ref 7–14)
ANISOCYTOSIS BLD QL: SLIGHT — SIGNIFICANT CHANGE UP
APTT BLD: 27.7 SEC — SIGNIFICANT CHANGE UP (ref 27.5–36.3)
AST SERPL-CCNC: 32 U/L — SIGNIFICANT CHANGE UP (ref 4–40)
B PERT DNA SPEC QL NAA+PROBE: NOT DETECTED — SIGNIFICANT CHANGE UP
BASE EXCESS BLDV CALC-SCNC: -0.5 MMOL/L — SIGNIFICANT CHANGE UP
BASE EXCESS BLDV CALC-SCNC: 1.6 MMOL/L — SIGNIFICANT CHANGE UP
BASOPHILS # BLD AUTO: 0.03 K/UL — SIGNIFICANT CHANGE UP (ref 0–0.2)
BASOPHILS NFR BLD AUTO: 0.2 % — SIGNIFICANT CHANGE UP (ref 0–2)
BASOPHILS NFR SPEC: 0 % — SIGNIFICANT CHANGE UP (ref 0–2)
BILIRUB SERPL-MCNC: 1.1 MG/DL — SIGNIFICANT CHANGE UP (ref 0.2–1.2)
BLASTS # FLD: 0 % — SIGNIFICANT CHANGE UP (ref 0–0)
BLOOD GAS VENOUS - CREATININE: 1 MG/DL — SIGNIFICANT CHANGE UP (ref 0.5–1.3)
BLOOD GAS VENOUS - CREATININE: 1.14 MG/DL — SIGNIFICANT CHANGE UP (ref 0.5–1.3)
BUN SERPL-MCNC: 21 MG/DL — SIGNIFICANT CHANGE UP (ref 7–23)
C PNEUM DNA SPEC QL NAA+PROBE: NOT DETECTED — SIGNIFICANT CHANGE UP
CALCIUM SERPL-MCNC: 8.7 MG/DL — SIGNIFICANT CHANGE UP (ref 8.4–10.5)
CHLORIDE BLDV-SCNC: 101 MMOL/L — SIGNIFICANT CHANGE UP (ref 96–108)
CHLORIDE BLDV-SCNC: 102 MMOL/L — SIGNIFICANT CHANGE UP (ref 96–108)
CHLORIDE SERPL-SCNC: 99 MMOL/L — SIGNIFICANT CHANGE UP (ref 98–107)
CO2 SERPL-SCNC: 24 MMOL/L — SIGNIFICANT CHANGE UP (ref 22–31)
CREAT SERPL-MCNC: 1.25 MG/DL — SIGNIFICANT CHANGE UP (ref 0.5–1.3)
EOSINOPHIL # BLD AUTO: 0.01 K/UL — SIGNIFICANT CHANGE UP (ref 0–0.5)
EOSINOPHIL NFR BLD AUTO: 0.1 % — SIGNIFICANT CHANGE UP (ref 0–6)
EOSINOPHIL NFR FLD: 0 % — SIGNIFICANT CHANGE UP (ref 0–6)
FLUAV H1 2009 PAND RNA SPEC QL NAA+PROBE: NOT DETECTED — SIGNIFICANT CHANGE UP
FLUAV H1 RNA SPEC QL NAA+PROBE: NOT DETECTED — SIGNIFICANT CHANGE UP
FLUAV H3 RNA SPEC QL NAA+PROBE: NOT DETECTED — SIGNIFICANT CHANGE UP
FLUAV SUBTYP SPEC NAA+PROBE: NOT DETECTED — SIGNIFICANT CHANGE UP
FLUBV RNA SPEC QL NAA+PROBE: NOT DETECTED — SIGNIFICANT CHANGE UP
GAS PNL BLDV: 128 MMOL/L — LOW (ref 136–146)
GAS PNL BLDV: 132 MMOL/L — LOW (ref 136–146)
GIANT PLATELETS BLD QL SMEAR: PRESENT — SIGNIFICANT CHANGE UP
GLUCOSE BLDV-MCNC: 106 — HIGH (ref 70–99)
GLUCOSE BLDV-MCNC: 106 — HIGH (ref 70–99)
GLUCOSE SERPL-MCNC: 104 MG/DL — HIGH (ref 70–99)
HADV DNA SPEC QL NAA+PROBE: NOT DETECTED — SIGNIFICANT CHANGE UP
HCO3 BLDV-SCNC: 24 MMOL/L — SIGNIFICANT CHANGE UP (ref 20–27)
HCO3 BLDV-SCNC: 24 MMOL/L — SIGNIFICANT CHANGE UP (ref 20–27)
HCOV PNL SPEC NAA+PROBE: SIGNIFICANT CHANGE UP
HCT VFR BLD CALC: 35.5 % — LOW (ref 39–50)
HCT VFR BLDV CALC: 28.6 % — LOW (ref 39–51)
HCT VFR BLDV CALC: 34.2 % — LOW (ref 39–51)
HGB BLD-MCNC: 10.8 G/DL — LOW (ref 13–17)
HGB BLDV-MCNC: 11.1 G/DL — LOW (ref 13–17)
HGB BLDV-MCNC: 9.2 G/DL — LOW (ref 13–17)
HMPV RNA SPEC QL NAA+PROBE: NOT DETECTED — SIGNIFICANT CHANGE UP
HPIV1 RNA SPEC QL NAA+PROBE: NOT DETECTED — SIGNIFICANT CHANGE UP
HPIV2 RNA SPEC QL NAA+PROBE: NOT DETECTED — SIGNIFICANT CHANGE UP
HPIV3 RNA SPEC QL NAA+PROBE: NOT DETECTED — SIGNIFICANT CHANGE UP
HPIV4 RNA SPEC QL NAA+PROBE: NOT DETECTED — SIGNIFICANT CHANGE UP
HYPOCHROMIA BLD QL: SLIGHT — SIGNIFICANT CHANGE UP
IMM GRANULOCYTES NFR BLD AUTO: 3.5 % — HIGH (ref 0–1.5)
INR BLD: 1.37 — HIGH (ref 0.88–1.17)
LACTATE BLDV-MCNC: 1.9 MMOL/L — SIGNIFICANT CHANGE UP (ref 0.5–2)
LACTATE BLDV-MCNC: 2.7 MMOL/L — HIGH (ref 0.5–2)
LYMPHOCYTES # BLD AUTO: 0.93 K/UL — LOW (ref 1–3.3)
LYMPHOCYTES # BLD AUTO: 5.7 % — LOW (ref 13–44)
LYMPHOCYTES NFR SPEC AUTO: 0.9 % — LOW (ref 13–44)
MACROCYTES BLD QL: SLIGHT — SIGNIFICANT CHANGE UP
MCHC RBC-ENTMCNC: 26.1 PG — LOW (ref 27–34)
MCHC RBC-ENTMCNC: 30.4 % — LOW (ref 32–36)
MCV RBC AUTO: 85.7 FL — SIGNIFICANT CHANGE UP (ref 80–100)
METAMYELOCYTES # FLD: 0 % — SIGNIFICANT CHANGE UP (ref 0–1)
MONOCYTES # BLD AUTO: 0.99 K/UL — HIGH (ref 0–0.9)
MONOCYTES NFR BLD AUTO: 6.1 % — SIGNIFICANT CHANGE UP (ref 2–14)
MONOCYTES NFR BLD: 6.1 % — SIGNIFICANT CHANGE UP (ref 2–9)
MYELOCYTES NFR BLD: 0 % — SIGNIFICANT CHANGE UP (ref 0–0)
NEUTROPHIL AB SER-ACNC: 66.7 % — SIGNIFICANT CHANGE UP (ref 43–77)
NEUTROPHILS # BLD AUTO: 13.72 K/UL — HIGH (ref 1.8–7.4)
NEUTROPHILS NFR BLD AUTO: 84.4 % — HIGH (ref 43–77)
NEUTS BAND # BLD: 22.8 % — HIGH (ref 0–6)
NRBC # FLD: 0 K/UL — SIGNIFICANT CHANGE UP (ref 0–0)
OTHER - HEMATOLOGY %: 0 — SIGNIFICANT CHANGE UP
OVALOCYTES BLD QL SMEAR: SLIGHT — SIGNIFICANT CHANGE UP
PCO2 BLDV: 38 MMHG — LOW (ref 41–51)
PCO2 BLDV: 47 MMHG — SIGNIFICANT CHANGE UP (ref 41–51)
PH BLDV: 7.37 PH — SIGNIFICANT CHANGE UP (ref 7.32–7.43)
PH BLDV: 7.41 PH — SIGNIFICANT CHANGE UP (ref 7.32–7.43)
PLATELET # BLD AUTO: 229 K/UL — SIGNIFICANT CHANGE UP (ref 150–400)
PLATELET COUNT - ESTIMATE: NORMAL — SIGNIFICANT CHANGE UP
PMV BLD: 10.2 FL — SIGNIFICANT CHANGE UP (ref 7–13)
PO2 BLDV: 25 MMHG — LOW (ref 35–40)
PO2 BLDV: 44 MMHG — HIGH (ref 35–40)
POIKILOCYTOSIS BLD QL AUTO: SLIGHT — SIGNIFICANT CHANGE UP
POLYCHROMASIA BLD QL SMEAR: SIGNIFICANT CHANGE UP
POTASSIUM BLDV-SCNC: 3.9 MMOL/L — SIGNIFICANT CHANGE UP (ref 3.4–4.5)
POTASSIUM BLDV-SCNC: 4.2 MMOL/L — SIGNIFICANT CHANGE UP (ref 3.4–4.5)
POTASSIUM SERPL-MCNC: 4.2 MMOL/L — SIGNIFICANT CHANGE UP (ref 3.5–5.3)
POTASSIUM SERPL-SCNC: 4.2 MMOL/L — SIGNIFICANT CHANGE UP (ref 3.5–5.3)
PROMYELOCYTES # FLD: 0 % — SIGNIFICANT CHANGE UP (ref 0–0)
PROT SERPL-MCNC: 6.9 G/DL — SIGNIFICANT CHANGE UP (ref 6–8.3)
PROTHROM AB SERPL-ACNC: 15.3 SEC — HIGH (ref 9.8–13.1)
RBC # BLD: 4.14 M/UL — LOW (ref 4.2–5.8)
RBC # FLD: 16 % — HIGH (ref 10.3–14.5)
REVIEW TO FOLLOW: YES — SIGNIFICANT CHANGE UP
RSV RNA SPEC QL NAA+PROBE: NOT DETECTED — SIGNIFICANT CHANGE UP
RV+EV RNA SPEC QL NAA+PROBE: NOT DETECTED — SIGNIFICANT CHANGE UP
SAO2 % BLDV: 37 % — LOW (ref 60–85)
SAO2 % BLDV: 77.5 % — SIGNIFICANT CHANGE UP (ref 60–85)
SODIUM SERPL-SCNC: 134 MMOL/L — LOW (ref 135–145)
TROPONIN T, HIGH SENSITIVITY: 32 NG/L — SIGNIFICANT CHANGE UP (ref ?–14)
TROPONIN T, HIGH SENSITIVITY: 35 NG/L — SIGNIFICANT CHANGE UP (ref ?–14)
VARIANT LYMPHS # BLD: 3.5 % — SIGNIFICANT CHANGE UP
WBC # BLD: 16.25 K/UL — HIGH (ref 3.8–10.5)
WBC # FLD AUTO: 16.25 K/UL — HIGH (ref 3.8–10.5)

## 2019-04-03 PROCEDURE — 99223 1ST HOSP IP/OBS HIGH 75: CPT | Mod: GC

## 2019-04-03 PROCEDURE — 71045 X-RAY EXAM CHEST 1 VIEW: CPT | Mod: 26

## 2019-04-03 PROCEDURE — 70450 CT HEAD/BRAIN W/O DYE: CPT | Mod: 26

## 2019-04-03 RX ORDER — LEVOCETIRIZINE DIHYDROCHLORIDE 0.5 MG/ML
1 SOLUTION ORAL
Qty: 0 | Refills: 0 | COMMUNITY

## 2019-04-03 RX ORDER — MIDODRINE HYDROCHLORIDE 2.5 MG/1
10 TABLET ORAL ONCE
Qty: 0 | Refills: 0 | Status: COMPLETED | OUTPATIENT
Start: 2019-04-03 | End: 2019-04-03

## 2019-04-03 RX ORDER — DEXTROSE 50 % IN WATER 50 %
25 SYRINGE (ML) INTRAVENOUS ONCE
Qty: 0 | Refills: 0 | Status: DISCONTINUED | OUTPATIENT
Start: 2019-04-03 | End: 2019-04-05

## 2019-04-03 RX ORDER — INSULIN LISPRO 100/ML
VIAL (ML) SUBCUTANEOUS EVERY 6 HOURS
Qty: 0 | Refills: 0 | Status: DISCONTINUED | OUTPATIENT
Start: 2019-04-03 | End: 2019-04-04

## 2019-04-03 RX ORDER — NOREPINEPHRINE BITARTRATE/D5W 8 MG/250ML
0.1 PLASTIC BAG, INJECTION (ML) INTRAVENOUS
Qty: 8 | Refills: 0 | Status: DISCONTINUED | OUTPATIENT
Start: 2019-04-03 | End: 2019-04-03

## 2019-04-03 RX ORDER — ACETAMINOPHEN 500 MG
975 TABLET ORAL ONCE
Qty: 0 | Refills: 0 | Status: COMPLETED | OUTPATIENT
Start: 2019-04-03 | End: 2019-04-03

## 2019-04-03 RX ORDER — SODIUM CHLORIDE 9 MG/ML
1000 INJECTION INTRAMUSCULAR; INTRAVENOUS; SUBCUTANEOUS ONCE
Qty: 0 | Refills: 0 | Status: COMPLETED | OUTPATIENT
Start: 2019-04-03 | End: 2019-04-03

## 2019-04-03 RX ORDER — SODIUM CHLORIDE 9 MG/ML
1000 INJECTION, SOLUTION INTRAVENOUS
Qty: 0 | Refills: 0 | Status: DISCONTINUED | OUTPATIENT
Start: 2019-04-03 | End: 2019-04-05

## 2019-04-03 RX ORDER — DEXTROSE 50 % IN WATER 50 %
12.5 SYRINGE (ML) INTRAVENOUS ONCE
Qty: 0 | Refills: 0 | Status: DISCONTINUED | OUTPATIENT
Start: 2019-04-03 | End: 2019-04-05

## 2019-04-03 RX ORDER — PIPERACILLIN AND TAZOBACTAM 4; .5 G/20ML; G/20ML
3.38 INJECTION, POWDER, LYOPHILIZED, FOR SOLUTION INTRAVENOUS EVERY 8 HOURS
Qty: 0 | Refills: 0 | Status: DISCONTINUED | OUTPATIENT
Start: 2019-04-03 | End: 2019-04-09

## 2019-04-03 RX ORDER — DEXTROSE 50 % IN WATER 50 %
15 SYRINGE (ML) INTRAVENOUS ONCE
Qty: 0 | Refills: 0 | Status: DISCONTINUED | OUTPATIENT
Start: 2019-04-03 | End: 2019-04-05

## 2019-04-03 RX ORDER — VANCOMYCIN HCL 1 G
1000 VIAL (EA) INTRAVENOUS ONCE
Qty: 0 | Refills: 0 | Status: COMPLETED | OUTPATIENT
Start: 2019-04-03 | End: 2019-04-03

## 2019-04-03 RX ORDER — PIPERACILLIN AND TAZOBACTAM 4; .5 G/20ML; G/20ML
3.38 INJECTION, POWDER, LYOPHILIZED, FOR SOLUTION INTRAVENOUS ONCE
Qty: 0 | Refills: 0 | Status: COMPLETED | OUTPATIENT
Start: 2019-04-03 | End: 2019-04-03

## 2019-04-03 RX ORDER — GLUCAGON INJECTION, SOLUTION 0.5 MG/.1ML
1 INJECTION, SOLUTION SUBCUTANEOUS ONCE
Qty: 0 | Refills: 0 | Status: DISCONTINUED | OUTPATIENT
Start: 2019-04-03 | End: 2019-04-05

## 2019-04-03 RX ORDER — SODIUM CHLORIDE 9 MG/ML
1000 INJECTION INTRAMUSCULAR; INTRAVENOUS; SUBCUTANEOUS
Qty: 0 | Refills: 0 | Status: DISCONTINUED | OUTPATIENT
Start: 2019-04-03 | End: 2019-04-04

## 2019-04-03 RX ORDER — PIPERACILLIN AND TAZOBACTAM 4; .5 G/20ML; G/20ML
4.5 INJECTION, POWDER, LYOPHILIZED, FOR SOLUTION INTRAVENOUS ONCE
Qty: 0 | Refills: 0 | Status: DISCONTINUED | OUTPATIENT
Start: 2019-04-03 | End: 2019-04-03

## 2019-04-03 RX ADMIN — Medication 13.88 MICROGRAM(S)/KG/MIN: at 20:06

## 2019-04-03 RX ADMIN — MIDODRINE HYDROCHLORIDE 10 MILLIGRAM(S): 2.5 TABLET ORAL at 19:05

## 2019-04-03 RX ADMIN — PIPERACILLIN AND TAZOBACTAM 3.38 GRAM(S): 4; .5 INJECTION, POWDER, LYOPHILIZED, FOR SOLUTION INTRAVENOUS at 11:00

## 2019-04-03 RX ADMIN — SODIUM CHLORIDE 1000 MILLILITER(S): 9 INJECTION INTRAMUSCULAR; INTRAVENOUS; SUBCUTANEOUS at 10:15

## 2019-04-03 RX ADMIN — SODIUM CHLORIDE 1000 MILLILITER(S): 9 INJECTION INTRAMUSCULAR; INTRAVENOUS; SUBCUTANEOUS at 10:45

## 2019-04-03 RX ADMIN — Medication 250 MILLIGRAM(S): at 11:41

## 2019-04-03 RX ADMIN — Medication 975 MILLIGRAM(S): at 17:42

## 2019-04-03 RX ADMIN — PIPERACILLIN AND TAZOBACTAM 25 GRAM(S): 4; .5 INJECTION, POWDER, LYOPHILIZED, FOR SOLUTION INTRAVENOUS at 20:06

## 2019-04-03 RX ADMIN — PIPERACILLIN AND TAZOBACTAM 200 GRAM(S): 4; .5 INJECTION, POWDER, LYOPHILIZED, FOR SOLUTION INTRAVENOUS at 10:54

## 2019-04-03 RX ADMIN — Medication 975 MILLIGRAM(S): at 15:21

## 2019-04-03 RX ADMIN — Medication 1000 MILLIGRAM(S): at 15:00

## 2019-04-03 RX ADMIN — SODIUM CHLORIDE 75 MILLILITER(S): 9 INJECTION INTRAMUSCULAR; INTRAVENOUS; SUBCUTANEOUS at 22:46

## 2019-04-03 RX ADMIN — SODIUM CHLORIDE 2000 MILLILITER(S): 9 INJECTION INTRAMUSCULAR; INTRAVENOUS; SUBCUTANEOUS at 18:42

## 2019-04-03 NOTE — CONSULT NOTE ADULT - ASSESSMENT
78M h/o HTN, T2DM, CABG 2002, HFpEF, gastric ca s/p partial gastrectomy and chemoradiation 2006, iron def anemia, chronic subdural hematoma, recent admission in March 2019 for gastric outlet obstruction s/p dilation presents with 2 day history of fevers and productive cough, a/f septic shock 2/2 pna. MICU consulted for shock, initially given midodrine and requiring levo. Now with stable BP off levo. Does not require MICU level care at this time.     #sepsis 2/2 pna   -c/w vanc, zosyn   -s/p 2L IVF.   -a lines on left with some scattered B's on right. c/w gentle IVF overnight. can c/w midodrine 10 mg q8, but would cautiously use given bradycardia. prev EKG with evidence of bradycardia.  -blood cultures sent and obtain sputum culture. RVP neg    #hypoxic resp failure 2/2 pna  -c/w o2 via NC and wean off as tolerated     #acute on chronic subdural hematoma  -neurosx following and repeat CT head 12 hours from first imaging     #T2DM  -SSI    #MARTA  -c/w IVF     d/w Dr. Donna Jon PGY3

## 2019-04-03 NOTE — H&P ADULT - PROBLEM SELECTOR PLAN 7
DVT PPx: hold in the setting of sepsis   Diet: NPO until improvement in mental status  Dispo: admitted for severe sepsis DVT PPx: hold in the setting of sepsis   Diet: NPO until improvement in mental status  Dispo: admitted for severe sepsis, septic shock

## 2019-04-03 NOTE — ED PROVIDER NOTE - CLINICAL SUMMARY MEDICAL DECISION MAKING FREE TEXT BOX
77 yo M with PMHX of HTN, HLD, DM, CAD s/p CABD, gastric ca s/p chemo and radiation, subdural hematomas presents to ED brought in by home health aide for Leftsided non radiating, non pleuritic cp with fevers documented at home and rectal temp here at 103, decreased PO intake/ nausea.  Plan labs, xray, ua, IVF, abx admit, sepsis,   CT head to eval subdural hematomas.

## 2019-04-03 NOTE — H&P ADULT - ASSESSMENT
78yoM with history of  CABG 2002, HFpEF, gastric ca s/p partial gastrectomy and chemoradiation 2006, HTN, HLD, T2DM, JACK, GERD, chronic subdural hematoma, recent admission in March 2019 for gastric outlet obstruction s/p dilation presents with 2 day history of fevers and productive cough. Patient was discharged home on March 19th and felt that his health was improving since yesterday when he began to have high fevers, productive blood tinged cough, and decreased appetite, and chest pain. Patients chest pain was 4/10, pressure sensation in this left parasterum, nonradiating, better with sublingual nitro. At baseline patient is ambulatory and able to take care of his ADLs with help from a home health aide. Otherwise denies shortness of breath/nausea/vomiting/diarrhea/dysuria/rash. 78yoM with history of  CABG 2002, HFpEF, gastric ca s/p partial gastrectomy and chemoradiation 2006, HTN, HLD, T2DM, JACK, GERD, chronic subdural hematoma, recent admission in March 2019 for gastric outlet obstruction s/p dilation presents with 2 day history of fevers and productive cough. Found to be in severe sepsis secondary to HCAP. Also complicated by stable new acute component to chronic SDH

## 2019-04-03 NOTE — ED PROVIDER NOTE - ATTENDING CONTRIBUTION TO CARE
Hx of gastric Cancer, s/p chemo and RT, diabetes, HTN, GERD, recently CT consistent with traumatic brain contusion, dilated pancreatic duct with calculi, discharged recently for same, presents with decreased appetite for 2 weeks, lightheadedness and weakness, as well as fever at home(although here afebrile. has had intermittent left sided chest pain. Also recently rash, placed on prednisone and resolved.   /51 P104 RR 16 sat 98 RA; T 98.7 HEENT mouth dry, sclera non icteric neck negativeJVD chest clear cor RR abd soft non tender ext without edema neuro lethargic but speech fluent coherent non focal. imp; Complicated medical history, suspect dehydration, and based on fever hx possible infection sepsis. plan vBG, CMP, CBC, u/A CXR, rectal temperature, if + pan culture and start IV antibiotics, begin NS hydration, admission

## 2019-04-03 NOTE — ED PROVIDER NOTE - PROGRESS NOTE DETAILS
called by Radiologist Dr Kay. CT head showing acute on chronic subdural bleeds with no midline shift.   Neuro surg cs and aware. pt seen by neurosurg, note appreciated rec rpt CT scan. pt report resolution of headache, feels improved. Admit for continued abx, spoke with hospitalist Dr. Lomas can admit to his service.

## 2019-04-03 NOTE — H&P ADULT - ATTENDING COMMENTS
78 M h/o CABG, gastric Ca treated 2006, T2DM; recently admitted March 2019 for gastric outlet obstruction. Presents with fevers and cough. Found to have PNA on CXR. Course complicated by hypotension refractory to fluid resuscitation and midodrine. Requiring Levophed. Concerning for septic shock with infectious encephalopathy secondary to bacterial pneumonia. MICU consult pending. Continuing with empiric vancomycin and pip-tazo.    Of note, patient also with subdural hematoma on CT. Neurosurgery requests repeat imaging.    I examined this patient and discussed their management with house staff on 4/3/2019.

## 2019-04-03 NOTE — H&P ADULT - PROBLEM SELECTOR PLAN 4
- patient with history of CABG in 2002   - no history of stents   - will hold off on antiplatelet and anticoagulation given new component of chronic subdural hematoma

## 2019-04-03 NOTE — ED ADULT NURSE REASSESSMENT NOTE - NS ED NURSE REASSESS COMMENT FT1
Patient alert, arouses to verbal stimuli but very lethargic, GCS 15, BP soft but stable upon shift assessment, daughter at bedside. Patient admitted to medical floor, pending bed assignment, MICU consult as patient was started on levophed drip. Responded to drip well and was taken off within 15 minutes of start time by day shift RN, covering MD notified. Continuous cardiac monitoring in place, safety maintained. needs attended, will continue to monitor

## 2019-04-03 NOTE — ED PROVIDER NOTE - CARE PLAN
Principal Discharge DX:	Sepsis Principal Discharge DX:	Sepsis  Secondary Diagnosis:	PNA (pneumonia)  Secondary Diagnosis:	Bandemia

## 2019-04-03 NOTE — H&P ADULT - NSICDXPASTMEDICALHX_GEN_ALL_CORE_FT
PAST MEDICAL HISTORY:  CAD (coronary artery disease) S/P CABG    Carotid stenosis     DM (diabetes mellitus)     Gastric cancer S/P RT and chemo    GERD (gastroesophageal reflux disease)     Hives on Prednisone    HLD (hyperlipidemia)     HTN (hypertension)     JACK (iron deficiency anemia)

## 2019-04-03 NOTE — ED PROVIDER NOTE - OBJECTIVE STATEMENT
77 yo M with PMHX of HTN, HLD, DM, CAD s/p CABD, gastric ca s/p chemo and radiation, subdural hematomas presents to ED brought in by home health aide for Leftsided non radiating, non pleuritic cp with fevers documented at home and rectal temp here at 103, decreased PO intake/ nausea no vomiting, and dizziness with headache. ROS + intermittent soft stools. Denies vomiting, abdominal pain, diarrhea, melena, BRBPR, sob, coughing, URI symptoms, dysuria, hematuria. 79 yo M with PMHX of HTN, HLD, DM, CAD s/p CABG, gastric ca s/p chemo and radiation, subdural hematomas presents to ED brought in by home health aide for Leftsided non radiating, non pleuritic cp with fevers documented at home and rectal temp here at 103, decreased PO intake/ nausea no vomiting, and dizziness with headache. ROS + intermittent soft stools. Denies vomiting, abdominal pain, diarrhea, melena, BRBPR, sob, coughing, URI symptoms, dysuria, hematuria.

## 2019-04-03 NOTE — ED ADULT TRIAGE NOTE - CHIEF COMPLAINT QUOTE
Pt complaining of dizziness since last night worsening today and headache.. Pt also complaining of intermittent chest pain. pt denies SOB, N/V/D, fever or chills.

## 2019-04-03 NOTE — CONSULT NOTE ADULT - ATTENDING COMMENTS
79 yo man with h/o ptl gastrectomy, chronic subdural hematoma, s/p recent gastric outlet dilatation, presents with cough and fever and found to be febrile and  hypotensive in ED despite IVF; placed transiently on norepinephrine but off since 8pm; received midodrine 10mg x1; CXR with new R sided infiltrate  Tmax 103; HR 50's - 90's  BP now 102/49 off norepi  lethargic but arousable; answers questions; no respiratory distress  RVP neg  Head CT with acute area in chronic subdural hematoma; stable size, no midline shift  Pneumonia and distributive shock; no longer requiring iv vasopressors  can continue to give 10 mg dose of midodrine q 8h but with caution and attention to HR  Agree with empiric HCAP abx pending cultures and urine legionella ag  Pt does not require ICU level of care at this time; please reconsult us if BP drops/pt becomes unstable

## 2019-04-03 NOTE — CONSULT NOTE ADULT - ASSESSMENT
77yo male with hx chronic SDH now with small acute component. SDH size unchanged, no midline shift, patient stable.

## 2019-04-03 NOTE — H&P ADULT - NSHPPHYSICALEXAM_GEN_ALL_CORE
General: elderly cachetic male, appearing stated age. Sleeping comfortably in bed. No acute distress   HEENT: normocephalic, atraumatic. Moist mucous membranes   Eyes: clear conjunctiva, PERRL  Neck: supple  Respiratory: decreased breath sounds in right anterior fields. Left chest clear to auscultation.   Cardiovascular: S1, S2. Regular rate and rhythm. No murmurs/rubs/gallops  Abdomen: soft, nondistended, nontender. +BS  Extremities: WWP, 2+ peripheral pulses b/l; no LE edema  Skin: normal color and turgor; no rash  Neurological: A&Ox3 General: elderly cachetic male, appearing stated age. Lethargic. Awakes to voice briefly and falls back asleep.  HEENT: normocephalic, atraumatic. Moist mucous membranes   Eyes: clear conjunctiva, PERRL  Neck: supple  Respiratory: decreased breath sounds in right anterior fields. Left chest clear to auscultation.   Cardiovascular: S1, S2. Regular rate and rhythm. No murmurs/rubs/gallops  Abdomen: soft, nondistended, nontender. +BS  Extremities: WWP, 2+ peripheral pulses b/l; no LE edema  Skin: normal color and turgor; no rash  Neurological: Not following commands, unable to assess  Psych: Lethargic, awakes to voice briefly and falls back asleep

## 2019-04-03 NOTE — H&P ADULT - PROBLEM SELECTOR PLAN 1
- s/p 1L of fluids, 1 dose of vanc and zosyn   - patients pressures dropping to 60s/40s, however was not adequately fluid resuscitated   - patient also with lethargy and MARTA  - source is likely right lobe HCAP  - c/w with vanc/zosyn  - MICU consulted - s/p 1L of fluids, 1 dose of vanc and zosyn   - patients pressures dropping to 60s/40s despite 2L IVNS. Improved to systolic 80s with 3rd liter NS and dose of midodrine. Requiring Levophed, consistent with septic shock due to bacterial pneumonia. MICU consulted.  - source is likely right lobe HAP given recent hosptialization  - c/w with broad spectrum vanc/zosyn  - MICU consulted

## 2019-04-03 NOTE — H&P ADULT - HISTORY OF PRESENT ILLNESS
78yoM with history of  CABG 2002, HFpEF, gastric ca s/p partial gastrectomy and chemoradiation 2006, HTN, HLD, T2DM, JACK, GERD, chronic subdural hematoma, recent admission in March 2019 for gastric outlet obstruction s/p dilation presents with 2 day history of fevers and productive cough. Patient was discharged home on March 19th and felt that his health was improving since yesterday when he began to have high fevers, productive blood tinged cough, and decreased appetite, and chest pain. Patients chest pain was 4/10, pressure sensation in this left parasterum, nonradiating, better with sublingual nitro. At baseline patient is ambulatory and able to take care of his ADLs with help from a home health aide. Otherwise denies shortness of breath/nausea/vomiting/diarrhea/dysuria/rash. 78yoM with history of  CABG 2002, HFpEF, gastric ca s/p partial gastrectomy and chemoradiation 2006, HTN, HLD, T2DM, JACK, GERD, chronic subdural hematoma, recent admission in March 2019 for gastric outlet obstruction s/p dilation presents with 2 day history of fevers and productive cough. Patient was discharged home on March 19th and felt that his health was improving since yesterday when he began to have high fevers, productive blood tinged cough, and decreased appetite, and chest pain. Patients chest pain was 4/10, pressure sensation in this left parasterum, nonradiating, better with sublingual nitro. At baseline patient is ambulatory and able to take care of his ADLs with help from a home health aide. Unable to obtain history or ROS from patient 2/2 encephalopathy. History obtained from ED charting and daughter.

## 2019-04-03 NOTE — CONSULT NOTE ADULT - SUBJECTIVE AND OBJECTIVE BOX
CHIEF COMPLAINT:    HPI:  78M h/o HTN, T2DM, CABG 2002, HFpEF, gastric ca s/p partial gastrectomy and chemoradiation 2006, iron def anemia, chronic subdural hematoma, recent admission in March 2019 for gastric outlet obstruction s/p dilation presents with 2 day history of fevers and productive cough. Patient was discharged home on March 19th and felt that his health was improving since yesterday when he began to have high fevers, productive blood tinged cough, and decreased appetite, and chest pain. Patients chest pain was 4/10, pressure sensation in this left parasterum, nonradiating, better with sublingual nitro. At baseline patient is ambulatory and able to take care of his ADLs with help from a home health aide. Lives at home with son. Otherwise denies shortness of breath/nausea/vomiting/diarrhea/dysuria/rash.    In ED vitals:  ICU Vital Signs Last 24 Hrs  T(C): 36.7 (04-03-19 @ 20:06), Max: 39.8 (04-03-19 @ 10:00)  T(F): 98 (04-03-19 @ 20:06), Max: 103.6 (04-03-19 @ 10:00)  HR: 52 (04-03-19 @ 20:34) (52 - 104)  BP: 102/49 (04-03-19 @ 20:34) (67/38 - 122/52)  BP(mean): --  ABP: --  ABP(mean): --  RR: 14 (04-03-19 @ 20:34) (14 - 18)  SpO2: 100% (04-03-19 @ 20:34) (93% - 100%)    Labs notable for leukocytosis with 22% bands.   Cr 1.25 (pre .88 at last admission). Lactate 2.7>>1.9. CXR with right midlung pna. RVP neg.     In ED, s/p 2L IVF with low BP and given midodrine at 19:00. Levo started but discontinued 2/2 high BP. MICU consulted for septic shock 2/2 pna. At time of initial eval, patient had been off levo for about an hour with BP 90s/30-40s, MAP >55.     PAST MEDICAL & SURGICAL HISTORY:  GERD (gastroesophageal reflux disease)  Gastric cancer: S/P RT and chemo  Carotid stenosis  JACK (iron deficiency anemia)  HLD (hyperlipidemia)  HTN (hypertension)  CAD (coronary artery disease): S/P CABG  Hives: on Prednisone  DM (diabetes mellitus)  S/P Gastrectomy: 2006 due to gastric cancer  S/P CABG X 4: 2002    FAMILY HISTORY:  Family history of early CAD (Sibling): brother  Family history of diabetes mellitus: mother    SOCIAL HISTORY:   Patient lives at home with his son. Has home health aide that comes for a 6 hours a day. Able to do his ADLs with assistance. Able to ambulate without cane. No history of smoking/drinking/illicit drugs.	      Allergies  No Known Allergies    HOME MEDICATIONS:  Patient Currently Takes Medications as of 03-Apr-2019 18:02 documented in Structured Notes  · 	sucralfate 1 g/10 mL oral suspension: 10 milliliter(s) orally 2 times a day, Last Dose Taken:    · 	polyethylene glycol 3350 oral powder for reconstitution: 17 gram(s) orally once a day, Last Dose Taken:    · 	docusate sodium 100 mg oral capsule: 1 cap(s) orally 3 times a day, Last Dose Taken:    · 	senna oral tablet: 2 tab(s) orally once a day (at bedtime), Last Dose Taken:    · 	aspirin 81 mg oral delayed release tablet: 1 tab(s) orally once a day, Last Dose Taken:    · 	aluminum hydroxide-magnesium hydroxide 200 mg-200 mg/5 mL oral suspension: 30 milliliter(s) orally every 4 hours, As needed, Dyspepsia, Last Dose Taken:    · 	metFORMIN 500 mg oral tablet: 1 tab(s) orally once a day prior to breakfast, Last Dose Taken:    · 	simvastatin 40 mg oral tablet: 1 tab(s) orally once a day (at bedtime), Last Dose Taken:    · 	esomeprazole 40 mg oral delayed release capsule: 1 cap(s) orally once a day, Last Dose Taken:    · 	nitroglycerin 0.4 mg sublingual tablet: 1 tab(s) sublingual every 5 minutes, As Needed, Last Dose Taken:    · 	levocetirizine 5 mg oral tablet: 1 tab(s) orally once a day, As Needed, Last Dose Taken:        HOSPITAL MEDICATIONS:    piperacillin/tazobactam IVPB. 3.375 Gram(s) IV Intermittent every 8 hours    norepinephrine Infusion 0.1 MICROgram(s)/kG/Min IV Continuous <Continuous>    REVIEW OF SYSTEMS:  Constitutional: + Fatigue and fever   Eyes: No itching or discharge from the eyes  ENT: No ear pain. No ear discharge. No nasal congestion.   CV: (+) CP  Resp: No dyspnea at rest.  (+) cough   GI: See HPI  MSK: No joint pain   Integumentary: No skin lesions. No pedal edema.  Neurological: No gross motor weakness.   Heme: no abnormal bleeding or bruising    OBJECTIVE:  ICU Vital Signs Last 24 Hrs  T(C): 36.7 (03 Apr 2019 20:06), Max: 39.8 (03 Apr 2019 10:00)  T(F): 98 (03 Apr 2019 20:06), Max: 103.6 (03 Apr 2019 10:00)  HR: 52 (03 Apr 2019 20:34) (52 - 104)  BP: 102/49 (03 Apr 2019 20:34) (67/38 - 122/52)  BP(mean): --  ABP: --  ABP(mean): --  RR: 14 (03 Apr 2019 20:34) (14 - 18)  SpO2: 100% (03 Apr 2019 20:34) (93% - 100%)    CAPILLARY BLOOD GLUCOSE      POCT lood Glucose.: 119 mg/dL (03 Apr 2019 18:30)      PHYSICAL EXAM:  General: elderly cachetic male, appearing stated age. Sleeping comfortably in bed. No acute distress   HEENT: normocephalic, atraumatic. Moist mucous membranes   Eyes: clear conjunctiva,   Neck: supple  Respiratory: decreased breath sounds in right anterior fields. Left chest clear to auscultation.   Cardiovascular: S1, S2. bradycardic and rhythm. No murmurs/rubs/gallops  Abdomen: soft, nondistended, nontender. +BS  Extremities: WWP, 2+ peripheral pulses b/l; no LE edema  Skin: normal color and turgor; no rash    bedside US: a lines on left. scattered b lines on right.        LABS:                        10.8   16.25 )-----------( 229      ( 03 Apr 2019 10:00 )             35.5     Hgb Trend: 10.8<--  04-03    134<L>  |  99  |  21  ----------------------------<  104<H>  4.2   |  24  |  1.25    Ca    8.7      03 Apr 2019 10:00    TPro  6.9  /  Alb  3.6  /  TBili  1.1  /  DBili  x   /  AST  32  /  ALT  25  /  AlkPhos  75  04-03    Creatinine Trend: 1.25<--, 0.88<--, 0.67<--, 0.85<--, 1.00<--, 0.83<--  PT/INR - ( 03 Apr 2019 10:18 )   PT: 15.3 SEC;   INR: 1.37          PTT - ( 03 Apr 2019 10:18 )  PTT:27.7 SEC    Venous Blood Gas:  04-03 @ 16:00  7.41/38/44/24/77.5  VBG Lactate: 1.9  Venous Blood Gas:  04-03 @ 10:00  7.37/47/25/24/37.0  VBG Lactate: 2.7      MICROBIOLOGY:  blood cultures sent     RADIOLOGY:        EKG: CHIEF COMPLAINT:    HPI:  78M h/o HTN, T2DM, CABG 2002, HFpEF, gastric ca s/p partial gastrectomy and chemoradiation 2006, iron def anemia, chronic subdural hematoma, recent admission in March 2019 for gastric outlet obstruction s/p dilation presents with 2 day history of fevers and productive cough. Patient was discharged home on March 19th and felt that his health was improving since yesterday when he began to have high fevers, productive blood tinged cough, and decreased appetite, and chest pain. Patients chest pain was 4/10, pressure sensation in this left parasterum, nonradiating, better with sublingual nitro. At baseline patient is ambulatory and able to take care of his ADLs with help from a home health aide. Lives at home with son. Otherwise denies shortness of breath/nausea/vomiting/diarrhea/dysuria/rash.    In ED vitals:  ICU Vital Signs Last 24 Hrs  T(C): 36.7 (04-03-19 @ 20:06), Max: 39.8 (04-03-19 @ 10:00)  T(F): 98 (04-03-19 @ 20:06), Max: 103.6 (04-03-19 @ 10:00)  HR: 52 (04-03-19 @ 20:34) (52 - 104)  BP: 102/49 (04-03-19 @ 20:34) (67/38 - 122/52)  BP(mean): --  ABP: --  ABP(mean): --  RR: 14 (04-03-19 @ 20:34) (14 - 18)  SpO2: 100% (04-03-19 @ 20:34) (93% - 100%)    Labs notable for leukocytosis with 22% bands.   Cr 1.25 (pre .88 at last admission). Lactate 2.7>>1.9. CXR with right midlung pna. RVP neg.     In ED, s/p 2L IVF with low BP and given midodrine at 19:00. Levo started but discontinued 2/2 high BP. MICU consulted for septic shock 2/2 pna. At time of initial eval, patient had been off levo for about an hour with BP 90s/30-40s, MAP >55.     PAST MEDICAL & SURGICAL HISTORY:  GERD (gastroesophageal reflux disease)  Gastric cancer: S/P RT and chemo  Carotid stenosis  JACK (iron deficiency anemia)  HLD (hyperlipidemia)  HTN (hypertension)  CAD (coronary artery disease): S/P CABG  Hives: on Prednisone  DM (diabetes mellitus)  S/P Gastrectomy: 2006 due to gastric cancer  S/P CABG X 4: 2002    FAMILY HISTORY:  Family history of early CAD (Sibling): brother  Family history of diabetes mellitus: mother    SOCIAL HISTORY:   Patient lives at home with his son. Has home health aide that comes for a 6 hours a day. Able to do his ADLs with assistance. Able to ambulate without cane. No history of smoking/drinking/illicit drugs.	      Allergies  No Known Allergies    HOME MEDICATIONS:  Patient Currently Takes Medications as of 03-Apr-2019 18:02 documented in Structured Notes  · 	sucralfate 1 g/10 mL oral suspension: 10 milliliter(s) orally 2 times a day, Last Dose Taken:    · 	polyethylene glycol 3350 oral powder for reconstitution: 17 gram(s) orally once a day, Last Dose Taken:    · 	docusate sodium 100 mg oral capsule: 1 cap(s) orally 3 times a day, Last Dose Taken:    · 	senna oral tablet: 2 tab(s) orally once a day (at bedtime), Last Dose Taken:    · 	aspirin 81 mg oral delayed release tablet: 1 tab(s) orally once a day, Last Dose Taken:    · 	aluminum hydroxide-magnesium hydroxide 200 mg-200 mg/5 mL oral suspension: 30 milliliter(s) orally every 4 hours, As needed, Dyspepsia, Last Dose Taken:    · 	metFORMIN 500 mg oral tablet: 1 tab(s) orally once a day prior to breakfast, Last Dose Taken:    · 	simvastatin 40 mg oral tablet: 1 tab(s) orally once a day (at bedtime), Last Dose Taken:    · 	esomeprazole 40 mg oral delayed release capsule: 1 cap(s) orally once a day, Last Dose Taken:    · 	nitroglycerin 0.4 mg sublingual tablet: 1 tab(s) sublingual every 5 minutes, As Needed, Last Dose Taken:    · 	levocetirizine 5 mg oral tablet: 1 tab(s) orally once a day, As Needed, Last Dose Taken:        HOSPITAL MEDICATIONS:    piperacillin/tazobactam IVPB. 3.375 Gram(s) IV Intermittent every 8 hours    norepinephrine Infusion 0.1 MICROgram(s)/kG/Min IV Continuous <Continuous>    REVIEW OF SYSTEMS:  Constitutional: + Fatigue and fever   Eyes: No itching or discharge from the eyes  ENT: No ear pain. No ear discharge. No nasal congestion.   CV: (+) CP  Resp: No dyspnea at rest.  (+) cough   GI: See HPI  MSK: No joint pain   Integumentary: No skin lesions. No pedal edema.  Neurological: No gross motor weakness.   Heme: no abnormal bleeding or bruising    OBJECTIVE:  ICU Vital Signs Last 24 Hrs  T(C): 36.7 (03 Apr 2019 20:06), Max: 39.8 (03 Apr 2019 10:00)  T(F): 98 (03 Apr 2019 20:06), Max: 103.6 (03 Apr 2019 10:00)  HR: 52 (03 Apr 2019 20:34) (52 - 104)  BP: 102/49 (03 Apr 2019 20:34) (67/38 - 122/52)  BP(mean): --  ABP: --  ABP(mean): --  RR: 14 (03 Apr 2019 20:34) (14 - 18)  SpO2: 100% (03 Apr 2019 20:34) (93% - 100%)    CAPILLARY BLOOD GLUCOSE      POCT lood Glucose.: 119 mg/dL (03 Apr 2019 18:30)      PHYSICAL EXAM:  General: elderly cachetic male, appearing stated age. Sleeping comfortably in bed. No acute distress   HEENT: normocephalic, atraumatic. Moist mucous membranes   Eyes: clear conjunctiva,   Neck: supple  Respiratory: decreased breath sounds in right anterior fields. Left chest clear to auscultation.   Cardiovascular: S1, S2. bradycardic and rhythm. No murmurs/rubs/gallops  Abdomen: soft, nondistended, nontender. +BS  Extremities: WWP, 2+ peripheral pulses b/l; no LE edema  Skin: normal color and turgor; no rash    bedside US: a lines on left. scattered b lines on right.        LABS:                        10.8   16.25 )-----------( 229      ( 03 Apr 2019 10:00 )             35.5     Hgb Trend: 10.8<--  04-03    134<L>  |  99  |  21  ----------------------------<  104<H>  4.2   |  24  |  1.25    Ca    8.7      03 Apr 2019 10:00    TPro  6.9  /  Alb  3.6  /  TBili  1.1  /  DBili  x   /  AST  32  /  ALT  25  /  AlkPhos  75  04-03    Creatinine Trend: 1.25<--, 0.88<--, 0.67<--, 0.85<--, 1.00<--, 0.83<--  PT/INR - ( 03 Apr 2019 10:18 )   PT: 15.3 SEC;   INR: 1.37          PTT - ( 03 Apr 2019 10:18 )  PTT:27.7 SEC    Venous Blood Gas:  04-03 @ 16:00  7.41/38/44/24/77.5  VBG Lactate: 1.9  Venous Blood Gas:  04-03 @ 10:00  7.37/47/25/24/37.0  VBG Lactate: 2.7      MICROBIOLOGY:  blood cultures sent     RADIOLOGY:    < from: Xray Chest 1 View- PORTABLE-Urgent (04.03.19 @ 11:22) >  IMPRESSION:  Right midlung presumably superior segment lower lobe   pneumonia.< from: CT Head No Cont (04.03.19 @ 12:22) >    IMPRESSION:    New small areas of acute subdural hemorrhage are present in the left   frontal and parietal regions, into a pre-existing low density subdural   collection, consistent with acute on chronic subdural hemorrhage. The   overall size of the subdural collection is however stable, without   associated midline shift.    < end of copied text >      < end of copied text >      < from: Transthoracic Echocardiogram (02.21.19 @ 21:03) >  Aortic Valve: Calcified trileaflet aortic valve with mildly   decreased opening.  Left Atrium: Normal left atrium.  LA volume index = 23  cc/m2.  Left Ventricle: Normal left ventricular systolic function.  No segmental wall motion abnormalities. Normal left  ventricular internal dimensions and wall thicknesses.  Right Heart: Normal right atrium. Normal right ventricular  size and function. Normal tricuspid valve. Mild tricuspid  regurgitation. Normal pulmonic valve. Minimal pulmonic  regurgitation.  Pericardium/PleuraNormal pericardium with no pericardial  effusion.  Hemodynamic: Estimated right ventricular systolic pressure  equals 40 mm Hg, assuming right atrial pressure equals 10  mm Hg, consistent with mild pulmonary hypertension.  ------------------------------------------------------------------------  CONCLUSIONS:  1. Calcified trileaflet aortic valve with mildly  decreased  opening.  2. Normal left ventricular internal dimensions and wall  thicknesses.  3. Normal left ventricular systolic function. No segmental  wall motion abnormalities.  4. Normal right ventricular size and function.  5. Estimatedpulmonary artery systolic pressure equals 40  mm Hg, assuming right atrial pressure equals 10  mm Hg,  consistent with mild pulmonary hypertension.  ----------------------------------------------------------------------    < end of copied text >        EKG:  sinus tach with LBBB (chronic)

## 2019-04-03 NOTE — H&P ADULT - NSHPLABSRESULTS_GEN_ALL_CORE
LABS:                        10.8   16.25 )-----------( 229      ( 03 Apr 2019 10:00 )             35.5     04-03    134<L>  |  99  |  21  ----------------------------<  104<H>  4.2   |  24  |  1.25    Ca    8.7      03 Apr 2019 10:00    TPro  6.9  /  Alb  3.6  /  TBili  1.1  /  DBili  x   /  AST  32  /  ALT  25  /  AlkPhos  75  04-03    PT/INR - ( 03 Apr 2019 10:18 )   PT: 15.3 SEC;   INR: 1.37       PTT - ( 03 Apr 2019 10:18 )  PTT:27.7 SEC    RADIOLOGY & ADDITIONAL TESTS: Reviewed.    < from: Xray Chest 1 View- PORTABLE-Urgent (04.03.19 @ 11:22) >    IMPRESSION:  Right midlung presumably superior segment lower lobe   pneumonia.    < end of copied text >    < from: CT Head No Cont (04.03.19 @ 12:22) >      IMPRESSION:    New small areas of acute subdural hemorrhage are present in the left   frontal and parietal regions, into a pre-existing low density subdural   collection, consistent with acute on chronic subdural hemorrhage. The   overall size of the subdural collection is however stable, without   associated midline shift.    < end of copied text >    < from: CT Head No Cont (04.03.19 @ 12:22) >

## 2019-04-03 NOTE — ED ADULT NURSE NOTE - OBJECTIVE STATEMENT
Patient received to room 13 accompanied by HHA. Pt is alert and oriented times three and is weak, Pt has been weak and fever since yesterday. Pt has not been able to keep food down. Pt states he has no appetite. Pt has a fever of 103.6. Pt evaluated by MD. IVL placed to left AC 20 gauge and labs drawn and sent. Waiting for results, further evaluation and disposition.   SOPHIA Chávez

## 2019-04-03 NOTE — CONSULT NOTE ADULT - SUBJECTIVE AND OBJECTIVE BOX
NEUROSURGERY CONSULT  SAMMIE SALGADO   04-03-19 @ 13:20    HPI: 78y Male PMHx HTN, HLD, DM, CAD s/p CABG, gastric ca s/p chemo and radiation, subdural hematomas presents to ED brought in by home health aide for Left sided non radiating, non pleuritic chest pain with fevers documented at home and rectal temp here at 103, decreased PO intake/ nausea no vomiting, and dizziness with headache. ROS + intermittent soft stools. Denies vomiting, abdominal pain, diarrhea, melena, BRBPR, sob, coughing, URI symptoms, dysuria, hematuria.    NSG consulted for incidental CTH finding of acute component of known chronic SDH.     RADIOLOGY:   Comparison is made to prior CT study from 03/09/2019.     New small areas of acute subdural hemorrhage are present in the left frontal   and parietal regions, into a pre-existing low density subdural collection.   The overall size of the subdural collection is however stable, without   associated midline shift. The subdural collection measures up to 5 mm in   greatest transverse thickness.     There is no evidence for acute infarct, brain parenchymal hemorrhage, mass   effect, calvarial fracture, or hydrocephalus.     Mild patchy hypodensity without mass effect is noted in the periventricular   white matter which most likely represents chronic microvascular ischemic   changes given the patient's age.     Cerebral volume loss is present with secondary proportional prominence of   the sulci and ventricles.     No lytic or blastic calvarial lesions are noted. The visualized portions of   the paranasal sinuses and mastoid air cells are clear.     I discussed the exam findings with the covering emergency room clinician   Navjot at 12:35 PM on 04/03/2019 with read back.     IMPRESSION:     New small areas of acute subdural hemorrhage are present in the left frontal   and parietal regions, into a pre-existing low density subdural collection,   consistent with acute on chronic subdural hemorrhage. The overall size of   the subdural collection is however stable, without associated midline shift.     MEDS:   acetaminophen   Tablet .. 975 milliGRAM(s) Oral once    PHYSICAL EXAM:  Vital Signs Last 24 Hrs  T(C): 39.8 (03 Apr 2019 10:00), Max: 39.8 (03 Apr 2019 10:00)  T(F): 103.6 (03 Apr 2019 10:00), Max: 103.6 (03 Apr 2019 10:00)  HR: 98 (03 Apr 2019 10:00) (98 - 104)  BP: 122/52 (03 Apr 2019 10:00) (102/51 - 122/52)  BP(mean): --  RR: 18 (03 Apr 2019 10:00) (16 - 18)  SpO2: 99% (03 Apr 2019 10:00) (98% - 99%)    AAOx3  EOMI, PERRL  ARAGON x4 with good strength   sensation intact    LABS:  [id number] @ 13:20                        10.8   16.25 )-----------( 229      ( 03 Apr 2019 10:00 )             35.5     04-03    134<L>  |  99  |  21  ----------------------------<  104<H>  4.2   |  24  |  1.25    Ca    8.7      03 Apr 2019 10:00  TPro  6.9  /  Alb  3.6  /  TBili  1.1  /  DBili  x   /  AST  32  /  ALT  25  /  AlkPhos  75  04-03  PT/INR - ( 03 Apr 2019 10:18 )   PT: 15.3 SEC;   INR: 1.37     PTT - ( 03 Apr 2019 10:18 )  PTT:27.7 SEC

## 2019-04-03 NOTE — H&P ADULT - PROBLEM SELECTOR PLAN 3
- patient with chronic subdural hematoma  - brain imaging shows new acute component   - neurosurgery on board. Wants repeat CT head at midnight   - no anticoagulation

## 2019-04-03 NOTE — H&P ADULT - NSHPREVIEWOFSYSTEMS_GEN_ALL_CORE
REVIEW OF SYSTEMS:  Constitutional: + Fatigue  Eyes: No itching or discharge from the eyes  ENT: No ear pain. No ear discharge. No nasal congestion.   CV: See HPI  Resp: No dyspnea at rest.   GI: See HPI  MSK: No joint pain   Integumentary: No skin lesions. No pedal edema.  Neurological: No gross motor weakness.   Heme: no abnormal bleeding or bruising Unable to obtain 2/2 encephalopathy

## 2019-04-03 NOTE — ED PROVIDER NOTE - NEUROLOGICAL, MLM
Alert and oriented, no focal deficits, no motor or sensory deficits. Alert and oriented, no focal deficits, no motor or sensory deficits. negative meningeal signs.

## 2019-04-03 NOTE — H&P ADULT - NSICDXFAMILYHX_GEN_ALL_CORE_FT
FAMILY HISTORY:  Family history of diabetes mellitus, mother    Sibling  Still living? Yes, Estimated age: Age Unknown  Family history of early CAD, Age at diagnosis: Age Unknown

## 2019-04-03 NOTE — H&P ADULT - NSHPSOCIALHISTORY_GEN_ALL_CORE
Patient lives at home with his son. Has home health aide that comes for a 6 hours a day. Able to do his ADLs with assistance. Able to ambulate without cane. No history of smoking/drinking/illicit drugs.

## 2019-04-03 NOTE — H&P ADULT - PROBLEM SELECTOR PLAN 2
- recent hospitalization  - MRSA and pseudomonas coverage  - c/w vanc and zosyn - recent hospitalization  - requires MRSA and pseudomonas coverage given this and severity of presentation  - c/w vanc and zosyn

## 2019-04-04 LAB
ANION GAP SERPL CALC-SCNC: 12 MMO/L — SIGNIFICANT CHANGE UP (ref 7–14)
BUN SERPL-MCNC: 19 MG/DL — SIGNIFICANT CHANGE UP (ref 7–23)
CALCIUM SERPL-MCNC: 7.9 MG/DL — LOW (ref 8.4–10.5)
CHLORIDE SERPL-SCNC: 106 MMOL/L — SIGNIFICANT CHANGE UP (ref 98–107)
CO2 SERPL-SCNC: 20 MMOL/L — LOW (ref 22–31)
CREAT SERPL-MCNC: 1.18 MG/DL — SIGNIFICANT CHANGE UP (ref 0.5–1.3)
GLUCOSE SERPL-MCNC: 95 MG/DL — SIGNIFICANT CHANGE UP (ref 70–99)
HCT VFR BLD CALC: 30.2 % — LOW (ref 39–50)
HGB BLD-MCNC: 9.5 G/DL — LOW (ref 13–17)
MAGNESIUM SERPL-MCNC: 2.2 MG/DL — SIGNIFICANT CHANGE UP (ref 1.6–2.6)
MCHC RBC-ENTMCNC: 26.7 PG — LOW (ref 27–34)
MCHC RBC-ENTMCNC: 31.5 % — LOW (ref 32–36)
MCV RBC AUTO: 84.8 FL — SIGNIFICANT CHANGE UP (ref 80–100)
NRBC # FLD: 0 K/UL — SIGNIFICANT CHANGE UP (ref 0–0)
PHOSPHATE SERPL-MCNC: 2.8 MG/DL — SIGNIFICANT CHANGE UP (ref 2.5–4.5)
PLATELET # BLD AUTO: 180 K/UL — SIGNIFICANT CHANGE UP (ref 150–400)
PMV BLD: 10.2 FL — SIGNIFICANT CHANGE UP (ref 7–13)
POTASSIUM SERPL-MCNC: 4.2 MMOL/L — SIGNIFICANT CHANGE UP (ref 3.5–5.3)
POTASSIUM SERPL-SCNC: 4.2 MMOL/L — SIGNIFICANT CHANGE UP (ref 3.5–5.3)
RBC # BLD: 3.56 M/UL — LOW (ref 4.2–5.8)
RBC # FLD: 16.2 % — HIGH (ref 10.3–14.5)
SODIUM SERPL-SCNC: 138 MMOL/L — SIGNIFICANT CHANGE UP (ref 135–145)
SPECIMEN SOURCE: SIGNIFICANT CHANGE UP
SPECIMEN SOURCE: SIGNIFICANT CHANGE UP
VANCOMYCIN FLD-MCNC: 14.6 UG/ML — SIGNIFICANT CHANGE UP
VANCOMYCIN FLD-MCNC: 20.3 UG/ML — SIGNIFICANT CHANGE UP
VANCOMYCIN FLD-MCNC: 8.5 UG/ML — SIGNIFICANT CHANGE UP
WBC # BLD: 16.07 K/UL — HIGH (ref 3.8–10.5)
WBC # FLD AUTO: 16.07 K/UL — HIGH (ref 3.8–10.5)

## 2019-04-04 PROCEDURE — 99233 SBSQ HOSP IP/OBS HIGH 50: CPT | Mod: GC

## 2019-04-04 RX ORDER — VANCOMYCIN HCL 1 G
1000 VIAL (EA) INTRAVENOUS EVERY 12 HOURS
Qty: 0 | Refills: 0 | Status: DISCONTINUED | OUTPATIENT
Start: 2019-04-04 | End: 2019-04-04

## 2019-04-04 RX ORDER — SIMETHICONE 80 MG/1
80 TABLET, CHEWABLE ORAL ONCE
Qty: 0 | Refills: 0 | Status: COMPLETED | OUTPATIENT
Start: 2019-04-04 | End: 2019-04-04

## 2019-04-04 RX ORDER — INSULIN LISPRO 100/ML
VIAL (ML) SUBCUTANEOUS
Qty: 0 | Refills: 0 | Status: DISCONTINUED | OUTPATIENT
Start: 2019-04-04 | End: 2019-04-04

## 2019-04-04 RX ORDER — VANCOMYCIN HCL 1 G
1000 VIAL (EA) INTRAVENOUS EVERY 12 HOURS
Qty: 0 | Refills: 0 | Status: COMPLETED | OUTPATIENT
Start: 2019-04-04 | End: 2019-04-05

## 2019-04-04 RX ORDER — SODIUM CHLORIDE 9 MG/ML
1000 INJECTION, SOLUTION INTRAVENOUS ONCE
Qty: 0 | Refills: 0 | Status: COMPLETED | OUTPATIENT
Start: 2019-04-04 | End: 2019-04-04

## 2019-04-04 RX ORDER — VANCOMYCIN HCL 1 G
1000 VIAL (EA) INTRAVENOUS ONCE
Qty: 0 | Refills: 0 | Status: COMPLETED | OUTPATIENT
Start: 2019-04-04 | End: 2019-04-04

## 2019-04-04 RX ADMIN — Medication 250 MILLIGRAM(S): at 18:43

## 2019-04-04 RX ADMIN — Medication 30 MILLILITER(S): at 21:01

## 2019-04-04 RX ADMIN — PIPERACILLIN AND TAZOBACTAM 25 GRAM(S): 4; .5 INJECTION, POWDER, LYOPHILIZED, FOR SOLUTION INTRAVENOUS at 21:01

## 2019-04-04 RX ADMIN — SODIUM CHLORIDE 1000 MILLILITER(S): 9 INJECTION, SOLUTION INTRAVENOUS at 00:35

## 2019-04-04 RX ADMIN — PIPERACILLIN AND TAZOBACTAM 25 GRAM(S): 4; .5 INJECTION, POWDER, LYOPHILIZED, FOR SOLUTION INTRAVENOUS at 05:13

## 2019-04-04 RX ADMIN — Medication 30 MILLILITER(S): at 13:00

## 2019-04-04 RX ADMIN — Medication 250 MILLIGRAM(S): at 03:48

## 2019-04-04 RX ADMIN — SIMETHICONE 80 MILLIGRAM(S): 80 TABLET, CHEWABLE ORAL at 12:30

## 2019-04-04 RX ADMIN — PIPERACILLIN AND TAZOBACTAM 25 GRAM(S): 4; .5 INJECTION, POWDER, LYOPHILIZED, FOR SOLUTION INTRAVENOUS at 13:32

## 2019-04-04 NOTE — PROGRESS NOTE ADULT - PROBLEM SELECTOR PLAN 1
- s/p 3L of fluids, Currently on vanc/zosyn  - patients pressures now maintaining in the 80s-90s off levophed. Able to take PO  - source is likely right lobe HCAP  - MICU reccs appreciated. Holding midodrine for now given bradycardia  - will also check urine legionella - s/p 3L of fluids, Currently on vanc/zosyn  - patients pressures now maintaining systolic BP in the 80s-90s off levophed. Able to take PO  - source is likely right lobe HCAP  - MICU reccs appreciated. Holding midodrine for now given bradycardia  - will also check urine legionella

## 2019-04-04 NOTE — PROGRESS NOTE ADULT - PROBLEM SELECTOR PLAN 7
DVT PPx: hold in the setting of sepsis   Diet: NPO until improvement in mental status  Dispo: admitted for severe sepsis DVT PPx: hold in the setting of sepsis   Diet: mechanical soft  Dispo: admitted for severe sepsis

## 2019-04-04 NOTE — PROGRESS NOTE ADULT - PROBLEM SELECTOR PLAN 3
- patient with chronic subdural hematoma  - Initial brain imaging showed new acute component   - neurosurgery on board. Repeat CT head after 12hrs shows stable hematoma   - no anticoagulation

## 2019-04-04 NOTE — PROGRESS NOTE ADULT - ASSESSMENT
78yoM with history of  CABG 2002, HFpEF, gastric ca s/p partial gastrectomy and chemoradiation 2006, HTN, HLD, T2DM, JACK, GERD, chronic subdural hematoma, recent admission in March 2019 for gastric outlet obstruction s/p dilation presents with 2 day history of fevers and productive cough. Patient was discharged home on March 19th and felt that his health was improving since yesterday when he began to have high fevers, productive blood tinged cough, and decreased appetite, and chest pain. Patients chest pain was 4/10, pressure sensation in this left parasterum, nonradiating, better with sublingual nitro. At baseline patient is ambulatory and able to take care of his ADLs with help from a home health aide. Otherwise denies shortness of breath/nausea/vomiting/diarrhea/dysuria/rash. 78yoM with history of  CABG 2002, HFpEF, gastric ca s/p partial gastrectomy and chemoradiation 2006, HTN, HLD, T2DM, JACK, GERD, chronic subdural hematoma, recent admission in March 2019 for gastric outlet obstruction s/p dilation presents with 2 day history of fevers and productive cough. Found to be in severe sepsis secondary to HCAP. 78yoM with history of  CABG 2002, HFpEF, gastric ca s/p partial gastrectomy and chemoradiation 2006, HTN, HLD, T2DM, JACK, GERD, chronic subdural hematoma, recent admission in March 2019 for gastric outlet obstruction s/p dilation presents with 2 day history of fevers and productive cough. Found to be in severe sepsis secondary to HCAP. Also complicated by stable new acute component to chronic SDH 78yoM with history of  CABG 2002, HFpEF, gastric ca s/p partial gastrectomy and chemoradiation 2006, HTN, HLD, T2DM, JACK, GERD, chronic subdural hematoma, recent admission in March 2019 for gastric outlet obstruction s/p dilation presents with 2 day history of fevers and productive cough. Found to be in septic shock secondary to bacterial pneumonia. Also complicated by stable new acute component to chronic SDH.

## 2019-04-04 NOTE — PROGRESS NOTE ADULT - SUBJECTIVE AND OBJECTIVE BOX
INTERVAL HPI/OVERNIGHT EVENTS: repeat CT head shows subdural hematoma is stable. Patient taken off levophed and maintained pressures to 80s-100s.     SUBJECTIVE: Patient seen and examined at bedside. Patient reports feeling much better than before. Denies chest pain/shortness of breath/abdominal pain/nausea/vomiting/diarrhea.     ROS: otherwise negative    OBJECTIVE:    VITAL SIGNS:  T(C): 36.4 (04 Apr 2019 09:22), Max: 39.1 (03 Apr 2019 15:45)  T(F): 97.5 (04 Apr 2019 09:22), Max: 102.3 (03 Apr 2019 15:45)  HR: 60 (04 Apr 2019 09:22) (52 - 94)  BP: 91/45 (04 Apr 2019 09:22) (67/38 - 105/46)  RR: 17 (04 Apr 2019 09:22) (14 - 18)  SpO2: 100% (04 Apr 2019 09:22) (93% - 100%)    CAPILLARY BLOOD GLUCOSE    POCT Blood Glucose.: 113 mg/dL (04 Apr 2019 12:02)    PHYSICAL EXAM:  General: elderly cachetic male, appearing stated age. No acute distress   HEENT: normocephalic, atraumatic. Moist mucous membranes   Eyes: clear conjunctiva, PERRL  Neck: supple  Respiratory: decreased breath sounds in right anterior fields. Left chest clear to auscultation.   Cardiovascular: S1, S2. Regular rate and rhythm. No murmurs/rubs/gallops  Abdomen: soft, nondistended, nontender. +BS  Extremities: WWP, 2+ peripheral pulses b/l; no LE edema  Skin: normal color and turgor; no rash  Neurological: A&Ox3    MEDICATIONS:  MEDICATIONS  (STANDING):  dextrose 5%. 1000 milliLiter(s) (50 mL/Hr) IV Continuous <Continuous>  dextrose 50% Injectable 12.5 Gram(s) IV Push once  dextrose 50% Injectable 25 Gram(s) IV Push once  dextrose 50% Injectable 25 Gram(s) IV Push once  insulin lispro (HumaLOG) corrective regimen sliding scale   SubCutaneous Before meals and at bedtime  piperacillin/tazobactam IVPB. 3.375 Gram(s) IV Intermittent every 8 hours    MEDICATIONS  (PRN):  aluminum hydroxide/magnesium hydroxide/simethicone Suspension 30 milliLiter(s) Oral every 6 hours PRN Dyspepsia  dextrose 40% Gel 15 Gram(s) Oral once PRN Blood Glucose LESS THAN 70 milliGRAM(s)/deciliter  glucagon  Injectable 1 milliGRAM(s) IntraMuscular once PRN Glucose LESS THAN 70 milligrams/deciliter    ALLERGIES:  Allergies    No Known Allergies    Intolerances    LABS:                        9.5    16.07 )-----------( 180      ( 04 Apr 2019 06:35 )             30.2     04-04    138  |  106  |  19  ----------------------------<  95  4.2   |  20<L>  |  1.18    Ca    7.9<L>      04 Apr 2019 06:35  Phos  2.8     04-04  Mg     2.2     04-04    TPro  6.9  /  Alb  3.6  /  TBili  1.1  /  DBili  x   /  AST  32  /  ALT  25  /  AlkPhos  75  04-03    PT/INR - ( 03 Apr 2019 10:18 )   PT: 15.3 SEC;   INR: 1.37        PTT - ( 03 Apr 2019 10:18 )  PTT:27.7 SEC    RADIOLOGY & ADDITIONAL TESTS: Reviewed.

## 2019-04-05 DIAGNOSIS — N17.9 ACUTE KIDNEY FAILURE, UNSPECIFIED: ICD-10-CM

## 2019-04-05 LAB
ANION GAP SERPL CALC-SCNC: 11 MMO/L — SIGNIFICANT CHANGE UP (ref 7–14)
BUN SERPL-MCNC: 12 MG/DL — SIGNIFICANT CHANGE UP (ref 7–23)
CALCIUM SERPL-MCNC: 8.4 MG/DL — SIGNIFICANT CHANGE UP (ref 8.4–10.5)
CHLORIDE SERPL-SCNC: 102 MMOL/L — SIGNIFICANT CHANGE UP (ref 98–107)
CO2 SERPL-SCNC: 22 MMOL/L — SIGNIFICANT CHANGE UP (ref 22–31)
CREAT SERPL-MCNC: 0.93 MG/DL — SIGNIFICANT CHANGE UP (ref 0.5–1.3)
GLUCOSE SERPL-MCNC: 189 MG/DL — HIGH (ref 70–99)
HBA1C BLD-MCNC: 6.3 % — HIGH (ref 4–5.6)
HCT VFR BLD CALC: 32.4 % — LOW (ref 39–50)
HGB BLD-MCNC: 10.2 G/DL — LOW (ref 13–17)
L PNEUMO AG UR QL: NEGATIVE — SIGNIFICANT CHANGE UP
MAGNESIUM SERPL-MCNC: 2.1 MG/DL — SIGNIFICANT CHANGE UP (ref 1.6–2.6)
MCHC RBC-ENTMCNC: 26.4 PG — LOW (ref 27–34)
MCHC RBC-ENTMCNC: 31.5 % — LOW (ref 32–36)
MCV RBC AUTO: 83.7 FL — SIGNIFICANT CHANGE UP (ref 80–100)
NRBC # FLD: 0 K/UL — SIGNIFICANT CHANGE UP (ref 0–0)
PHOSPHATE SERPL-MCNC: 2.2 MG/DL — LOW (ref 2.5–4.5)
PLATELET # BLD AUTO: 211 K/UL — SIGNIFICANT CHANGE UP (ref 150–400)
PMV BLD: 10.5 FL — SIGNIFICANT CHANGE UP (ref 7–13)
POTASSIUM SERPL-MCNC: 4.4 MMOL/L — SIGNIFICANT CHANGE UP (ref 3.5–5.3)
POTASSIUM SERPL-SCNC: 4.4 MMOL/L — SIGNIFICANT CHANGE UP (ref 3.5–5.3)
RBC # BLD: 3.87 M/UL — LOW (ref 4.2–5.8)
RBC # FLD: 15.9 % — HIGH (ref 10.3–14.5)
SODIUM SERPL-SCNC: 135 MMOL/L — SIGNIFICANT CHANGE UP (ref 135–145)
VANCOMYCIN TROUGH SERPL-MCNC: 17.5 UG/ML — SIGNIFICANT CHANGE UP (ref 10–20)
WBC # BLD: 10.94 K/UL — HIGH (ref 3.8–10.5)
WBC # FLD AUTO: 10.94 K/UL — HIGH (ref 3.8–10.5)

## 2019-04-05 PROCEDURE — 99233 SBSQ HOSP IP/OBS HIGH 50: CPT | Mod: GC

## 2019-04-05 RX ORDER — GLUCAGON INJECTION, SOLUTION 0.5 MG/.1ML
1 INJECTION, SOLUTION SUBCUTANEOUS ONCE
Qty: 0 | Refills: 0 | Status: DISCONTINUED | OUTPATIENT
Start: 2019-04-05 | End: 2019-04-05

## 2019-04-05 RX ORDER — DEXTROSE 50 % IN WATER 50 %
12.5 SYRINGE (ML) INTRAVENOUS ONCE
Qty: 0 | Refills: 0 | Status: DISCONTINUED | OUTPATIENT
Start: 2019-04-05 | End: 2019-04-05

## 2019-04-05 RX ORDER — DEXTROSE 50 % IN WATER 50 %
25 SYRINGE (ML) INTRAVENOUS ONCE
Qty: 0 | Refills: 0 | Status: DISCONTINUED | OUTPATIENT
Start: 2019-04-05 | End: 2019-04-05

## 2019-04-05 RX ORDER — SODIUM CHLORIDE 9 MG/ML
1000 INJECTION, SOLUTION INTRAVENOUS
Qty: 0 | Refills: 0 | Status: DISCONTINUED | OUTPATIENT
Start: 2019-04-05 | End: 2019-04-05

## 2019-04-05 RX ORDER — DEXTROSE 50 % IN WATER 50 %
15 SYRINGE (ML) INTRAVENOUS ONCE
Qty: 0 | Refills: 0 | Status: DISCONTINUED | OUTPATIENT
Start: 2019-04-05 | End: 2019-04-05

## 2019-04-05 RX ORDER — FAMOTIDINE 10 MG/ML
40 INJECTION INTRAVENOUS
Qty: 0 | Refills: 0 | Status: DISCONTINUED | OUTPATIENT
Start: 2019-04-05 | End: 2019-04-09

## 2019-04-05 RX ORDER — SIMETHICONE 80 MG/1
80 TABLET, CHEWABLE ORAL
Qty: 0 | Refills: 0 | Status: DISCONTINUED | OUTPATIENT
Start: 2019-04-05 | End: 2019-04-10

## 2019-04-05 RX ORDER — INSULIN LISPRO 100/ML
VIAL (ML) SUBCUTANEOUS
Qty: 0 | Refills: 0 | Status: DISCONTINUED | OUTPATIENT
Start: 2019-04-05 | End: 2019-04-05

## 2019-04-05 RX ORDER — CALCIUM CARBONATE 500(1250)
2 TABLET ORAL AT BEDTIME
Qty: 0 | Refills: 0 | Status: DISCONTINUED | OUTPATIENT
Start: 2019-04-05 | End: 2019-04-10

## 2019-04-05 RX ORDER — VANCOMYCIN HCL 1 G
1000 VIAL (EA) INTRAVENOUS EVERY 12 HOURS
Qty: 0 | Refills: 0 | Status: DISCONTINUED | OUTPATIENT
Start: 2019-04-05 | End: 2019-04-07

## 2019-04-05 RX ADMIN — PIPERACILLIN AND TAZOBACTAM 25 GRAM(S): 4; .5 INJECTION, POWDER, LYOPHILIZED, FOR SOLUTION INTRAVENOUS at 14:26

## 2019-04-05 RX ADMIN — Medication 250 MILLIGRAM(S): at 22:26

## 2019-04-05 RX ADMIN — Medication 30 MILLILITER(S): at 14:38

## 2019-04-05 RX ADMIN — SIMETHICONE 80 MILLIGRAM(S): 80 TABLET, CHEWABLE ORAL at 17:55

## 2019-04-05 RX ADMIN — PIPERACILLIN AND TAZOBACTAM 25 GRAM(S): 4; .5 INJECTION, POWDER, LYOPHILIZED, FOR SOLUTION INTRAVENOUS at 21:37

## 2019-04-05 RX ADMIN — Medication 250 MILLIGRAM(S): at 05:06

## 2019-04-05 RX ADMIN — PIPERACILLIN AND TAZOBACTAM 25 GRAM(S): 4; .5 INJECTION, POWDER, LYOPHILIZED, FOR SOLUTION INTRAVENOUS at 05:06

## 2019-04-05 RX ADMIN — Medication 30 MILLILITER(S): at 08:38

## 2019-04-05 RX ADMIN — FAMOTIDINE 40 MILLIGRAM(S): 10 INJECTION INTRAVENOUS at 18:46

## 2019-04-05 NOTE — PROGRESS NOTE ADULT - PROBLEM SELECTOR PLAN 8
DVT PPx: hold in the setting of sepsis   Diet: mechanical soft, consistent carb   Dispo: admitted for severe sepsis

## 2019-04-05 NOTE — PROGRESS NOTE ADULT - SUBJECTIVE AND OBJECTIVE BOX
INTERVAL HPI/OVERNIGHT EVENTS: no acute events overnight     SUBJECTIVE: Patient seen and examined at bedside. Patient denies chest pain/shortness of breath/abdominal pain/nausea/vomiting/diarrhea.     ROS: otherwise negative    OBJECTIVE:    VITAL SIGNS:  T(C): 36.5 (05 Apr 2019 09:10), Max: 36.8 (04 Apr 2019 18:04)  T(F): 97.7 (05 Apr 2019 09:10), Max: 98.3 (04 Apr 2019 18:04)  HR: 66 (05 Apr 2019 09:10) (65 - 70)  BP: 126/72 (05 Apr 2019 09:10) (101/65 - 126/72)  RR: 18 (05 Apr 2019 09:10) (17 - 20)  SpO2: 100% (05 Apr 2019 09:10) (99% - 100%)    CAPILLARY BLOOD GLUCOSE    POCT Blood Glucose.: 135 mg/dL (04 Apr 2019 22:28)    PHYSICAL EXAM:  General: elderly cachetic male, appearing stated age. No acute distress   HEENT: normocephalic, atraumatic. Moist mucous membranes   Eyes: clear conjunctiva, PERRL  Neck: supple  Respiratory: decreased breath sounds in right anterior fields. Left chest clear to auscultation.   Cardiovascular: S1, S2. Regular rate and rhythm. No murmurs/rubs/gallops  Abdomen: soft, nondistended, nontender. +BS  Extremities: WWP, 2+ peripheral pulses b/l; no LE edema  Skin: normal color and turgor; no rash  Neurological: A&Ox3    MEDICATIONS:  MEDICATIONS  (STANDING):  piperacillin/tazobactam IVPB. 3.375 Gram(s) IV Intermittent every 8 hours    MEDICATIONS  (PRN):  aluminum hydroxide/magnesium hydroxide/simethicone Suspension 30 milliLiter(s) Oral every 6 hours PRN Dyspepsia    ALLERGIES:  Allergies    No Known Allergies    Intolerances    LABS:                        9.5    16.07 )-----------( 180      ( 04 Apr 2019 06:35 )             30.2     04-04    138  |  106  |  19  ----------------------------<  95  4.2   |  20<L>  |  1.18    Ca    7.9<L>      04 Apr 2019 06:35  Phos  2.8     04-04  Mg     2.2     04-04    RADIOLOGY & ADDITIONAL TESTS: Reviewed. INTERVAL HPI/OVERNIGHT EVENTS: no acute events overnight     SUBJECTIVE: Patient seen and examined at bedside. Patient reporting excess gas. Still passing gas, last bowel movement yesterday. Patient denies chest pain/shortness of breath/abdominal pain/nausea/vomiting/diarrhea.     ROS: otherwise negative    OBJECTIVE:    VITAL SIGNS:  T(C): 36.5 (05 Apr 2019 09:10), Max: 36.8 (04 Apr 2019 18:04)  T(F): 97.7 (05 Apr 2019 09:10), Max: 98.3 (04 Apr 2019 18:04)  HR: 66 (05 Apr 2019 09:10) (65 - 70)  BP: 126/72 (05 Apr 2019 09:10) (101/65 - 126/72)  RR: 18 (05 Apr 2019 09:10) (17 - 20)  SpO2: 100% (05 Apr 2019 09:10) (99% - 100%)    CAPILLARY BLOOD GLUCOSE    POCT Blood Glucose.: 135 mg/dL (04 Apr 2019 22:28)    PHYSICAL EXAM:  General: elderly cachetic male, appearing stated age. No acute distress   HEENT: normocephalic, atraumatic. Moist mucous membranes   Eyes: clear conjunctiva, PERRL  Neck: supple  Respiratory: decreased breath sounds in right anterior fields. Left chest clear to auscultation.   Cardiovascular: S1, S2. Regular rate and rhythm. No murmurs/rubs/gallops  Abdomen: soft, nondistended, nontender. +BS  Extremities: WWP, 2+ peripheral pulses b/l; no LE edema  Skin: normal color and turgor; no rash  Neurological: A&Ox3    MEDICATIONS:  MEDICATIONS  (STANDING):  piperacillin/tazobactam IVPB. 3.375 Gram(s) IV Intermittent every 8 hours    MEDICATIONS  (PRN):  aluminum hydroxide/magnesium hydroxide/simethicone Suspension 30 milliLiter(s) Oral every 6 hours PRN Dyspepsia    ALLERGIES:  Allergies    No Known Allergies    Intolerances    LABS:                        9.5    16.07 )-----------( 180      ( 04 Apr 2019 06:35 )             30.2     04-04    138  |  106  |  19  ----------------------------<  95  4.2   |  20<L>  |  1.18    Ca    7.9<L>      04 Apr 2019 06:35  Phos  2.8     04-04  Mg     2.2     04-04    RADIOLOGY & ADDITIONAL TESTS: Reviewed.

## 2019-04-05 NOTE — PROGRESS NOTE ADULT - PROBLEM SELECTOR PLAN 3
- baseline Scr ~0.7   - Scr trend this admission 1.25 > 1.18  - likely secondary to hypotension from sepsis  - avoid nephrotoxins, hypotension    - will continue to monitor - baseline Scr ~0.7   - Scr trend this admission 1.25 > 1.18  - likely secondary to ATN 2/2 hypotension from septic shock  - avoid nephrotoxins, hypotension    - will continue to monitor

## 2019-04-05 NOTE — PHYSICAL THERAPY INITIAL EVALUATION ADULT - PERTINENT HX OF CURRENT PROBLEM, REHAB EVAL
78 year old male with history of recent admission in March 2019 for gastric outlet obstruction s/p dilation presents with 2 day history of fevers and productive cough.

## 2019-04-05 NOTE — PROGRESS NOTE ADULT - ASSESSMENT
78yoM with history of  CABG 2002, HFpEF, gastric ca s/p partial gastrectomy and chemoradiation 2006, HTN, HLD, T2DM, JACK, GERD, chronic subdural hematoma, recent admission in March 2019 for gastric outlet obstruction s/p dilation presents with 2 day history of fevers and productive cough. Found to be in septic shock secondary to bacterial pneumonia. Also complicated by stable new acute component to chronic SDH.

## 2019-04-05 NOTE — PROGRESS NOTE ADULT - PROBLEM SELECTOR PLAN 4
- resolved   - s/p 3L of fluids, Currently on vanc/zosyn  - patients pressures now maintaining systolic BP in the 80s-90s off levophed. Able to take PO  - source is likely right lobe HAP  - MICU reccs appreciated. Holding midodrine for now given bradycardia  - urine legionella sent

## 2019-04-05 NOTE — PROGRESS NOTE ADULT - PROBLEM SELECTOR PLAN 1
- recent hospitalization  - MRSA and pseudomonas coverage  - c/w vanc and zosyn for 7 day course. Currently day 3/7

## 2019-04-06 DIAGNOSIS — R19.7 DIARRHEA, UNSPECIFIED: ICD-10-CM

## 2019-04-06 LAB
ANION GAP SERPL CALC-SCNC: 15 MMO/L — HIGH (ref 7–14)
BUN SERPL-MCNC: 11 MG/DL — SIGNIFICANT CHANGE UP (ref 7–23)
CALCIUM SERPL-MCNC: 8.9 MG/DL — SIGNIFICANT CHANGE UP (ref 8.4–10.5)
CHLORIDE SERPL-SCNC: 103 MMOL/L — SIGNIFICANT CHANGE UP (ref 98–107)
CO2 SERPL-SCNC: 19 MMOL/L — LOW (ref 22–31)
CREAT SERPL-MCNC: 1.04 MG/DL — SIGNIFICANT CHANGE UP (ref 0.5–1.3)
GLUCOSE SERPL-MCNC: 87 MG/DL — SIGNIFICANT CHANGE UP (ref 70–99)
GRAM STN SPT: SIGNIFICANT CHANGE UP
HCT VFR BLD CALC: 34.8 % — LOW (ref 39–50)
HGB BLD-MCNC: 10.7 G/DL — LOW (ref 13–17)
MAGNESIUM SERPL-MCNC: 2.1 MG/DL — SIGNIFICANT CHANGE UP (ref 1.6–2.6)
MCHC RBC-ENTMCNC: 26.7 PG — LOW (ref 27–34)
MCHC RBC-ENTMCNC: 30.7 % — LOW (ref 32–36)
MCV RBC AUTO: 86.8 FL — SIGNIFICANT CHANGE UP (ref 80–100)
NRBC # FLD: 0 K/UL — SIGNIFICANT CHANGE UP (ref 0–0)
PHOSPHATE SERPL-MCNC: 2.9 MG/DL — SIGNIFICANT CHANGE UP (ref 2.5–4.5)
PLATELET # BLD AUTO: 205 K/UL — SIGNIFICANT CHANGE UP (ref 150–400)
PMV BLD: 11.6 FL — SIGNIFICANT CHANGE UP (ref 7–13)
POTASSIUM SERPL-MCNC: 4.6 MMOL/L — SIGNIFICANT CHANGE UP (ref 3.5–5.3)
POTASSIUM SERPL-SCNC: 4.6 MMOL/L — SIGNIFICANT CHANGE UP (ref 3.5–5.3)
RBC # BLD: 4.01 M/UL — LOW (ref 4.2–5.8)
RBC # FLD: 16.4 % — HIGH (ref 10.3–14.5)
SODIUM SERPL-SCNC: 137 MMOL/L — SIGNIFICANT CHANGE UP (ref 135–145)
SPECIMEN SOURCE: SIGNIFICANT CHANGE UP
WBC # BLD: 6.11 K/UL — SIGNIFICANT CHANGE UP (ref 3.8–10.5)
WBC # FLD AUTO: 6.11 K/UL — SIGNIFICANT CHANGE UP (ref 3.8–10.5)

## 2019-04-06 PROCEDURE — 99233 SBSQ HOSP IP/OBS HIGH 50: CPT | Mod: GC

## 2019-04-06 RX ADMIN — Medication 250 MILLIGRAM(S): at 22:12

## 2019-04-06 RX ADMIN — FAMOTIDINE 40 MILLIGRAM(S): 10 INJECTION INTRAVENOUS at 17:35

## 2019-04-06 RX ADMIN — Medication 250 MILLIGRAM(S): at 10:06

## 2019-04-06 RX ADMIN — PIPERACILLIN AND TAZOBACTAM 25 GRAM(S): 4; .5 INJECTION, POWDER, LYOPHILIZED, FOR SOLUTION INTRAVENOUS at 22:12

## 2019-04-06 RX ADMIN — Medication 30 MILLILITER(S): at 22:14

## 2019-04-06 RX ADMIN — SIMETHICONE 80 MILLIGRAM(S): 80 TABLET, CHEWABLE ORAL at 00:15

## 2019-04-06 RX ADMIN — FAMOTIDINE 40 MILLIGRAM(S): 10 INJECTION INTRAVENOUS at 06:07

## 2019-04-06 RX ADMIN — Medication 30 MILLILITER(S): at 08:04

## 2019-04-06 RX ADMIN — PIPERACILLIN AND TAZOBACTAM 25 GRAM(S): 4; .5 INJECTION, POWDER, LYOPHILIZED, FOR SOLUTION INTRAVENOUS at 06:08

## 2019-04-06 RX ADMIN — PIPERACILLIN AND TAZOBACTAM 25 GRAM(S): 4; .5 INJECTION, POWDER, LYOPHILIZED, FOR SOLUTION INTRAVENOUS at 13:07

## 2019-04-06 RX ADMIN — Medication 2 TABLET(S): at 02:39

## 2019-04-06 RX ADMIN — SIMETHICONE 80 MILLIGRAM(S): 80 TABLET, CHEWABLE ORAL at 13:07

## 2019-04-06 NOTE — PROGRESS NOTE ADULT - ASSESSMENT
78M with CAD s/p CABG 2002, HFpEF, gastric ca s/p partial gastrectomy and chemoradiation 2006, HTN, HLD, T2DM, JACK, GERD, chronic subdural hematoma, recent admission in March 2019 for gastric outlet obstruction s/p dilation presents with 2 day history of fevers and productive cough, found to be in septic shock secondary to pneumonia Also complicated by stable new acute component to chronic SDH.

## 2019-04-06 NOTE — PROGRESS NOTE ADULT - SUBJECTIVE AND OBJECTIVE BOX
Patient is a 78y old  Male who presents with a chief complaint of Pneumonia (05 Apr 2019 12:37)    SUBJECTIVE / OVERNIGHT EVENTS: Pt complains of loose stool, 3 episodes over last 24h. On antibiotics for HAP. No fever, chills, dyspnea, cough, or abdominal pain. No melena or hematochezia.     MEDICATIONS  (STANDING):  famotidine    Tablet 40 milliGRAM(s) Oral two times a day  piperacillin/tazobactam IVPB. 3.375 Gram(s) IV Intermittent every 8 hours  vancomycin  IVPB 1000 milliGRAM(s) IV Intermittent every 12 hours    MEDICATIONS  (PRN):  aluminum hydroxide/magnesium hydroxide/simethicone Suspension 30 milliLiter(s) Oral every 6 hours PRN Dyspepsia  calcium carbonate    500 mG (Tums) Chewable 2 Tablet(s) Chew at bedtime PRN Heartburn  simethicone 80 milliGRAM(s) Chew two times a day PRN Indigestion      Vital Signs Last 24 Hrs  T(C): 36.3 (06 Apr 2019 05:59), Max: 36.7 (05 Apr 2019 20:39)  T(F): 97.4 (06 Apr 2019 05:59), Max: 98.1 (05 Apr 2019 20:39)  HR: 64 (06 Apr 2019 05:59) (64 - 75)  BP: 139/71 (06 Apr 2019 05:59) (139/71 - 151/72)  BP(mean): --  RR: 18 (06 Apr 2019 05:59) (14 - 18)  SpO2: 98% (06 Apr 2019 05:59) (98% - 100%)  CAPILLARY BLOOD GLUCOSE        I&O's Summary      PHYSICAL EXAM:  General: NAD, appears comfortable  Skin: Warm and dry  Neuro: AAOx3, nonfocal  HEENT: PERRL, EOMI, no oral lesions  Neck: Full range of motion, no JVD  Lungs: Clear to ascultation bilaterally no wheezes, rales, or rhonchi   Heart: Regular rate and rhythm, no murmurs  Abdomen: Normoactive bowl sounds, soft, nontender, nondistended  Extremities: No lower extremity tenderness, erythema, or edema   Psych: normal mood and affect    LABS:                        10.7   6.11  )-----------( 205      ( 06 Apr 2019 06:39 )             34.8     04-06    137  |  103  |  11  ----------------------------<  87  4.6   |  19<L>  |  1.04    Ca    8.9      06 Apr 2019 06:39  Phos  2.9     04-06  Mg     2.1     04-06

## 2019-04-06 NOTE — PROGRESS NOTE ADULT - PROBLEM SELECTOR PLAN 5
Resolved   - s/p 3L of fluids, Currently on vanc/zosyn  - patients pressures now maintaining systolic BP in the 80s-90s off levophed. Able to take PO  - source is likely right lobe HAP  - MICU reccs appreciated. Holding midodrine for now given bradycardia  - urine legionella sent

## 2019-04-06 NOTE — PROGRESS NOTE ADULT - PROBLEM SELECTOR PLAN 6
Patient with history of CABG in 2002   - no history of stents   - will hold off on antiplatelet and anticoagulation given new component of chronic subdural hematoma

## 2019-04-06 NOTE — PROGRESS NOTE ADULT - PROBLEM SELECTOR PLAN 1
Recent hospitalization  - MRSA and pseudomonas coverage  - c/w vanc and zosyn for 7 day course. Currently day 3/7

## 2019-04-06 NOTE — PROGRESS NOTE ADULT - PROBLEM SELECTOR PLAN 3
Patient with chronic subdural hematoma  - Initial brain imaging showed new acute component   - neurosurgery on board. Repeat CT head after 12hrs shows stable hematoma   - no anticoagulation

## 2019-04-06 NOTE — PROGRESS NOTE ADULT - PROBLEM SELECTOR PLAN 4
- baseline Scr ~0.7, currently 1  - likely secondary to ATN 2/2 hypotension from septic shock  - avoid nephrotoxins, hypotension    - will continue to monitor

## 2019-04-07 DIAGNOSIS — L29.9 PRURITUS, UNSPECIFIED: ICD-10-CM

## 2019-04-07 LAB
ANION GAP SERPL CALC-SCNC: 8 MMO/L — SIGNIFICANT CHANGE UP (ref 7–14)
BACTERIA SPT RESP CULT: SIGNIFICANT CHANGE UP
BUN SERPL-MCNC: 9 MG/DL — SIGNIFICANT CHANGE UP (ref 7–23)
CALCIUM SERPL-MCNC: 8.8 MG/DL — SIGNIFICANT CHANGE UP (ref 8.4–10.5)
CHLORIDE SERPL-SCNC: 104 MMOL/L — SIGNIFICANT CHANGE UP (ref 98–107)
CO2 SERPL-SCNC: 25 MMOL/L — SIGNIFICANT CHANGE UP (ref 22–31)
CREAT SERPL-MCNC: 0.91 MG/DL — SIGNIFICANT CHANGE UP (ref 0.5–1.3)
GLUCOSE SERPL-MCNC: 103 MG/DL — HIGH (ref 70–99)
HCT VFR BLD CALC: 34.8 % — LOW (ref 39–50)
HGB BLD-MCNC: 11.2 G/DL — LOW (ref 13–17)
MAGNESIUM SERPL-MCNC: 2 MG/DL — SIGNIFICANT CHANGE UP (ref 1.6–2.6)
MCHC RBC-ENTMCNC: 26 PG — LOW (ref 27–34)
MCHC RBC-ENTMCNC: 32.2 % — SIGNIFICANT CHANGE UP (ref 32–36)
MCV RBC AUTO: 80.9 FL — SIGNIFICANT CHANGE UP (ref 80–100)
NRBC # FLD: 0 K/UL — SIGNIFICANT CHANGE UP (ref 0–0)
PHOSPHATE SERPL-MCNC: 3.3 MG/DL — SIGNIFICANT CHANGE UP (ref 2.5–4.5)
PLATELET # BLD AUTO: 250 K/UL — SIGNIFICANT CHANGE UP (ref 150–400)
PMV BLD: 10.4 FL — SIGNIFICANT CHANGE UP (ref 7–13)
POTASSIUM SERPL-MCNC: 4.2 MMOL/L — SIGNIFICANT CHANGE UP (ref 3.5–5.3)
POTASSIUM SERPL-SCNC: 4.2 MMOL/L — SIGNIFICANT CHANGE UP (ref 3.5–5.3)
RBC # BLD: 4.3 M/UL — SIGNIFICANT CHANGE UP (ref 4.2–5.8)
RBC # FLD: 15.9 % — HIGH (ref 10.3–14.5)
SODIUM SERPL-SCNC: 137 MMOL/L — SIGNIFICANT CHANGE UP (ref 135–145)
VANCOMYCIN TROUGH SERPL-MCNC: 23 UG/ML — HIGH (ref 10–20)
WBC # BLD: 5.83 K/UL — SIGNIFICANT CHANGE UP (ref 3.8–10.5)
WBC # FLD AUTO: 5.83 K/UL — SIGNIFICANT CHANGE UP (ref 3.8–10.5)

## 2019-04-07 PROCEDURE — 99233 SBSQ HOSP IP/OBS HIGH 50: CPT | Mod: GC

## 2019-04-07 RX ORDER — VANCOMYCIN HCL 1 G
750 VIAL (EA) INTRAVENOUS EVERY 12 HOURS
Qty: 0 | Refills: 0 | Status: DISCONTINUED | OUTPATIENT
Start: 2019-04-08 | End: 2019-04-09

## 2019-04-07 RX ORDER — DIPHENHYDRAMINE HCL 50 MG
25 CAPSULE ORAL ONCE
Qty: 0 | Refills: 0 | Status: DISCONTINUED | OUTPATIENT
Start: 2019-04-07 | End: 2019-04-07

## 2019-04-07 RX ORDER — LORATADINE 10 MG/1
5 TABLET ORAL
Qty: 0 | Refills: 0 | Status: DISCONTINUED | OUTPATIENT
Start: 2019-04-07 | End: 2019-04-08

## 2019-04-07 RX ORDER — VANCOMYCIN HCL 1 G
VIAL (EA) INTRAVENOUS
Qty: 0 | Refills: 0 | Status: DISCONTINUED | OUTPATIENT
Start: 2019-04-07 | End: 2019-04-09

## 2019-04-07 RX ORDER — LORATADINE 10 MG/1
10 TABLET ORAL DAILY
Qty: 0 | Refills: 0 | Status: DISCONTINUED | OUTPATIENT
Start: 2019-04-07 | End: 2019-04-07

## 2019-04-07 RX ORDER — VANCOMYCIN HCL 1 G
750 VIAL (EA) INTRAVENOUS ONCE
Qty: 0 | Refills: 0 | Status: COMPLETED | OUTPATIENT
Start: 2019-04-07 | End: 2019-04-07

## 2019-04-07 RX ORDER — LORATADINE 10 MG/1
5 TABLET ORAL ONCE
Qty: 0 | Refills: 0 | Status: COMPLETED | OUTPATIENT
Start: 2019-04-07 | End: 2019-04-07

## 2019-04-07 RX ADMIN — PIPERACILLIN AND TAZOBACTAM 25 GRAM(S): 4; .5 INJECTION, POWDER, LYOPHILIZED, FOR SOLUTION INTRAVENOUS at 21:44

## 2019-04-07 RX ADMIN — SIMETHICONE 80 MILLIGRAM(S): 80 TABLET, CHEWABLE ORAL at 10:59

## 2019-04-07 RX ADMIN — FAMOTIDINE 40 MILLIGRAM(S): 10 INJECTION INTRAVENOUS at 05:35

## 2019-04-07 RX ADMIN — Medication 30 MILLILITER(S): at 16:37

## 2019-04-07 RX ADMIN — LORATADINE 5 MILLIGRAM(S): 10 TABLET ORAL at 22:23

## 2019-04-07 RX ADMIN — PIPERACILLIN AND TAZOBACTAM 25 GRAM(S): 4; .5 INJECTION, POWDER, LYOPHILIZED, FOR SOLUTION INTRAVENOUS at 13:06

## 2019-04-07 RX ADMIN — LORATADINE 10 MILLIGRAM(S): 10 TABLET ORAL at 11:00

## 2019-04-07 RX ADMIN — FAMOTIDINE 40 MILLIGRAM(S): 10 INJECTION INTRAVENOUS at 17:00

## 2019-04-07 RX ADMIN — PIPERACILLIN AND TAZOBACTAM 25 GRAM(S): 4; .5 INJECTION, POWDER, LYOPHILIZED, FOR SOLUTION INTRAVENOUS at 05:37

## 2019-04-07 RX ADMIN — Medication 250 MILLIGRAM(S): at 16:57

## 2019-04-07 NOTE — PROGRESS NOTE ADULT - PROBLEM SELECTOR PLAN 1
Recent hospitalization  - MRSA and pseudomonas coverage  - c/w vanc and zosyn for 7 day course. Currently day 5/7

## 2019-04-07 NOTE — PROGRESS NOTE ADULT - SUBJECTIVE AND OBJECTIVE BOX
INTERVAL HPI/OVERNIGHT EVENTS: no acute events overnight    SUBJECTIVE: Patient seen and examined at bedside. Patient reporting scale pruritis. Says its chronic and treated at home with zyrtec. Patient denies chest pain/shortness of breath/abdominal pain/nausea/vomiting/diarrhea.     ROS: otherwise negative    OBJECTIVE:    VITAL SIGNS:  T(C): 36.8 (07 Apr 2019 05:33), Max: 36.8 (07 Apr 2019 05:33)  T(F): 98.2 (07 Apr 2019 05:33), Max: 98.2 (07 Apr 2019 05:33)  HR: 66 (07 Apr 2019 05:33) (62 - 66)  BP: 120/71 (07 Apr 2019 05:33) (120/71 - 133/72)  RR: 16 (07 Apr 2019 05:33) (16 - 18)  SpO2: 100% (07 Apr 2019 05:33) (99% - 100%)    CAPILLARY BLOOD GLUCOSE    General: NAD, appears comfortable  HEENT: no oral lesions. No scalp rash  Neck: Full range of motion, no JVD  Lungs: Clear to ascultation bilaterally no wheezes, rales, or rhonchi   Heart: Regular rate and rhythm, no murmurs  Abdomen: Normoactive bowl sounds, soft, nontender, nondistended  Extremities: No lower extremity tenderness, erythema, or edema   Skin: Warm and dry. No rash  Psych: normal mood and affect  Neuro: AAOx3, nonfocal    MEDICATIONS:  MEDICATIONS  (STANDING):  famotidine    Tablet 40 milliGRAM(s) Oral two times a day  loratadine 10 milliGRAM(s) Oral daily  piperacillin/tazobactam IVPB. 3.375 Gram(s) IV Intermittent every 8 hours  vancomycin  IVPB 1000 milliGRAM(s) IV Intermittent every 12 hours    MEDICATIONS  (PRN):  aluminum hydroxide/magnesium hydroxide/simethicone Suspension 30 milliLiter(s) Oral every 6 hours PRN Dyspepsia  calcium carbonate    500 mG (Tums) Chewable 2 Tablet(s) Chew at bedtime PRN Heartburn  simethicone 80 milliGRAM(s) Chew two times a day PRN Indigestion    ALLERGIES:  Allergies    No Known Allergies    Intolerances    LABS:                        11.2   5.83  )-----------( 250      ( 07 Apr 2019 06:04 )             34.8     04-07    137  |  104  |  9   ----------------------------<  103<H>  4.2   |  25  |  0.91    Ca    8.8      07 Apr 2019 06:10  Phos  3.3     04-07  Mg     2.0     04-07    RADIOLOGY & ADDITIONAL TESTS: Reviewed.

## 2019-04-07 NOTE — PROGRESS NOTE ADULT - ASSESSMENT
78M with CAD s/p CABG 2002, HFpEF, gastric ca s/p partial gastrectomy and chemoradiation 2006, HTN, HLD, T2DM, JACK, GERD, chronic subdural hematoma, recent admission in March 2019 for gastric outlet obstruction s/p dilation presents with 2 day history of fevers and productive cough, found to be in septic shock secondary to pneumonia. Also complicated by stable new acute component to chronic SDH.

## 2019-04-07 NOTE — PROGRESS NOTE ADULT - PROBLEM SELECTOR PLAN 2
- Likely medication induced. Will follow up with RN and consider checking c. diff if persists.  - denied diarrhea today - patient with scalp and back itching.   - No rash  - takes zyrtec 10mg daily at home. Will start on Claritin 10mg PO QD

## 2019-04-07 NOTE — PROGRESS NOTE ADULT - PROBLEM SELECTOR PLAN 9
DVT PPx: SCDs given SDH   Diet: mechanical soft, consistent carb   Dispo: IV abx til Tuesday BP 120s-130s, not on medications

## 2019-04-07 NOTE — PROVIDER CONTACT NOTE (MEDICATION) - BACKGROUND
patient dx with sepsis. hx of gastroesophageal reflux disease, gastric cancer, carotid stenosis, Hives, iron deficiency anemia, hyperlipidemia, hypertension, coronary artery disease, and diabetes

## 2019-04-07 NOTE — PROGRESS NOTE ADULT - PROBLEM SELECTOR PLAN 3
Patient with chronic subdural hematoma  - Initial brain imaging showed new acute component   - neurosurgery on board. Repeat CT head after 12hrs shows stable hematoma   - no anticoagulation - Likely medication induced. Will follow up with RN and consider checking c. diff if persists.  - denied diarrhea today

## 2019-04-07 NOTE — PROGRESS NOTE ADULT - PROBLEM SELECTOR PLAN 5
Resolved   - s/p 3L of fluids, Currently on vanc/zosyn  - patients pressures now maintaining systolic BP in the 80s-90s off levophed. Able to take PO  - source is likely right lobe HAP  - MICU reccs appreciated. Holding midodrine for now given bradycardia  - urine legionella sent - baseline Scr ~0.7, currently 0.9  - likely secondary to ATN 2/2 hypotension from septic shock  - avoid nephrotoxins, hypotension    - will continue to monitor

## 2019-04-07 NOTE — PROGRESS NOTE ADULT - PROBLEM SELECTOR PLAN 4
- baseline Scr ~0.7, currently 0.9  - likely secondary to ATN 2/2 hypotension from septic shock  - avoid nephrotoxins, hypotension    - will continue to monitor Patient with chronic subdural hematoma  - Initial brain imaging showed new acute component   - neurosurgery on board. Repeat CT head after 12hrs shows stable hematoma   - no anticoagulation

## 2019-04-07 NOTE — PROGRESS NOTE ADULT - PROBLEM SELECTOR PLAN 6
Patient with history of CABG in 2002   - no history of stents   - will hold off on antiplatelet and anticoagulation given new component of chronic subdural hematoma Resolved   - s/p 3L of fluids, Currently on vanc/zosyn  - patients pressures now maintaining systolic BP in the 80s-90s off levophed. Able to take PO  - source is likely right lobe HAP  - MICU reccs appreciated. Holding midodrine for now given bradycardia  - urine legionella sent

## 2019-04-08 ENCOUNTER — TRANSCRIPTION ENCOUNTER (OUTPATIENT)
Age: 79
End: 2019-04-08

## 2019-04-08 LAB
ANION GAP SERPL CALC-SCNC: 9 MMO/L — SIGNIFICANT CHANGE UP (ref 7–14)
BACTERIA BLD CULT: SIGNIFICANT CHANGE UP
BACTERIA BLD CULT: SIGNIFICANT CHANGE UP
BASE EXCESS BLDV CALC-SCNC: -1.3 MMOL/L — SIGNIFICANT CHANGE UP
BUN SERPL-MCNC: 11 MG/DL — SIGNIFICANT CHANGE UP (ref 7–23)
CALCIUM SERPL-MCNC: 7.6 MG/DL — LOW (ref 8.4–10.5)
CHLORIDE SERPL-SCNC: 91 MMOL/L — LOW (ref 98–107)
CO2 SERPL-SCNC: 22 MMOL/L — SIGNIFICANT CHANGE UP (ref 22–31)
CREAT SERPL-MCNC: 0.89 MG/DL — SIGNIFICANT CHANGE UP (ref 0.5–1.3)
GAS PNL BLDV: 123 MMOL/L — LOW (ref 136–146)
GLUCOSE BLDV-MCNC: 381 — HIGH (ref 70–99)
GLUCOSE SERPL-MCNC: 648 MG/DL — CRITICAL HIGH (ref 70–99)
HCO3 BLDV-SCNC: 23 MMOL/L — SIGNIFICANT CHANGE UP (ref 20–27)
HCT VFR BLD CALC: 29 % — LOW (ref 39–50)
HCT VFR BLDV CALC: 28.1 % — LOW (ref 39–51)
HGB BLD-MCNC: 9.3 G/DL — LOW (ref 13–17)
HGB BLDV-MCNC: 9.1 G/DL — LOW (ref 13–17)
MAGNESIUM SERPL-MCNC: 1.7 MG/DL — SIGNIFICANT CHANGE UP (ref 1.6–2.6)
MCHC RBC-ENTMCNC: 26.1 PG — LOW (ref 27–34)
MCHC RBC-ENTMCNC: 32.1 % — SIGNIFICANT CHANGE UP (ref 32–36)
MCV RBC AUTO: 81.2 FL — SIGNIFICANT CHANGE UP (ref 80–100)
NRBC # FLD: 0.02 K/UL — SIGNIFICANT CHANGE UP (ref 0–0)
PCO2 BLDV: 39 MMHG — LOW (ref 41–51)
PH BLDV: 7.39 PH — SIGNIFICANT CHANGE UP (ref 7.32–7.43)
PHOSPHATE SERPL-MCNC: 3.3 MG/DL — SIGNIFICANT CHANGE UP (ref 2.5–4.5)
PLATELET # BLD AUTO: 221 K/UL — SIGNIFICANT CHANGE UP (ref 150–400)
PMV BLD: 10.3 FL — SIGNIFICANT CHANGE UP (ref 7–13)
PO2 BLDV: 135 MMHG — HIGH (ref 35–40)
POTASSIUM BLDV-SCNC: 3.8 MMOL/L — SIGNIFICANT CHANGE UP (ref 3.4–4.5)
POTASSIUM SERPL-MCNC: 3.9 MMOL/L — SIGNIFICANT CHANGE UP (ref 3.5–5.3)
POTASSIUM SERPL-SCNC: 3.9 MMOL/L — SIGNIFICANT CHANGE UP (ref 3.5–5.3)
RBC # BLD: 3.57 M/UL — LOW (ref 4.2–5.8)
RBC # FLD: 15.9 % — HIGH (ref 10.3–14.5)
SAO2 % BLDV: 98.8 % — HIGH (ref 60–85)
SODIUM SERPL-SCNC: 122 MMOL/L — LOW (ref 135–145)
VANCOMYCIN FLD-MCNC: 116.4 UG/ML — CRITICAL HIGH
VANCOMYCIN FLD-MCNC: 22.5 UG/ML — SIGNIFICANT CHANGE UP
WBC # BLD: 4.69 K/UL — SIGNIFICANT CHANGE UP (ref 3.8–10.5)
WBC # FLD AUTO: 4.69 K/UL — SIGNIFICANT CHANGE UP (ref 3.8–10.5)

## 2019-04-08 PROCEDURE — 99233 SBSQ HOSP IP/OBS HIGH 50: CPT | Mod: GC

## 2019-04-08 RX ORDER — LORATADINE 10 MG/1
5 TABLET ORAL ONCE
Qty: 0 | Refills: 0 | Status: COMPLETED | OUTPATIENT
Start: 2019-04-08 | End: 2019-04-08

## 2019-04-08 RX ORDER — LORATADINE 10 MG/1
10 TABLET ORAL DAILY
Qty: 0 | Refills: 0 | Status: DISCONTINUED | OUTPATIENT
Start: 2019-04-08 | End: 2019-04-10

## 2019-04-08 RX ORDER — LORATADINE 10 MG/1
10 TABLET ORAL EVERY 12 HOURS
Qty: 0 | Refills: 0 | Status: DISCONTINUED | OUTPATIENT
Start: 2019-04-08 | End: 2019-04-08

## 2019-04-08 RX ADMIN — Medication 250 MILLIGRAM(S): at 05:45

## 2019-04-08 RX ADMIN — Medication 30 MILLILITER(S): at 15:11

## 2019-04-08 RX ADMIN — LORATADINE 5 MILLIGRAM(S): 10 TABLET ORAL at 05:55

## 2019-04-08 RX ADMIN — PIPERACILLIN AND TAZOBACTAM 25 GRAM(S): 4; .5 INJECTION, POWDER, LYOPHILIZED, FOR SOLUTION INTRAVENOUS at 21:35

## 2019-04-08 RX ADMIN — PIPERACILLIN AND TAZOBACTAM 25 GRAM(S): 4; .5 INJECTION, POWDER, LYOPHILIZED, FOR SOLUTION INTRAVENOUS at 06:45

## 2019-04-08 RX ADMIN — PIPERACILLIN AND TAZOBACTAM 25 GRAM(S): 4; .5 INJECTION, POWDER, LYOPHILIZED, FOR SOLUTION INTRAVENOUS at 13:37

## 2019-04-08 RX ADMIN — FAMOTIDINE 40 MILLIGRAM(S): 10 INJECTION INTRAVENOUS at 05:56

## 2019-04-08 RX ADMIN — LORATADINE 5 MILLIGRAM(S): 10 TABLET ORAL at 17:49

## 2019-04-08 RX ADMIN — Medication 30 MILLILITER(S): at 08:25

## 2019-04-08 RX ADMIN — Medication 250 MILLIGRAM(S): at 17:40

## 2019-04-08 RX ADMIN — SIMETHICONE 80 MILLIGRAM(S): 80 TABLET, CHEWABLE ORAL at 17:40

## 2019-04-08 RX ADMIN — FAMOTIDINE 40 MILLIGRAM(S): 10 INJECTION INTRAVENOUS at 17:40

## 2019-04-08 NOTE — PROGRESS NOTE ADULT - SUBJECTIVE AND OBJECTIVE BOX
INTERVAL HPI/OVERNIGHT EVENTS:    SUBJECTIVE: Patient seen and examined at bedside. Patient denies chest pain/shortness of breath/abdominal pain/nausea/vomiting/diarrhea.     ROS: otherwise negative    OBJECTIVE:    VITAL SIGNS:  ICU Vital Signs Last 24 Hrs  T(C): 36.6 (08 Apr 2019 13:25), Max: 36.9 (07 Apr 2019 21:10)  T(F): 97.9 (08 Apr 2019 13:25), Max: 98.4 (07 Apr 2019 21:10)  HR: 70 (08 Apr 2019 13:25) (70 - 73)  BP: 120/56 (08 Apr 2019 13:25) (108/55 - 120/56)  RR: 17 (08 Apr 2019 13:25) (17 - 18)  SpO2: 100% (08 Apr 2019 13:25) (100% - 100%)    CAPILLARY BLOOD GLUCOSE    POCT Blood Glucose.: 117 mg/dL (08 Apr 2019 07:53)    General: NAD, appears comfortable  HEENT: no oral lesions. No scalp rash  Neck: Full range of motion, no JVD  Lungs: Clear to ascultation bilaterally no wheezes, rales, or rhonchi   Heart: Regular rate and rhythm, no murmurs  Abdomen: Normoactive bowl sounds, soft, nontender, nondistended  Extremities: No lower extremity tenderness, erythema, or edema   Skin: Warm and dry. No rash  Psych: normal mood and affect  Neuro: AAOx3, nonfocal    MEDICATIONS:  MEDICATIONS  (STANDING):  famotidine    Tablet 40 milliGRAM(s) Oral two times a day  loratadine 5 milliGRAM(s) Oral two times a day  piperacillin/tazobactam IVPB. 3.375 Gram(s) IV Intermittent every 8 hours  vancomycin  IVPB      vancomycin  IVPB 750 milliGRAM(s) IV Intermittent every 12 hours    MEDICATIONS  (PRN):  aluminum hydroxide/magnesium hydroxide/simethicone Suspension 30 milliLiter(s) Oral every 6 hours PRN Dyspepsia  calcium carbonate    500 mG (Tums) Chewable 2 Tablet(s) Chew at bedtime PRN Heartburn  simethicone 80 milliGRAM(s) Chew two times a day PRN Indigestion    ALLERGIES:  Allergies    No Known Allergies    Intolerances    LABS:                        9.3    4.69  )-----------( 221      ( 08 Apr 2019 06:05 )             29.0     04-08    122<L>  |  91<L>  |  11  ----------------------------<  648<HH>  3.9   |  22  |  0.89    Ca    7.6<L>      08 Apr 2019 06:05  Phos  3.3     04-08  Mg     1.7     04-08    RADIOLOGY & ADDITIONAL TESTS: Reviewed.

## 2019-04-08 NOTE — PROGRESS NOTE ADULT - PROBLEM SELECTOR PLAN 3
- resolved   - was likely medication induced. Will follow up with RN and consider checking c. diff if persists.

## 2019-04-08 NOTE — PROGRESS NOTE ADULT - PROBLEM SELECTOR PLAN 8
- A1c 6.3   Diet: consistent carb  - BMP earlier 648 with Na 122, likely because it was drawn upstream from the vanc D5 infusion. Will repeat tomorrow

## 2019-04-08 NOTE — PROGRESS NOTE ADULT - PROBLEM SELECTOR PLAN 5
- baseline Scr ~0.7, currently 0.89  - likely secondary to ATN 2/2 hypotension from septic shock  - avoid nephrotoxins, hypotension    - will continue to monitor

## 2019-04-08 NOTE — DISCHARGE NOTE PROVIDER - CARE PROVIDER_API CALL
Harry Scott)  Neurosurgery  General  [address] [facility], [address]  [place] [address]  Phone: [phone]  Fax: [phone]  Follow Up Time:     Edwin Brennan  [address] [place] [address]  Phone: [phone]  Fax: (   )    -  Follow Up Time:

## 2019-04-08 NOTE — PROGRESS NOTE ADULT - PROBLEM SELECTOR PLAN 2
- patient with scalp and back itching.   - No rash  - takes zyrtec 10mg daily at home. Will start on Claritin 10mg PO QD

## 2019-04-08 NOTE — DISCHARGE NOTE PROVIDER - NSDCCPCAREPLAN_GEN_ALL_CORE_FT
PRINCIPAL DISCHARGE DIAGNOSIS  Diagnosis: PNA (pneumonia)  Assessment and Plan of Treatment:       SECONDARY DISCHARGE DIAGNOSES  Diagnosis: Bandemia  Assessment and Plan of Treatment:     Diagnosis: PNA (pneumonia)  Assessment and Plan of Treatment: PRINCIPAL DISCHARGE DIAGNOSIS  Diagnosis: PNA (pneumonia)  Assessment and Plan of Treatment: You came into the hospital with dangerously low blood pressure. We checked a chest xray which showed a pneumonia (lung infection) in your right lung. We treated you with fluids and antibiotics after which you improved. Please follow-up with your primary care doctor within 1-2 weeks of discharge for further monitoring.      SECONDARY DISCHARGE DIAGNOSES  Diagnosis: Acute kidney injury  Assessment and Plan of Treatment: On day 7 of your hospital course we checked your blood work and it showed a mild increase in your kidney numbers. PRINCIPAL DISCHARGE DIAGNOSIS  Diagnosis: PNA (pneumonia)  Assessment and Plan of Treatment: You came into the hospital with dangerously low blood pressure. We checked a chest xray which showed a pneumonia (lung infection) in your right lung. We treated you with fluids and antibiotics after which you improved. Please follow-up with your primary care doctor within 1-2 weeks of discharge for further monitoring.      SECONDARY DISCHARGE DIAGNOSES  Diagnosis: Subdural hematoma  Assessment and Plan of Treatment: We did a CT head of your head and it showed a worsening of your subdural hematoma (bleed on the surface of the brain) as compared to a prior scan. We called neurosurgery who recommended a repeat scan to see if the bleed is increasing. The repeat scan showed that the new component of the bleed was stable. We held your aspirin and blood thinners while you were in the hospital. Neurosurgery said that it was OK to restart your aspirin after 1 week of CT scan. Please see Dr. Scott within 2-4 weeks of discharge for further management of your subdural hematoma.    Diagnosis: Acute kidney injury  Assessment and Plan of Treatment: On day 7 of your hospital course we checked your blood work and it showed a mild increase in your kidney numbers. We gave you some fluids and rechecked your blood work and your kidney function returned to baseline. Please see your primary care physician within 1-2 weeks of discharge to recheck your blood work to check your kidney function.

## 2019-04-08 NOTE — DISCHARGE NOTE PROVIDER - NSDCFUADDAPPT_GEN_ALL_CORE_FT
Please see your primary care physician within 1-2 weeks for further monitoring of your pneumonia and subdural hemorrhage. Please see your primary care physician within 1-2 weeks for further monitoring of the resolution of your pneumonia and also do repeat blood work to check your kidney function     Please see Dr. Harry Scott within 2-4 weeks of discharge for further monitoring of your subdural hemorrhage (brain bleed)

## 2019-04-08 NOTE — DISCHARGE NOTE PROVIDER - HOSPITAL COURSE
78yoM with history of  CABG 2002, HFpEF, gastric ca s/p partial gastrectomy and chemoradiation	 2006, HTN, HLD, T2DM, JACK, GERD, chronic subdural hematoma, recent admission in March 2019 for gastric outlet obstruction s/p dilation presented to the hospital  with 2 day history of fevers and productive cough, found on chest xray to have a right middle lobe pneumonia with signs of septic shock, BP: 70/40's-> requiring aggressive IV resuscitation, 10mg Midodrine and Vanc/zosyn. Patient clinically improved the following day; normotensive, resolution of leukocytosis. Blood cultures and sputum cultures were negative. Patient was treated for HAP with 7 day course of van/zosyn. On the day of discharge patient was clinically improved and stable for discharge with outpt followup. HISTORY OF PRESENT ILLNESS    78yoM with history of  CABG 2002, HFpEF, gastric ca s/p partial gastrectomy and chemoradiation 2006, HTN, HLD, T2DM, JACK, GERD, chronic subdural hematoma, recent admission in March 2019 for gastric outlet obstruction s/p dilation presents with 2 day history of fevers and productive cough. Patient was discharged home on March 19th and felt that his health was improving since yesterday when he began to have high fevers, productive blood tinged cough, and decreased appetite, and chest pain. Patients chest pain was 4/10, pressure sensation in this left parasterum, nonradiating, better with sublingual nitro. At baseline patient is ambulatory and able to take care of his ADLs with help from a home health aide. Unable to obtain history or ROS from patient 2/2 encephalopathy. History obtained from ED charting and daughter.        HOSPITAL COURSE    Patient presented with signs of septic shock, BP: 70/40's-> requiring aggressive IV resuscitation, 10mg Midodrine and Vanc/zosyn. Patient clinically improved the following day; normotensive, resolution of leukocytosis. Initial CT head also showed a new acute component of patient's SDH. Neurosurgery was called who recommended a repeat CT head after 12 hours. Repeat CT head showed no evidence of change or increase. Blood cultures and sputum cultures were negative. On day 7 of antibiotics, BMP showed an increase in the creatinine from 0.9 to 1.3, concerning for antibiotic induced MARTA. Patient was given a fluid challenge which ________. HISTORY OF PRESENT ILLNESS    78yoM with history of  CABG 2002, HFpEF, gastric ca s/p partial gastrectomy and chemoradiation 2006, HTN, HLD, T2DM, JACK, GERD, chronic subdural hematoma, recent admission in March 2019 for gastric outlet obstruction s/p dilation presents with 2 day history of fevers and productive cough. Patient was discharged home on March 19th and felt that his health was improving since yesterday when he began to have high fevers, productive blood tinged cough, and decreased appetite, and chest pain. Patients chest pain was 4/10, pressure sensation in this left parasterum, nonradiating, better with sublingual nitro. At baseline patient is ambulatory and able to take care of his ADLs with help from a home health aide. Unable to obtain history or ROS from patient 2/2 encephalopathy. History obtained from ED charting and daughter.        HOSPITAL COURSE    Patient presented with signs of septic shock, BP: 70/40's-> requiring aggressive IV resuscitation, 10mg Midodrine and Vanc/zosyn. Patient clinically improved the following day; normotensive, resolution of leukocytosis. Initial CT head also showed a new acute component of patient's SDH. Neurosurgery was called who recommended a repeat CT head after 12 hours. Repeat CT head showed no evidence of change or increase. Blood cultures and sputum cultures were negative. On day 7 of antibiotics, BMP showed an increase in the creatinine from 0.9 to 1.3, concerning for antibiotic induced MARTA. Patient was given a fluid challenge which resolved his MARTA. Neurosurgery recommended that it was OK to restart aspirin since it was >1 week since the CT scan. They also recommended that he see Dr. Scott in the office. HISTORY OF PRESENT ILLNESS    78yoM with history of  CABG 2002, HFpEF, gastric ca s/p partial gastrectomy and chemoradiation 2006, HTN, HLD, T2DM, JACK, GERD, chronic subdural hematoma, recent admission in March 2019 for gastric outlet obstruction s/p dilation presents with 2 day history of fevers and productive cough. Patient was discharged home on March 19th and felt that his health was improving since yesterday when he began to have high fevers, productive blood tinged cough, and decreased appetite, and chest pain. Patients chest pain was 4/10, pressure sensation in this left parasterum, nonradiating, better with sublingual nitro. At baseline patient is ambulatory and able to take care of his ADLs with help from a home health aide. Unable to obtain history or ROS from patient 2/2 encephalopathy. History obtained from ED charting and daughter.        HOSPITAL COURSE    Patient presented with signs of septic shock, BP: 70/40's-> requiring aggressive IV resuscitation, 10mg Midodrine and Vanc/zosyn. He was briefly on vasopressors. Patient clinically improved the following day; normotensive, resolution of leukocytosis. Initial CT head also showed a new acute component of patient's SDH. Neurosurgery was called who recommended a repeat CT head after 12 hours. Repeat CT head showed no evidence of change or increase. Blood cultures and sputum cultures were negative. On day 7 of antibiotics, BMP showed an increase in the creatinine from 0.9 to 1.3, concerning for antibiotic induced MARTA. Patient was given a fluid challenge which resolved his MARTA. Neurosurgery recommended that it was OK to restart aspirin since it was >1 week since the CT scan. They also recommended that he see Dr. Scott in the office.

## 2019-04-08 NOTE — PROGRESS NOTE ADULT - PROBLEM SELECTOR PLAN 1
Recent hospitalization  - MRSA and pseudomonas coverage  - c/w vanc and zosyn for 7 day course. Currently day 6/7  - vanc trough from earlier today was 114. Likely because it was drawn upstream from the vanc D5 infusion. Repeat 22.3. Recent hospitalization, presenting with septic shock, improving  - MRSA and pseudomonas coverage  - c/w vanc and zosyn for 7 day course. Currently day 6/7  - vanc trough from earlier today was 114. Likely because it was drawn upstream from the vanc D5 infusion. Repeat 22.3.

## 2019-04-08 NOTE — DISCHARGE NOTE NURSING/CASE MANAGEMENT/SOCIAL WORK - NSDCDPATPORTLINK_GEN_ALL_CORE
You can access the [facility] Patient Portal, offered by [facility], by registering with the following website: [email or web address]

## 2019-04-08 NOTE — DISCHARGE NOTE PROVIDER - PROVIDER TOKENS
PROVIDER:[TOKEN:[[id number]]],FREE:[LAST:[Dairo],FIRST:[Edwin],PHONE:[phone]],FAX:[(   [phone]    -],ADDRESS:[[address] [place] [address]]]

## 2019-04-09 ENCOUNTER — APPOINTMENT (OUTPATIENT)
Dept: GASTROENTEROLOGY | Facility: CLINIC | Age: 79
End: 2019-04-09

## 2019-04-09 LAB
ANION GAP SERPL CALC-SCNC: 10 MMO/L — SIGNIFICANT CHANGE UP (ref 7–14)
ANION GAP SERPL CALC-SCNC: 12 MMO/L — SIGNIFICANT CHANGE UP (ref 7–14)
APPEARANCE UR: CLEAR — SIGNIFICANT CHANGE UP
BASOPHILS # BLD AUTO: 0.02 K/UL — SIGNIFICANT CHANGE UP (ref 0–0.2)
BASOPHILS NFR BLD AUTO: 0.3 % — SIGNIFICANT CHANGE UP (ref 0–2)
BILIRUB UR-MCNC: NEGATIVE — SIGNIFICANT CHANGE UP
BLOOD UR QL VISUAL: NEGATIVE — SIGNIFICANT CHANGE UP
BUN SERPL-MCNC: 12 MG/DL — SIGNIFICANT CHANGE UP (ref 7–23)
BUN SERPL-MCNC: 16 MG/DL — SIGNIFICANT CHANGE UP (ref 7–23)
CALCIUM SERPL-MCNC: 8.8 MG/DL — SIGNIFICANT CHANGE UP (ref 8.4–10.5)
CALCIUM SERPL-MCNC: 9.1 MG/DL — SIGNIFICANT CHANGE UP (ref 8.4–10.5)
CHLORIDE SERPL-SCNC: 100 MMOL/L — SIGNIFICANT CHANGE UP (ref 98–107)
CHLORIDE SERPL-SCNC: 102 MMOL/L — SIGNIFICANT CHANGE UP (ref 98–107)
CO2 SERPL-SCNC: 24 MMOL/L — SIGNIFICANT CHANGE UP (ref 22–31)
CO2 SERPL-SCNC: 24 MMOL/L — SIGNIFICANT CHANGE UP (ref 22–31)
COLOR SPEC: COLORLESS — SIGNIFICANT CHANGE UP
CREAT ?TM UR-MCNC: 27.7 MG/DL — SIGNIFICANT CHANGE UP
CREAT SERPL-MCNC: 0.98 MG/DL — SIGNIFICANT CHANGE UP (ref 0.5–1.3)
CREAT SERPL-MCNC: 1.3 MG/DL — SIGNIFICANT CHANGE UP (ref 0.5–1.3)
EOSINOPHIL # BLD AUTO: 0.34 K/UL — SIGNIFICANT CHANGE UP (ref 0–0.5)
EOSINOPHIL NFR BLD AUTO: 5.9 % — SIGNIFICANT CHANGE UP (ref 0–6)
GLUCOSE SERPL-MCNC: 103 MG/DL — HIGH (ref 70–99)
GLUCOSE SERPL-MCNC: 150 MG/DL — HIGH (ref 70–99)
GLUCOSE UR-MCNC: NEGATIVE — SIGNIFICANT CHANGE UP
HCT VFR BLD CALC: 31.2 % — LOW (ref 39–50)
HCT VFR BLD CALC: 31.2 % — LOW (ref 39–50)
HGB BLD-MCNC: 10.1 G/DL — LOW (ref 13–17)
HGB BLD-MCNC: 10.1 G/DL — LOW (ref 13–17)
IMM GRANULOCYTES NFR BLD AUTO: 1.7 % — HIGH (ref 0–1.5)
KETONES UR-MCNC: NEGATIVE — SIGNIFICANT CHANGE UP
LEUKOCYTE ESTERASE UR-ACNC: NEGATIVE — SIGNIFICANT CHANGE UP
LYMPHOCYTES # BLD AUTO: 1.38 K/UL — SIGNIFICANT CHANGE UP (ref 1–3.3)
LYMPHOCYTES # BLD AUTO: 24 % — SIGNIFICANT CHANGE UP (ref 13–44)
MAGNESIUM SERPL-MCNC: 2 MG/DL — SIGNIFICANT CHANGE UP (ref 1.6–2.6)
MAGNESIUM SERPL-MCNC: 2.1 MG/DL — SIGNIFICANT CHANGE UP (ref 1.6–2.6)
MCHC RBC-ENTMCNC: 26.1 PG — LOW (ref 27–34)
MCHC RBC-ENTMCNC: 26.1 PG — LOW (ref 27–34)
MCHC RBC-ENTMCNC: 32.4 % — SIGNIFICANT CHANGE UP (ref 32–36)
MCHC RBC-ENTMCNC: 32.4 % — SIGNIFICANT CHANGE UP (ref 32–36)
MCV RBC AUTO: 80.6 FL — SIGNIFICANT CHANGE UP (ref 80–100)
MCV RBC AUTO: 80.6 FL — SIGNIFICANT CHANGE UP (ref 80–100)
MONOCYTES # BLD AUTO: 0.7 K/UL — SIGNIFICANT CHANGE UP (ref 0–0.9)
MONOCYTES NFR BLD AUTO: 12.2 % — SIGNIFICANT CHANGE UP (ref 2–14)
NEUTROPHILS # BLD AUTO: 3.22 K/UL — SIGNIFICANT CHANGE UP (ref 1.8–7.4)
NEUTROPHILS NFR BLD AUTO: 55.9 % — SIGNIFICANT CHANGE UP (ref 43–77)
NITRITE UR-MCNC: NEGATIVE — SIGNIFICANT CHANGE UP
NRBC # FLD: 0 K/UL — SIGNIFICANT CHANGE UP (ref 0–0)
NRBC # FLD: 0 K/UL — SIGNIFICANT CHANGE UP (ref 0–0)
OSMOLALITY UR: 357 MOSMO/KG — SIGNIFICANT CHANGE UP (ref 50–1200)
PH UR: 7 — SIGNIFICANT CHANGE UP (ref 5–8)
PHOSPHATE SERPL-MCNC: 3.2 MG/DL — SIGNIFICANT CHANGE UP (ref 2.5–4.5)
PHOSPHATE SERPL-MCNC: 3.4 MG/DL — SIGNIFICANT CHANGE UP (ref 2.5–4.5)
PLATELET # BLD AUTO: 279 K/UL — SIGNIFICANT CHANGE UP (ref 150–400)
PLATELET # BLD AUTO: 279 K/UL — SIGNIFICANT CHANGE UP (ref 150–400)
PMV BLD: 10.8 FL — SIGNIFICANT CHANGE UP (ref 7–13)
PMV BLD: 10.8 FL — SIGNIFICANT CHANGE UP (ref 7–13)
POTASSIUM SERPL-MCNC: 4.1 MMOL/L — SIGNIFICANT CHANGE UP (ref 3.5–5.3)
POTASSIUM SERPL-MCNC: 4.5 MMOL/L — SIGNIFICANT CHANGE UP (ref 3.5–5.3)
POTASSIUM SERPL-SCNC: 4.1 MMOL/L — SIGNIFICANT CHANGE UP (ref 3.5–5.3)
POTASSIUM SERPL-SCNC: 4.5 MMOL/L — SIGNIFICANT CHANGE UP (ref 3.5–5.3)
PROT UR-MCNC: NEGATIVE — SIGNIFICANT CHANGE UP
RBC # BLD: 3.87 M/UL — LOW (ref 4.2–5.8)
RBC # BLD: 3.87 M/UL — LOW (ref 4.2–5.8)
RBC # FLD: 15.9 % — HIGH (ref 10.3–14.5)
RBC # FLD: 15.9 % — HIGH (ref 10.3–14.5)
SODIUM SERPL-SCNC: 136 MMOL/L — SIGNIFICANT CHANGE UP (ref 135–145)
SODIUM SERPL-SCNC: 136 MMOL/L — SIGNIFICANT CHANGE UP (ref 135–145)
SODIUM UR-SCNC: 115 MMOL/L — SIGNIFICANT CHANGE UP
SP GR SPEC: 1.01 — SIGNIFICANT CHANGE UP (ref 1–1.04)
UROBILINOGEN FLD QL: NORMAL — SIGNIFICANT CHANGE UP
UUN UR-MCNC: 276.5 MG/DL — SIGNIFICANT CHANGE UP
WBC # BLD: 5.92 K/UL — SIGNIFICANT CHANGE UP (ref 3.8–10.5)
WBC # BLD: 5.92 K/UL — SIGNIFICANT CHANGE UP (ref 3.8–10.5)
WBC # FLD AUTO: 5.92 K/UL — SIGNIFICANT CHANGE UP (ref 3.8–10.5)
WBC # FLD AUTO: 5.92 K/UL — SIGNIFICANT CHANGE UP (ref 3.8–10.5)

## 2019-04-09 PROCEDURE — 99233 SBSQ HOSP IP/OBS HIGH 50: CPT | Mod: GC

## 2019-04-09 RX ORDER — SODIUM CHLORIDE 9 MG/ML
500 INJECTION, SOLUTION INTRAVENOUS
Qty: 0 | Refills: 0 | Status: DISCONTINUED | OUTPATIENT
Start: 2019-04-09 | End: 2019-04-10

## 2019-04-09 RX ORDER — SODIUM CHLORIDE 9 MG/ML
1000 INJECTION, SOLUTION INTRAVENOUS
Qty: 0 | Refills: 0 | Status: DISCONTINUED | OUTPATIENT
Start: 2019-04-09 | End: 2019-04-09

## 2019-04-09 RX ORDER — SODIUM CHLORIDE 9 MG/ML
500 INJECTION, SOLUTION INTRAVENOUS ONCE
Qty: 0 | Refills: 0 | Status: COMPLETED | OUTPATIENT
Start: 2019-04-09 | End: 2019-04-09

## 2019-04-09 RX ADMIN — SODIUM CHLORIDE 100 MILLILITER(S): 9 INJECTION, SOLUTION INTRAVENOUS at 10:47

## 2019-04-09 RX ADMIN — Medication 250 MILLIGRAM(S): at 05:34

## 2019-04-09 RX ADMIN — SODIUM CHLORIDE 500 MILLILITER(S): 9 INJECTION, SOLUTION INTRAVENOUS at 11:13

## 2019-04-09 RX ADMIN — LORATADINE 10 MILLIGRAM(S): 10 TABLET ORAL at 12:03

## 2019-04-09 RX ADMIN — PIPERACILLIN AND TAZOBACTAM 25 GRAM(S): 4; .5 INJECTION, POWDER, LYOPHILIZED, FOR SOLUTION INTRAVENOUS at 06:40

## 2019-04-09 RX ADMIN — SIMETHICONE 80 MILLIGRAM(S): 80 TABLET, CHEWABLE ORAL at 10:48

## 2019-04-09 RX ADMIN — SODIUM CHLORIDE 100 MILLILITER(S): 9 INJECTION, SOLUTION INTRAVENOUS at 12:14

## 2019-04-09 RX ADMIN — FAMOTIDINE 40 MILLIGRAM(S): 10 INJECTION INTRAVENOUS at 05:37

## 2019-04-09 NOTE — PROGRESS NOTE ADULT - PROBLEM SELECTOR PLAN 7
Patient with history of CABG in 2002   - no history of stents   - will hold off on antiplatelet and anticoagulation given new component of chronic subdural hematoma BP 110s-140s, not on medications

## 2019-04-09 NOTE — CHART NOTE - NSCHARTNOTEFT_GEN_A_CORE
Dermatology Brief Note    HPI:  79 y/o M w/ hx of gastric cancer s/p partial gastrectomy and chemo/radiation in 2006, DMII admitted for pneumonia a/w itchy rash on thighs and trunk. Reports 4 year hx of chronic urticaria for which he has been treated with prednisone and antihistamines. Has only had relief with prednisone in the past.     PHYSICAL EXAM:  Vital Signs Last 24 Hrs  T(C): 36.7 (09 Apr 2019 15:18), Max: 36.8 (09 Apr 2019 05:33)  T(F): 98.1 (09 Apr 2019 15:18), Max: 98.3 (09 Apr 2019 05:33)  HR: 66 (09 Apr 2019 15:18) (66 - 77)  BP: 137/55 (09 Apr 2019 15:18) (112/64 - 147/60)  BP(mean): --  RR: 18 (09 Apr 2019 15:18) (18 - 18)  SpO2: 100% (09 Apr 2019 15:18) (97% - 100%)    Skin exam notable for:  The patient was alert and oriented X 3, well nourished, and in no apparent distress. Oropharynx showed no ulcerations. There was no visible lymphadenopathy. Conjunctiva were non-injected. There was no clubbing or edema of extremities.    The scalp, hair, face, eyebrows, lips, oropharynx , neck, chest, back, buttocks, extremities X 4, hands, feet, nails were examined. There was no hyperhidrosis or bromhidrosis.     The following lesions are noted:   - Edematous pink non-scaly plaques on proximal thighs/buttocks and lower abdomen    ASSESSMENT/PLAN:  Favor urticaria, which may be chronic urticaria based on hx.    - Recommend lortadine 10 mg qam and diphenhydramine 25 mg qpm prn itchy rash    Discussed with attending, Dr. Katya Yeung. Formal rounds to be conducted on 04/10/19. Will update assessment and plan that time.    Eric Richardson MD  PGY3, Dermatology

## 2019-04-09 NOTE — PROGRESS NOTE ADULT - PROBLEM SELECTOR PLAN 5
- baseline Scr ~0.7, currently 0.89  - likely secondary to ATN 2/2 hypotension from septic shock  - avoid nephrotoxins, hypotension    - will continue to monitor Patient with history of CABG in 2002   - no history of stents   - will hold off on antiplatelet and anticoagulation given new component of chronic subdural hematoma

## 2019-04-09 NOTE — PROGRESS NOTE ADULT - PROBLEM SELECTOR PLAN 2
- patient with scalp and back itching.   - No rash  - takes zyrtec 10mg daily at home. Will start on Claritin 10mg PO QD Recent hospitalization, presented with septic shock which is now resolved   - MRSA and pseudomonas coverage  - s/p 7ds of vanc/zosyn. Will discontinue last dose of vanc and last 2 doses of zosyn because concern for AIN/ATN secondary to abx   - monitor until tomorrow

## 2019-04-09 NOTE — PROGRESS NOTE ADULT - PROBLEM SELECTOR PLAN 6
Resolved   - s/p 3L of fluids, Currently on vanc/zosyn  - source is likely right lobe HAP  - urine legionella negative - A1c 6.3   Diet: consistent carb  - BMP earlier 648 with Na 122, likely because it was drawn upstream from the vanc D5 infusion. Repeat today 103

## 2019-04-09 NOTE — PROGRESS NOTE ADULT - PROBLEM SELECTOR PLAN 1
Recent hospitalization, presenting with septic shock, improving  - MRSA and pseudomonas coverage  - c/w vanc and zosyn for 7 day course. Currently day 7/7  - vanc trough from earlier today was 114. Likely because it was drawn upstream from the vanc D5 infusion. Repeat 22.3. - baseline Scr ~0.7, Last 3d trend 0.91 > 0.89 > 1.30  - was initially secondary to hypotension from septic shock, but that resolved and patient has new onset MARTA despite no episodes of hypotension recorded  - likely secondary to AIN/ATN from vanc/zosyn. Will discontinue last dose of vanc and last 2 doses of zosyn and monitor until tomorrow   - f/u urine studies, repeat BMP tomorrow AM  - avoid nephrotoxins, hypotension

## 2019-04-09 NOTE — PROGRESS NOTE ADULT - PROBLEM SELECTOR PLAN 3
- resolved   - was likely medication induced. Will follow up with RN and consider checking c. diff if persists. - patient with scalp and back itching. Says its chronic since the past 4 years   - takes zyrtec 10mg daily at home. Will start on Claritin 10mg PO QD

## 2019-04-09 NOTE — PROGRESS NOTE ADULT - PROBLEM SELECTOR PLAN 8
- A1c 6.3   Diet: consistent carb  - BMP earlier 648 with Na 122, likely because it was drawn upstream from the vanc D5 infusion. Will repeat tomorrow DVT PPx: SCDs given SDH   Diet: mechanical soft, consistent carb   Dispo: dc home with home health aide

## 2019-04-09 NOTE — PROGRESS NOTE ADULT - PROBLEM SELECTOR PLAN 10
DVT PPx: SCDs given SDH   Diet: mechanical soft, consistent carb   Dispo: dc home with home health aide
DVT PPx: SCDs given SDH   Diet: mechanical soft, consistent carb   Dispo: IV abx til Tuesday
DVT PPx: SCDs given SDH   Diet: mechanical soft, consistent carb   Dispo: IV abx til Tuesday

## 2019-04-09 NOTE — PROGRESS NOTE ADULT - SUBJECTIVE AND OBJECTIVE BOX
INTERVAL HPI/OVERNIGHT EVENTS: no acute events overnight     SUBJECTIVE: Patient seen and examined at bedside. Patient denies chest pain/shortness of breath/abdominal pain/nausea/vomiting/diarrhea.     ROS: otherwise negative    OBJECTIVE:    VITAL SIGNS:  ICU Vital Signs Last 24 Hrs  T(C): 36.8 (09 Apr 2019 05:33), Max: 36.8 (09 Apr 2019 05:33)  T(F): 98.3 (09 Apr 2019 05:33), Max: 98.3 (09 Apr 2019 05:33)  HR: 72 (09 Apr 2019 05:33) (70 - 77)  BP: 112/64 (09 Apr 2019 05:33) (112/64 - 147/60)  RR: 18 (09 Apr 2019 05:33) (17 - 18)  SpO2: 99% (09 Apr 2019 05:33) (97% - 100%)    CAPILLARY BLOOD GLUCOSE    POCT Blood Glucose.: 117 mg/dL (08 Apr 2019 07:53)    PHYSICAL EXAM:  General: NAD, appears comfortable  HEENT: no oral lesions. No scalp rash  Neck: Full range of motion, no JVD  Lungs: Clear to ascultation bilaterally no wheezes, rales, or rhonchi   Heart: Regular rate and rhythm, no murmurs  Abdomen: Normoactive bowl sounds, soft, nontender, nondistended  Extremities: No lower extremity tenderness, erythema, or edema   Skin: Warm and dry. No rash  Psych: normal mood and affect  Neuro: AAOx3, nonfocal    MEDICATIONS:  MEDICATIONS  (STANDING):  famotidine    Tablet 40 milliGRAM(s) Oral two times a day  loratadine 10 milliGRAM(s) Oral daily  piperacillin/tazobactam IVPB. 3.375 Gram(s) IV Intermittent every 8 hours  vancomycin  IVPB      vancomycin  IVPB 750 milliGRAM(s) IV Intermittent every 12 hours    MEDICATIONS  (PRN):  aluminum hydroxide/magnesium hydroxide/simethicone Suspension 30 milliLiter(s) Oral every 6 hours PRN Dyspepsia  calcium carbonate    500 mG (Tums) Chewable 2 Tablet(s) Chew at bedtime PRN Heartburn  simethicone 80 milliGRAM(s) Chew two times a day PRN Indigestion    ALLERGIES:  Allergies    No Known Allergies    Intolerances    LABS:                        9.3    4.69  )-----------( 221      ( 08 Apr 2019 06:05 )             29.0     04-08    122<L>  |  91<L>  |  11  ----------------------------<  648<HH>  3.9   |  22  |  0.89    Ca    7.6<L>      08 Apr 2019 06:05  Phos  3.3     04-08  Mg     1.7     04-08    RADIOLOGY & ADDITIONAL TESTS: Reviewed. INTERVAL HPI/OVERNIGHT EVENTS: no acute events overnight     SUBJECTIVE: Patient seen and examined at bedside. Patient denies chest pain/shortness of breath/abdominal pain/nausea/vomiting/diarrhea.     ROS: otherwise negative    OBJECTIVE:    VITAL SIGNS:  ICU Vital Signs Last 24 Hrs  T(C): 36.8 (09 Apr 2019 05:33), Max: 36.8 (09 Apr 2019 05:33)  T(F): 98.3 (09 Apr 2019 05:33), Max: 98.3 (09 Apr 2019 05:33)  HR: 72 (09 Apr 2019 05:33) (70 - 77)  BP: 112/64 (09 Apr 2019 05:33) (112/64 - 147/60)  RR: 18 (09 Apr 2019 05:33) (17 - 18)  SpO2: 99% (09 Apr 2019 05:33) (97% - 100%)    CAPILLARY BLOOD GLUCOSE    POCT Blood Glucose.: 117 mg/dL (08 Apr 2019 07:53)    PHYSICAL EXAM:  General: NAD, appears comfortable  Lungs: Clear to ascultation bilaterally no wheezes, rales, or rhonchi   Heart: Regular rate and rhythm, no murmurs  Abdomen: Normoactive bowl sounds, soft, nontender, nondistended  Extremities: No lower extremity tenderness, erythema, or edema   Skin: Warm and dry. No rash  Psych: normal mood and affect  Neuro: AAOx3, nonfocal    MEDICATIONS:  MEDICATIONS  (STANDING):  famotidine    Tablet 40 milliGRAM(s) Oral two times a day  loratadine 10 milliGRAM(s) Oral daily  piperacillin/tazobactam IVPB. 3.375 Gram(s) IV Intermittent every 8 hours  vancomycin  IVPB      vancomycin  IVPB 750 milliGRAM(s) IV Intermittent every 12 hours    MEDICATIONS  (PRN):  aluminum hydroxide/magnesium hydroxide/simethicone Suspension 30 milliLiter(s) Oral every 6 hours PRN Dyspepsia  calcium carbonate    500 mG (Tums) Chewable 2 Tablet(s) Chew at bedtime PRN Heartburn  simethicone 80 milliGRAM(s) Chew two times a day PRN Indigestion    ALLERGIES:  Allergies    No Known Allergies    Intolerances    LABS:                        9.3    4.69  )-----------( 221      ( 08 Apr 2019 06:05 )             29.0     04-08    122<L>  |  91<L>  |  11  ----------------------------<  648<HH>  3.9   |  22  |  0.89    Ca    7.6<L>      08 Apr 2019 06:05  Phos  3.3     04-08  Mg     1.7     04-08    RADIOLOGY & ADDITIONAL TESTS: Reviewed.

## 2019-04-10 VITALS
SYSTOLIC BLOOD PRESSURE: 152 MMHG | HEART RATE: 70 BPM | TEMPERATURE: 98 F | OXYGEN SATURATION: 100 % | DIASTOLIC BLOOD PRESSURE: 62 MMHG | RESPIRATION RATE: 17 BRPM

## 2019-04-10 DIAGNOSIS — L50.8 OTHER URTICARIA: ICD-10-CM

## 2019-04-10 PROCEDURE — 99239 HOSP IP/OBS DSCHRG MGMT >30: CPT | Mod: GC

## 2019-04-10 PROCEDURE — 99223 1ST HOSP IP/OBS HIGH 75: CPT | Mod: GC

## 2019-04-10 RX ORDER — ASPIRIN/CALCIUM CARB/MAGNESIUM 324 MG
1 TABLET ORAL
Qty: 0 | Refills: 0 | DISCHARGE
Start: 2019-04-10

## 2019-04-10 RX ADMIN — SIMETHICONE 80 MILLIGRAM(S): 80 TABLET, CHEWABLE ORAL at 02:48

## 2019-04-10 RX ADMIN — LORATADINE 10 MILLIGRAM(S): 10 TABLET ORAL at 12:19

## 2019-04-10 NOTE — PROVIDER CONTACT NOTE (OTHER) - BACKGROUND
patient dx with sepsis. hx of gastroesophageal reflux disease, gastric cancer, carotid stenosis, Hives, iron deficiency anemia, hyperlipidemia, hypertension, coronary artery disease, and diabetes
Pt admitted with sepsis
Pt. admitted with sepsis
patient dx with sepsis. hx of gastroesophageal reflux disease, gastric cancer, carotid stenosis, Hives, iron deficiency anemia, hyperlipidemia, hypertension, coronary artery disease, and diabetes
patient dx with sepsis. hx of gastroesophageal reflux disease, gastric cancer, carotid stenosis, Hives, iron deficiency anemia, hyperlipidemia, hypertension, coronary artery disease, and diabetes
pt admitted with sepsis

## 2019-04-10 NOTE — PROVIDER CONTACT NOTE (OTHER) - REASON
/55
B/P 91/45
low diastolic
pt complaining of itching on b/l hips, patient requesting medication for itching.
/56
low diastolic

## 2019-04-10 NOTE — PROGRESS NOTE ADULT - SUBJECTIVE AND OBJECTIVE BOX
INTERVAL HPI/OVERNIGHT EVENTS: no acute events overnight     SUBJECTIVE: Patient seen and examined at bedside. Patient denies chest pain/shortness of breath/abdominal pain/nausea/vomiting/diarrhea.     ROS: otherwise negative    OBJECTIVE:    VITAL SIGNS:  ICU Vital Signs Last 24 Hrs  T(C): 36.7 (10 Apr 2019 05:54), Max: 36.9 (2019 21:12)  T(F): 98 (10 Apr 2019 05:54), Max: 98.5 (2019 21:12)  HR: 65 (10 Apr 2019 05:54) (65 - 66)  BP: 142/52 (10 Apr 2019 05:54) (137/55 - 147/63)  RR: 18 (10 Apr 2019 05:54) (18 - 18)  SpO2: 98% (10 Apr 2019 05:54) (96% - 100%)    CAPILLARY BLOOD GLUCOSE    PHYSICAL EXAM:  General: NAD, appears comfortable  Lungs: Clear to ascultation bilaterally no wheezes, rales, or rhonchi   Heart: Regular rate and rhythm, no murmurs  Abdomen: Normoactive bowl sounds, soft, nontender, nondistended  Extremities: No lower extremity tenderness, erythema, or edema   Skin: Warm and dry. No rash  Psych: normal mood and affect  Neuro: AAOx3, nonfocal    MEDICATIONS:  MEDICATIONS  (STANDING):  lactated ringers. 500 milliLiter(s) (100 mL/Hr) IV Continuous <Continuous>  loratadine 10 milliGRAM(s) Oral daily    MEDICATIONS  (PRN):  aluminum hydroxide/magnesium hydroxide/simethicone Suspension 30 milliLiter(s) Oral every 6 hours PRN Dyspepsia  calcium carbonate    500 mG (Tums) Chewable 2 Tablet(s) Chew at bedtime PRN Heartburn  simethicone 80 milliGRAM(s) Chew two times a day PRN Indigestion    ALLERGIES:  Allergies    No Known Allergies    Intolerances    LABS:                        10.1   5.92  )-----------( 279      ( 2019 05:52 )             31.2     -    136  |  100  |  16  ----------------------------<  150<H>  4.5   |  24  |  0.98    Ca    9.1      2019 16:38  Phos  3.2     04-09  Mg     2.0         Urinalysis Basic - ( 2019 15:00 )    Color: COLORLESS / Appearance: CLEAR / S.011 / pH: 7.0  Gluc: NEGATIVE / Ketone: NEGATIVE  / Bili: NEGATIVE / Urobili: NORMAL   Blood: NEGATIVE / Protein: NEGATIVE / Nitrite: NEGATIVE   Leuk Esterase: NEGATIVE / RBC: x / WBC x   Sq Epi: x / Non Sq Epi: x / Bacteria: x    RADIOLOGY & ADDITIONAL TESTS: Reviewed.

## 2019-04-10 NOTE — CONSULT NOTE ADULT - PROBLEM SELECTOR RECOMMENDATION 9
1. repeat CTH in 12 hours   Case discussed with attending neurosurgeon.
- Etiology unclear. Can consider outpatient follow up for determination of whether additional workup is necessary  - Cont loratadine 10 mg daily and diphenhydramine 25 mg qhs prn itchy rash  - Can follow up with Dr. Katya Yeung, [phone] upon discharge from the hospital

## 2019-04-10 NOTE — PROGRESS NOTE ADULT - PROBLEM SELECTOR PLAN 4
Patient with chronic subdural hematoma  - Initial brain imaging showed new acute component   - neurosurgery on board. Repeat CT head after 12hrs shows stable hematoma   - will f/u neurosurgery reccs to when to restart aspirin

## 2019-04-10 NOTE — PROVIDER CONTACT NOTE (OTHER) - SITUATION
Patients /52 hr 65
/55
B/P 91/45
bp 108/55 hr 73
pt complaining of itching on b/l hips, patient requesting medication for itching.
/56

## 2019-04-10 NOTE — PROGRESS NOTE ADULT - ATTENDING COMMENTS
78 M h/o CABG, gastric Ca treated 2006, T2DM; recently admitted March 2019 for gastric outlet obstruction. Presents with fevers and cough. Found to have PNA on CXR. Course complicated by hypotension refractory to fluid resuscitation and midodrine. Required Levophed briefly. Concerning for septic shock with infectious encephalopathy secondary to bacterial pneumonia.     Patient with new MARTA, suspicious for AIN/ATN from vancomycin and pip-tazo. Antibiotics were discontinued and sCr improved. Patient remains clinically stable and ready for discharge.    Additionally, patient presented with subdural hematoma on CT which is stable on repeat imaging.    I spent 35 minutes counseling this patient and coordinating their discharge.
78 M h/o CABG, gastric Ca treated 2006, T2DM; recently admitted March 2019 for gastric outlet obstruction. Presents with fevers and cough. Found to have PNA on CXR. Course complicated by hypotension refractory to fluid resuscitation and midodrine. Required Levophed briefly. Concerning for septic shock with infectious encephalopathy secondary to bacterial pneumonia. Also with MARTA which is suspicious for potential ATN in setting of septic shock; creatinine is decreasing. Patient is improving with empiric vancomycin and pip-tazo. Cultures without growth.    Of note, patient also with subdural hematoma on CT which is stable on repeat imaging.
78 M h/o CABG, gastric Ca treated 2006, T2DM; recently admitted March 2019 for gastric outlet obstruction. Presents with fevers and cough. Found to have PNA on CXR. Course complicated by hypotension refractory to fluid resuscitation and midodrine. Required Levophed briefly. Concerning for septic shock with infectious encephalopathy secondary to bacterial pneumonia. Patient is improving with empiric vancomycin and pip-tazo. Cultures pending.    Of note, patient also with subdural hematoma on CT which is stable on repeat imaging.
Agree with Housestaff Note Above, edits made where appropriate, case discussed with housestaff    Patient seen and examined. This is a 78M being hospitalized for septic shock 2/2 hospital acquired pneumonia s/p MICU admission. Currently feeling fine. Will c/w finishing abx course. Agree with A&P above     Thomas Pastrana DO  Division of Hospital Medicine  Pager #: [phone]
78 M h/o CABG, gastric Ca treated 2006, T2DM; recently admitted March 2019 for gastric outlet obstruction. Presents with fevers and cough. Found to have PNA on CXR. Course complicated by hypotension refractory to fluid resuscitation and midodrine. Required Levophed briefly. Concerning for septic shock with infectious encephalopathy secondary to bacterial pneumonia. Also with MARTA which is suspicious for potential ATN in setting of septic shock; creatinine is decreasing. Patient is improving with empiric vancomycin and pip-tazo. Cultures remain without growth.    Of note, patient also with subdural hematoma on CT which is stable on repeat imaging.
Agree with Housestaff Note Above, edits made where appropriate, case discussed with housestaff    Patient seen and examined. Agree with A&P above, c/w abx for Hospital Acquired Pneumonia. The patient's septic shock is now resolved and he is doing well. Rest of plan above     Thomas Pastrana DO  Division of Hospital Medicine  Pager #: [phone]
78 M h/o CABG, gastric Ca treated 2006, T2DM; recently admitted March 2019 for gastric outlet obstruction. Presents with fevers and cough. Found to have PNA on CXR. Course complicated by hypotension refractory to fluid resuscitation and midodrine. Required Levophed briefly. Concerning for septic shock with infectious encephalopathy secondary to bacterial pneumonia.     Patient with new MARTA, suspicious for AIN/ATN from vancomycin and pip-tazo. Will d/c antbiotics as patient has completed 6 days of broad-spectrum treatment of HAP with clinical improvement. Will monitor for clinical deterioration. Also giving 500cc bolus of IVNS and repeating chemistry.    Additionally, patient presented with subdural hematoma on CT which is stable on repeat imaging.

## 2019-04-10 NOTE — PROGRESS NOTE ADULT - PROBLEM SELECTOR PLAN 3
- patient with scalp and back itching. Says its chronic since the past 4 years   - takes zyrtec 10mg daily at home. Will start on Claritin 10mg PO QD  - derm recommended Claritin qD and benadryl 25mg qd PRN for breakthrough itch

## 2019-04-10 NOTE — PROVIDER CONTACT NOTE (OTHER) - RECOMMENDATIONS
continue to assess
MD to be notified
continue to monitor
continue to monitor

## 2019-04-10 NOTE — PROVIDER CONTACT NOTE (OTHER) - ACTION/TREATMENT ORDERED:
No treatment ordered
Dr Bell aware, no interventions ordered. Will continue to monitor pt.
MD aware, No further interventions ordered. Will continue to monitor pt
MD aware, at bedside, to order Benadryl. Will continue to monitor pt
No treatment ordered
will continue to monitor

## 2019-04-10 NOTE — CONSULT NOTE ADULT - SUBJECTIVE AND OBJECTIVE BOX
HPI:  79 y/o M w/ hx of gastric cancer s/p partial gastrectomy and chemo/radiation in 2006, DMII admitted for pneumonia a/w itchy rash on thighs and trunk. Reports 4 year hx of chronic urticaria for which he has been treated with prednisone and antihistamines. Has only had relief with prednisone in the past.     PAST MEDICAL & SURGICAL HISTORY:  GERD (gastroesophageal reflux disease)  Gastric cancer: S/P RT and chemo  Carotid stenosis  JACK (iron deficiency anemia)  HLD (hyperlipidemia)  HTN (hypertension)  CAD (coronary artery disease): S/P CABG  Hives: on Prednisone  DM (diabetes mellitus)  S/P Gastrectomy: 2006 due to gastric cancer  S/P CABG X 4: 2002    Review of Systems:  Constitutional: denies fevers, chills  HEENT: odynophagia or dysphagia  Cardiovascular: denies palpitations  Respiratory: denies SOB, wheezing  Gastrointestinal: denies N/V/D, abdominal pain  : denies dysuria  MSK: denies weakness, joint pain  Skin: denies new rashes or masses unless otherwise specified in hpi    MEDICATIONS  (STANDING):  loratadine 10 milliGRAM(s) Oral daily    MEDICATIONS  (PRN):  aluminum hydroxide/magnesium hydroxide/simethicone Suspension 30 milliLiter(s) Oral every 6 hours PRN Dyspepsia  calcium carbonate    500 mG (Tums) Chewable 2 Tablet(s) Chew at bedtime PRN Heartburn  simethicone 80 milliGRAM(s) Chew two times a day PRN Indigestion      Allergies    No Known Allergies    Intolerances        SOCIAL HISTORY: denies drug use    FAMILY HISTORY:  Family history of early CAD (Sibling): brother  Family history of diabetes mellitus: mother      Vital Signs Last 24 Hrs  T(C): 36.7 (10 Apr 2019 05:54), Max: 36.9 (2019 21:12)  T(F): 98 (10 Apr 2019 05:54), Max: 98.5 (2019 21:12)  HR: 65 (10 Apr 2019 05:54) (65 - 66)  BP: 142/52 (10 Apr 2019 05:54) (137/55 - 147/63)  BP(mean): --  RR: 18 (10 Apr 2019 05:54) (18 - 18)  SpO2: 98% (10 Apr 2019 05:54) (96% - 100%)    Physical Exam:  The patient was alert and oriented X 3, well nourished, and in no apparent distress. Oropharynx showed no ulcerations. There was no visible lymphadenopathy. Conjunctiva were non-injected. There was no clubbing or edema of extremities.    The scalp, hair, face, eyebrows, lips, oropharynx , neck, chest, back, buttocks, extremities X 4, hands, feet, nails were examined. There was no hyperhidrosis or bromhidrosis.     The following lesions are noted:   - Edematous pink non-scaly plaques on proximal thighs/buttocks and lower abdomen    LABS:                        10.1   5.92  )-----------( 279      ( 2019 05:52 )             31.2         136  |  100  |  16  ----------------------------<  150<H>  4.5   |  24  |  0.98    Ca    9.1      2019 16:38  Phos  3.2       Mg     2.0             Urinalysis Basic - ( 2019 15:00 )    Color: COLORLESS / Appearance: CLEAR / S.011 / pH: 7.0  Gluc: NEGATIVE / Ketone: NEGATIVE  / Bili: NEGATIVE / Urobili: NORMAL   Blood: NEGATIVE / Protein: NEGATIVE / Nitrite: NEGATIVE   Leuk Esterase: NEGATIVE / RBC: x / WBC x   Sq Epi: x / Non Sq Epi: x / Bacteria: x        RADIOLOGY & ADDITIONAL STUDIES: no relevant studies

## 2019-04-10 NOTE — PROGRESS NOTE ADULT - PROBLEM SELECTOR PLAN 2
Recent hospitalization, presented with septic shock which is now resolved   - MRSA and pseudomonas coverage  - s/p 7ds of vanc/zosyn. patient missed last dose of vanc and last 2 doses of zosyn because concern for AIN/ATN secondary to abx. No S/S of infection today. Hemodynamically stable

## 2019-04-10 NOTE — CONSULT NOTE ADULT - ASSESSMENT
79 y/o M w/ hx of gastric cancer s/p partial gastrectomy and chemo/radiation in 2006, DMII admitted for pneumonia a/w itchy rash on thighs and trunk. 79 y/o M w/ hx of gastric cancer s/p partial gastrectomy and chemo/radiation in 2006, DMII admitted for pneumonia a/w itchy rash on thighs and trunk. Rash consistent w/ urticaria, possibly chronic urticaria based on history.

## 2019-04-10 NOTE — PROGRESS NOTE ADULT - PROBLEM SELECTOR PLAN 8
DVT PPx: SCDs given SDH   Diet: mechanical soft, consistent carb   Dispo: dc home with home health aide

## 2019-04-10 NOTE — PROGRESS NOTE ADULT - PROBLEM SELECTOR PLAN 1
- resolving  - baseline Scr ~0.7, Last 3d trend 0.89 > 1.30 > 0.98  - was initially secondary to hypotension from septic shock, but that resolved and patient has new onset MARTA despite no episodes of hypotension recorded  - likely secondary to AIN/ATN from vanc/zosyn  - urine studies unremarkable   - avoid nephrotoxins, hypotension

## 2019-04-10 NOTE — PROVIDER CONTACT NOTE (OTHER) - DATE AND TIME:
10-Apr-2019 06:23
04-Apr-2019 09:30
07-Apr-2019 22:00
08-Apr-2019 05:59
09-Apr-2019 15:38
08-Apr-2019 14:23

## 2019-04-16 NOTE — CONSULT NOTE ADULT - ATTENDING COMMENTS
The patient is a 53 yo woman w/ PMH of PSC p/w "flu like symptoms" last 3-4 days. Pt in [facility]
The patient is a 53 yo woman w/ PMH of PSC p/w "flu like symptoms" last 3-4 days. Pt in H past Saturday, developed generalized aches. Follow sudnay developed fever, chills, nausea. No rhinorrhea, sore throat, productive cough. Went to  on Monday, no flu test done. This morning spiked fever 101 at home. Pt endorses sick contacts w/  children through work, known to have flu and strep in past week. No headaches, no SOB, no CP, no N/V/Diarrhea, dysuria.
Pt seen and examined with GI fellow. I agree with above.
The patient is a 53 yo woman w/ PMH of PSC p/w "flu like symptoms" last 3-4 days. Pt in H past Saturday, developed generalized aches. Follow Sunday developed fever, chills, nausea. No rhinorrhea, sore throat, productive cough. Went to  on Monday, no flu test done. This morning spiked fever 101 at home. Pt endorses sick contacts w/  children through work, known to have flu and strep in past week. No headaches, no SOB, no CP, mild epigastric pain, no right sided abdominal pain, no emesis/Diarrhea, no dysuria, tolerating po intake.
78-year-old right-handed gentleman first evaluated at [facility] on 2/19/19 with dizziness and CT findings of small hemorrhages. For 2 weeks prior to admission, he noted dizziness or vertigo, which might have been positional. He had mild headache which resolved. Medications at home included aspirin. ROS otherwise negative. Exam. Alert and attentive; no nystagmus; David-Hallpike maneuver not performed; gait not tested; remainder of neurologic exam was nonfocal. CT head (2/18/19) to my eye showed several small cortical hemorrhages, bilateral frontal and left temporal. MRI brain (2/20/19) to my eye showed the same small superficial hemorrhages. I did not see SWI sequence. MRA neck and head (2/20/19) was unremarkable. Impression. For 2 weeks prior to admission he had dizziness/probable vertigo, which possibly was positional. This suggests that he most likely has peripheral vestibular dysfunction, such as benign positional vertigo, and less likely central vertigo as in posterior circulation ischemia. Brain imaging showed several small cortical hemorrhages suggestive of contusions, although there was no history of head trauma. Etiology of these hemorrhages remains uncertain. Amyloid angiopathy is possible, but difficult to verify since, as far as I can tell, SWI was not done as part of his MRI. While unlikely, septic emboli due to endocarditis should be screened for. Suggest. Blood cultures; TTE; hold aspirin; if repeat CT is stable at about one week, can restart aspirin; PT/OT/PM&R.
I saw and examined Jaison at the bedside today with Dr. Quiroz. His clinical exam is consistent with urticaria. If individual lesions persist beyond 24-48 hours, a biopsy may be warranted to rule out urticarial vasculitis. In the interim, treatment for urticaria may be started with around the clock non-sedating antihistamines and topical steroids. We will continue to follow him to solidify a diagnosis over the next 24-48 hours depending on his clinical course.     Anahi Merritt MD, DTMH

## 2019-05-02 ENCOUNTER — APPOINTMENT (OUTPATIENT)
Dept: DERMATOLOGY | Facility: CLINIC | Age: 79
End: 2019-05-02
Payer: MEDICARE

## 2019-05-02 VITALS
HEART RATE: 69 BPM | WEIGHT: 107 LBS | BODY MASS INDEX: 17.83 KG/M2 | DIASTOLIC BLOOD PRESSURE: 56 MMHG | HEIGHT: 65 IN | SYSTOLIC BLOOD PRESSURE: 105 MMHG

## 2019-05-02 DIAGNOSIS — Z86.39 PERSONAL HISTORY OF OTHER ENDOCRINE, NUTRITIONAL AND METABOLIC DISEASE: ICD-10-CM

## 2019-05-02 PROCEDURE — 99214 OFFICE O/P EST MOD 30 MIN: CPT

## 2019-05-06 LAB
ALBUMIN SERPL ELPH-MCNC: 4 G/DL
ALP BLD-CCNC: 79 U/L
ALT SERPL-CCNC: 18 U/L
ANION GAP SERPL CALC-SCNC: 14 MMOL/L
AST SERPL-CCNC: 25 U/L
BASOPHILS # BLD AUTO: 0.03 K/UL
BASOPHILS NFR BLD AUTO: 0.5 %
BILIRUB SERPL-MCNC: 0.4 MG/DL
BUN SERPL-MCNC: 14 MG/DL
CALCIUM SERPL-MCNC: 8.8 MG/DL
CHLORIDE SERPL-SCNC: 103 MMOL/L
CO2 SERPL-SCNC: 24 MMOL/L
CREAT SERPL-MCNC: 0.94 MG/DL
EOSINOPHIL # BLD AUTO: 0.41 K/UL
EOSINOPHIL NFR BLD AUTO: 7.2 %
GLUCOSE SERPL-MCNC: 162 MG/DL
HCT VFR BLD CALC: 37.2 %
HGB BLD-MCNC: 11.3 G/DL
IMM GRANULOCYTES NFR BLD AUTO: 0.2 %
LYMPHOCYTES # BLD AUTO: 1.57 K/UL
LYMPHOCYTES NFR BLD AUTO: 27.7 %
MAN DIFF?: NORMAL
MCHC RBC-ENTMCNC: 26.2 PG
MCHC RBC-ENTMCNC: 30.4 GM/DL
MCV RBC AUTO: 86.1 FL
MONOCYTES # BLD AUTO: 0.49 K/UL
MONOCYTES NFR BLD AUTO: 8.6 %
NEUTROPHILS # BLD AUTO: 3.16 K/UL
NEUTROPHILS NFR BLD AUTO: 55.8 %
PLATELET # BLD AUTO: 219 K/UL
POTASSIUM SERPL-SCNC: 4.8 MMOL/L
PROT SERPL-MCNC: 6.6 G/DL
RBC # BLD: 4.32 M/UL
RBC # FLD: 16.7 %
SODIUM SERPL-SCNC: 141 MMOL/L
TSH SERPL-ACNC: 0.82 UIU/ML
WBC # FLD AUTO: 5.67 K/UL

## 2019-05-07 LAB — CHRONIC URTICARIA PANEL (CU INDEX): <6.1

## 2019-06-04 ENCOUNTER — LABORATORY RESULT (OUTPATIENT)
Age: 79
End: 2019-06-04

## 2019-06-04 ENCOUNTER — APPOINTMENT (OUTPATIENT)
Dept: DERMATOLOGY | Facility: CLINIC | Age: 79
End: 2019-06-04
Payer: MEDICARE

## 2019-06-04 VITALS
DIASTOLIC BLOOD PRESSURE: 62 MMHG | HEIGHT: 65 IN | HEART RATE: 62 BPM | BODY MASS INDEX: 17.83 KG/M2 | SYSTOLIC BLOOD PRESSURE: 117 MMHG | WEIGHT: 107 LBS | TEMPERATURE: 98 F

## 2019-06-04 DIAGNOSIS — L50.9 URTICARIA, UNSPECIFIED: ICD-10-CM

## 2019-06-04 DIAGNOSIS — L30.9 DERMATITIS, UNSPECIFIED: ICD-10-CM

## 2019-06-04 PROCEDURE — 11105 PUNCH BX SKIN EA SEP/ADDL: CPT

## 2019-06-04 PROCEDURE — 99214 OFFICE O/P EST MOD 30 MIN: CPT | Mod: 25

## 2019-06-04 PROCEDURE — 11104 PUNCH BX SKIN SINGLE LESION: CPT

## 2019-06-20 ENCOUNTER — APPOINTMENT (OUTPATIENT)
Dept: DERMATOLOGY | Facility: CLINIC | Age: 79
End: 2019-06-20
Payer: MEDICARE

## 2019-06-20 VITALS
SYSTOLIC BLOOD PRESSURE: 112 MMHG | HEIGHT: 65 IN | DIASTOLIC BLOOD PRESSURE: 57 MMHG | OXYGEN SATURATION: 100 % | BODY MASS INDEX: 17.83 KG/M2 | HEART RATE: 67 BPM | WEIGHT: 107 LBS | TEMPERATURE: 98.3 F

## 2019-06-20 PROCEDURE — 99213 OFFICE O/P EST LOW 20 MIN: CPT

## 2019-06-20 RX ORDER — PREDNISONE 5 MG/1
5 TABLET ORAL DAILY
Refills: 0 | Status: DISCONTINUED | COMMUNITY
End: 2019-06-20

## 2019-07-03 ENCOUNTER — EMERGENCY (EMERGENCY)
Facility: HOSPITAL | Age: 79
LOS: 1 days | Discharge: HOME | End: 2019-07-03
Attending: EMERGENCY MEDICINE | Admitting: EMERGENCY MEDICINE
Payer: MEDICARE

## 2019-07-03 VITALS
RESPIRATION RATE: 17 BRPM | DIASTOLIC BLOOD PRESSURE: 60 MMHG | SYSTOLIC BLOOD PRESSURE: 145 MMHG | OXYGEN SATURATION: 97 % | HEART RATE: 61 BPM

## 2019-07-03 VITALS
DIASTOLIC BLOOD PRESSURE: 58 MMHG | SYSTOLIC BLOOD PRESSURE: 132 MMHG | HEART RATE: 78 BPM | TEMPERATURE: 98 F | RESPIRATION RATE: 18 BRPM

## 2019-07-03 LAB
ALBUMIN SERPL ELPH-MCNC: 4.2 G/DL — SIGNIFICANT CHANGE UP (ref 3.3–5)
ALP SERPL-CCNC: 69 U/L — SIGNIFICANT CHANGE UP (ref 40–120)
ALT FLD-CCNC: 17 U/L — SIGNIFICANT CHANGE UP (ref 4–41)
ANION GAP SERPL CALC-SCNC: 14 MMO/L — SIGNIFICANT CHANGE UP (ref 7–14)
APTT BLD: 25.5 SEC — LOW (ref 27.5–36.3)
AST SERPL-CCNC: 29 U/L — SIGNIFICANT CHANGE UP (ref 4–40)
BASE EXCESS BLDV CALC-SCNC: 1.7 MMOL/L — SIGNIFICANT CHANGE UP
BASOPHILS # BLD AUTO: 0.02 K/UL — SIGNIFICANT CHANGE UP (ref 0–0.2)
BASOPHILS NFR BLD AUTO: 0.2 % — SIGNIFICANT CHANGE UP (ref 0–2)
BILIRUB SERPL-MCNC: 0.6 MG/DL — SIGNIFICANT CHANGE UP (ref 0.2–1.2)
BLOOD GAS VENOUS - CREATININE: 1.04 MG/DL — SIGNIFICANT CHANGE UP (ref 0.5–1.3)
BUN SERPL-MCNC: 16 MG/DL — SIGNIFICANT CHANGE UP (ref 7–23)
CALCIUM SERPL-MCNC: 8.8 MG/DL — SIGNIFICANT CHANGE UP (ref 8.4–10.5)
CHLORIDE BLDV-SCNC: 105 MMOL/L — SIGNIFICANT CHANGE UP (ref 96–108)
CHLORIDE SERPL-SCNC: 102 MMOL/L — SIGNIFICANT CHANGE UP (ref 98–107)
CO2 SERPL-SCNC: 21 MMOL/L — LOW (ref 22–31)
CREAT SERPL-MCNC: 0.97 MG/DL — SIGNIFICANT CHANGE UP (ref 0.5–1.3)
CRP SERPL-MCNC: 6.5 MG/L — HIGH
EOSINOPHIL # BLD AUTO: 0.23 K/UL — SIGNIFICANT CHANGE UP (ref 0–0.5)
EOSINOPHIL NFR BLD AUTO: 2.8 % — SIGNIFICANT CHANGE UP (ref 0–6)
ERYTHROCYTE [SEDIMENTATION RATE] IN BLOOD: 31 MM/HR — HIGH (ref 1–15)
GAS PNL BLDV: 134 MMOL/L — LOW (ref 136–146)
GLUCOSE BLDV-MCNC: 104 MG/DL — HIGH (ref 70–99)
GLUCOSE SERPL-MCNC: 118 MG/DL — HIGH (ref 70–99)
HCO3 BLDV-SCNC: 24 MMOL/L — SIGNIFICANT CHANGE UP (ref 20–27)
HCT VFR BLD CALC: 35.3 % — LOW (ref 39–50)
HCT VFR BLDV CALC: 35.6 % — LOW (ref 39–51)
HGB BLD-MCNC: 10.7 G/DL — LOW (ref 13–17)
HGB BLDV-MCNC: 11.6 G/DL — LOW (ref 13–17)
IMM GRANULOCYTES NFR BLD AUTO: 0.2 % — SIGNIFICANT CHANGE UP (ref 0–1.5)
INR BLD: 1.08 — SIGNIFICANT CHANGE UP (ref 0.88–1.17)
LACTATE BLDV-MCNC: 1.3 MMOL/L — SIGNIFICANT CHANGE UP (ref 0.5–2)
LYMPHOCYTES # BLD AUTO: 1.74 K/UL — SIGNIFICANT CHANGE UP (ref 1–3.3)
LYMPHOCYTES # BLD AUTO: 20.8 % — SIGNIFICANT CHANGE UP (ref 13–44)
MCHC RBC-ENTMCNC: 24.3 PG — LOW (ref 27–34)
MCHC RBC-ENTMCNC: 30.3 % — LOW (ref 32–36)
MCV RBC AUTO: 80.2 FL — SIGNIFICANT CHANGE UP (ref 80–100)
MONOCYTES # BLD AUTO: 0.57 K/UL — SIGNIFICANT CHANGE UP (ref 0–0.9)
MONOCYTES NFR BLD AUTO: 6.8 % — SIGNIFICANT CHANGE UP (ref 2–14)
NEUTROPHILS # BLD AUTO: 5.78 K/UL — SIGNIFICANT CHANGE UP (ref 1.8–7.4)
NEUTROPHILS NFR BLD AUTO: 69.2 % — SIGNIFICANT CHANGE UP (ref 43–77)
NRBC # FLD: 0 K/UL — SIGNIFICANT CHANGE UP (ref 0–0)
PCO2 BLDV: 56 MMHG — HIGH (ref 41–51)
PH BLDV: 7.31 PH — LOW (ref 7.32–7.43)
PLATELET # BLD AUTO: 203 K/UL — SIGNIFICANT CHANGE UP (ref 150–400)
PMV BLD: 10.2 FL — SIGNIFICANT CHANGE UP (ref 7–13)
PO2 BLDV: < 24 MMHG — LOW (ref 35–40)
POTASSIUM BLDV-SCNC: 4.6 MMOL/L — HIGH (ref 3.4–4.5)
POTASSIUM SERPL-MCNC: 5.5 MMOL/L — HIGH (ref 3.5–5.3)
POTASSIUM SERPL-SCNC: 5.5 MMOL/L — HIGH (ref 3.5–5.3)
PROT SERPL-MCNC: 6.9 G/DL — SIGNIFICANT CHANGE UP (ref 6–8.3)
PROTHROM AB SERPL-ACNC: 12 SEC — SIGNIFICANT CHANGE UP (ref 9.8–13.1)
RBC # BLD: 4.4 M/UL — SIGNIFICANT CHANGE UP (ref 4.2–5.8)
RBC # FLD: 17 % — HIGH (ref 10.3–14.5)
SAO2 % BLDV: 30 % — LOW (ref 60–85)
SODIUM SERPL-SCNC: 137 MMOL/L — SIGNIFICANT CHANGE UP (ref 135–145)
WBC # BLD: 8.36 K/UL — SIGNIFICANT CHANGE UP (ref 3.8–10.5)
WBC # FLD AUTO: 8.36 K/UL — SIGNIFICANT CHANGE UP (ref 3.8–10.5)

## 2019-07-03 PROCEDURE — 99283 EMERGENCY DEPT VISIT LOW MDM: CPT

## 2019-07-03 RX ORDER — DIPHENHYDRAMINE HCL 50 MG
50 CAPSULE ORAL ONCE
Refills: 0 | Status: COMPLETED | OUTPATIENT
Start: 2019-07-03 | End: 2019-07-03

## 2019-07-03 RX ORDER — FAMOTIDINE 10 MG/ML
20 INJECTION INTRAVENOUS DAILY
Refills: 0 | Status: DISCONTINUED | OUTPATIENT
Start: 2019-07-03 | End: 2019-07-07

## 2019-07-03 RX ADMIN — Medication 50 MILLIGRAM(S): at 12:32

## 2019-07-03 RX ADMIN — FAMOTIDINE 20 MILLIGRAM(S): 10 INJECTION INTRAVENOUS at 12:32

## 2019-07-03 NOTE — ED ADULT NURSE NOTE - NSIMPLEMENTINTERV_GEN_ALL_ED
Implemented All Universal Safety Interventions:  [place] to call system. Call bell, personal items and telephone within reach. Instruct patient to call for assistance. Room bathroom lighting operational. Non-slip footwear when patient is off stretcher. Physically safe environment: no spills, clutter or unnecessary equipment. Stretcher in lowest position, wheels locked, appropriate side rails in place.

## 2019-07-03 NOTE — ED PROVIDER NOTE - ATTENDING CONTRIBUTION TO CARE
Dr Bloch, ATTENDING MD-  I performed a face to face bedside interview with patient regarding history of present illness, review of symptoms and past medical history. I completed an independent physical exam.  I have discussed patient's plan of care with PA.   Documentation as above in the note.  chronically ill appearing thin male NAD HEENT nml skin with urticarial lesions on arms, right lower leg with erythematous pruritic rash with irreg border, no swelling no heat covering most of ant tibial area lower leg. petichial, with central ulcer, no d/c pulses and sensation distally intact. heart sounds nomgr, lungs clear, abd soft nontender.

## 2019-07-03 NOTE — ED PROVIDER NOTE - CARE PLAN
Principal Discharge DX:	Vasculitis  Assessment and plan of treatment:	Patient advised to follow up with PRIMARY CARE DOCTOR IN 1-2 DAYS AND DERMATOLOGIST WITHIN WEEK and told to return to the emergency department immediately for any new or concerning symptoms OR ANY OTHER COMPLAINTS. Patient agrees with plan.    Continue home medications, continue to apply bacitracin to area and take antibiotic as directed   Follow up with dermatologist   RETURN IF ANY NEW OR WORSENING SYMPTOMS  Secondary Diagnosis:	Hives

## 2019-07-03 NOTE — ED PROVIDER NOTE - OBJECTIVE STATEMENT
HPI: Patient is a 79 y/o M w/ hx of gastric cancer s/p partial gastrectomy and chemo/radiation in 2006, DM, chronic urticaria x years who presents to ED c/o worsening RLE...... HPI: Patient is a 79 y/o M  who presents with HHA w/ hx of gastric cancer s/p partial gastrectomy and chemo/radiation in 2006, DM, HTN, chronic urticaria x years who presents to ED c/o worsening RLE rash and itching surrounding an abrasion he got 2/2 from bumping into something 2-3 weeks ago. Pt states developed abrasion 2 weeks ago, began to have hives and then increasing redness and itching surrounding area. Pt followed with PCP on thursday and was given tetanus and bacitracin for area, then on friday HHA states patient followed with unknown provider and was given keflex. Pt states area not improving. Denies tongue elevation, drooling, sob, cp, fevers, chills, recent trauma, blood thinner use, recent travel, environmental exposures, new soaps, allergies, medications or any other complaints.  Pt states he has a dermatologist but has never seen an allergist for chronic urticaria.

## 2019-07-03 NOTE — ED PROVIDER NOTE - PHYSICAL EXAMINATION
Vital signs reviewed.   CONSTITUTIONAL: Well-appearing; well-nourished; in no apparent distress. Non-toxic appearing.   HEAD: Normocephalic, atraumatic.  EYES: PERRL, EOM intact, conjunctiva and sclera WNL.  ENT: normal nose; no rhinorrhea; normal pharynx no mucosal lesions   NECK/LYMPH: Supple; non-tender;  CARD: Normal S1, S2; no murmurs, rubs, or gallops noted.  RESP: Normal chest excursion with respiration; breath sounds clear and equal bilaterally; no wheezes, rhonchi, or rales.  ABD/GI: soft, non-distended; non-tender  EXT/MS: moves all extremities; distal pulses are normal, no pedal edema.  SKIN: Normal for age and race; warm; dry; good turgor; +abrasion noted to Rt shin, with surrounding petechial rash. + area of urticaria on back and few spots on b/l knees that are blanchable. No signs of warmth, cellulitis, lymphangitis or any other complaints.   NEURO: Awake, alert, oriented x 3, no gross deficits,  no motor or sensory deficit noted.  PSYCH: Normal mood; appropriate affect.

## 2019-07-03 NOTE — ED PROVIDER NOTE - NSFOLLOWUPINSTRUCTIONS_ED_ALL_ED_FT
Patient advised to follow up with PRIMARY CARE DOCTOR IN 1-2 DAYS AND DERMATOLOGIST WITHIN WEEK and told to return to the emergency department immediately for any new or concerning symptoms OR ANY OTHER COMPLAINTS. Patient agrees with plan.    Continue home medications, continue to apply bacitracin to area and take antibiotic as directed   Follow up with dermatologist   RETURN IF ANY NEW OR WORSENING SYMPTOMS

## 2019-07-03 NOTE — ED PROVIDER NOTE - PROGRESS NOTE DETAILS
Results of labs WNL. Sx and PE consistent with vasculitis. Pt declined steroid stating causes him to "pass out". Pt case reviewed with MD, concetta for [place] home and outpatient f/u with dermatology. Pt to take benadryl prn every 4 hours for itchiness, rest, elevate, and follow up with PCP and derm. All question and concerns addressed. Strict return instructions given.

## 2019-07-03 NOTE — ED PROVIDER NOTE - CLINICAL SUMMARY MEDICAL DECISION MAKING FREE TEXT BOX
Patient is a 79 y/o M  who presents with HHA w/ hx of gastric cancer s/p partial gastrectomy and chemo/radiation in 2006, DM, HTN, chronic urticaria x years who presents to ED c/o worsening RLE rash and itching surrounding an abrasion he got 2/2 from bumping into something 2-3 weeks ago. DDx- includes but not limited to; chronic urticaria, vasculitis. Plan- labs, esr, crp, pepcid, benadryl, steroid. Will monitor and reassess.

## 2019-07-18 ENCOUNTER — INBOUND DOCUMENT (OUTPATIENT)
Age: 79
End: 2019-07-18

## 2019-07-25 NOTE — ED ADULT NURSE NOTE - NSFALLRSKASSESASSIST_ED_ALL_ED
Detail Level: Zone Quality 137: Melanoma: Continuity Of Care - Recall System: Patient information entered into a recall system that includes: target date for the next exam specified AND a process to follow up with patients regarding missed or unscheduled appointments Quality 224: Stage 0-Iic Melanoma: Overutilization Of Imaging Studies For Only Stage 0-Iic Melanoma: None of the following diagnostic imaging studies ordered: chest X-ray, CT, Ultrasound, MRI, PET, or nuclear medicine scans (ML) Detail Level: Detailed Detail Level: Simple yes

## 2019-07-30 ENCOUNTER — APPOINTMENT (OUTPATIENT)
Dept: DERMATOLOGY | Facility: CLINIC | Age: 79
End: 2019-07-30
Payer: MEDICARE

## 2019-07-30 VITALS — DIASTOLIC BLOOD PRESSURE: 60 MMHG | TEMPERATURE: 97.3 F | SYSTOLIC BLOOD PRESSURE: 95 MMHG

## 2019-07-30 DIAGNOSIS — L98.499 NON-PRESSURE CHRONIC ULCER OF SKIN OF OTHER SITES WITH UNSPECIFIED SEVERITY: ICD-10-CM

## 2019-07-30 DIAGNOSIS — L50.8 OTHER URTICARIA: ICD-10-CM

## 2019-07-30 PROCEDURE — 99213 OFFICE O/P EST LOW 20 MIN: CPT

## 2019-07-30 RX ORDER — ESOMEPRAZOLE MAGNESIUM 40 MG/1
40 CAPSULE, DELAYED RELEASE ORAL DAILY
Refills: 0 | Status: DISCONTINUED | COMMUNITY
End: 2019-07-30

## 2019-07-30 RX ORDER — HYDROXYZINE HYDROCHLORIDE 10 MG/1
10 TABLET ORAL
Refills: 0 | Status: DISCONTINUED | COMMUNITY
End: 2019-07-30

## 2019-09-10 RX ORDER — DESLORATADINE 5 MG/1
5 TABLET ORAL DAILY
Qty: 90 | Refills: 1 | Status: ACTIVE | COMMUNITY
Start: 2019-05-02 | End: 1900-01-01

## 2019-10-08 ENCOUNTER — APPOINTMENT (OUTPATIENT)
Dept: DERMATOLOGY | Facility: CLINIC | Age: 79
End: 2019-10-08

## 2019-11-08 NOTE — DISCHARGE NOTE ADULT - LAUNCH MEDICATION RECONCILIATION
Breathing spontaneous and unlabored. Breath sounds clear and equal bilaterally with regular rhythm. <<-----Click here for Discharge Medication Review

## 2019-11-21 NOTE — PATIENT PROFILE ADULT. - FUNCTIONAL SCREEN CURRENT LEVEL: SWALLOWING (IF SCORE 2 OR MORE FOR ANY ITEM, CONSULT REHAB SERVICES), MLM)
Vascular Access Team     Date of Insertion: 11/20/19  Arm Circumference: 32  Internal length: 42  External Length: 0 (hub)  Vein Occupancy %:   Reason for PICC:   Labs: WBC 4.9, , BUN 12, Cr 0.67, GFR >60, INR n/a     Consents confirmed, vessel patency confirmed with ultrasound. Risks and benefits of procedure explained to patient and education regarding central line associated bloodstream infections provided. Questions answered.      PICC placed in LUE per licensed provider order with ultrasound guidance.  4 Fr, 1 lumen PICC placed in brachial vein after 1 attempt(s). 2 mL of 1% lidocaine injected intradermally, 21 gauge microintroducer needle and modified Seldinger technique used. 42 cm catheter inserted with good blood return. Secured at hub. Each lumen flushed without resistance with 10 mL 0.9% normal saline. PICC line secured with Biopatch and Tegaderm.     PICC tip placement location is confirmed by nurse to be in the Superior Vena Cava (SVC) utilizing 3CG technology. PICC line is appropriate for use at this time. Patient tolerated procedure well, without complications.  Patient condition relayed to unit RN or ordering physician via this post procedure note in the EMR.      Ultrasound images uploaded to PACS and viewable in the EMR - yes  Ultrasound imaged printed and placed in paper chart - no     BARD Power PICC ref # [id number], Lot # [id number]   (0) swallows foods/liquids without difficulty

## 2019-12-06 ENCOUNTER — APPOINTMENT (OUTPATIENT)
Dept: CARDIOLOGY | Facility: CLINIC | Age: 79
End: 2019-12-06
Payer: MEDICARE

## 2019-12-06 VITALS
SYSTOLIC BLOOD PRESSURE: 122 MMHG | HEART RATE: 68 BPM | HEIGHT: 65 IN | WEIGHT: 115 LBS | BODY MASS INDEX: 19.16 KG/M2 | DIASTOLIC BLOOD PRESSURE: 56 MMHG

## 2019-12-06 DIAGNOSIS — I44.7 LEFT BUNDLE-BRANCH BLOCK, UNSPECIFIED: ICD-10-CM

## 2019-12-06 DIAGNOSIS — K21.9 GASTRO-ESOPHAGEAL REFLUX DISEASE W/OUT ESOPHAGITIS: ICD-10-CM

## 2019-12-06 DIAGNOSIS — I25.10 ATHEROSCLEROTIC HEART DISEASE OF NATIVE CORONARY ARTERY W/OUT ANGINA PECTORIS: ICD-10-CM

## 2019-12-06 PROCEDURE — 99214 OFFICE O/P EST MOD 30 MIN: CPT

## 2019-12-06 PROCEDURE — 93000 ELECTROCARDIOGRAM COMPLETE: CPT

## 2019-12-06 RX ORDER — ESOMEPRAZOLE MAGNESIUM 40 MG/1
40 CAPSULE, DELAYED RELEASE ORAL DAILY
Refills: 0 | Status: ACTIVE | COMMUNITY

## 2019-12-06 RX ORDER — CLOBETASOL PROPIONATE 0.5 MG/G
0.05 OINTMENT TOPICAL TWICE DAILY
Qty: 1 | Refills: 1 | Status: DISCONTINUED | COMMUNITY
Start: 2019-06-04 | End: 2019-12-06

## 2019-12-06 RX ORDER — FEXOFENADINE HYDROCHLORIDE 180 MG/1
180 TABLET ORAL
Qty: 1 | Refills: 1 | Status: DISCONTINUED | COMMUNITY
Start: 2019-06-20 | End: 2019-12-06

## 2019-12-06 RX ORDER — FLUOCINONIDE 0.5 MG/ML
0.05 SOLUTION TOPICAL
Qty: 1 | Refills: 2 | Status: DISCONTINUED | COMMUNITY
Start: 2019-06-04 | End: 2019-12-06

## 2019-12-06 RX ORDER — BETAMETHASONE VALERATE 1 MG/ML
0.1 LOTION CUTANEOUS
Qty: 1 | Refills: 1 | Status: DISCONTINUED | COMMUNITY
Start: 2019-06-05 | End: 2019-12-06

## 2019-12-06 NOTE — REASON FOR VISIT
[Follow-Up - Clinic] : a clinic follow-up of [Coronary Artery Disease] : coronary artery disease [FreeTextEntry1] : LBBB.

## 2019-12-06 NOTE — REVIEW OF SYSTEMS
[Feeling Fatigued] : feeling fatigued [Eyeglasses] : currently wearing eyeglasses [Joint Pain] : joint pain [Dizziness] : dizziness [Negative] : Endocrine [Fever] : no fever [Headache] : no headache [Chills] : no chills [Blurry Vision] : no blurred vision [Shortness Of Breath] : no shortness of breath [Eye Pain] : no eye pain [Seeing Double (Diplopia)] : no diplopia [Lower Ext Edema] : no extremity edema [Palpitations] : no palpitations [Chest Pain] : no chest pain [Muscle Cramps] : no muscle cramps [Limb Weakness (Paresis)] : no limb weakness [Convulsions] : no convulsions [Numbness (Hypesthesia)] : no numbness [Tremor] : no tremor was seen [Tingling (Paresthesia)] : no tingling [Easy Bleeding] : no tendency for easy bleeding [Easy Bruising] : no tendency for easy bruising

## 2019-12-06 NOTE — DISCUSSION/SUMMARY
[FreeTextEntry1] : In a summary Jaison Brito is an elderly male with CAD s/p CABG, continue aspirin. LBBB, get echo to assess LV systolic function. Chest pains ? secondary to GERD. Hypercholesterolemia, on statins and low cholesterol diet. Further plan based on echo results.

## 2019-12-06 NOTE — HISTORY OF PRESENT ILLNESS
[FreeTextEntry1] : Jaison Brito is a 78 year old male with history of CAD s/p CABG, LBBB and hypercholesterolemia comes for follow up visit. Denies any palpitations. No shortness of breath. On and off chronic chest pains relieved by burping. No exertional chest pains. Chronic fatigue. Compliant to medications.

## 2019-12-06 NOTE — PHYSICAL EXAM
[Well Groomed] : well groomed [Normal Appearance] : normal appearance [General Appearance - Well Developed] : well developed [General Appearance - In No Acute Distress] : no acute distress [General Appearance - Well Nourished] : well nourished [No Deformities] : no deformities [Eyelids - No Xanthelasma] : the eyelids demonstrated no xanthelasmas [Normal Conjunctiva] : the conjunctiva exhibited no abnormalities [Normal Oral Mucosa] : normal oral mucosa [No Oral Cyanosis] : no oral cyanosis [No Oral Pallor] : no oral pallor [Respiration, Rhythm And Depth] : normal respiratory rhythm and effort [Auscultation Breath Sounds / Voice Sounds] : lungs were clear to auscultation bilaterally [Arterial Pulses Normal] : the arterial pulses were normal [Murmurs] : no murmurs present [Heart Rate And Rhythm] : heart rate and rhythm were normal [Heart Sounds] : normal S1 and S2 [Edema] : no peripheral edema present [Veins - Varicosity Changes] : no varicosital changes were noted in the lower extremities [Bowel Sounds] : normal bowel sounds [Abdomen Soft] : soft [Abdomen Tenderness] : non-tender [Cyanosis, Localized] : no localized cyanosis [Abnormal Walk] : normal gait [Nail Clubbing] : no clubbing of the fingernails [Skin Turgor] : normal skin turgor [Skin Color & Pigmentation] : normal skin color and pigmentation [] : no rash [Oriented To Time, Place, And Person] : oriented to person, place, and time [No Anxiety] : not feeling anxious [Impaired Insight] : insight and judgment were intact [FreeTextEntry1] : No JVD

## 2020-01-03 ENCOUNTER — APPOINTMENT (OUTPATIENT)
Dept: CARDIOLOGY | Facility: CLINIC | Age: 80
End: 2020-01-03

## 2020-01-14 NOTE — DISCHARGE NOTE ADULT - SMOKING EVEN A SINGLE PUFF INCREASES THE LIKELIHOOD OF A FULL RELAPSE, WITHDRAWAL SYMPTOMS PEAK WITHIN 1-2 WEEKS, BUT CAN PERSIST FOR MONTHS
Statement Selected Spiral Flap Text: The defect edges were debeveled with a #15 scalpel blade.  Given the location of the defect, shape of the defect and the proximity to free margins a spiral flap was deemed most appropriate.  Using a sterile surgical marker, an appropriate rotation flap was drawn incorporating the defect and placing the expected incisions within the relaxed skin tension lines where possible. The area thus outlined was incised deep to adipose tissue with a #15 scalpel blade.  The skin margins were undermined to an appropriate distance in all directions utilizing iris scissors.

## 2020-02-14 NOTE — H&P ADULT - PROBLEM SELECTOR PROBLEM 1
EGD done.  Normal appearance.  Resume diet.  Agree with PPI.  Follow up as outpatient.  GI to sign off.  Call for questions.   Nausea and vomiting

## 2020-03-17 ENCOUNTER — INPATIENT (INPATIENT)
Facility: HOSPITAL | Age: 80
LOS: 1 days | Discharge: HOME | End: 2020-03-19
Attending: INTERNAL MEDICINE | Admitting: INTERNAL MEDICINE
Payer: MEDICARE

## 2020-03-17 VITALS
OXYGEN SATURATION: 100 % | HEART RATE: 65 BPM | SYSTOLIC BLOOD PRESSURE: 148 MMHG | DIASTOLIC BLOOD PRESSURE: 70 MMHG | RESPIRATION RATE: 20 BRPM | TEMPERATURE: 98 F

## 2020-03-17 DIAGNOSIS — R55 SYNCOPE AND COLLAPSE: ICD-10-CM

## 2020-03-17 LAB
ALBUMIN SERPL ELPH-MCNC: 4.1 G/DL — SIGNIFICANT CHANGE UP (ref 3.3–5)
ALP SERPL-CCNC: 64 U/L — SIGNIFICANT CHANGE UP (ref 40–120)
ALT FLD-CCNC: 19 U/L — SIGNIFICANT CHANGE UP (ref 4–41)
ANION GAP SERPL CALC-SCNC: 11 MMO/L — SIGNIFICANT CHANGE UP (ref 7–14)
ANION GAP SERPL CALC-SCNC: 13 MMO/L — SIGNIFICANT CHANGE UP (ref 7–14)
APTT BLD: 19.8 SEC — LOW (ref 27.5–36.3)
AST SERPL-CCNC: 43 U/L — HIGH (ref 4–40)
BASE EXCESS BLDV CALC-SCNC: 0.2 MMOL/L — SIGNIFICANT CHANGE UP
BASOPHILS # BLD AUTO: 0.03 K/UL — SIGNIFICANT CHANGE UP (ref 0–0.2)
BASOPHILS NFR BLD AUTO: 0.5 % — SIGNIFICANT CHANGE UP (ref 0–2)
BILIRUB SERPL-MCNC: 0.3 MG/DL — SIGNIFICANT CHANGE UP (ref 0.2–1.2)
BLOOD GAS VENOUS - CREATININE: 0.92 MG/DL — SIGNIFICANT CHANGE UP (ref 0.5–1.3)
BUN SERPL-MCNC: 14 MG/DL — SIGNIFICANT CHANGE UP (ref 7–23)
BUN SERPL-MCNC: 14 MG/DL — SIGNIFICANT CHANGE UP (ref 7–23)
CALCIUM SERPL-MCNC: 8.8 MG/DL — SIGNIFICANT CHANGE UP (ref 8.4–10.5)
CALCIUM SERPL-MCNC: 8.8 MG/DL — SIGNIFICANT CHANGE UP (ref 8.4–10.5)
CHLORIDE BLDV-SCNC: 106 MMOL/L — SIGNIFICANT CHANGE UP (ref 96–108)
CHLORIDE SERPL-SCNC: 100 MMOL/L — SIGNIFICANT CHANGE UP (ref 98–107)
CHLORIDE SERPL-SCNC: 101 MMOL/L — SIGNIFICANT CHANGE UP (ref 98–107)
CO2 SERPL-SCNC: 22 MMOL/L — SIGNIFICANT CHANGE UP (ref 22–31)
CO2 SERPL-SCNC: 26 MMOL/L — SIGNIFICANT CHANGE UP (ref 22–31)
CREAT SERPL-MCNC: 1.01 MG/DL — SIGNIFICANT CHANGE UP (ref 0.5–1.3)
CREAT SERPL-MCNC: 1.01 MG/DL — SIGNIFICANT CHANGE UP (ref 0.5–1.3)
EOSINOPHIL # BLD AUTO: 0.6 K/UL — HIGH (ref 0–0.5)
EOSINOPHIL NFR BLD AUTO: 10 % — HIGH (ref 0–6)
GAS PNL BLDV: 135 MMOL/L — LOW (ref 136–146)
GLUCOSE BLDV-MCNC: 120 MG/DL — HIGH (ref 70–99)
GLUCOSE SERPL-MCNC: 111 MG/DL — HIGH (ref 70–99)
GLUCOSE SERPL-MCNC: 90 MG/DL — SIGNIFICANT CHANGE UP (ref 70–99)
HCO3 BLDV-SCNC: 24 MMOL/L — SIGNIFICANT CHANGE UP (ref 20–27)
HCT VFR BLD CALC: 33.2 % — LOW (ref 39–50)
HCT VFR BLDV CALC: 28.7 % — LOW (ref 39–51)
HGB BLD-MCNC: 10.4 G/DL — LOW (ref 13–17)
HGB BLDV-MCNC: 9.2 G/DL — LOW (ref 13–17)
IMM GRANULOCYTES NFR BLD AUTO: 0.3 % — SIGNIFICANT CHANGE UP (ref 0–1.5)
INR BLD: 1.01 — SIGNIFICANT CHANGE UP (ref 0.88–1.17)
LACTATE BLDV-MCNC: 1.6 MMOL/L — SIGNIFICANT CHANGE UP (ref 0.5–2)
LYMPHOCYTES # BLD AUTO: 1.58 K/UL — SIGNIFICANT CHANGE UP (ref 1–3.3)
LYMPHOCYTES # BLD AUTO: 26.4 % — SIGNIFICANT CHANGE UP (ref 13–44)
MCHC RBC-ENTMCNC: 23.2 PG — LOW (ref 27–34)
MCHC RBC-ENTMCNC: 31.3 % — LOW (ref 32–36)
MCV RBC AUTO: 73.9 FL — LOW (ref 80–100)
MONOCYTES # BLD AUTO: 0.57 K/UL — SIGNIFICANT CHANGE UP (ref 0–0.9)
MONOCYTES NFR BLD AUTO: 9.5 % — SIGNIFICANT CHANGE UP (ref 2–14)
NEUTROPHILS # BLD AUTO: 3.18 K/UL — SIGNIFICANT CHANGE UP (ref 1.8–7.4)
NEUTROPHILS NFR BLD AUTO: 53.3 % — SIGNIFICANT CHANGE UP (ref 43–77)
NRBC # FLD: 0 K/UL — SIGNIFICANT CHANGE UP (ref 0–0)
PCO2 BLDV: 49 MMHG — SIGNIFICANT CHANGE UP (ref 41–51)
PH BLDV: 7.33 PH — SIGNIFICANT CHANGE UP (ref 7.32–7.43)
PLATELET # BLD AUTO: 242 K/UL — SIGNIFICANT CHANGE UP (ref 150–400)
PMV BLD: 10.6 FL — SIGNIFICANT CHANGE UP (ref 7–13)
PO2 BLDV: 42 MMHG — HIGH (ref 35–40)
POTASSIUM BLDV-SCNC: 4.4 MMOL/L — SIGNIFICANT CHANGE UP (ref 3.4–4.5)
POTASSIUM SERPL-MCNC: 4.8 MMOL/L — SIGNIFICANT CHANGE UP (ref 3.5–5.3)
POTASSIUM SERPL-MCNC: 6.1 MMOL/L — HIGH (ref 3.5–5.3)
POTASSIUM SERPL-SCNC: 4.8 MMOL/L — SIGNIFICANT CHANGE UP (ref 3.5–5.3)
POTASSIUM SERPL-SCNC: 6.1 MMOL/L — HIGH (ref 3.5–5.3)
PROT SERPL-MCNC: 7.5 G/DL — SIGNIFICANT CHANGE UP (ref 6–8.3)
PROTHROM AB SERPL-ACNC: 11.6 SEC — SIGNIFICANT CHANGE UP (ref 9.8–13.1)
RBC # BLD: 4.49 M/UL — SIGNIFICANT CHANGE UP (ref 4.2–5.8)
RBC # FLD: 20.4 % — HIGH (ref 10.3–14.5)
SAO2 % BLDV: 70 % — SIGNIFICANT CHANGE UP (ref 60–85)
SODIUM SERPL-SCNC: 136 MMOL/L — SIGNIFICANT CHANGE UP (ref 135–145)
SODIUM SERPL-SCNC: 137 MMOL/L — SIGNIFICANT CHANGE UP (ref 135–145)
TROPONIN T, HIGH SENSITIVITY: 20 NG/L — SIGNIFICANT CHANGE UP (ref ?–14)
TROPONIN T, HIGH SENSITIVITY: 20 NG/L — SIGNIFICANT CHANGE UP (ref ?–14)
WBC # BLD: 5.98 K/UL — SIGNIFICANT CHANGE UP (ref 3.8–10.5)
WBC # FLD AUTO: 5.98 K/UL — SIGNIFICANT CHANGE UP (ref 3.8–10.5)

## 2020-03-17 PROCEDURE — 99223 1ST HOSP IP/OBS HIGH 75: CPT

## 2020-03-17 PROCEDURE — 71045 X-RAY EXAM CHEST 1 VIEW: CPT | Mod: 26

## 2020-03-17 PROCEDURE — 70496 CT ANGIOGRAPHY HEAD: CPT | Mod: 26

## 2020-03-17 PROCEDURE — 70498 CT ANGIOGRAPHY NECK: CPT | Mod: 26

## 2020-03-17 RX ORDER — SODIUM CHLORIDE 9 MG/ML
3 INJECTION INTRAMUSCULAR; INTRAVENOUS; SUBCUTANEOUS EVERY 8 HOURS
Refills: 0 | Status: DISCONTINUED | OUTPATIENT
Start: 2020-03-17 | End: 2020-03-19

## 2020-03-17 RX ORDER — ASPIRIN/CALCIUM CARB/MAGNESIUM 324 MG
162 TABLET ORAL ONCE
Refills: 0 | Status: COMPLETED | OUTPATIENT
Start: 2020-03-17 | End: 2020-03-17

## 2020-03-17 RX ORDER — LEVOCETIRIZINE DIHYDROCHLORIDE 0.5 MG/ML
1 SOLUTION ORAL
Qty: 0 | Refills: 0 | DISCHARGE

## 2020-03-17 RX ADMIN — SODIUM CHLORIDE 3 MILLILITER(S): 9 INJECTION INTRAMUSCULAR; INTRAVENOUS; SUBCUTANEOUS at 21:43

## 2020-03-17 RX ADMIN — Medication 162 MILLIGRAM(S): at 17:19

## 2020-03-17 NOTE — H&P ADULT - NEGATIVE NEUROLOGICAL SYMPTOMS
Patient Education     Sinusitis (Antibiotic Treatment)    The sinuses are air-filled spaces within the bones of the face. They connect to the inside of the nose. Sinusitis is an inflammation of the tissue lining the sinus cavity. Sinus inflammation can occur during a cold. It can also be due to allergies to pollens and other particles in the air. Sinusitis can cause symptoms of sinus congestion and fullness. A sinus infection causes fever, headache and facial pain. There is often green or yellow drainage from the nose or into the back of the throat (post-nasal drip). You have been given antibiotics to treat this condition.  Home care:  · Take the full course of antibiotics as instructed. Do not stop taking them, even if you feel better.  · Drink plenty of water, hot tea, and other liquids. This may help thin mucus. It also may promote sinus drainage.  · Heat may help soothe painful areas of the face. Use a towel soaked in hot water. Or,  the shower and direct the hot spray onto your face. Using a vaporizer along with a menthol rub at night may also help.   · An expectorant containing guaifenesin may help thin the mucus and promote drainage from the sinuses.  · Over-the-counter decongestants may be used unless a similar medicine was prescribed. Nasal sprays work the fastest. Use one that contains phenylephrine or oxymetazoline. First blow the nose gently. Then use the spray. Do not use these medicines more often than directed on the label or symptoms may get worse. You may also use tablets containing pseudoephedrine. Avoid products that combine ingredients, because side effects may be increased. Read labels. You can also ask the pharmacist for help. (NOTE: Persons with high blood pressure should not use decongestants. They can raise blood pressure.)  · Over-the-counter antihistamines may help if allergies contributed to your sinusitis.    · Do not use nasal rinses or irrigation during an acute sinus  infection, unless told to by your health care provider. Rinsing may spread the infection to other sinuses.  · Use acetaminophen or ibuprofen to control pain, unless another pain medicine was prescribed. (If you have chronic liver or kidney disease or ever had a stomach ulcer, talk with your doctor before using these medicines. Aspirin should never be used in anyone under 18 years of age who is ill with a fever. It may cause severe liver damage.)  · Don't smoke. This can worsen symptoms.  Follow-up care  Follow up with your healthcare provider or our staff if you are not improving within the next week.  When to seek medical advice  Call your healthcare provider if any of these occur:  · Facial pain or headache becoming more severe  · Stiff neck  · Unusual drowsiness or confusion  · Swelling of the forehead or eyelids  · Vision problems, including blurred or double vision  · Fever of 100.4ºF (38ºC) or higher, or as directed by your healthcare provider  · Seizure  · Breathing problems  · Symptoms not resolving within 10 days  © 4266-1185 [employer]. [address] [place] [address], [place] [address]. All rights reserved. This information is not intended as a substitute for professional medical care. Always follow your healthcare professional's instructions.            no tremors/no headache/no confusion/no transient paralysis/no weakness

## 2020-03-17 NOTE — H&P ADULT - NSHPLABSRESULTS_GEN_ALL_CORE
10.4   5.98  )-----------( 242      ( 17 Mar 2020 17:30 )             33.2   03-17    137  |  100  |  14  ----------------------------<  111<H>  4.8   |  26  |  1.01    Ca    8.8      17 Mar 2020 19:30    TPro  7.5  /  Alb  4.1  /  TBili  0.3  /  DBili  x   /  AST  43<H>  /  ALT  19  /  AlkPhos  64  03-17    Trops 20--->20    EKbpm, NSR, LBBB, PVCs    CXR: PRELIM--> No focal consolidations 10.4   5.98  )-----------( 242      ( 17 Mar 2020 17:30 )             33.2   03-17    137  |  100  |  14  ----------------------------<  111<H>  4.8   |  26  |  1.01    Ca    8.8      17 Mar 2020 19:30    TPro  7.5  /  Alb  4.1  /  TBili  0.3  /  DBili  x   /  AST  43<H>  /  ALT  19  /  AlkPhos  64  03-17    Trops 20--->20    EKbpm, NSR, LBBB, PVCs    CXR personally reviewed: PRELIM--> No focal consolidations

## 2020-03-17 NOTE — ED PROVIDER NOTE - PHYSICAL EXAMINATION
GEN - NAD; well appearing; A+O x3   HEAD - NC/AT   EYES- PERRL, EOMI  ENT: Airway patent, mmm, Oral cavity and pharynx normal. No inflammation, swelling, exudate, or lesions.    NECK: Neck supple, non-tender without lymphadenopathy, no masses.  PULMONARY - CTA b/l, symmetric breath sounds.   CARDIAC -s1s2, RRR, +systolic murmur,G,R  ABDOMEN - +BS, ND, NT, soft, no guarding, no rebound, no masses   BACK - no CVA tenderness, Normal  spine   EXTREMITIES - FROM, symmetric pulses, capillary refill < 2 seconds, no edema   SKIN - no rash or bruising   NEUROLOGIC - alert, speech clear, no focal deficits  PSYCH -calm, cooperative GEN - NAD; well appearing; A+O x3   HEAD - NC/AT   EYES- PERRL, EOMI  ENT: Airway patent, mmm, Oral cavity and pharynx normal. No inflammation, swelling, exudate, or lesions.    NECK: Neck supple, non-tender without lymphadenopathy, no masses. +L carotid bruit.   PULMONARY - CTA b/l, symmetric breath sounds.   CARDIAC -s1s2, RRR, +systolic murmur,G,R  ABDOMEN - +BS, ND, NT, soft, no guarding, no rebound, no masses   BACK - no CVA tenderness, Normal  spine   EXTREMITIES - FROM, symmetric pulses, capillary refill < 2 seconds, no edema   SKIN - no rash or bruising   NEUROLOGIC - alert, speech clear, no focal deficits  PSYCH -calm, cooperative

## 2020-03-17 NOTE — ED PROVIDER NOTE - PROGRESS NOTE DETAILS
Patient to be admitted for high risk syncope; discussed with Dr. Tobar recommending admission to hospitalist since cardiologist is Dr. Pruett. Admitted to hospitalist recommending CTA to better evaluate vasculature.

## 2020-03-17 NOTE — H&P ADULT - HISTORY OF PRESENT ILLNESS
78yo Male with PMHx of CAD s/p CABG x4 in , Gastric cancer s/p partial gastrectomy and radiation in , Carotid stenosis (Mild right 16-49%, Mod left 50-79% in 2018.) DM2 (no longer on DM meds), GERD, HLD and HTN who presented to ED after syncopal episode. Per patient, he has had long-standing dizziness with multiple syncopal episodes for over the past year. His cardiologist believes it to be 2/2 to carotid stenosis; however patient unsure why he was never offered surgical treatment.  He was recently admitted to [facility] for the same dizziness and syncope but he is unsure if they found any new findings. He explains last night he had an episode of chest pain, which he gets intermittently for years and takes PRN Nitroglycerin for, the episode resolved with the Nitro, he felt better and went to bed. Then, this morning he woke up and was feeling dizzy and nauseous. He went to sit at kitchen table, vomited x 1 after trying to eat, while sitting he then synopsized w/LOC and woke up sitting slouched in his chair. He does not know how long he was out for, denies palpitations or chest pain preceding the event, his daughter-in-law was at the kitchen table and did not realize he passed out, no fall, or head trauma. He endorses that at times he feels like his blood pressure is too low and so he self-discontinued his BP meds; however he is still having the syncope. He denies fever, chills, sick contacts, SOB, current chest pain, HA, visual changes, cough, abdominal pain, dysuria, hematuria, melena or hematochezia.    While in ED, EKbpm, Sinus w/ LBBB (seen on previous EKGs) and PVCs.  Vitals stable, noted to be hypertensive to 168/69. HR: 65, Temp: 98.1, RR: 16, 02: 100%. Trops stable: 20-->20. CXR: no focal consolidations, CTH, CT A/N pending. 78yo Male with PMHx of CAD s/p CABG x4 in , Gastric cancer s/p partial gastrectomy and radiation in , Carotid stenosis (Mild right 16-49%, Mod left 50-79% in 2018.) DM2 (no longer on DM meds), GERD, HLD and HTN who presented to ED after syncopal episode. Per patient, he has had long-standing dizziness with multiple syncopal episodes for over the past year. His cardiologist believes it to be 2/2 to carotid stenosis; however patient unsure why he was never offered surgical treatment. He was recently admitted to [facility] for the same dizziness and syncope but he is unsure if they found any new findings. He explains last night he had an episode of chest pain, which he gets intermittently for years and takes PRN Nitroglycerin for, the episode resolved with the Nitroglycerin, he felt better and went to bed. Then, this morning he woke up and was feeling dizzy and nauseous. He went to sit at kitchen table, vomited x 1 after trying to eat, while sitting he then synopsized w/LOC and woke up sitting slouched in his chair. He does not know how long he was out for, denies palpitations or chest pain preceding the event, his daughter-in-law was at the kitchen table and did not realize he passed out, no fall, or head trauma. He endorses that at times he feels like his blood pressure is too low and so he self-discontinued his BP meds; however he is still having the syncope. He denies fever, chills, sick contacts, SOB, current chest pain, HA, visual changes, cough, abdominal pain, dysuria, hematuria, melena or hematochezia.    While in ED, EKbpm, Sinus w/ LBBB (seen on previous EKGs) and PVCs.  Vitals stable, noted to be hypertensive to 168/69. HR: 65, Temp: 98.1, RR: 16, 02: 100%. Trops stable: 20-->20. CXR: no focal consolidations, CTH, CT A/N pending.

## 2020-03-17 NOTE — ED ADULT NURSE NOTE - NSIMPLEMENTINTERV_GEN_ALL_ED
Implemented All Fall with Harm Risk Interventions:  [place] to call system. Call bell, personal items and telephone within reach. Instruct patient to call for assistance. Room bathroom lighting operational. Non-slip footwear when patient is off stretcher. Physically safe environment: no spills, clutter or unnecessary equipment. Stretcher in lowest position, wheels locked, appropriate side rails in place. Provide visual cue, wrist band, yellow gown, etc. Monitor gait and stability. Monitor for mental status changes and reorient to person, place, and time. Review medications for side effects contributing to fall risk. Reinforce activity limits and safety measures with patient and family. Provide visual clues: red socks.

## 2020-03-17 NOTE — H&P ADULT - RS GEN PE MLT RESP DETAILS PC
Patient called requesting to speak with the nurse regarding orders for Hep B and pneumonia. no chest wall tenderness/good air movement/respirations non-labored/no rales/breath sounds equal/clear to auscultation bilaterally/no wheezes

## 2020-03-17 NOTE — H&P ADULT - PROBLEM SELECTOR PLAN 3
- Meclizine trial  - f/u CTH, CTA H/N  - Fall precautions, ambulate w/ assistance   - PT c/s - Meclizine trial  - f/u CTH, CTA H/N, for possible cerebellar infarct   - Consider Neuro c/s in AM   - Fall precautions, ambulate w/ assistance   - PT c/s

## 2020-03-17 NOTE — H&P ADULT - PROBLEM SELECTOR PLAN 2
Spoke with Dr. Montaño regarding patient's request for lab work prior to appointment 2/9/2020. The physician wishes to wait to see the patient in person and then have her get blood work as needed.    Care Manager attempted to speak with pt on 3/4/2020 to notify of the above. Pt did not , and a voicemail was left with callback number.     Plan:  Care Manager will meet with patient at next visit in person.      Ailin Lion RN, BSN  Outpatient Care Management  [facility]  [address] [place] [id number]  O:[phone](internal [phone])  M:[phone]  F: [phone]       - CTH, CTA H/N pending   - Carotid dopplers   - ASA/ Statin   - Vascular c/s pending Carotid doppler results - CTH, CTA H/N pending   - Carotid dopplers   - ASA / Statin   - Vascular c/s pending Carotid doppler results

## 2020-03-17 NOTE — H&P ADULT - ATTENDING COMMENTS
80yo Male with PMHx of CAD s/p CABG x4 in 2002, Gastric cancer s/p partial gastrectomy and radiation in 2006, Carotid stenosis (Mild right 16-49%, Mod left 50-79% in 2018.), DM2 (no longer on DM meds), GERD, HLD and HTN who presented to ED after syncopal episode of unclear etiology. CTA head/neck pending to r/o possible cerebellar etiology given episodes of dizziness and h/o carotid stenosis. Monitor on tele, f/u TTE.

## 2020-03-17 NOTE — H&P ADULT - NSHPSOCIALHISTORY_GEN_ALL_CORE
Patient lives with Son, daughter-in-law.  Never smoker.   No illicit drug use, says he drinks "stout" to increase his blood pressure, but when asked if he meant akers beer he said no.  Ambulates without assistance. Patient lives with son, daughter-in-law.  Never smoker.   No illicit drug use, says he drinks "stout" to increase his blood pressure, but when asked if he meant akers beer he said no.  Ambulates without assistance.

## 2020-03-17 NOTE — ED PROVIDER NOTE - CLINICAL SUMMARY MEDICAL DECISION MAKING FREE TEXT BOX
Patient presents to the ed with syncopal episode, h/o of multiple episodes of same in the past, along with intermittent episodes of chest pain for months, last episode prior to ed arrival. Patient nontoxic appearing with no symptoms in ed, no neuro deficits or complaints. Plan for labs, ekg, cxr, asa, monitor for cardiac events on tele, eval for elec disturbance/anemia/acs, reasss.

## 2020-03-17 NOTE — ED PROVIDER NOTE - OBJECTIVE STATEMENT
78 y/o m presents with syncopal episode and eps of cp. Patient states he has had long history of dizziness and syncope, last episode was 2 weeks ago and was admitted to [facility] for it but does not know what they found. Today he was at home, stood up, felt dizzy, sat back down, and states he blacked for a few seconds. No cp/sob/sweating/ha prior to this. On way to hospital had episode of left side cp. Has had this cp many times in past, lasted a few minutes and resolved, same as prior. No fevers, chills, ha, vision change, neck pain, sob, abd pain, vomiting, diarrhea, dysuria, focal weakness, numbness. 78 y/o m presents with syncopal episode and eps of cp. Patient states he has had long history of dizziness and syncope, last episode was 2 weeks ago and was admitted to [facility] for it but does not know what they found. Today he was at home, stood up, felt dizzy, sat back down, and states he blacked for a few seconds. No cp/sob/sweating/ha prior to this. On way to hospital had episode of left side cp. Has had this cp many times in past, lasted a few minutes and resolved, same as prior. No fevers, chills, ha, vision change, neck pain, sob, abd pain, vomiting, diarrhea, dysuria, focal weakness, numbness.    Cardiologist: Dr. Pruett

## 2020-03-17 NOTE — ED ADULT NURSE NOTE - OBJECTIVE STATEMENT
Receive pt. in ER room 3 alert and oriented x 3, presenting to the Er with complaints of syncope. Pt. have a history of CABG, HLD, Carotid Stenosis, HTN, Gerd, Gastric Cancer. Pt. stated " I keep blocking out for the past two weeks and today when I was sitting down my daughter said I passed out for about five minutes, my doctor told me two weeks ago that I have a blockage in the artery in my left neck". No c/o pain no respiratory distress, c/o generalized weakness. Placed pt. on cardiac monitor, medicate as ordered, labs sent, will continue to monitor.

## 2020-03-17 NOTE — H&P ADULT - PROBLEM SELECTOR PLAN 10
1.  Name of PCP: Cardiologist: Dr. Pruett   2.  PCP Contacted on Admission: [ ] Y    [ ] N    3.  PCP contacted at Discharge: [ ] Y    [ ] N    [ ] N/A  4.  Post-Discharge Appointment Date and Location:  5.  Summary of Handoff given to PCP: 1.  Name of PCP: Cardiologist: Dr. Pruett   2.  PCP Contacted on Admission: [ ] Y    [X] N    3.  PCP contacted at Discharge: [ ] Y    [ ] N    [ ] N/A  4.  Post-Discharge Appointment Date and Location:  5.  Summary of Handoff given to PCP:

## 2020-03-17 NOTE — H&P ADULT - PROBLEM SELECTOR PLAN 5
- Continue statin  - lipid profile   - Low fat/ low cholesterol diet - Continue statin  - Lipid profile   - Low fat/ low cholesterol diet

## 2020-03-17 NOTE — ED PROVIDER NOTE - NS ED ROS FT
ROS:  GENERAL: No fever, no chills  EYES: no change in vision  HEENT: no trouble swallowing, no trouble speaking  CARDIAC: + chest pain  PULMONARY: no cough, no shortness of breath  GI: no abdominal pain, no nausea, no vomiting, no diarrhea, no constipation.  : No dysuria, no frequency, no change in appearance, or odor of urine  SKIN: no rashes  NEURO: no headache, no weakness, +syncope  MSK: No joint pain

## 2020-03-17 NOTE — H&P ADULT - PROBLEM SELECTOR PLAN 8
- Pt no longer on DM meds  - A1C ordered for AM  - Monitor FS - Pt no longer on DM meds. HgbA1c 6.3% in 4/2019.   - A1C ordered for AM  - Monitor FS

## 2020-03-17 NOTE — H&P ADULT - PROBLEM SELECTOR PLAN 6
- Per patient he stopped taking Lisinopril 2/2 to dizziness; however he is still hypertensive, will lower dose from 10mg -->2.5 and can increase as needed.   - monitor BP   - DASH/TLC diet - Per patient he stopped taking Lisinopril 2/2 to dizziness; however he is still hypertensive, will lower dose from 10mg -->2.5 and can increase as needed.   - If orthostatics positive, DC lisinopril.   - monitor BP   - DASH/TLC diet - Per patient, he stopped taking lisinopril 2/2 to dizziness; however he is still hypertensive, will lower dose from 10mg -->2.5 and can increase as needed.   - if orthostatics positive, will discontinue lisinopril  - monitor BP   - DASH/TLC diet

## 2020-03-17 NOTE — ED ADULT TRIAGE NOTE - CHIEF COMPLAINT QUOTE
Pt BIB EMS for syncope as per EMS pt has been having syncopal episodes  the past few weeks   PMH: Bypass 2002

## 2020-03-17 NOTE — H&P ADULT - ASSESSMENT
78yo Male with PMHx of CAD s/p CABG x4 in 2002, Gastric cancer s/p partial gastrectomy and radiation in 2006, Carotid stenosis (Mild right 16-49%, Mod left 50-79% in 2018.) DM2 (no longer on DM meds), GERD, HLD and HTN who presented to ED after syncopal episode. Admit to telemetry. 80yo Male with PMHx of CAD s/p CABG x4 in 2002, Gastric cancer s/p partial gastrectomy and radiation in 2006, Carotid stenosis (Mild right 16-49%, Mod left 50-79% in 2018.), DM2 (no longer on DM meds), GERD, HLD and HTN who presented to ED after syncopal episode. Admit to telemetry.

## 2020-03-17 NOTE — H&P ADULT - PROBLEM SELECTOR PLAN 1
- TTE ordered   - CTH, CTA H/N pending   - orthostatics ordered   - monitor on tele   - fall precautions   - ambulate w/ assistance   - PT eval - TTE ordered   - CTH, CTA H/N pending   - orthostatics ordered   - monitor on tele   - fall precautions   - ambulate w/ assistance   - PT eval    Case discussed with Dr. Smart Unclear etiology. Can be orthostatic vs. vasovagal vs. cardiogenic (arrhythmias, structural heart disease) vs. neurogenic.   - TTE ordered   - CTH, CTA H/N pending   - orthostatics ordered   - monitor on tele for any concerning arrhythmias  - trops 20 -> 20, so low suspicion for ACS at this time   - fall precautions   - ambulate w/ assistance   - PT eval

## 2020-03-17 NOTE — H&P ADULT - PROBLEM SELECTOR PLAN 7
- Continue ASA, statin   - Nitro PRN for chest pain  - C/w Home Ranexa 500mg ER   - Low fat, low cholesterol diet Trops 20 -> 20. Current chest pain free.  - Continue ASA, statin   - Nitro PRN for chest pain  - C/w Home Ranexa 500mg bid  - Low fat, low cholesterol diet

## 2020-03-17 NOTE — H&P ADULT - NEUROLOGICAL DETAILS
alert and oriented x 3/sensation intact/cranial nerves intact alert and oriented x 3/no spontaneous movement/sensation intact/cranial nerves intact/normal strength

## 2020-03-18 DIAGNOSIS — I25.118 ATHEROSCLEROTIC HEART DISEASE OF NATIVE CORONARY ARTERY WITH OTHER FORMS OF ANGINA PECTORIS: ICD-10-CM

## 2020-03-18 DIAGNOSIS — R42 DIZZINESS AND GIDDINESS: ICD-10-CM

## 2020-03-18 DIAGNOSIS — I10 ESSENTIAL (PRIMARY) HYPERTENSION: ICD-10-CM

## 2020-03-18 DIAGNOSIS — Z29.9 ENCOUNTER FOR PROPHYLACTIC MEASURES, UNSPECIFIED: ICD-10-CM

## 2020-03-18 DIAGNOSIS — D64.9 ANEMIA, UNSPECIFIED: ICD-10-CM

## 2020-03-18 DIAGNOSIS — R55 SYNCOPE AND COLLAPSE: ICD-10-CM

## 2020-03-18 DIAGNOSIS — E11.9 TYPE 2 DIABETES MELLITUS WITHOUT COMPLICATIONS: ICD-10-CM

## 2020-03-18 DIAGNOSIS — Z02.9 ENCOUNTER FOR ADMINISTRATIVE EXAMINATIONS, UNSPECIFIED: ICD-10-CM

## 2020-03-18 DIAGNOSIS — I65.29 OCCLUSION AND STENOSIS OF UNSPECIFIED CAROTID ARTERY: ICD-10-CM

## 2020-03-18 DIAGNOSIS — E78.5 HYPERLIPIDEMIA, UNSPECIFIED: ICD-10-CM

## 2020-03-18 LAB
ANION GAP SERPL CALC-SCNC: 16 MMO/L — HIGH (ref 7–14)
APPEARANCE UR: CLEAR — SIGNIFICANT CHANGE UP
BILIRUB UR-MCNC: NEGATIVE — SIGNIFICANT CHANGE UP
BLOOD UR QL VISUAL: NEGATIVE — SIGNIFICANT CHANGE UP
BUN SERPL-MCNC: 14 MG/DL — SIGNIFICANT CHANGE UP (ref 7–23)
CALCIUM SERPL-MCNC: 8.4 MG/DL — SIGNIFICANT CHANGE UP (ref 8.4–10.5)
CHLORIDE SERPL-SCNC: 97 MMOL/L — LOW (ref 98–107)
CHOLEST SERPL-MCNC: 141 MG/DL — SIGNIFICANT CHANGE UP (ref 120–199)
CK MB BLD-MCNC: 2.16 NG/ML — SIGNIFICANT CHANGE UP (ref 1–6.6)
CK MB BLD-MCNC: SIGNIFICANT CHANGE UP (ref 0–2.5)
CK SERPL-CCNC: 97 U/L — SIGNIFICANT CHANGE UP (ref 30–200)
CO2 SERPL-SCNC: 18 MMOL/L — LOW (ref 22–31)
COLOR SPEC: YELLOW — SIGNIFICANT CHANGE UP
CREAT SERPL-MCNC: 0.89 MG/DL — SIGNIFICANT CHANGE UP (ref 0.5–1.3)
FERRITIN SERPL-MCNC: 24.24 NG/ML — LOW (ref 30–400)
FOLATE SERPL-MCNC: > 20 NG/ML — HIGH (ref 4.7–20)
GLUCOSE SERPL-MCNC: 139 MG/DL — HIGH (ref 70–99)
GLUCOSE UR-MCNC: NEGATIVE — SIGNIFICANT CHANGE UP
HBA1C BLD-MCNC: 6.2 % — HIGH (ref 4–5.6)
HCT VFR BLD CALC: 33 % — LOW (ref 39–50)
HDLC SERPL-MCNC: 69 MG/DL — HIGH (ref 35–55)
HGB BLD-MCNC: 10.4 G/DL — LOW (ref 13–17)
IRON SATN MFR SERPL: 34 UG/DL — LOW (ref 45–165)
IRON SATN MFR SERPL: 373 UG/DL — SIGNIFICANT CHANGE UP (ref 155–535)
KETONES UR-MCNC: NEGATIVE — SIGNIFICANT CHANGE UP
LEUKOCYTE ESTERASE UR-ACNC: NEGATIVE — SIGNIFICANT CHANGE UP
LIPID PNL WITH DIRECT LDL SERPL: 60 MG/DL — SIGNIFICANT CHANGE UP
MAGNESIUM SERPL-MCNC: 2.2 MG/DL — SIGNIFICANT CHANGE UP (ref 1.6–2.6)
MCHC RBC-ENTMCNC: 23.3 PG — LOW (ref 27–34)
MCHC RBC-ENTMCNC: 31.5 % — LOW (ref 32–36)
MCV RBC AUTO: 73.8 FL — LOW (ref 80–100)
NITRITE UR-MCNC: NEGATIVE — SIGNIFICANT CHANGE UP
NRBC # FLD: 0 K/UL — SIGNIFICANT CHANGE UP (ref 0–0)
PH UR: 7 — SIGNIFICANT CHANGE UP (ref 5–8)
PHOSPHATE SERPL-MCNC: 4.1 MG/DL — SIGNIFICANT CHANGE UP (ref 2.5–4.5)
PLATELET # BLD AUTO: 231 K/UL — SIGNIFICANT CHANGE UP (ref 150–400)
PMV BLD: 10.6 FL — SIGNIFICANT CHANGE UP (ref 7–13)
POTASSIUM SERPL-MCNC: 5.2 MMOL/L — SIGNIFICANT CHANGE UP (ref 3.5–5.3)
POTASSIUM SERPL-SCNC: 5.2 MMOL/L — SIGNIFICANT CHANGE UP (ref 3.5–5.3)
PROT UR-MCNC: NEGATIVE — SIGNIFICANT CHANGE UP
RBC # BLD: 4.47 M/UL — SIGNIFICANT CHANGE UP (ref 4.2–5.8)
RBC # FLD: 20.3 % — HIGH (ref 10.3–14.5)
SODIUM SERPL-SCNC: 131 MMOL/L — LOW (ref 135–145)
SP GR SPEC: 1.03 — SIGNIFICANT CHANGE UP (ref 1–1.04)
TRIGL SERPL-MCNC: 87 MG/DL — SIGNIFICANT CHANGE UP (ref 10–149)
TSH SERPL-MCNC: 1.12 UIU/ML — SIGNIFICANT CHANGE UP (ref 0.27–4.2)
UIBC SERPL-MCNC: 338.9 UG/DL — SIGNIFICANT CHANGE UP (ref 110–370)
UROBILINOGEN FLD QL: NORMAL — SIGNIFICANT CHANGE UP
VIT B12 SERPL-MCNC: 680 PG/ML — SIGNIFICANT CHANGE UP (ref 200–900)
WBC # BLD: 6.6 K/UL — SIGNIFICANT CHANGE UP (ref 3.8–10.5)
WBC # FLD AUTO: 6.6 K/UL — SIGNIFICANT CHANGE UP (ref 3.8–10.5)

## 2020-03-18 PROCEDURE — 93880 EXTRACRANIAL BILAT STUDY: CPT | Mod: 26

## 2020-03-18 PROCEDURE — 93306 TTE W/DOPPLER COMPLETE: CPT | Mod: 26

## 2020-03-18 RX ORDER — MECLIZINE HCL 12.5 MG
12.5 TABLET ORAL EVERY 12 HOURS
Refills: 0 | Status: DISCONTINUED | OUTPATIENT
Start: 2020-03-18 | End: 2020-03-19

## 2020-03-18 RX ORDER — SIMETHICONE 80 MG/1
80 TABLET, CHEWABLE ORAL ONCE
Refills: 0 | Status: DISCONTINUED | OUTPATIENT
Start: 2020-03-18 | End: 2020-03-18

## 2020-03-18 RX ORDER — SIMETHICONE 80 MG/1
80 TABLET, CHEWABLE ORAL ONCE
Refills: 0 | Status: COMPLETED | OUTPATIENT
Start: 2020-03-18 | End: 2020-03-18

## 2020-03-18 RX ORDER — PANTOPRAZOLE SODIUM 20 MG/1
40 TABLET, DELAYED RELEASE ORAL
Refills: 0 | Status: DISCONTINUED | OUTPATIENT
Start: 2020-03-18 | End: 2020-03-19

## 2020-03-18 RX ORDER — RANOLAZINE 500 MG/1
500 TABLET, FILM COATED, EXTENDED RELEASE ORAL
Refills: 0 | Status: DISCONTINUED | OUTPATIENT
Start: 2020-03-18 | End: 2020-03-19

## 2020-03-18 RX ORDER — LISINOPRIL 2.5 MG/1
2.5 TABLET ORAL DAILY
Refills: 0 | Status: DISCONTINUED | OUTPATIENT
Start: 2020-03-18 | End: 2020-03-19

## 2020-03-18 RX ORDER — FERROUS SULFATE 325(65) MG
325 TABLET ORAL DAILY
Refills: 0 | Status: DISCONTINUED | OUTPATIENT
Start: 2020-03-18 | End: 2020-03-19

## 2020-03-18 RX ORDER — HEPARIN SODIUM 5000 [USP'U]/ML
5000 INJECTION INTRAVENOUS; SUBCUTANEOUS
Refills: 0 | Status: DISCONTINUED | OUTPATIENT
Start: 2020-03-18 | End: 2020-03-19

## 2020-03-18 RX ORDER — ATORVASTATIN CALCIUM 80 MG/1
80 TABLET, FILM COATED ORAL AT BEDTIME
Refills: 0 | Status: DISCONTINUED | OUTPATIENT
Start: 2020-03-18 | End: 2020-03-19

## 2020-03-18 RX ORDER — ASPIRIN/CALCIUM CARB/MAGNESIUM 324 MG
81 TABLET ORAL DAILY
Refills: 0 | Status: DISCONTINUED | OUTPATIENT
Start: 2020-03-18 | End: 2020-03-19

## 2020-03-18 RX ADMIN — SODIUM CHLORIDE 3 MILLILITER(S): 9 INJECTION INTRAMUSCULAR; INTRAVENOUS; SUBCUTANEOUS at 21:06

## 2020-03-18 RX ADMIN — Medication 30 MILLILITER(S): at 20:11

## 2020-03-18 RX ADMIN — PANTOPRAZOLE SODIUM 40 MILLIGRAM(S): 20 TABLET, DELAYED RELEASE ORAL at 06:08

## 2020-03-18 RX ADMIN — Medication 81 MILLIGRAM(S): at 11:42

## 2020-03-18 RX ADMIN — RANOLAZINE 500 MILLIGRAM(S): 500 TABLET, FILM COATED, EXTENDED RELEASE ORAL at 06:11

## 2020-03-18 RX ADMIN — SODIUM CHLORIDE 3 MILLILITER(S): 9 INJECTION INTRAMUSCULAR; INTRAVENOUS; SUBCUTANEOUS at 14:16

## 2020-03-18 RX ADMIN — SIMETHICONE 80 MILLIGRAM(S): 80 TABLET, CHEWABLE ORAL at 23:44

## 2020-03-18 RX ADMIN — LISINOPRIL 2.5 MILLIGRAM(S): 2.5 TABLET ORAL at 06:07

## 2020-03-18 RX ADMIN — HEPARIN SODIUM 5000 UNIT(S): 5000 INJECTION INTRAVENOUS; SUBCUTANEOUS at 06:07

## 2020-03-18 RX ADMIN — ATORVASTATIN CALCIUM 80 MILLIGRAM(S): 80 TABLET, FILM COATED ORAL at 21:06

## 2020-03-18 RX ADMIN — HEPARIN SODIUM 5000 UNIT(S): 5000 INJECTION INTRAVENOUS; SUBCUTANEOUS at 17:53

## 2020-03-18 RX ADMIN — SIMETHICONE 80 MILLIGRAM(S): 80 TABLET, CHEWABLE ORAL at 15:50

## 2020-03-18 RX ADMIN — SODIUM CHLORIDE 3 MILLILITER(S): 9 INJECTION INTRAMUSCULAR; INTRAVENOUS; SUBCUTANEOUS at 06:07

## 2020-03-18 NOTE — PROGRESS NOTE ADULT - SUBJECTIVE AND OBJECTIVE BOX
CHIEF COMPLAINT:Patient is a 79y old  Male who presents with a chief complaint of Syncope (19 Mar 2020 12:57)    	        PAST MEDICAL & SURGICAL HISTORY:  GERD (gastroesophageal reflux disease)  Gastric cancer: S/P RT and chemo  Carotid stenosis  JACK (iron deficiency anemia)  HLD (hyperlipidemia)  HTN (hypertension)  CAD (coronary artery disease): S/P CABG  Hives: on Prednisone  DM (diabetes mellitus)  S/P Gastrectomy: 2006 due to gastric cancer  S/P CABG X 4: 2002          REVIEW OF SYSTEMS:  no complaints at present      Medications:  MEDICATIONS  (STANDING):  aspirin enteric coated 81 milliGRAM(s) Oral daily  atorvastatin 80 milliGRAM(s) Oral at bedtime  ferrous    sulfate 325 milliGRAM(s) Oral daily  heparin  Injectable 5000 Unit(s) SubCutaneous two times a day  pantoprazole    Tablet 40 milliGRAM(s) Oral before breakfast  sodium chloride 0.9% lock flush 3 milliLiter(s) IV Push every 8 hours    MEDICATIONS  (PRN):  meclizine 12.5 milliGRAM(s) Oral every 12 hours PRN Dizziness    	    vss      Appearance: Normal	  HEENT:   Normal oral mucosa, PERRL, EOMI	  Lymphatic: No lymphadenopathy  Cardiovascular: Normal S1 S2, No JVD, No murmurs, No edema  Respiratory: Lungs clear to auscultation	  Psychiatry: A & O   Gastrointestinal:  Soft, Non-tender, + BS	  Skin: No rashes, No ecchymoses, No cyanosis	  Neurologic: Non-focal  Extremities: Normal range of motion, No clubbing, cyanosis or edema  Vascular: Peripheral pulses palpable    TELEMETRY: 	    ECG:  	  RADIOLOGY:  OTHER: 	  	  LABS:	 reviewed                TPro  7.5  /  Alb  4.1  /  TBili  0.3  /  DBili  x   /  AST  43<H>  /  ALT  19  /  AlkPhos  64  03-17    proBNP:   Lipid Profile:   HgA1c:   TSH:

## 2020-03-18 NOTE — CONSULT NOTE ADULT - SUBJECTIVE AND OBJECTIVE BOX
[facility]        Patient is a 79y old  Male who presents with a chief complaint of Syncope (17 Mar 2020 23:17)    Excerpt from H&P,78yo Male with PMHx of CAD s/p CABG x4 in , Gastric cancer s/p partial gastrectomy and radiation in , Carotid stenosis (Mild right 16-49%, Mod left 50-79% in 2018.) DM2 (no longer on DM meds), GERD, HLD and HTN who presented to ED after syncopal episode. Per patient, he has had long-standing dizziness with multiple syncopal episodes for over the past year. His cardiologist believes it to be 2/2 to carotid stenosis; however patient unsure why he was never offered surgical treatment. He was recently admitted to [facility] for the same dizziness and syncope but he is unsure if they found any new findings. He explains last night he had an episode of chest pain, which he gets intermittently for years and takes PRN Nitroglycerin for, the episode resolved with the Nitroglycerin, he felt better and went to bed. Then, this morning he woke up and was feeling dizzy and nauseous. He went to sit at kitchen table, vomited x 1 after trying to eat, while sitting he then synopsized w/LOC and woke up sitting slouched in his chair. He does not know how long he was out for, denies palpitations or chest pain preceding the event, his daughter-in-law was at the kitchen table and did not realize he passed out, no fall, or head trauma. He endorses that at times he feels like his blood pressure is too low and so he self-discontinued his BP meds; however he is still having the syncope. He denies fever, chills, sick contacts, SOB, current chest pain, HA, visual changes, cough, abdominal pain, dysuria, hematuria, melena or hematochezia.    While in ED, EKbpm, Sinus w/ LBBB (seen on previous EKGs) and PVCs.  Vitals stable, noted to be hypertensive to 168/69. HR: 65, Temp: 98.1, RR: 16, 02: 100%. Trops stable: 20-->20. CXR: no focal consolidations, CTH, CT A/N pending. (17 Mar 2020 23:17)           *****PAST MEDICAL / Surgical  HISTORY:  PAST MEDICAL & SURGICAL HISTORY:  GERD (gastroesophageal reflux disease)  Gastric cancer: S/P RT and chemo  Carotid stenosis  JACK (iron deficiency anemia)  HLD (hyperlipidemia)  HTN (hypertension)  CAD (coronary artery disease): S/P CABG  Hives: on Prednisone  DM (diabetes mellitus)  S/P Gastrectomy:  due to gastric cancer  S/P CABG X 4:            *****FAMILY HISTORY:  FAMILY HISTORY:  Family history of early CAD (Sibling): brother  Family history of diabetes mellitus: mother           *****SOCIAL HISTORY:  Alcohol: None  Smoking: None         *****ALLERGIES:   Allergies    No Known Allergies    Intolerances             *****MEDICATIONS: current medication reviewed and documented.   MEDICATIONS  (STANDING):  aspirin enteric coated 81 milliGRAM(s) Oral daily  atorvastatin 80 milliGRAM(s) Oral at bedtime  ferrous    sulfate 325 milliGRAM(s) Oral daily  heparin  Injectable 5000 Unit(s) SubCutaneous two times a day  lisinopril 2.5 milliGRAM(s) Oral daily  pantoprazole    Tablet 40 milliGRAM(s) Oral before breakfast  ranolazine 500 milliGRAM(s) Oral two times a day  sodium chloride 0.9% lock flush 3 milliLiter(s) IV Push every 8 hours    MEDICATIONS  (PRN):  meclizine 12.5 milliGRAM(s) Oral every 12 hours PRN Dizziness           *****REVIEW OF SYSTEM:  GEN: no fever, no chills, no pain  RESP: no SOB, no cough, no sputum  CVS: no chest pain, no palpitations, no edema  GI: no abdominal pain, no nausea, no vomiting, no constipation, no diarrhea  : no dysurea, no frequency, no hematurea  Neuro: no headache, no dizziness  PSYCH: no anxiety, no depression  Derm : no itching, no rash         *****VITAL SIGNS:  T(C): 37 (20 @ 21:00), Max: 37 (20 @ 21:00)  HR: 65 (20 @ 21:00) (60 - 70)  BP: 125/66 (20 @ 21:00) (107/61 - 168/69)  RR: 16 (20 @ 21:00) (15 - 16)  SpO2: 99% (20 @ 21:00) (99% - 100%)  Wt(kg): --           *****PHYSICAL EXAM:   Alert oriented x 3   Attention comprehension are fair. Able to name, repeat, read without any difficulty.   Able to follow 3 step commands.     EOMI fundi not visualized,  VFF to confrontration  No facial asymmetry   Tongue is midline   Palate elevates symmetrically   Moving all 4 ext symmetrically no pronator drift   Reflexes are symmetric throughout   sensation is grossly symmetric  Gait : not assessed.  B/L down going toes               *****LAB AND IMAGING:                          10.4   6.60  )-----------( 231      ( 18 Mar 2020 11:57 )             33.0               03-18    131<L>  |  97<L>  |  14  ----------------------------<  139<H>  5.2   |  18<L>  |  0.89    Ca    8.4      18 Mar 2020 11:45  Phos  4.1     -  Mg     2.2     -18    TPro  7.5  /  Alb  4.1  /  TBili  0.3  /  DBili  x   /  AST  43<H>  /  ALT  19  /  AlkPhos  64  03-17    PT/INR - ( 17 Mar 2020 17:30 )   PT: 11.6 SEC;   INR: 1.01          PTT - ( 17 Mar 2020 17:30 )  PTT:19.8 SEC            CARDIAC MARKERS ( 17 Mar 2020 19:30 )  x     / x     / 97 u/L / 2.16 ng/mL / x                  Urinalysis Basic - ( 18 Mar 2020 12:37 )    Color: YELLOW / Appearance: CLEAR / S.030 / pH: 7.0  Gluc: NEGATIVE / Ketone: NEGATIVE  / Bili: NEGATIVE / Urobili: NORMAL   Blood: NEGATIVE / Protein: NEGATIVE / Nitrite: NEGATIVE   Leuk Esterase: NEGATIVE / RBC: x / WBC x   Sq Epi: x / Non Sq Epi: x / Bacteria: x      < from: VA Duplex Carotid Arteries, Bilateral. (20 @ 10:39) >  Moderate heterogenous plaque noted within the proximal  right and left internal carotid arteries, consistent with  50-79% in both proximal vessels.    < end of copied text >    [All pertinent recent Imaging reports reviewed]         *****A S S E S S M E N T   A N D   P L A N :      < from: CT Angio Head w/ IV Cont (20 @ 22:02) >    The visualized aorta and great vessels are unremarkable. The common carotid arteries are normal in appearance.   Atherosclerosis of the carotid bifurcations is seen bilaterally.    There is 30-40% stenosis of the proximal right internal carotid artery. The right external carotid is patent.    There is mild to moderate stenosis at the junction between the left common carotid artery and carotid bifurcation with approximately 20% stenosis of the proximal left internal carotid artery. The left external carotid is patent.    The vertebral arteries are patent.    Brain CTA:    The distal internal carotid arteries are patent. Atherosclerosis is seen involving the carotid siphons.  The [place] is normal in appearance.   The visualized cerebral arteries are unremarkable.   The vertebrobasilar junction and basilar artery are normal.    All measurements are performed using standard NASCET criteria.    CT of the head demonstrates the ventricles and sulci to be prominent in size compatible with age-appropriate volume loss. No intracranial mass effect or hemorrhage is seen. Mild patchy areas ofwhite matter hypodensity are seen compatible with microvascular-type changes. No other areas of abnormal attenuation or abnormal enhancement are seen. The small mixed density left sided subdural collection seen previously has resolved.    Mild ethmoid sinus mucosal thickening is seen. The patient is edentulous. Cervical spondylosis is noted.    IMPRESSION:  Mild stenosis of the right internal carotid artery.    Mild to moderate stenosis of the distal left common carotid artery and mild stenosis of the proximal left internal carotid artery.    No other significant stenosis, occlusion or other vascular abnormality identified.          < end of copied text >    Excerpt from H&P,78yo Male with PMHx of CAD s/p CABG x4 in , Gastric cancer s/p partial gastrectomy and radiation in , Carotid stenosis (Mild right 16-49%, Mod left 50-79% in 2018.) DM2 (no longer on DM meds), GERD, HLD and HTN who presented to ED after syncopal episode. Per patient, he has had long-standing dizziness with multiple syncopal episodes for over the past year. His cardiologist believes it to be 2/2 to carotid stenosis; however patient unsure why he was never offered surgical treatment. He was recently admitted to [facility] for the same dizziness and syncope but he is unsure if they found any new findings. He explains last night he had an episode of chest pain, which he gets intermittently for years and takes PRN Nitroglycerin for, the episode resolved with the Nitroglycerin, he felt better and went to bed. Then, this morning he woke up and was feeling dizzy and nauseous. He went to sit at kitchen table, vomited x 1 after trying to eat, while sitting he then synopsized w/LOC and woke up sitting slouched in his chair. He does not know how long he was out for, denies palpitations or chest pain preceding the event, his daughter-in-law was at the kitchen table and did not realize he passed out, no fall, or head trauma. He endorses that at times he feels like his blood pressure is too low and so he self-discontinued his BP meds; however he is still having the syncope. He denies fever, chills, sick contacts, SOB, current chest pain, HA, visual changes, cough, abdominal pain, dysuria, hematuria, melena or hematochezia.    Problem/Recommendations 1:  dizziness of unclear etiology   + diastolic orthostatic bp fluid hydration   b/l carotid arteries with  mild  stenosis consider vascular surgery referral outpt   keep bp > 110 systolic   continue asa/atorvastatin for secondary prophylaxis   pt eval   iron deficient per iron studies likely contributory, microcytic anemia   ck occult blood     complains of reflux continue protonix.   hx of gastric ca and gastritis.   f/u with gi         Problem/Recommendations 2: syncope of unclear etiology   long standing hx of syncope   unclear if there is any post ictal period   no other stigmata of seizures noted., such as incontinence or tongue laceration.           ___________________________  Will follow with you.  Thank you,  Jackie Castorena MD  Diplomate of the American Board of Neurology and Psychiatry.  Diplomate of the American Board of Vascular Neurology.   [facility]   Ph: [phone]    Differential diagnosis and plan of care discussed with patient after the evaluation.   Advanced care planning options discussed.   Pain assessed and judicious use of narcotics when appropriate was discussed.  Importance of Fall prevention discussed.  Counseling on Smoking and Alcohol cessation was offered when appropriate.  Counseling on Diet, exercise, and medication compliance was done.   83 minutes spent on the total encounter;  more than 50 % of the visit was spent on counseling  and or coordinating care by the attending physician.    Thank you for allowing me to participate in the care of this elina patient. Please do not hesitate to call me if you have any questions.     This and subsequent notes were partially created using voice recognition software and will  inherently be subject to errors including those of syntax and sound alike substitutions which may escape proofreading. In such instances original meaning may be extrapolated by contextual derivation.

## 2020-03-18 NOTE — PHYSICAL THERAPY INITIAL EVALUATION ADULT - IMPAIRMENTS FOUND, PT EVAL
Alert and oriented, no focal deficits, no motor or sensory deficits. aerobic capacity/endurance/gait, locomotion, and balance

## 2020-03-19 ENCOUNTER — TRANSCRIPTION ENCOUNTER (OUTPATIENT)
Age: 80
End: 2020-03-19

## 2020-03-19 VITALS
SYSTOLIC BLOOD PRESSURE: 101 MMHG | OXYGEN SATURATION: 100 % | DIASTOLIC BLOOD PRESSURE: 56 MMHG | TEMPERATURE: 98 F | HEART RATE: 67 BPM | RESPIRATION RATE: 16 BRPM

## 2020-03-19 LAB
ANION GAP SERPL CALC-SCNC: 13 MMO/L — SIGNIFICANT CHANGE UP (ref 7–14)
BUN SERPL-MCNC: 15 MG/DL — SIGNIFICANT CHANGE UP (ref 7–23)
CALCIUM SERPL-MCNC: 9.1 MG/DL — SIGNIFICANT CHANGE UP (ref 8.4–10.5)
CHLORIDE SERPL-SCNC: 96 MMOL/L — LOW (ref 98–107)
CO2 SERPL-SCNC: 21 MMOL/L — LOW (ref 22–31)
CREAT SERPL-MCNC: 1.08 MG/DL — SIGNIFICANT CHANGE UP (ref 0.5–1.3)
GLUCOSE SERPL-MCNC: 125 MG/DL — HIGH (ref 70–99)
HCT VFR BLD CALC: 34.8 % — LOW (ref 39–50)
HGB BLD-MCNC: 11.1 G/DL — LOW (ref 13–17)
MAGNESIUM SERPL-MCNC: 2.2 MG/DL — SIGNIFICANT CHANGE UP (ref 1.6–2.6)
MCHC RBC-ENTMCNC: 23.5 PG — LOW (ref 27–34)
MCHC RBC-ENTMCNC: 31.9 % — LOW (ref 32–36)
MCV RBC AUTO: 73.7 FL — LOW (ref 80–100)
NRBC # FLD: 0 K/UL — SIGNIFICANT CHANGE UP (ref 0–0)
PLATELET # BLD AUTO: 248 K/UL — SIGNIFICANT CHANGE UP (ref 150–400)
PMV BLD: 10.7 FL — SIGNIFICANT CHANGE UP (ref 7–13)
POTASSIUM SERPL-MCNC: 4.6 MMOL/L — SIGNIFICANT CHANGE UP (ref 3.5–5.3)
POTASSIUM SERPL-SCNC: 4.6 MMOL/L — SIGNIFICANT CHANGE UP (ref 3.5–5.3)
RBC # BLD: 4.72 M/UL — SIGNIFICANT CHANGE UP (ref 4.2–5.8)
RBC # FLD: 20.1 % — HIGH (ref 10.3–14.5)
SODIUM SERPL-SCNC: 130 MMOL/L — LOW (ref 135–145)
WBC # BLD: 9.05 K/UL — SIGNIFICANT CHANGE UP (ref 3.8–10.5)
WBC # FLD AUTO: 9.05 K/UL — SIGNIFICANT CHANGE UP (ref 3.8–10.5)

## 2020-03-19 RX ORDER — ESOMEPRAZOLE MAGNESIUM 40 MG/1
1 CAPSULE, DELAYED RELEASE ORAL
Qty: 30 | Refills: 0
Start: 2020-03-19

## 2020-03-19 RX ORDER — NITROGLYCERIN 6.5 MG
1 CAPSULE, EXTENDED RELEASE ORAL
Qty: 0 | Refills: 0 | DISCHARGE

## 2020-03-19 RX ORDER — MECLIZINE HCL 12.5 MG
1 TABLET ORAL
Qty: 28 | Refills: 0
Start: 2020-03-19 | End: 2020-04-01

## 2020-03-19 RX ORDER — LISINOPRIL 2.5 MG/1
1 TABLET ORAL
Qty: 0 | Refills: 0 | DISCHARGE

## 2020-03-19 RX ORDER — FERROUS SULFATE 325(65) MG
1 TABLET ORAL
Qty: 30 | Refills: 0
Start: 2020-03-19 | End: 2020-04-17

## 2020-03-19 RX ORDER — RANOLAZINE 500 MG/1
1 TABLET, FILM COATED, EXTENDED RELEASE ORAL
Qty: 0 | Refills: 0 | DISCHARGE

## 2020-03-19 RX ORDER — ROSUVASTATIN CALCIUM 5 MG/1
1 TABLET ORAL
Qty: 0 | Refills: 0 | DISCHARGE

## 2020-03-19 RX ORDER — ROSUVASTATIN CALCIUM 5 MG/1
1 TABLET ORAL
Qty: 30 | Refills: 0
Start: 2020-03-19

## 2020-03-19 RX ORDER — ESOMEPRAZOLE MAGNESIUM 40 MG/1
1 CAPSULE, DELAYED RELEASE ORAL
Qty: 0 | Refills: 0 | DISCHARGE

## 2020-03-19 RX ORDER — ASPIRIN/CALCIUM CARB/MAGNESIUM 324 MG
1 TABLET ORAL
Qty: 30 | Refills: 0
Start: 2020-03-19 | End: 2020-04-17

## 2020-03-19 RX ADMIN — HEPARIN SODIUM 5000 UNIT(S): 5000 INJECTION INTRAVENOUS; SUBCUTANEOUS at 05:32

## 2020-03-19 RX ADMIN — LISINOPRIL 2.5 MILLIGRAM(S): 2.5 TABLET ORAL at 05:32

## 2020-03-19 RX ADMIN — RANOLAZINE 500 MILLIGRAM(S): 500 TABLET, FILM COATED, EXTENDED RELEASE ORAL at 05:33

## 2020-03-19 RX ADMIN — SODIUM CHLORIDE 3 MILLILITER(S): 9 INJECTION INTRAMUSCULAR; INTRAVENOUS; SUBCUTANEOUS at 05:33

## 2020-03-19 RX ADMIN — Medication 325 MILLIGRAM(S): at 13:50

## 2020-03-19 RX ADMIN — PANTOPRAZOLE SODIUM 40 MILLIGRAM(S): 20 TABLET, DELAYED RELEASE ORAL at 05:33

## 2020-03-19 RX ADMIN — Medication 81 MILLIGRAM(S): at 13:50

## 2020-03-19 RX ADMIN — SODIUM CHLORIDE 3 MILLILITER(S): 9 INJECTION INTRAMUSCULAR; INTRAVENOUS; SUBCUTANEOUS at 13:49

## 2020-03-19 NOTE — DISCHARGE NOTE PROVIDER - HOSPITAL COURSE
80yo Male with PMHx of CAD s/p CABG x4 in 2002, Gastric cancer s/p partial gastrectomy and radiation in 2006, Carotid stenosis (Mild right 16-49%, Mod left 50-79% in 2018.) DM2 (no longer on DM meds), GERD, HLD and HTN who presented to ED after syncopal episode. Admitted to telemetry.             Syncope    -Orthostatics borderline (asx).     -CTA H/N: Mild stenosis of the right internal carotid artery. Mild to moderate stenosis of the distal left common carotid artery and mild stenosis of the proximal left internal carotid artery    -TTE with EF 62%, mild diastolic dysfx, Mild mitral regurgitation. Mild LVH.    -UA negative    -Carotid dopplers; Moderate heterogenous plaque noted within the proximal right and left internal carotid arteries, consistent with 50-79% in both proximal vessels.        Case discussed with Dr. Baldwin, Patient is ready for discharge home.        Patient was seen by physical therapy and is clear for discharge home with no needs.

## 2020-03-19 NOTE — PROGRESS NOTE ADULT - ASSESSMENT
syncope  cards/neurof/u noted  carotids noted  orthostatics  oob  pt  dvt proph    spoken w/ vascular and cleared by dr valencia to see as outpt   asa daily

## 2020-03-19 NOTE — DISCHARGE NOTE PROVIDER - CARE PROVIDERS DIRECT ADDRESSES
,DirectAddress_Unknown,DirectAddress_Unknown,[email or web address]

## 2020-03-19 NOTE — DISCHARGE NOTE PROVIDER - NSDCCPCAREPLAN_GEN_ALL_CORE_FT
PRINCIPAL DISCHARGE DIAGNOSIS  Diagnosis: Syncope  Assessment and Plan of Treatment: Syncope possibly due to drop in blood pressure when changing positions. Blood pressure medications discontinued. Please follow up with Dr. Tobar [phone] in 1-2 weeks      SECONDARY DISCHARGE DIAGNOSES  Diagnosis: Carotid artery stenosis  Assessment and Plan of Treatment: Please follow up with Dr. Cortes at [phone] in 1-2 weeks

## 2020-03-19 NOTE — CHART NOTE - NSCHARTNOTEFT_GEN_A_CORE
Spoke with Vascular on call regarding results of patients doppler. Patient is to follow up as out patient with Dr. Cortes [phone].

## 2020-03-19 NOTE — CHART NOTE - NSCHARTNOTEFT_GEN_A_CORE
Spoke with the vascular surgery team regarding bilateral moderate carotid stenosis. vascular recommends out patient follow up with Dr. Cortes at [phone].

## 2020-03-19 NOTE — DISCHARGE NOTE PROVIDER - CARE PROVIDER_API CALL
Yash Tobar (MD)  Cardiovascular Disease; Internal Medicine  [address], [address]  [place] [address]  Phone: [phone]  Fax: [phone]  Follow Up Time:     Your primary care provider,   Phone: (   )    -  Fax: (   )    -  Follow Up Time:     Shailesh Cortes)  Vascular Surgery  [address] [place] [address], [address] [place] [address]  Phone: [phone]  Fax: [phone]  Follow Up Time:

## 2020-03-19 NOTE — DISCHARGE NOTE PROVIDER - NSDCMRMEDTOKEN_GEN_ALL_CORE_FT
aspirin 81 mg oral delayed release tablet: 1 tab(s) orally once a day  esomeprazole 40 mg oral delayed release capsule: 1 cap(s) orally once a day  ferrous sulfate 325 mg (65 mg elemental iron) oral tablet: 1 tab(s) orally once a day   meclizine 12.5 mg oral tablet: 1 tab(s) orally every 12 hours, As needed, Dizziness  rosuvastatin 40 mg oral tablet: 1 tab(s) orally once a day

## 2020-03-19 NOTE — DISCHARGE NOTE PROVIDER - PROVIDER TOKENS
PROVIDER:[TOKEN:[[id number]]],FREE:[LAST:[Your primary care provider],PHONE:[(   )    -],FAX:[(   )    -]],PROVIDER:[TOKEN:[[id number]]]

## 2020-03-19 NOTE — PROGRESS NOTE ADULT - SUBJECTIVE AND OBJECTIVE BOX
CHIEF COMPLAINT:Patient is a 79y old  Male who presents with a chief complaint of Syncope (19 Mar 2020 12:57)    	        PAST MEDICAL & SURGICAL HISTORY:  GERD (gastroesophageal reflux disease)  Gastric cancer: S/P RT and chemo  Carotid stenosis  JACK (iron deficiency anemia)  HLD (hyperlipidemia)  HTN (hypertension)  CAD (coronary artery disease): S/P CABG  Hives: on Prednisone  DM (diabetes mellitus)  S/P Gastrectomy: 2006 due to gastric cancer  S/P CABG X 4: 2002          REVIEW OF SYSTEMS:  CONSTITUTIONAL: No fever, weight loss, or fatigue  EYES: No eye pain, visual disturbances, or discharge  NECK: No pain or stiffness  RESPIRATORY: No cough, wheezing, chills or hemoptysis; No Shortness of Breath  CARDIOVASCULAR: No chest pain, palpitations, passing out, dizziness, or leg swelling  GASTROINTESTINAL: No abdominal or epigastric pain. No nausea, vomiting, or hematemesis; No diarrhea or constipation. No melena or hematochezia.  GENITOURINARY: No dysuria, frequency, hematuria, or incontinence  NEUROLOGICAL: No headaches, memory loss, loss of strength, numbness, or tremors  SKIN: No itching, burning, rashes, or lesions   LYMPH Nodes: No enlarged glands  ENDOCRINE: No heat or cold intolerance; No hair loss  MUSCULOSKELETAL: No joint pain or swelling; No muscle, back, or extremity pain    Medications:  MEDICATIONS  (STANDING):  aspirin enteric coated 81 milliGRAM(s) Oral daily  atorvastatin 80 milliGRAM(s) Oral at bedtime  ferrous    sulfate 325 milliGRAM(s) Oral daily  heparin  Injectable 5000 Unit(s) SubCutaneous two times a day  pantoprazole    Tablet 40 milliGRAM(s) Oral before breakfast  sodium chloride 0.9% lock flush 3 milliLiter(s) IV Push every 8 hours    MEDICATIONS  (PRN):  meclizine 12.5 milliGRAM(s) Oral every 12 hours PRN Dizziness    	    PHYSICAL EXAM:  T(C): 36.4 (03-19-20 @ 13:30), Max: 37 (03-18-20 @ 21:00)  HR: 67 (03-19-20 @ 13:30) (64 - 70)  BP: 101/56 (03-19-20 @ 13:30) (101/56 - 125/66)  RR: 16 (03-19-20 @ 13:30) (15 - 16)  SpO2: 100% (03-19-20 @ 13:30) (99% - 100%)  Wt(kg): --  I&O's Summary      Appearance: Normal	  HEENT:   Normal oral mucosa, PERRL, EOMI	  Lymphatic: No lymphadenopathy  Cardiovascular: Normal S1 S2, No JVD, No murmurs, No edema  Respiratory: Lungs clear to auscultation	  Psychiatry: A & O x 3, Mood & affect appropriate  Gastrointestinal:  Soft, Non-tender, + BS	  Skin: No rashes, No ecchymoses, No cyanosis	  Neurologic: Non-focal  Extremities: Normal range of motion, No clubbing, cyanosis or edema  Vascular: Peripheral pulses palpable 2+ bilaterally    TELEMETRY: 	    ECG:  	  RADIOLOGY:  OTHER: 	  	  LABS:	 	    CARDIAC MARKERS:  CARDIAC MARKERS ( 17 Mar 2020 19:30 )  x     / x     / 97 u/L / 2.16 ng/mL / x                                    11.1   9.05  )-----------( 248      ( 19 Mar 2020 10:31 )             34.8     03-19    130<L>  |  96<L>  |  15  ----------------------------<  125<H>  4.6   |  21<L>  |  1.08    Ca    9.1      19 Mar 2020 10:31  Phos  4.1     03-18  Mg     2.2     03-19    TPro  7.5  /  Alb  4.1  /  TBili  0.3  /  DBili  x   /  AST  43<H>  /  ALT  19  /  AlkPhos  64  03-17    proBNP:   Lipid Profile:   HgA1c:   TSH:

## 2020-03-19 NOTE — DISCHARGE NOTE NURSING/CASE MANAGEMENT/SOCIAL WORK - PATIENT PORTAL LINK FT
You can access the FollowMyHealth Patient Portal offered by [facility] by registering at the following website: [email or web address]. By joining [employer]’s FollowMyHealth portal, you will also be able to view your health information using other applications (apps) compatible with our system.

## 2020-03-19 NOTE — CONSULT NOTE ADULT - ASSESSMENT
Syncope  due to O.H. in setting of carotid stenosis  DC lisinopril  DC ranexa     HTN  plan as above  keep BP elevated    CAD history of cabg  on asa, statin     Carotid stenosis  Neuro / vascular eval      Advanced care planning was discussed with patient and family.  Risks, benefits and alternatives of the cardiac treatments and medical therapy including procedures were discussed in detail and all questions were answered. Importance of compliance with medical therapy and lifestyle modification to improve cardiovascular health were addressed. Appropriate forms and patient educational materials were reviewed. 30 minutes face to face spent.

## 2020-03-19 NOTE — CONSULT NOTE ADULT - SUBJECTIVE AND OBJECTIVE BOX
CHIEF COMPLAINT:Patient is a 79y old  Male who presents with a chief complaint of Syncope (18 Mar 2020 11:43)      HISTORY OF PRESENT ILLNESS:    79 male with history as below CAD s/p CABG 2002, HFpEF, gastric ca s/p partial gastrectomy and chemoradiation 2006, HTN, HLD, T2DM, JACK, GERD, chronic subdural hematoma, recent admission in March 2019 for gastric outlet obstruction s/p dilation presents with 2 day history of fevers and productive cough, found to be in septic shock secondary to pneumonia. Also complicated by stable new acute component to chronic SDH.    PAST MEDICAL & SURGICAL HISTORY:  GERD (gastroesophageal reflux disease)  Gastric cancer: S/P RT and chemo  Carotid stenosis  JACK (iron deficiency anemia)  HLD (hyperlipidemia)  HTN (hypertension)  CAD (coronary artery disease): S/P CABG  Hives: on Prednisone  DM (diabetes mellitus)  S/P Gastrectomy: 2006 due to gastric cancer  S/P CABG X 4: 2002          MEDICATIONS:  aspirin enteric coated 81 milliGRAM(s) Oral daily  heparin  Injectable 5000 Unit(s) SubCutaneous two times a day  lisinopril 2.5 milliGRAM(s) Oral daily  ranolazine 500 milliGRAM(s) Oral two times a day        meclizine 12.5 milliGRAM(s) Oral every 12 hours PRN    pantoprazole    Tablet 40 milliGRAM(s) Oral before breakfast    atorvastatin 80 milliGRAM(s) Oral at bedtime    ferrous    sulfate 325 milliGRAM(s) Oral daily  sodium chloride 0.9% lock flush 3 milliLiter(s) IV Push every 8 hours      FAMILY HISTORY:  Family history of early CAD (Sibling): brother  Family history of diabetes mellitus: mother      Non-contributory    SOCIAL HISTORY:    No tobacco, drugs or etoh    Allergies    No Known Allergies    Intolerances    	    REVIEW OF SYSTEMS:  as above  The rest of the 14 points ROS reviewed and except above they are unremarkable.        PHYSICAL EXAM:  T(C): 36.8 (03-19-20 @ 09:30), Max: 37 (03-18-20 @ 21:00)  HR: 69 (03-19-20 @ 09:30) (64 - 70)  BP: 103/59 (03-19-20 @ 09:30) (103/59 - 125/66)  RR: 16 (03-19-20 @ 09:30) (15 - 16)  SpO2: 100% (03-19-20 @ 09:30) (99% - 100%)  Wt(kg): --  I&O's Summary    JVP: Normal  Neck: supple  Lung: clear   CV: S1 S2 , Murmur:  Abd: soft  Ext: No edema  neuro: Awake / alert  Psych: flat affect  Skin: normal    LABS/DATA:    TELEMETRY: 	    ECG:  	   	  CARDIAC MARKERS:      CKMB: 2.16 ng/mL (03-17 @ 19:30)    CKMB Relative Index: Test not performed (03-17 @ 19:30)                                10.4   6.60  )-----------( 231      ( 18 Mar 2020 11:57 )             33.0     03-18    131<L>  |  97<L>  |  14  ----------------------------<  139<H>  5.2   |  18<L>  |  0.89    Ca    8.4      18 Mar 2020 11:45  Phos  4.1     03-18  Mg     2.2     03-18    TPro  7.5  /  Alb  4.1  /  TBili  0.3  /  DBili  x   /  AST  43<H>  /  ALT  19  /  AlkPhos  64  03-17    proBNP:   Lipid Profile:   HgA1c: Hemoglobin A1C, Whole Blood: 6.2 % (03-18 @ 11:57)    TSH: Thyroid Stimulating Hormone, Serum: 1.12 uIU/mL (03-18 @ 11:45)    < from: Transthoracic Echocardiogram (03.18.20 @ 08:01) >  CONCLUSIONS:  1. Mitral annular calcification, otherwise normal mitral  valve. Mild mitral regurgitation.  2. Mildly dilated left atrium.  LA volume index = 40 cc/m2.  3. Mild concentric left ventricular hypertrophy.  4. Normal left ventricular systolic function. No segmental  wall motion abnormalities.  Paradoxical septal wall motion,  consistent with an intraventricular conduction delay.  5. Mild diastolic dysfunction (Stage I).  6. Normal right ventricular size and function.  7. Estimated right ventricular systolic pressure equals 36  mm Hg, assuming right atrial pressure equals 10 mm Hg,  consistent with borderline pulmonary hypertension.  *** Compared with echocardiogram of 2/21/2019, no  significant changes noted.  ------------------------------------------------------------------------  Confirmed on  3/18/2020 - 10:22:40 by Harry Zavaleta M.D.  ------------------------------------------------------------------------    < end of copied text >      Summary/Impressions:  Moderate heterogenous plaque noted within the proximal  right and left internal carotid arteries, consistent with  50-79% in both proximal vessels. CHIEF COMPLAINT:Patient is a 79y old  Male who presents with a chief complaint of Syncope (18 Mar 2020 11:43)      HISTORY OF PRESENT ILLNESS:    79 male with history as below CAD s/p CABG 2002, HFpEF, gastric ca s/p partial gastrectomy and chemoradiation 2006, HTN, HLD, T2DM, JACK, GERD, chronic subdural hematoma, recent admission in March 2019 for gastric outlet obstruction s/p dilation admitted with syncope  pt has history of multiple syncope  gets dizzy with standing     PAST MEDICAL & SURGICAL HISTORY:  GERD (gastroesophageal reflux disease)  Gastric cancer: S/P RT and chemo  Carotid stenosis  JACK (iron deficiency anemia)  HLD (hyperlipidemia)  HTN (hypertension)  CAD (coronary artery disease): S/P CABG  Hives: on Prednisone  DM (diabetes mellitus)  S/P Gastrectomy: 2006 due to gastric cancer  S/P CABG X 4: 2002          MEDICATIONS:  aspirin enteric coated 81 milliGRAM(s) Oral daily  heparin  Injectable 5000 Unit(s) SubCutaneous two times a day  lisinopril 2.5 milliGRAM(s) Oral daily  ranolazine 500 milliGRAM(s) Oral two times a day        meclizine 12.5 milliGRAM(s) Oral every 12 hours PRN    pantoprazole    Tablet 40 milliGRAM(s) Oral before breakfast    atorvastatin 80 milliGRAM(s) Oral at bedtime    ferrous    sulfate 325 milliGRAM(s) Oral daily  sodium chloride 0.9% lock flush 3 milliLiter(s) IV Push every 8 hours      FAMILY HISTORY:  Family history of early CAD (Sibling): brother  Family history of diabetes mellitus: mother      Non-contributory    SOCIAL HISTORY:    No tobacco, drugs or etoh    Allergies    No Known Allergies    Intolerances    	    REVIEW OF SYSTEMS:  as above  The rest of the 14 points ROS reviewed and except above they are unremarkable.        PHYSICAL EXAM:  T(C): 36.8 (03-19-20 @ 09:30), Max: 37 (03-18-20 @ 21:00)  HR: 69 (03-19-20 @ 09:30) (64 - 70)  BP: 103/59 (03-19-20 @ 09:30) (103/59 - 125/66)  RR: 16 (03-19-20 @ 09:30) (15 - 16)  SpO2: 100% (03-19-20 @ 09:30) (99% - 100%)  Wt(kg): --  I&O's Summary    JVP: Normal  Neck: supple  Lung: clear   CV: S1 S2 , Murmur:  Abd: soft  Ext: No edema  neuro: Awake / alert  Psych: flat affect  Skin: normal    LABS/DATA:    TELEMETRY: 	    ECG:  	   	  CARDIAC MARKERS:      CKMB: 2.16 ng/mL (03-17 @ 19:30)    CKMB Relative Index: Test not performed (03-17 @ 19:30)                                10.4   6.60  )-----------( 231      ( 18 Mar 2020 11:57 )             33.0     03-18    131<L>  |  97<L>  |  14  ----------------------------<  139<H>  5.2   |  18<L>  |  0.89    Ca    8.4      18 Mar 2020 11:45  Phos  4.1     03-18  Mg     2.2     03-18    TPro  7.5  /  Alb  4.1  /  TBili  0.3  /  DBili  x   /  AST  43<H>  /  ALT  19  /  AlkPhos  64  03-17    proBNP:   Lipid Profile:   HgA1c: Hemoglobin A1C, Whole Blood: 6.2 % (03-18 @ 11:57)    TSH: Thyroid Stimulating Hormone, Serum: 1.12 uIU/mL (03-18 @ 11:45)    < from: Transthoracic Echocardiogram (03.18.20 @ 08:01) >  CONCLUSIONS:  1. Mitral annular calcification, otherwise normal mitral  valve. Mild mitral regurgitation.  2. Mildly dilated left atrium.  LA volume index = 40 cc/m2.  3. Mild concentric left ventricular hypertrophy.  4. Normal left ventricular systolic function. No segmental  wall motion abnormalities.  Paradoxical septal wall motion,  consistent with an intraventricular conduction delay.  5. Mild diastolic dysfunction (Stage I).  6. Normal right ventricular size and function.  7. Estimated right ventricular systolic pressure equals 36  mm Hg, assuming right atrial pressure equals 10 mm Hg,  consistent with borderline pulmonary hypertension.  *** Compared with echocardiogram of 2/21/2019, no  significant changes noted.  ------------------------------------------------------------------------  Confirmed on  3/18/2020 - 10:22:40 by Harry Zavaleta M.D.  ------------------------------------------------------------------------    < end of copied text >      Summary/Impressions:  Moderate heterogenous plaque noted within the proximal  right and left internal carotid arteries, consistent with  50-79% in both proximal vessels.

## 2020-06-24 ENCOUNTER — RX RENEWAL (OUTPATIENT)
Age: 80
End: 2020-06-24

## 2020-10-19 NOTE — DISCHARGE NOTE PROVIDER - NSDCHHNEEDSERVICEOTHER_GEN_ALL_CORE_FT
previous home care Inflammation Suggestive Of Cancer Camouflage Histology Text: There was a dense lymphocytic infiltrate which prevented adequate histologic evaluation of adjacent structures.

## 2021-01-11 NOTE — PROGRESS NOTE ADULT - ASSESSMENT
normal appearance , no deformities 77 yo m h/o cad/cabg 2002, diastolic chf,  gastric ca s/p surgery 2006, chemoradiation. HTN, HLD, dm, JACK, GERD, carotid stenosis. chronic diffuse pruritic rash, recently admitted here ( feb 19-26th ) for rash, headache, nausea, vomiting and wt loss, had MRI which revealed SAH, MRCP revealed Dilated main pancreatic duct down to the neck. Etiology for n/v inc. GERD  vs. pancreatic duct stone. No gastric outlet obstruction on Barium swallow test, awaiting EGD

## 2021-04-28 NOTE — ED PROVIDER NOTE - CADM POA CENTRAL LINE
Alert and oriented. VSS on RA. 2-3 person squat pivot transfer to chair and to BR. C/O anterior headache pain x 1 today at 10/10, resolved with oxycodone 10 mg. Appetite good, consuming % of meals and supplements. Able to feed self after set-up. Helmet remains in place when up and tolerating well. Participated in all therapies today. Continent of B&B. No new issues today. No

## 2021-07-25 NOTE — PROVIDER CONTACT NOTE (OTHER) - ASSESSMENT
Patients /52 hr 65  pt asymptomatic
B/P 91/45
Pt resting comfortably
bp 108/55 hr 73, pt asymptomatic
pt complaining of itching on b/l hips, patient requesting medication for itching.
Pt resting comfortably
Discharged
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
## 2022-05-09 NOTE — PROGRESS NOTE ADULT - ASSESSMENT
[[email or web address]] : cc: microscopic blood in urine \par \par 53 yo m presents to discuss blood in urine. \par Here for repeat urine sample. \par Interested in discussion of previous findings and reviewing urology consult. \par Denies smoking hx. Mentioned increased urination, but admits interrupted stream with hesitancy, also with nocturia..\par No burning or foul smell.   Impression:  # Nausea/vomiting: Pt s/p EGD with stenosis of afferent limb s/p balloon dilation. s/p gastric and esophageal biopsy.  # Gastric cancer s/p surgery / chemoradiation in 2006.  # Esophagitis: Suspect reflux vs bile acid induced  # Pancreatic duct stone (6mm)  # Recent intracranial hemorrhage without sequelae: Unclear etiology:  noted on CT Head 2/2019  # Anemia with past history of iron deficiency anemia, this is common s/p gastrectomy as well as B-12 deficiency    Recommendations  - Diet as tolerated  - PPI qd + Carafate  - Supportive care per primary team    Nadia Pino MD  Gastroenterology Fellow  [phone]  Please page on call fellow on weekends and after 5pm on weekdays

## 2022-10-14 NOTE — H&P ADULT - HISTORY OF PRESENT ILLNESS
Neurology Consultation    ADMISSION DATE:  10/13/2022  CONSULTING PHYSICIAN:  Dariel Dixon MD  REQUESTING PHYSICIAN: No ref. provider found  ATTENDING PHYSICIAN:  Anna Marie Miner MD  CODE STATUS:  Full Resuscitation    Inpatient consult to Neurology  Consult performed by: Dariel Dixon MD  Consult ordered by: Anna Marie Miner MD         I was asked to see the patient by Dr.Zakaria QUIROS.  History Of Present Illness  Karina is a 58 year old female who reports that yesterday she had a follow-up to her doctor's appointment, after having her blood pressure medications changed recently. Reports EMS was called after BP checked, the ED physician's noted reported BP of 250/134 .  Treated with IV hydralazine.    Reported that on Wednesday night she was speaking to her daughter on the phone and her daughter stated that she had slurred speech, and stated she has had slurred speech in the past. Episodes of slurred speech occuring on and off for approximately 1 year, lasting 10- 15 minutes .  This all occurred after having dental work approximately 1 year ago.  In addition to the slurred speech has chronic numbness to the upper and lower lip and left side of her face.  Sometimes this occurs when her mouth twists to the left into the right, lasting a few minutes. Sometimes she has slurred speech, sometimes she cannot get the words out.     In the emergency room she had an episode where her mouth began to twist she could not speak for about 15 minutes.  Night having weakness to her left upper extremity, and weakness in any of her extremities, other stroke symptoms.  The emergency room as a stroke code and evaluated by the telestroke neurologist, scored her a 3 on the NIH stroke scale due to facial weakness, mild to moderate dysarthria, and sensory loss.  Was not a candidate for treatment with thrombolytic as she was outside of the treatment window, and the telestroke neurologist felt that her symptoms were suspected of a  hyper tensive emergency.  She was given ativan in ED due to concern for possible seizure.     CT head was negative for acute changes.  CTA showed no vessel occlusion.  50% narrowing of the right ICA, no narrowing in left ICA.  MRI  of the brain was negative for acute stroke.  MRI of the cervical spine showed multilevel degenerative changes the level of C5/C6, severe left neural neural foraminal narrowing at C5/C6.    No history of stroke, report hx. Of TIA, chronic neck and back pain.   Reports depression secondary to recent expected death in her family, and living alone.     Reports chronic neck pain chronic back pain, chronic numbness to the left side of her upper and lower lip and left side of face, reports some mild headache.  Reports on and off chest tightness, and shortness of breath/  Denies fevers, chills, tinnitus, vertigo, changes in her vision, is in her hearing, occultly swallowing, chest pain, palpitations, nausea, vomiting, diarrhea, constipation,'s in extremities, weakness in extremity, rashes.              Past Medical History  Past Medical History:   Diagnosis Date   • Asthma    • Depression    • Essential (primary) hypertension    • Gastroesophageal reflux disease    • Hepatitis C    • High cholesterol    • Hyperlipoproteinemia         Surgical History  Past Surgical History:   Procedure Laterality Date   • Appendectomy     • Hysterectomy          Social History  Social History     Tobacco Use   • Smoking status: Current Every Day Smoker     Packs/day: 0.25   • Smokeless tobacco: Never Used   Substance Use Topics   • Alcohol use: Yes     Comment: HAS NOT HAD A DRINK 7 DAYS   • Drug use: Not Currently       Family History  History reviewed. No pertinent family history.     Allergies  ALLERGIES:  Lisinopril    Medications  Medications Prior to Admission   Medication Sig Dispense Refill   • losartan (COZAAR) 25 MG tablet Take 25 mg by mouth daily.     • cloNIDine (CATAPRES) 0.1 MG tablet Take 0.1 mg  by mouth in the morning and 0.1 mg in the evening.     • cyclobenzaprine (FLEXERIL) 10 MG tablet Take 10 mg by mouth 3 times daily as needed.     • zolpidem (AMBIEN) 5 MG tablet Take 5 mg by mouth at bedtime.     • topiramate (TOPAMAX) 25 MG tablet Take 25 mg by mouth daily.     • HYDROcodone-acetaminophen (NORCO)  MG per tablet Take 1 tablet by mouth 2 times daily as needed.     • hydrALAZINE (APRESOLINE) 50 MG tablet Take 50 mg by mouth in the morning and 50 mg at noon and 50 mg in the evening.     • NIFEdipine CC (ADALAT CC) 90 MG 24 hr tablet Take 90 mg by mouth daily.     • ondansetron (ZOFRAN) 4 MG tablet Take 4 mg by mouth every 6 hours as needed for Nausea.     • albuterol 108 (90 Base) MCG/ACT inhaler Inhale 2 puffs into the lungs every 4 hours as needed for Shortness of Breath or Wheezing.     • ibuprofen (MOTRIN) 800 MG tablet Take 800 mg by mouth every 8 hours as needed for Pain.     • triamterene-hydroCHLOROthiazide (MAXZIDE-25) 37.5-25 MG per tablet Take 1 tablet by mouth daily.     • omeprazole (PriLOSEC) 40 MG capsule Take 40 mg by mouth daily.     • gabapentin (NEURONTIN) 300 MG capsule Take 300 mg by mouth in the morning and 300 mg in the evening.     • albuterol (VENTOLIN) (2.5 MG/3ML) 0.083% nebulizer solution Take 2.5 mg by nebulization every 6 hours as needed for Wheezing.     • atorvastatin (LIPITOR) 40 MG tablet Take 40 mg by mouth.     • aspirin 81 MG chewable tablet Chew 81 mg by mouth daily.         Review of Systems  Review of Systems    See HPI otherwise 10 point ROS was negative.  Physical Exam  Physical Exam  Neurologic Exam     General the patient was awake alert no acute distress.  Cardiovascular S1 S2 normal.  Mental status the patient was awake alert oriented x3,  normal speech, normal concentration and memory,  was answering and following commands properly.  Cranial nerves II to XII were all performed including funduscopic exam and they were normal.  Motor extremities left  proximal 5/5 distal 5/5 , right proximal 5/5 distal 5/5   Lower extremities left proximal 5/5 distal 5/5 right proximal 5/5 distal 5/5  Normal muscle tone.  Sensory was intact to touch all over.  Coordination finger-to-nose test was normal.  Reflexes were 1+ biceps, 1+ knee jerks symmetric.  Gait was deferred     Last Recorded Vitals  Blood pressure (!) 165/81, pulse 65, temperature 98.4 °F (36.9 °C), temperature source Oral, resp. rate 20, height 5' 7\" (1.702 m), weight 89.3 kg (196 lb 13.9 oz), SpO2 99 %.  Vitals with min/max:  Vital Last Value 24 Hour Range   Temperature 98.4 °F (36.9 °C) (10/14/22 0806) Temp  Min: 98.1 °F (36.7 °C)  Max: 99.1 °F (37.3 °C)   Pulse 65 (10/14/22 0806) Pulse  Min: 65  Max: 89   Respiratory 20 (10/14/22 0806) Resp  Min: 12  Max: 28   Non-Invasive  Blood Pressure (!) 165/81 (10/14/22 0806) BP  Min: 142/73  Max: 205/113   Pulse Oximetry 99 % (10/14/22 0806) SpO2  Min: 98 %  Max: 100 %   Arterial   Blood Pressure   No data recorded      Labs  Recent Results (from the past 24 hour(s))   Lipid Panel With Reflex    Collection Time: 10/14/22  5:08 AM   Result Value Ref Range    Fasting Status      Cholesterol 185 <=199 mg/dL    Triglycerides 118 <=149 mg/dL    HDL 56 >=50 mg/dL     <=129 mg/dL    Non-HDL Cholesterol 129 mg/dL    Cholesterol/ HDL Ratio 3.3 <=4.4   Basic Metabolic Panel    Collection Time: 10/14/22  5:08 AM   Result Value Ref Range    Fasting Status      Sodium 139 135 - 145 mmol/L    Potassium 3.4 3.4 - 5.1 mmol/L    Chloride 106 97 - 110 mmol/L    Carbon Dioxide 26 21 - 32 mmol/L    Anion Gap 10 7 - 19 mmol/L    Glucose 100 (H) 70 - 99 mg/dL    BUN 11 6 - 20 mg/dL    Creatinine 0.58 0.51 - 0.95 mg/dL    Glomerular Filtration Rate >90 >=60    BUN/ Creatinine Ratio 19 7 - 25    Calcium 9.2 8.4 - 10.2 mg/dL   Magnesium    Collection Time: 10/14/22  5:08 AM   Result Value Ref Range    Magnesium 2.2 1.7 - 2.4 mg/dL   Phosphorus    Collection Time: 10/14/22  5:08 AM    Result Value Ref Range    Phosphorus 3.9 2.4 - 4.7 mg/dL   CBC with Automated Differential (performable only)    Collection Time: 10/14/22  5:08 AM   Result Value Ref Range    WBC 4.1 (L) 4.2 - 11.0 K/mcL    RBC 5.10 4.00 - 5.20 mil/mcL    HGB 15.9 (H) 12.0 - 15.5 g/dL    HCT 48.2 (H) 36.0 - 46.5 %    MCV 94.5 78.0 - 100.0 fl    MCH 31.2 26.0 - 34.0 pg    MCHC 33.0 32.0 - 36.5 g/dL    RDW-CV 13.6 11.0 - 15.0 %    RDW-SD 47.5 39.0 - 50.0 fL     140 - 450 K/mcL    NRBC 0 <=0 /100 WBC    Neutrophil, Percent 43 %    Lymphocytes, Percent 42 %    Mono, Percent 11 %    Eosinophils, Percent 3 %    Basophils, Percent 1 %    Immature Granulocytes 0 %    Absolute Neutrophils 1.8 1.8 - 7.7 K/mcL    Absolute Lymphocytes 1.7 1.0 - 4.0 K/mcL    Absolute Monocytes 0.4 0.3 - 0.9 K/mcL    Absolute Eosinophils  0.1 0.0 - 0.5 K/mcL    Absolute Basophils 0.0 0.0 - 0.3 K/mcL    Absolute Immmature Granulocytes 0.0 0.0 - 0.2 K/mcL   TROPONIN I, HIGH SENSITIVITY    Collection Time: 10/14/22  7:18 AM   Result Value Ref Range    Troponin I, High Sensitivity 11 <52 ng/L   D Dimer, Quantitative    Collection Time: 10/14/22  7:18 AM   Result Value Ref Range    D Dimer, Quantitative 0.29 <0.57 mg/L (FEU)       Imaging  LAST ECHO/ECHO STRESS:  No valid procedures specified.    LAST MRI:  === 10/13/22 ===    MRI CERVICAL SPINE WO CONTRAST    - Narrative -  EXAMINATION: Magnetic resonance imaging (MRI) of the cervical spine without  contrast    HISTORY: 58 years Female Osteoarthritis, cervical    TECHNIQUE: MRI of the cervical spine was performed without contrast  according to standard protocol.    COMPARISON: None    FINDINGS:    ALIGNMENT: Grade 1 retrolisthesis of C5 on C6.  MARROW: Vertebral body heights are intact.  Modic type I degenerative  changes along the C5-C6 endplates.  No suspicious marrow lesions are  identified.  CORD SIGNAL: The spinal cord appears normal.  DISCS:  Mild disc height loss at C5-C6.  OTHER: No soft tissue  76 y/o male with a PMHx of CAD S/P CABG, carotid stenosis, HTN, HLD, DM, GERD, anemia and gastric cancer S/P RT, chemo and gastrectomy presents to ED with multiple episodes of syncope associated with hypotension. Pt reports that he monitors his blood pressure at home. For the past week or so, pt has noticed that his blood pressure has been fluctuating from very low to very high. Pt attributes his episodes of hypotension to taking baby Aspirin so he no longer takes it. During the times when his blood pressure has been low, he reports dizziness and palpitations with several episodes of syncope. Pt admits to transient and short periods of loss of consciousness but he does not admit to any head or bodily trauma. Pt does not know how long he passes out for but he doesn't think it is for a long period of time. Pt admits that this has happened to him multiple times in the past and he has been known to have carotid stenosis. Pt denies fever, chills, recent travel, headache, visual deficits, chest pain, shortness of breath, orthopnea, abdominal pain, N/V/D/C, hematochezia, melena, dysuria, hematuria, peripheral edema. Upon arrival to ED, EKG: NSR with 1st degree AV block, LAE, LBBB. CE x2: Trop 22-->19. H/H: 11.5/37.3. Glucose: 150. UA: Negative. CXR: Clear lungs. abnormality is identified. Normal flow voids are  identified in the vertebral arteries.    C2-3: No significant spinal canal or neural foraminal narrowing.  Facets  are unremarkable.    C3-4: Disc osteophyte complex and uncovertebral hypertrophy.  No  significant spinal canal narrowing.  Moderate to severe left and mild to  moderate right neural foraminal narrowing.  Mild left facet arthropathy.    C4-5: Disc osteophyte complex and uncovertebral hypertrophy.  No  significant spinal canal narrowing.  Mild bilateral neural foraminal  narrowing.  Mild facet arthropathy.    C5-6: Disc osteophyte complex and uncovertebral hypertrophy.  Mild  ligamentum flavum hypertrophy.  Mild spinal canal narrowing.  Severe left  and moderate right neural foraminal narrowing.  Mild facet arthropathy.    C6-7: No significant spinal canal or neural foraminal narrowing.  Facets  are unremarkable.    C7-T1: No significant spinal canal or neural foraminal narrowing.  Facets  are unremarkable.    - Impression -  1.   Multilevel degenerative changes with mild spinal canal narrowing at  C5-C6.    2.   Severe left neural foraminal narrowing at C5-C6.    Electronically Signed by: ANIKET SILVERMAN MD  Signed on: 10/14/2022 12:48 PM    ___________________________________________________________________________    MRI BRAIN WO CONTRAST    - Narrative -  EXAMINATION: Magnetic resonance imaging (MRI) of the brain without contrast    HISTORY: 58 years Female Transient ischemic attack (TIA)    TECHNIQUE: MRI of the brain was performed without contrast according to  standard protocol.    COMPARISON: None    FINDINGS:    No evidence of acute or chronic hemorrhage is identified. No evidence of  acute cerebral infarction is seen. There is mild generalized cerebral  volume loss, without a definite lobar predilection, with compensatory  enlargement of the ventricles and sulci. No mass effect or midline shift is  seen. Mild periventricular white matter FLAIR  hyperintensities are  nonspecific, but can be seen in the setting of chronic small vessel  ischemic disease. The corpus callosum and sella appear normal. The  posterior fossa, brainstem, and craniocervical junction appear normal.  Cystic 1.1 x 1.0 cm lesion within the pineal gland.    Other than mild paranasal sinus disease, the visualized portions of the  orbits, paranasal sinuses, and mastoids appear normal. Normal flow voids  are demonstrated in the carotid arteries and basilar artery. The calvarium  and visualized cervical spine appear normal.    - Impression -  1.   No acute intracranial abnormality.    2.   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  Consider follow-up MR brain with and without contrast in 12 months  to ensure stability as a cystic pineocytoma could have a similar  appearance.    Electronically Signed by: ANIKET SILVERMAN MD  Signed on: 10/14/2022 12:42 PM    LAST CT:  === 10/13/22 ===    CT CEREBRAL PERFUSION W CONTRAST LEVEL 1    - Narrative -  EXAM: CT CEREBRAL PERFUSION W CONTRAST LEVEL 1, CTA HEAD AND NECK W  CONTRAST LEVEL 1    HISTORY: Neuro deficit, acute, stroke suspected  neuro deficit acute, stroke suspected    TECHNIQUE: CT angiography of the head and neck and CT cerebral perfusion  was obtained after the uneventful administration of 115 mL Omnipaque 350  intravenous contrast. Three dimensional postprocessing was performed by the  technologist and sent to the workstation for review. In addition, the  perfusion data was sent to the RApid process of PerfusIon and Diffusion  (RAPID) for automated perfusion postprocessing.    FINDINGS: CT head exam obtained earlier in the day    Non-angiographic findings:    No acute intra- or extra-axial fluid collections are identified. The  ventricles are of normal size, shape, and morphology. The basilar cisterns  are patent. No mass effect or midline shift is seen. The gray-white matter  differentiation is normal.  Periventricular white matter hypoattenuation is  favored to represent sequelae of chronic small vessel ischemic disease.  There is vascular calcification of the carotid siphons. Other than a  chronic left lamina papyracea defect, the visualized portions of the  orbits, paranasal sinuses, and mastoids appear normal. No acute fracture is  identified.    No soft tissue abnormalities are identified in the neck.    Angiographic findings:    The visualized aortic arch appears normal. The configuration of the  brachiocephalic vessels is typical. There is atherosclerotic calcification  of the innominate and subclavian arteries. The common carotid arteries  appear normal. There is atherosclerotic disease at the carotid bifurcations  extending into the origin of the internal carotid arteries without  significant focal stenosis. There is approximately 50% narrowing of the  right internal carotid artery near its origin due to atherosclerotic  disease.  Left ICA is patent throughout. The cervical vertebral arteries  appear normal.    There is atherosclerotic disease involving the distal internal carotid  arteries without significant focal stenosis. The anterior and middle  cerebral arteries appear normal. The distal vertebral arteries appear  normal. The basilar artery and posterior cerebral arteries appear normal.  No aneurysms, vascular occlusions, or intracranial stenoses are identified.        Perfusion findings:    No perfusion abnormality is identified. Based on the RAPID software  calculation, the infarct core (defined as a cerebral blood flow of less  than 30 percent) has a volume of 0 mL, the total volume of tissue at risk  (defined as a Tmax of greater than 6 seconds) has a volume of 0 mL, and the  mismatch volume (penumbra), has a volume of 0 mL.    - Impression -  No large arterial occlusions identified in the head or neck.  50% narrowing  of the right internal carotid artery near its origin due to  atherosclerotic  disease.  No other significant stenoses identified.    No perfusion abnormality identified.      KEY FINDINGS WERE COMMUNICATED BY DR. CLARKE TO DR. KAILASH CISNEROS AT  10/13/2022 4:57 PM.    Electronically Signed by: KAILASH CLARKE M.D.  Signed on: 10/13/2022 4:58 PM    ___________________________________________________________________________    CTA HEAD AND NECK W CONTRAST LEVEL 1    - Narrative -  EXAM: CT CEREBRAL PERFUSION W CONTRAST LEVEL 1, CTA HEAD AND NECK W  CONTRAST LEVEL 1    HISTORY: Neuro deficit, acute, stroke suspected  neuro deficit acute, stroke suspected    TECHNIQUE: CT angiography of the head and neck and CT cerebral perfusion  was obtained after the uneventful administration of 115 mL Omnipaque 350  intravenous contrast. Three dimensional postprocessing was performed by the  technologist and sent to the workstation for review. In addition, the  perfusion data was sent to the RApid process of PerfusIon and Diffusion  (RAPID) for automated perfusion postprocessing.    FINDINGS: CT head exam obtained earlier in the day    Non-angiographic findings:    No acute intra- or extra-axial fluid collections are identified. The  ventricles are of normal size, shape, and morphology. The basilar cisterns  are patent. No mass effect or midline shift is seen. The gray-white matter  differentiation is normal. Periventricular white matter hypoattenuation is  favored to represent sequelae of chronic small vessel ischemic disease.  There is vascular calcification of the carotid siphons. Other than a  chronic left lamina papyracea defect, the visualized portions of the  orbits, paranasal sinuses, and mastoids appear normal. No acute fracture is  identified.    No soft tissue abnormalities are identified in the neck.    Angiographic findings:    The visualized aortic arch appears normal. The configuration of the  brachiocephalic vessels is typical. There is atherosclerotic  calcification  of the innominate and subclavian arteries. The common carotid arteries  appear normal. There is atherosclerotic disease at the carotid bifurcations  extending into the origin of the internal carotid arteries without  significant focal stenosis. There is approximately 50% narrowing of the  right internal carotid artery near its origin due to atherosclerotic  disease.  Left ICA is patent throughout. The cervical vertebral arteries  appear normal.    There is atherosclerotic disease involving the distal internal carotid  arteries without significant focal stenosis. The anterior and middle  cerebral arteries appear normal. The distal vertebral arteries appear  normal. The basilar artery and posterior cerebral arteries appear normal.  No aneurysms, vascular occlusions, or intracranial stenoses are identified.        Perfusion findings:    No perfusion abnormality is identified. Based on the RAPID software  calculation, the infarct core (defined as a cerebral blood flow of less  than 30 percent) has a volume of 0 mL, the total volume of tissue at risk  (defined as a Tmax of greater than 6 seconds) has a volume of 0 mL, and the  mismatch volume (penumbra), has a volume of 0 mL.    - Impression -  No large arterial occlusions identified in the head or neck.  50% narrowing  of the right internal carotid artery near its origin due to atherosclerotic  disease.  No other significant stenoses identified.    No perfusion abnormality identified.      KEY FINDINGS WERE COMMUNICATED BY DR. CLARKE TO DR. KAILASH CISNEROS AT  10/13/2022 4:57 PM.    Electronically Signed by: KAILASH CLARKE M.D.  Signed on: 10/13/2022 4:58 PM    ___________________________________________________________________________    CT HEAD LEVEL 1    - Narrative -  EXAM: CT HEAD LEVEL 1    CLINICAL INDICATION: Neuro deficit, acute, stroke suspected.  Hypertensive. Slurred speech.    COMPARISON: None.    TECHNIQUE: Axial CT images of the  head were obtained from the base of the  skull to the vertex. No contrast was administered. Sagittal and coronal  reformats were created.    FINDINGS:    Scalp is unremarkable. The calvarium is intact.    No evidence of acute intracranial hemorrhage. No mass effect, midline  shift, or herniation.    Periventricular and subcortical white matter hypoattenuation, nonspecific  but favored to reflect chronic microvascular ischemic change. Gray-white  differentiation is preserved.    Age-appropriate appearance of the ventricles and sulci. Ventricles are  unremarkable.  Basal cisterns are patent.    No more than mild scattered mucosal thickening within the visualized  paranasal sinuses. Mastoid air cells are clear.    No acute abnormality identified in the orbits.    Intracranial arterial vascular calcifications are noted.    - Impression -  No acute intracranial hemorrhage.  No acute transcortical infarct.      KEY FINDINGS WERE COMMUNICATED BY DR. CLARKE TO DR. FAVIAN EASTMAN AT  10/13/2022 4:27 PM.    Electronically Signed by: KAILASH CLARKE M.D.  Signed on: 10/13/2022 4:28 PM    LAST X-RAY:  === 10/13/22 ===    XR CHEST PA OR AP 1 VIEW    - Narrative -  EXAMINATION: Chest Radiograph, anteroposterior view    HISTORY: Cerebrovascular accident. Hypertension. Pain.    COMPARISON: 01/24/2022.    FINDINGS:    No abnormal pulmonary opacity is identified. No pleural effusion or  pneumothorax is seen. The heart appears mildly enlarged. The mediastinal  contour is normal. The osseous structures are intact.    - Impression -  1. No acute pulmonary process.        Electronically Signed by: SONDRA MILTON MD  Signed on: 10/13/2022 5:45 PM      === 01/24/22 ===    XR CHEST PA AND LATERAL 2 VIEWS    - Narrative -  Chest radiograph-2 views    CLINICAL HISTORY:    Chest pain acute x 1 day  Sob  + asthma  Smoker  Vaccinated  Cough that hurts    COMPARISON:  None.    TECHNIQUE: Two views of the chest are obtained.    FINDINGS:  The  heart size is at the upper limits of normal.  Trachea is midline.  Hyperexpanded lungs.  No focal lung consolidation.  No pneumothorax.  No  significant pleural effusion.  Pulmonary vascularity is within normal  limits.  Mild degenerative changes in the thoracic spine seen.    - Impression -  No focal lung consolidation is seen.    Electronically Signed by: YVES CARRANZA MD  Signed on: 1/24/2022 12:19 PM      === 06/23/20 ===    XR ADVOCATE PROCEDURE    - Narrative -  Accession #    [id number]    DICTATING PHYSICIAN:  Uche Gupta M.D.    EXAM:  XR FOOT LT MIN 3V, 7/21/2020 4:00 PM    HISTORY:  Foot pain, no injury.    COMPARISON: None.    - Impression -  FINDINGS/IMPRESSION:  Arthrodesis at the proximal phalangeal joint of the 2nd toe, cortical screws demonstrated in  the 2nd metacarpal head and proximal phalanx great toe. No acute fracture or dislocation. Joint  spaces are maintained. Soft tissues are unremarkable.    -----  F I N A L  -----    Transcribed By: JOSHUA  07/21/20 5:05 pm    Dictated By:            MEGAN, UCHE MERAZ MD    Electronically Reviewed and Approved By:           MEGAN, UCHE MERAZ MD  07/21/20 5:15 pm    LAST U/S:  No results found for this or any previous visit.        Assessment    1.  Hypertensive emergency    2.  Chronic episodes of mouth twisting    3.  Chronic episodes of speech difficulty    4.  Chronic neck and back pain    5.  Recent family stressors/possible depression and and anxiety    6. Hx. Of HTN    7. Hx of HLD    Plan    The patient is a 58-year-old female, admitted from her physician's office. She went there for a follow-up because she recently had changes in blood pressure medication. In the physician's office blood pressure was  elevated, per  emergency room physicians report: 250/134.  Was given IV hydralazine and trended down.     In the emergency room it was noted that she had changes in her speech, mouth was twisting, left facial droop. Denies having left  upper extremity weakness in the ED .Reported that difficulty with speech and mouth twisting has been going on for approximately 1 year, along with numbness to the left upper and lower lips and left side of the face after a dental procedure. The last episode PTA was 10/12 when she was on the phone with her daughter. These episodes last anywhere between 10 and 15-minutes. In did not appreciate any facial weakness, dysarthria or aphasia. Neuro. exam was normal.     In the ED was evaluated by the telestroke neurologist who felt that her symptoms were related to her hypertensive emergency.  Was not a candidate for IV thrombolytics.  She was given IV ativan in the emergency room for concern that symptoms may be related to a seizure.     MRI of the brain was negative for an acute stroke.CTA showed no vessel occlusion, 50% narrowing of the right ICA, no narrowing in left ICA.  MRI of the cervical spine multilevel degenerative changes the level of C5/C6, severe left neural neural foraminal narrowing at C5/C6.    The patient's changes with her speech, mouth twisting, were not related to a vascular event, no stroke or TIA and I do not believe are seizures. Her presenting symptoms occurred multiple times over the last year.  This likely these are related to the psychogenic reasons as she has suffered a significant loss in the past 7-month. Report feeling depressed, and states that she is going to be receiving help from a therapist and from her Y[employer].    No further neurological work-up required at this time, the plan and her MRI results were discussed with the patient.           Code Status    Code Status: Full Resuscitation        Dariel Dixon MD   10/14/2022 5:35 PM

## 2022-10-21 NOTE — PHYSICAL THERAPY INITIAL EVALUATION ADULT - HEALTH SCREEN CRITERIA
Please Be Courteous:  You have COVID-19  Day 0 is your first day of symptoms  Day 1 is the first full day after your symptoms started  You should isolate yourself away from other through day 5 since this is when you are likely most infectious  This means go home and stay home  In Your Home:  If you live with other people, trying to avoid common spaces and disinfect areas that you come into contact with  Per the CDC's recommendations: Use a separate bedroom and bathroom if feasible  If If other people in your home are concerned they may have covid, isolation should begin even before seeking testing and before test results become available  All household members should start wearing a mask in the home, particularly in shared spaces where appropriate distancing is not possible  Take Care of Yourself:  Schedule a virtual visit with your primary care physician as soon as possible  Try to sleep on your stomach as much as possible  If you have the ability to, take vitamin D3 2000 IU daily, vitamin-C 1000 mg twice a day, a multivitamin daily to help boost your immune system  You should check your temperature twice day  Go to the emergency department for any severe shortness of breath, chest pain or pulse ox less than 90%  Isolation: Everyone, regardless of vaccination status: You may end isolation after day 5 if:  You are fever-free for 24 hours (without the use of fever-reducing medication)    Your symptoms are improving    If you still have fever or your other symptoms have not improved, continue to isolate until they improve  If you had?moderate illness?(if you experienced shortness of breath or had difficulty breathing), or?severe illness?(you were hospitalized) due to COVID-19, or you have a weakened immune system, you need to isolate through day 10  If you had?severe illness?or have a weakened immune system, consult your doctor before ending isolation   Ending isolation without a viral test may not be an option for you  Nirmatrelvir/Ritonavir (By mouth)   Nirmatrelvir (zjb-CX-evzu-vir), Ritonavir (ntx-CS-od-vir)  Treats coronavirus disease 2019 (COVID-19)  Brand Name(s):   There may be other brand names for this medicine  When This Medicine Should Not Be Used: This medicine is not right for everyone  Do not use it if you had an allergic reaction to nirmatrelvir or ritonavir  How to Use This Medicine:   Tablet  Your doctor will tell you how much medicine to use  Do not use more than directed  Take this medicine within 5 days of the onset of COVID-19 symptoms  Swallow the tablet whole  Do not crush, break, or chew it  This medicine comes with a Fact Sheet for Patients, Parents, and Caregivers  Read and follow the information in the Fact Sheet carefully  Ask your doctor if you have any questions  Missed dose: If it is within 8 hours of the time it is usually taken, take it as soon as possible, and then go back to your regular schedule  If it is more than 8 hours, skip the missed dose and take your next dose at the regular time  Do not use extra medicine to make up for a missed dose  Store the medicine in a closed container at room temperature, away from heat, moisture, and direct light  Drugs and Foods to Avoid:   Ask your doctor or pharmacist before using any other medicine, including over-the-counter medicines, vitamins, and herbal products  Do not use this medicine if you are also using alfuzosin, apalutamide, clozapine, colchicine, lovastatin, lurasidone, midazolam, pethidine, pimozide, piroxicam, propoxyphene, ranolazine, rifampin, salmeterol, sildenafil, simvastatin, Bonnie's wort, triazolam, ergot medicine (including dihydroergotamine, ergotamine, methylergonovine), medicine for heart rhythm problems (including amiodarone, dronedarone, flecainide, propafenone, quinidine), or seizure medicine (including carbamazepine, phenobarbital, phenytoin)    Some medicines can affect how yes nirmatrelvir/ritonavir works  Tell your doctor if you are using any of the following:  Atorvastatin, bedaquiline, bepridil, bosentan, cyclosporine, dasabuvir, digoxin, elbasvir/grazoprevir, fentanyl, glecaprevir/pibrentasvir, lidocaine, methadone, ombitasvir/paritaprevir/ritonavir, quetiapine, rifabutin, rosuvastatin, sirolimus, sofosbuvir/velpatasvir/voxilaprevir, tacrolimus  Birth control pills (including ethinyl estradiol)  Blood pressure medicine (including amlodipine, diltiazem, felodipine, nicardipine, nifedipine)  Blood thinner (including rivaroxaban, warfarin)  Medicine to treat cancer (including abemaciclib, ceritinib, dasatinib, encorafenib, ibrutinib, ivosidenib, neratinib, nilotinib, venetoclax, vinblastine, vincristine)  Medicine to treat depression (including bupropion, trazodone)  Medicine to treat HIV (including amprenavir, atazanavir, bictegravir, darunavir, delavirdine, didanosine, efavirenz, emtricitabine/tenofovir, fosamprenavir, indinavir, maraviroc, nelfinavir, raltegravir, saquinavir, tipranavir, tenofovir, zidovudine)  Medicine to treat infections (including clarithromycin, erythromycin, isavuconazonium, itraconazole, ketoconazole, voriconazole)  Steroid medicine (including betamethasone, budesonide, ciclesonide, dexamethasone, fluticasone, methylprednisolone, mometasone, prednisone, triamcinolone)  Warnings While Using This Medicine:   Tell your doctor if you are pregnant or planning to become pregnant  Birth control pills may not work as well to prevent pregnancy when used with this medicine  Use another form of birth control (including condoms or spermicide) along with your pills  Tell your doctor if you are breastfeeding, or if you have kidney disease, liver disease, or HIV infection  This medicine may cause liver problems  Your doctor will do lab tests at regular visits to check on the effects of this medicine  Keep all appointments  Keep all medicine out of the reach of children  Never share your medicine with anyone  Possible Side Effects While Using This Medicine: If you notice these less serious side effects, talk with your doctor: Allergic reaction: Itching or hives, swelling in your face or hands, swelling or tingling in your mouth or throat, chest tightness, trouble breathing  Blurred vision, severe headache, slow or fast heartbeat, lightheadedness, dizziness  Dark urine or pale stools, nausea, vomiting, loss of appetite, stomach pain, yellow skin or eyes  If you notice these less serious side effects, talk with your doctor:  Change or loss of taste  Diarrhea  Muscle pain  If you notice other side effects that you think are caused by this medicine, tell your doctor  Call your doctor for medical advice about side effects  You may report side effects to FDA at 7-646-FDA-3520    © Copyright [employer] 2022 Information is for End User's use only and may not be sold, redistributed or otherwise used for commercial purposes  The above information is an  only  It is not intended as medical advice for individual conditions or treatments  Talk to your doctor, nurse or pharmacist before following any medical regimen to see if it is safe and effective for you

## 2022-10-25 NOTE — PHYSICAL THERAPY INITIAL EVALUATION ADULT - MARITAL STATUS
[FreeTextEntry1] : Very pleasant 72-year-old woman who presents for follow-up of kidney stones, left renal atrophy and severe hydronephrosis\par -Ultrasound images reviewed with the patient demonstrating no kidney stones, right hydronephrosis, or renal masses.  It does demonstrate severe left hydronephrosis with renal atrophy\par -Repeat renal ultrasound in 6 months and follow-up at that time\par -We discussed general preventative strategies for kidney stones Single

## 2023-01-06 NOTE — CHART NOTE - NSCHARTNOTEFT_GEN_A_CORE
Patient discussed again with advanced team.  PD stone likely manifestation of chronic panc.  Given other more pressing comorbidities, with increased anesthesia risk, do not plan for inpatient endoscopy.  Would followup GI series to eval for source of vomiting.  Recommend outpatient GI followup - [phone]; at outpatient visit can plan for appropriate timing of endoscopic evaluation. No

## 2023-01-13 NOTE — PATIENT PROFILE ADULT - NSPROMUTPARTICIPCAREFT_GEN_A_NUR
60y.o. year old female presenting to the emergency room with concerns of left rib pain, shortness of breath beginning 36 hours ago patient. Patient reports that symptom's onset 36 hours ago. Worsen with deep inspiration. Improves with nothing. Severity of 9 out of 10 pain, with radiation around the left chest wall. Symptoms are constant in timing. Symptoms described as sharp. Patient reports associated symptoms of decreased appetite. Patient with recent surgery on May 28 for left ankle fracture. Patient denies being on any blood thinners. Chief Complaint   Patient presents with    Rib Pain     Left side Rib pain. Has been going on for 36 hours. Patient denies injury    Shortness of Breath     due to pain patient is having SOB       Review of Systems   Constitutional: Positive for fatigue. Negative for chills and fever. HENT: Negative for trouble swallowing and voice change. Eyes: Negative for photophobia and visual disturbance. Respiratory: Positive for shortness of breath. Negative for wheezing. Cardiovascular: Positive for chest pain. Negative for palpitations. Gastrointestinal: Negative for abdominal pain, diarrhea, nausea and vomiting. Genitourinary: Negative for dysuria and hematuria. Musculoskeletal: Negative for arthralgias, back pain and neck pain. Skin: Negative for wound. Neurological: Negative for dizziness, syncope, weakness, numbness and headaches. Psychiatric/Behavioral: Negative for behavioral problems and confusion. The patient is nervous/anxious. Physical Exam  Vitals reviewed. Constitutional:       General: She is not in acute distress. Appearance: Normal appearance. HENT:      Head: Normocephalic. Right Ear: External ear normal.      Left Ear: External ear normal.      Nose: Nose normal.      Mouth/Throat:      Pharynx: Oropharynx is clear. Eyes:      General:         Right eye: No discharge. Left eye: No discharge. Conjunctiva/sclera: Conjunctivae normal.      Pupils: Pupils are equal, round, and reactive to light. Cardiovascular:      Rate and Rhythm: Normal rate and regular rhythm. Heart sounds: No murmur heard. No friction rub. Pulmonary:      Effort: Accessory muscle usage present. No respiratory distress. Breath sounds: No stridor. No decreased breath sounds, wheezing, rhonchi or rales. Abdominal:      General: There is no distension. Palpations: Abdomen is soft. Tenderness: There is no guarding or rebound. Musculoskeletal:         General: No tenderness or deformity. Cervical back: Normal range of motion and neck supple. No rigidity or tenderness. Right lower leg: No edema. Left lower leg: No edema. Skin:     General: Skin is warm. Coloration: Skin is not jaundiced. Neurological:      Mental Status: She is alert. Sensory: No sensory deficit. Motor: No weakness. Psychiatric:         Mood and Affect: Mood is anxious. Behavior: Behavior is agitated. Procedures     EKG: This EKG is signed by emergency department physician. Rate: 82  Rhythm: Sinus  ST Changes:no st elevations noted  Interpretation:sinus rhythm  Comparison: changes compared to previous EKG     MDM  Number of Diagnoses or Management Options  Diagnosis management comments: 43-year-old female presenting to the emergency department complaints of rib pain shortness of breath. Symptoms of been ongoing for 36 hours. Patient with recent orthopedic surgery on end of May. UA positive for infection. CTA demonstrated bilateral pulmonary emboli. Patient started on heparin. Spoke to Dr. Luisa Nair, discussed patient will plan to admit patient at this time.   Patient stable at time of admission.                  --------------------------------------------- PAST HISTORY ---------------------------------------------  Past Medical History:  has a past medical history of Depression and Thyroid disease. Past Surgical History:  has a past surgical history that includes eye surgery; Tonsillectomy; Ankle fracture surgery (Left, 5/28/2022); and Endometrial ablation. Social History:  reports that she has never smoked. She has never used smokeless tobacco. She reports current alcohol use. She reports that she does not use drugs. Family History: family history is not on file. The patients home medications have been reviewed. Allergies: Patient has no known allergies.     -------------------------------------------------- RESULTS -------------------------------------------------    LABS:  Results for orders placed or performed during the hospital encounter of 06/27/22   CBC with Auto Differential   Result Value Ref Range    WBC 11.3 4.5 - 11.5 E9/L    RBC 4.51 3.50 - 5.50 E12/L    Hemoglobin 13.3 11.5 - 15.5 g/dL    Hematocrit 40.5 34.0 - 48.0 %    MCV 89.8 80.0 - 99.9 fL    MCH 29.5 26.0 - 35.0 pg    MCHC 32.8 32.0 - 34.5 %    RDW 12.3 11.5 - 15.0 fL    Platelets 097 421 - 797 E9/L    MPV 9.5 7.0 - 12.0 fL    Neutrophils % 86.4 (H) 43.0 - 80.0 %    Immature Granulocytes % 0.4 0.0 - 5.0 %    Lymphocytes % 7.1 (L) 20.0 - 42.0 %    Monocytes % 5.8 2.0 - 12.0 %    Eosinophils % 0.0 0.0 - 6.0 %    Basophils % 0.3 0.0 - 2.0 %    Neutrophils Absolute 9.78 (H) 1.80 - 7.30 E9/L    Immature Granulocytes # 0.04 E9/L    Lymphocytes Absolute 0.80 (L) 1.50 - 4.00 E9/L    Monocytes Absolute 0.66 0.10 - 0.95 E9/L    Eosinophils Absolute 0.00 (L) 0.05 - 0.50 E9/L    Basophils Absolute 0.03 0.00 - 0.20 E9/L   Comprehensive Metabolic Panel w/ Reflex to MG   Result Value Ref Range    Sodium 136 132 - 146 mmol/L    Potassium reflex Magnesium 3.9 3.5 - 5.0 mmol/L    Chloride 100 98 - 107 mmol/L    CO2 21 (L) 22 - 29 mmol/L    Anion Gap 15 7 - 16 mmol/L    Glucose 116 (H) 74 - 99 mg/dL    BUN 9 6 - 23 mg/dL    CREATININE 0.8 0.5 - 1.0 mg/dL    GFR Non-African American >60 >=60 mL/min/1.73    GFR African American >60     Calcium 9.4 8.6 - 10.2 mg/dL    Total Protein 7.3 6.4 - 8.3 g/dL    Albumin 4.4 3.5 - 5.2 g/dL    Total Bilirubin 1.1 0.0 - 1.2 mg/dL    Alkaline Phosphatase 106 (H) 35 - 104 U/L    ALT 9 0 - 32 U/L    AST 13 0 - 31 U/L   Troponin   Result Value Ref Range    Troponin, High Sensitivity 6 0 - 9 ng/L   Urinalysis with Microscopic   Result Value Ref Range    Color, UA Yellow Straw/Yellow    Clarity, UA Clear Clear    Glucose, Ur Negative Negative mg/dL    Bilirubin Urine Negative Negative    Ketones, Urine 40 (A) Negative mg/dL    Specific Gravity, UA 1.015 1.005 - 1.030    Blood, Urine MODERATE (A) Negative    pH, UA 5.0 5.0 - 9.0    Protein, UA Negative Negative mg/dL    Urobilinogen, Urine 0.2 <2.0 E.U./dL    Nitrite, Urine POSITIVE (A) Negative    Leukocyte Esterase, Urine TRACE (A) Negative    Mucus, UA Present (A) None Seen /LPF    WBC, UA 5-10 (A) 0 - 5 /HPF    RBC, UA 0-1 0 - 2 /HPF    Bacteria, UA MANY (A) None Seen /HPF    Amorphous, UA RARE    APTT   Result Value Ref Range    aPTT 26.2 24.5 - 35.1 sec   EKG 12 Lead   Result Value Ref Range    Ventricular Rate 82 BPM    Atrial Rate 82 BPM    P-R Interval 174 ms    QRS Duration 88 ms    Q-T Interval 372 ms    QTc Calculation (Bazett) 434 ms    P Axis 15 degrees    R Axis -14 degrees    T Axis 46 degrees       RADIOLOGY:  CTA PULMONARY W CONTRAST   Final Result   1. Bilateral pulmonary emboli. There are pulmonary emboli seen within the   segmental branches of the right middle and right lower lobes and there are   pulmonary emboli seen within the segmental and subsegmental pulmonary   arteries of the left lower lobe. 2. Right lower lobe airspace disease which is favored to represent   atelectasis. 3. Left lower lobe airspace disease which could represent pneumonia or   pulmonary infarct. 4. Trace left pleural effusion. 5. There are no findings of right ventricular strain.          CT ABDOMEN PELVIS WO CONTRAST Additional Contrast? None   Final Result   Opacities in the bilateral lung basis. Please refer to CT chest, same day   for further detail. No hydronephrosis. No evidence of renal or ureteral calculus. XR CHEST PORTABLE   Final Result   1. Patchy bibasilar airspace disease   2. Trace left pleural effusion.               ------------------------- NURSING NOTES AND VITALS REVIEWED ---------------------------  Date / Time Roomed:  6/27/2022  7:50 AM  ED Bed Assignment:  [id number]    The nursing notes within the ED encounter and vital signs as below have been reviewed. Patient Vitals for the past 24 hrs:   BP Temp Temp src Pulse Resp SpO2 Height Weight   06/27/22 1902 111/61 98.6 °F (37 °C) Oral 75 18 91 % -- --   06/27/22 1525 130/66 98.2 °F (36.8 °C) Temporal 76 18 95 % -- --   06/27/22 1141 131/75 98 °F (36.7 °C) Oral 84 16 95 % -- --   06/27/22 0656 101/62 97.8 °F (36.6 °C) Tympanic 98 18 94 % 5' 9\" (1.753 m) 164 lb (74.4 kg)       Oxygen Saturation Interpretation: Normal    ------------------------------------------ PROGRESS NOTES ------------------------------------------  Re-evaluation(s):   Patients symptoms show no change  Repeat physical examination is not changed    Counseling:  I have spoken with the patient and discussed todays results, in addition to providing specific details for the plan of care and counseling regarding the diagnosis and prognosis. Their questions are answered at this time and they are agreeable with the plan of admission.    --------------------------------- ADDITIONAL PROVIDER NOTES ---------------------------------  Consultations:  Spoke with Dr. Omar Fernandes. Discussed case. They will admit the patient.   This patient's ED course included: a personal history and physicial examination, re-evaluation prior to disposition, multiple bedside re-evaluations, IV medications, cardiac monitoring and continuous pulse oximetry    This patient has remained hemodynamically stable during their ED course. Diagnosis:  1. Pulmonary embolism, other, unspecified chronicity, unspecified whether acute cor pulmonale present ([facility] [address]        Disposition:  Patient's disposition: Admit  Patient's condition is stable. Attending was present and available throughout encounter including all critical portions;  See Attending Note/Attestation for Final [address], DO  Resident  06/27/22 0514 Attending Only n/a

## 2023-01-22 NOTE — ED PROVIDER NOTE - OBJECTIVE STATEMENT
78 year old male that has a history of htn, hyperlipidemia, diabetes, SAH, gastric CA (treated in 2006 and resected) came ot the ED because of vomiting. He developed watery non-bloody diarrhea when he was discharged from the hospital approximately 2 weeks ago. 4 days ago he started to vomit and has not been able to keep anything down. Last BM was yesterday and he is passing gas. No abd pain, no chest pain, no headache, no back pain, no neck pain, no chills, no urinary complaints. His daughter adds that he has been loosing weight the last couple of years. 36.6

## 2023-01-31 NOTE — ED ADULT NURSE NOTE - CHIEF COMPLAINT
Please let pt know that I have filled rx for ativan and losartan    1  Ask if any recent home BP readings? 2   Recommendation for visit in 3/2023 for PE    Thanks The patient is a 79y Male complaining of syncope.

## 2023-10-26 NOTE — PATIENT PROFILE ADULT - BRADEN SCORE
"Occupational Therapy   Discharge Summary     Patient Name: Yvonne Marie Wada  Age:  85 y.o., Sex:  female  Medical Record #: [id number]  Today's Date: 10/26/2023     Precautions  Precautions: Fall Risk  Comments: TLSO PRN         Subjective    DC summary written based off most recent DC assessment on 10/24     Objective       10/26/23 1300   Vitals   Pulse 67   Patient BP Position Supine   Blood Pressure  99/58   Respiration 16   Pulse Oximetry 95 %   Eating   Assistance Needed Set-up / clean-up   Physical Assistance Level No physical assistance   CARE Score - Eating 5   Oral Hygiene   Assistance Needed Set-up / clean-up   Physical Assistance Level No physical assistance   CARE Score - Oral Hygiene 5   Toileting Hygiene   Assistance Needed Physical assistance   Physical Assistance Level 26%-50%   CARE Score - Toileting Hygiene 3   Shower/Bathe Self   Assistance Needed Physical assistance   Physical Assistance Level 26%-50%   CARE Score - Shower/Bathe Self 3   Upper Body Dressing   Assistance Needed Set-up / clean-up   Physical Assistance Level No physical assistance   CARE Score - Upper Body Dressing 5   Lower Body Dressing   Assistance Needed Physical assistance   Physical Assistance Level 26%-50%   CARE Score - Lower Body Dressing 3   Putting On/Taking Off Footwear   Assistance Needed Physical assistance   Physical Assistance Level 51%-75%   CARE Score - Putting On/Taking Off Footwear 2   Toilet Transfer   Assistance Needed Incidental touching   Physical Assistance Level No physical assistance   CARE Score - Toilet Transfer 4   Cognitive Pattern Assessment   Cognitive Pattern Assessment Used BIMS   Brief Interview for Mental Status (BIMS)   Repetition of Three Words (First Attempt) 3   Temporal Orientation: Year Correct   Temporal Orientation: Month Accurate within 5 days   Temporal Orientation: Day Incorrect   Recall: \"Sock\" No, could not recall   Recall: \"Blue\" No, could not recall   Recall: \"Bed\" No, could not " recall   BIMS Summary Score 8   Confusion Assessment Method (CAM)   Is there evidence of an acute change in mental status from the patient's baseline? No   Inattention Behavior not present   Disorganized thinking Behavior not present   Altered level of consciousness Behavior not present   Discharge Summary    Discharge Location  Other (See Comments)  (pending SNF)   Patient Discharging with Assist of Caregiver;Family    Level of Supervision Required 24 Hour Supervision   Recommended Equipment for Discharge Front-Wheeled Walker   Recommended Services Upon Discharge Home Health Occupational Therapy;Other (See Comments)  (SNF vs home)   Long Term Goals Met 1   Long Term Goals Not Met 1   Reason(s) for Goals Not Met poor participation   Criteria for Termination of Services Maximum Function Achieved for Inpatient Rehabilitation   Discharge Instructions to Patient   Level of Assist Required for Eating Able to Complete Eating without Assist   Level of Assist Required for Grooming Able to Complete Grooming without Assist   Level of Assist Required for Dressing Able to Complete Dressing without Assist   Level of Assist Required for Toileting Requires Supervision with Toileting   Level of Assist Required for Toilet Transfer Requires Supervision with Toilet Transfer   Level of Assist Required for Bathing Requires Supervision with Bathing   Level of Assist Required for Shower Transfer Requires Supervision with Shower Transfer   Level of Assist Required for Home Mgmt Requires Supervision with Home Management   Level of Assist Required for Meal Prep Requires Supervision with Meal Preparation   Driving Please Contact Physician Prior to Driving   Home Exercise Program Refer to Home Exercise Program Handout for Details       Assessment    Pt at plateau of progress during current LOS. She demonstrates barriers in participation with poor carry over of education and cueing tactics. Pt noncompliant with spinal precautions and has poor  safety with task completion. Highly recommend skilled facility for continuation of care.      Strengths: Able to follow instructions, Supportive family, Alert and oriented  Barriers: Decreased endurance, Generalized weakness, Impaired balance, Impaired activity tolerance, Pain, Lack of motivation    Plan    DC        Occupational Therapy Goals (Active)       Problem: Bathing       Dates: Start:  10/13/23         Goal: STG-Within one week, patient will bathe with CGA using DME/AE as needed       Dates: Start:  10/24/23    Expected End:  10/25/23       Description:             Problem: Dressing       Dates: Start:  10/13/23         Goal: STG-Within one week, patient will dress LB with Min A using DME/AE as needed       Dates: Start:  10/13/23    Expected End:  10/25/23         Goal Note filed on 10/24/23 1255 by Priya Sesay OT       Pt can complete with SBA but refuses to complete                 Problem: Functional Transfers       Dates: Start:  10/13/23         Goal: STG-Within one week, patient will transfer to toilet with SUP using DME/AE as needed       Dates: Start:  10/24/23    Expected End:  10/25/23       Description:             Problem: OT Long Term Goals       Dates: Start:  10/13/23         Goal: LTG-By discharge, patient will complete basic self care tasks with supervision using DME/AE as needed       Dates: Start:  10/13/23    Expected End:  10/25/23            Goal: LTG-By discharge, patient will perform bathroom transfers with supervision - set-up using DME/AE as needed       Dates: Start:  10/13/23    Expected End:  10/25/23               Problem: Toileting       Dates: Start:  10/13/23         Goal: STG-Within one week, patient will complete toileting tasks with Min A using DME/AE as needed       Dates: Start:  10/13/23    Expected End:  10/25/23         Goal Note filed on 10/24/23 1255 by Priya Sesay OT       Refuses to complete LBD part of toileting tasks, therefore requiring assistance    19

## 2023-11-28 NOTE — PROVIDER CONTACT NOTE (CRITICAL VALUE NOTIFICATION) - NAME OF MD/NP/PA/DO NOTIFIED:
Dr. Washington Detail Level: Zone Initiate Treatment: Efudex 5% cream BID x2 weeks to affected areas of face
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
## 2025-02-10 NOTE — PROVIDER CONTACT NOTE (CRITICAL VALUE NOTIFICATION) - RECOMMENDATIONS
84-year-old female patient with past medical history as listed below, known to Dr. Renee. Last office visit June 5, 2023 for right lower quadrant abdominal tenderness, CT scan follow-up, refilled Levsin tablets, refilled pantoprazole for GERD. She had come in to see him for evaluation of intermittent dysphagia previously. CT enterography was done May 30, 2023 failed to show any evidence of bowel wall thickening, there was sigmoid diverticulosis without local inflammation, there was a small hiatal hernia and distal esophageal thickening, there was no evidence of mechanical small bowel obstruction and the appendix appeared to be normal. Dr. Renee went over the CT enterography with her, Levsin was helping with her abdominal pain. 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  -- The patient presents today with very similar complaints as the last visit this time she has some left lower quadrant pain that radiates up to the rib and around to the back she describes it as a gas pain.  She states that if she sits for more than 45 minutes that her stomach will start hurting and bloating especially at [employer] or in the car and she feels like it is trapped gas.  Once she stands up it is relieved after a few minutes.  It is also relieved after passing some gas.  She also complains of burping frequently and always being gassy.  She says this has been this way for months and that it really all started after having COVID.  Says when she passes gas she feels better.  She says she has been working with Dr. Lynch's PA and she has been doing therapy for her back.  She states she has IBS.  States she does not really take the Levsin that much as she does not know that it really helps.  She has started taking Gas-X before   Tenriism but does not feel like that helps very much either.  Denies any dark stools or hematochezia or dysphagia.  Today February 10, 2025 the patient returns for a follow-up visit, the patient was last seen on September 25, 2023 by Juanita Keith with left lower quadrant abdominal pain, right lower quadrant abdominal pain, mesenteric artery stenosis celiac artery stenosis, bloating and increased flatulence, IBS with diarrhea, gastroesophageal reflux, history of atrial fibrillation on chronic anticoagulation, the patient had a pacemaker.  At the time of the visit the patient complained of intermittent dysphagia.  The patient on a CT enterography revealed a small hiatal hernia and distal esophageal thickening with no evidence of obstruction, the patient did have a history of GERD being treated with pantoprazole.  The patient also complained of left lower quadrant abdominal pain radiated towards the  lower abdomen and around her back, had increased gas, the patient stated that if she sat for over 45 minutes she would then develop pain which she blames on trapped gas.  Improvement came after passing gas, the patient also complains of frequent burping.  The patient was in the process of seeing primary care to be treated for back pain.  The patient CT enterography performed May 30, 2023 revealed sigmoid diverticulosis without diverticulitis, moderate stenosis at the origin of the celiac and superior mesenteric artery.  The liver, bile ducts, gallbladder were normal the pancreas revealed diffuse atrophy with no masses or ductal dilation, the spleen had some granulomas.  There were normal enlarged lymph nodes.  There were no destructive osseous lesions.  Today the patient returns in the company of her daughter complaining of 6 days of intense abdominal pain, it started in the left lower quadrant and suprapubic area and radiated towards the right lower quadrant, it was associated with abdominal bloating, gassiness and change in bowel habits, the patient started defecating type V stools on the [place] scale with some blood and what she described as fleshy tissue.  The patient denies having any fever, had slight difficulty urinating, she describes the pain as cramping in nature and very intense.  Over a period of 6 days the intensity has decreased, no longer is passing any fleshy tissue or mucus and continues to have minimal bright red blood on the stool.  The patient denies having any recent upper GI symptoms.  The patient does have a history of sigmoid diverticulosis, mesenteric and celiac artery stenosis.  We discussed today various possibilities including diverticulitis versus ischemic colitis.  The patient agreed to get a CBC with differential, CRP, sedimentation rate and a stat CT of the abdomen and pelvis with contrast.  Laboratory on February 4, 2025 revealed a normal EGFR of 62 with a normal BUN and creatinine normal electrolytes and LFTs.  The patient will remain on a liquid diet, we will contact the patient with results and recommendations as soon as available.  The patient states that she is allergic to various antibiotics including all penicillins, cephalosporins, doxycycline, erythromycin.  Has never been treated with sulfa products.
Follow provider orders, repeat labs, continue to monitor.